# Patient Record
Sex: FEMALE | Race: BLACK OR AFRICAN AMERICAN
[De-identification: names, ages, dates, MRNs, and addresses within clinical notes are randomized per-mention and may not be internally consistent; named-entity substitution may affect disease eponyms.]

---

## 2017-02-27 ENCOUNTER — HOSPITAL ENCOUNTER (EMERGENCY)
Dept: HOSPITAL 17 - NEPA | Age: 62
LOS: 1 days | Discharge: HOME | End: 2017-02-28
Payer: MEDICARE

## 2017-02-27 VITALS — BODY MASS INDEX: 23.95 KG/M2 | WEIGHT: 140.3 LBS | HEIGHT: 64 IN

## 2017-02-27 VITALS
HEART RATE: 86 BPM | RESPIRATION RATE: 16 BRPM | SYSTOLIC BLOOD PRESSURE: 191 MMHG | OXYGEN SATURATION: 98 % | DIASTOLIC BLOOD PRESSURE: 90 MMHG | TEMPERATURE: 97.9 F

## 2017-02-27 DIAGNOSIS — Z79.82: ICD-10-CM

## 2017-02-27 DIAGNOSIS — I50.9: ICD-10-CM

## 2017-02-27 DIAGNOSIS — E11.9: ICD-10-CM

## 2017-02-27 DIAGNOSIS — Z95.1: ICD-10-CM

## 2017-02-27 DIAGNOSIS — I42.9: ICD-10-CM

## 2017-02-27 DIAGNOSIS — R06.09: Primary | ICD-10-CM

## 2017-02-27 DIAGNOSIS — E03.9: ICD-10-CM

## 2017-02-27 DIAGNOSIS — J44.9: ICD-10-CM

## 2017-02-27 DIAGNOSIS — M32.9: ICD-10-CM

## 2017-02-27 DIAGNOSIS — Z79.4: ICD-10-CM

## 2017-02-27 LAB
ANION GAP SERPL CALC-SCNC: 10 MEQ/L (ref 5–15)
BASOPHILS # BLD AUTO: 0 TH/MM3 (ref 0–0.2)
BASOPHILS NFR BLD: 0.2 % (ref 0–2)
BUN SERPL-MCNC: 11 MG/DL (ref 7–18)
CHLORIDE SERPL-SCNC: 104 MEQ/L (ref 98–107)
CK MB SERPL-MCNC: 2.3 NG/ML (ref 0.5–3.6)
CK SERPL-CCNC: 118 U/L (ref 26–192)
EOSINOPHIL # BLD: 0 TH/MM3 (ref 0–0.4)
EOSINOPHIL NFR BLD: 0.4 % (ref 0–4)
ERYTHROCYTE [DISTWIDTH] IN BLOOD BY AUTOMATED COUNT: 15.3 % (ref 11.6–17.2)
GFR SERPLBLD BASED ON 1.73 SQ M-ARVRAT: 83 ML/MIN (ref 89–?)
HCO3 BLD-SCNC: 30.2 MEQ/L (ref 21–32)
HCT VFR BLD CALC: 34.4 % (ref 35–46)
HEMO FLAGS: (no result)
LYMPHOCYTES # BLD AUTO: 0.9 TH/MM3 (ref 1–4.8)
LYMPHOCYTES NFR BLD AUTO: 14.8 % (ref 9–44)
MCH RBC QN AUTO: 26.5 PG (ref 27–34)
MCHC RBC AUTO-ENTMCNC: 33.1 % (ref 32–36)
MCV RBC AUTO: 80.1 FL (ref 80–100)
MONOCYTES NFR BLD: 9.9 % (ref 0–8)
NEUTROPHILS # BLD AUTO: 4.8 TH/MM3 (ref 1.8–7.7)
NEUTROPHILS NFR BLD AUTO: 74.7 % (ref 16–70)
PLATELET # BLD: 181 TH/MM3 (ref 150–450)
POTASSIUM SERPL-SCNC: 3.2 MEQ/L (ref 3.5–5.1)
RBC # BLD AUTO: 4.3 MIL/MM3 (ref 4–5.3)
SODIUM SERPL-SCNC: 144 MEQ/L (ref 136–145)
WBC # BLD AUTO: 6.4 TH/MM3 (ref 4–11)

## 2017-02-27 PROCEDURE — 71275 CT ANGIOGRAPHY CHEST: CPT

## 2017-02-27 PROCEDURE — 93005 ELECTROCARDIOGRAM TRACING: CPT

## 2017-02-27 PROCEDURE — 71010: CPT

## 2017-02-27 PROCEDURE — 82552 ASSAY OF CPK IN BLOOD: CPT

## 2017-02-27 PROCEDURE — 84484 ASSAY OF TROPONIN QUANT: CPT

## 2017-02-27 PROCEDURE — 99285 EMERGENCY DEPT VISIT HI MDM: CPT

## 2017-02-27 PROCEDURE — 82550 ASSAY OF CK (CPK): CPT

## 2017-02-27 PROCEDURE — 85025 COMPLETE CBC W/AUTO DIFF WBC: CPT

## 2017-02-27 PROCEDURE — 80048 BASIC METABOLIC PNL TOTAL CA: CPT

## 2017-02-27 NOTE — PD
HPI


.


Shortness of breath


Chief Complaint:  Edema


Time Seen by Provider:  22:34


Travel History


International Travel<30 days:  No


Contact w/Intl Traveler<30days:  No


Traveled to known affect area:  No





History of Present Illness


HPI


Patient presents with a 3 to four-week history of shortness of breath.  [facility]  She told her doctor about the 

shortness of breath and was instructed to come here for further evaluation.  

Patient reports increasing dyspnea with speaking.  She reports a past medical 

history of chronic bronchitis, CHF, cardiomyopathy, lupus.  She has several 

other underlying medical problems but these are the ones that could possibly 

affect her breathing.  She denies fever.  She denies productive cough.





PFSH


Past Medical History


Hx Anticoagulant Therapy:  Yes (asa)


Anemia:  Yes (PERNICIOUS)


Arthritis:  Yes


Asthma:  Yes (RESOLVED)


Autoimmune Disease:  Yes (LUPUS/ RA/ FIBROMYALGIA)


Blood Disorders:  No


Anxiety:  No


Depression:  No


Heart Rhythm Problems:  Yes (TACHYCARDIA)


Cancer:  No


Cardiac Catheterization:  Yes (in  for ablation)


Cardiomyopathy:  Yes


Cardiovascular Problems:  Yes (MI)


High Cholesterol:  No


Chemotherapy:  No


Chest Pain:  Yes


Congestive Heart Failure:  Yes


COPD:  Yes


Cerebrovascular Accident:  No


Coronary Artery Disease:  Yes


Diabetes:  Yes


Patient Takes Glucophage:  No


Diminished Hearing:  No


Endocrine:  Yes


Fibromyalgia:  Yes


Gastrointestinal Disorders:  Yes


GERD:  Yes


Glaucoma:  No


Genitourinary:  Yes


Headaches:  Yes


Hepatitis:  No


Hiatal Hernia:  Yes


Heparin Induced Thrombocytopen:  No


Herniated Disk:  Yes


Hypertension:  Yes


Immune Disorder:  Yes


Implanted Vascular Access Dvce:  Yes


Kidney Stones:  Yes


Medical other:  Yes (LUPUS, MIGRAINES, ANEMIA, FIBROMYALGIA, RA )


Musculoskeletal:  Yes (FIBROMYALGIA/LUPUS)


Neurologic:  Yes


Psychiatric:  Yes


Reproductive:  No


Immunizations Current:  Yes


Migraines:  Yes


Myocardial Infarction:  Yes ()


Radiation Therapy:  No


Renal Failure:  No


Seizures:  No


Sickle Cell Disease:  No


Sleep Apnea:  No


Thyroid Disease:  Yes (HYPO)


Ulcer:  Yes


Tetanus Vaccination:  > 5 Years


Influenza Vaccination:  Yes


Menopausal:  Yes


Dilation and Curettage (D&C):  Yes





Past Surgical History


Abdominal Surgery:  No


AICD:  Yes ([employer] RFMarq)


Arteriovenous Shunt:  No


Cardiac Surgery:  Yes (AICD [employer] CHANGE)


 Section:  Yes


Coronary Artery Bypass Graft:  Yes


Ear Surgery:  No


Endocrine Surgery:  No


Eye Surgery:  No


Genitourinary Surgery:  No


Gynecologic Surgery:  Yes ( LAP. MYOMECTOMY)


Hysterectomy:  Yes


Insulin Pump:  No


Joint Replacement:  No


Neurologic Surgery:  No


Oral Surgery:  No


Pacemaker:  Yes


Thoracic Surgery:  No


Other Surgery:  Yes





Family History


Family Myocardial Infarction:  No





Social History


Alcohol Use:  Yes (RARE)


Tobacco Use:  No


Substance Use:  No





Allergies-Medications


(Allergen,Severity, Reaction):  


Coded Allergies:  


     No Known Allergies (Verified , 17)


Reported Meds & Prescriptions





Reported Meds & Active Scripts


Active


Reported


Nitroglycerin SL (Nitroglycerin) 0.4 Mg Subl 0.4 Mg SL AS DIRECTED PRN


     ONE TABLET UNDER THE TONGUE AS NEEDED FOR CHEST PAIN, MAY REPEAT EVERY


     FIVE MINUTES FOR A TOTAL OF 3 DOSES OR CALL 911 IF NO RELIEF


Lyrica (Pregabalin) 25 Mg Cap 25 Mg PO DAILY


Percocet (Oxycodone-Acetaminophen)  mg Tab 1 Tab PO Q6H PRN


Metoprolol Tartrate 100 Mg Tab 100 Mg PO BID


Lisinopril 40 Mg Tab 40 Mg PO DAILY


Levothyroxine (Levothyroxine Sodium) 125 Mcg Tab 125 Mcg PO DAILY


Lantus Inj (Insulin Glargine) 1,000 Unit/10 Ml Vial 26 Units SQ HS


Cymbalta DR (Duloxetine HCl) 60 Mg Capdr 60 Mg PO DAILY


Cyanocobalamin Inj (Cyanocobalamin) 1,000 Mcg/Ml Inj 1,000 Mcg SQ ONCE


Carisoprodol 250 Mg Tab 250 Mg PO QID PRN


Aspirin 81 Mg Tabdr 81 Mg PO DAILY


Amlodipine-Benazepril 2.5-10 Mg Cap 1 Cap PO DAILY








Review of Systems


Except as stated in HPI:  all other systems reviewed are Neg


General / Constitutional:  No: Fever, Chills


Cardiovascular:  Positive: Chest Pain or Discomfort


Respiratory:  Positive: Shortness of Breath


Gastrointestinal:  No: Nausea, Vomiting, Diarrhea


Genitourinary:  No: Urgency, Frequency, Dysuria





Physical Exam


Narrative


GENERAL: Patient ambulates to the room and the apparent difficulty.


SKIN: Warm and dry.


HEAD: Atraumatic. Normocephalic. 


EYES: Pupils equal and round. 


ENT: No nasal bleeding or discharge.  Mucous membranes pink and moist.


NECK: Trachea midline.  Neck is supple.


CARDIOVASCULAR: Regular rate and rhythm.  Heart sounds are normal.


RESPIRATORY: No accessory muscle use.  Lungs sound clear with full air movement 

throughout.


GASTROINTESTINAL: Abdomen soft, non-tender, nondistended. 


MUSCULOSKELETAL: No obvious deformities.   No edema. 


NEUROLOGICAL: Awake and alert. No obvious cranial nerve deficits.  Motor 

grossly within normal limits. Normal speech.


PSYCHIATRIC: Appropriate mood and affect; insight and judgment normal.





Data


Data


Last Documented VS





Vital Signs








  Date Time  Temp Pulse Resp B/P Pulse Ox O2 Delivery O2 Flow Rate FiO2


 


17 22:50   24  99 Nasal Cannula 2 


 


17 21:11 97.9 86  191/90    








Orders





 Electrocardiogram (17 21:27)


Complete Blood Count With Diff (17 21:27)


Basic Metabolic Panel (Bmp) (17 21:27)


Ckmb (Isoenzyme) Profile (17 21:27)


Troponin I (17 21:27)


Chest, Single Ap (17 21:27)


CKMB (17 21:35)


CKMB% (17 21:35)


Ct Pulmonary Angiogram (17 )


Iohexol 350 Inj (Omnipaque 350 Inj) (17 01:07)





Labs





 Laboratory Tests








Test 17





 21:35


 


White Blood Count 6.4 TH/MM3


 


Red Blood Count 4.30 MIL/MM3


 


Hemoglobin 11.4 GM/DL


 


Hematocrit 34.4 %


 


Mean Corpuscular Volume 80.1 FL


 


Mean Corpuscular Hemoglobin 26.5 PG


 


Mean Corpuscular Hemoglobin 33.1 %





Concent 


 


Red Cell Distribution Width 15.3 %


 


Platelet Count 181 TH/MM3


 


Mean Platelet Volume 8.4 FL


 


Neutrophils (%) (Auto) 74.7 %


 


Lymphocytes (%) (Auto) 14.8 %


 


Monocytes (%) (Auto) 9.9 %


 


Eosinophils (%) (Auto) 0.4 %


 


Basophils (%) (Auto) 0.2 %


 


Neutrophils # (Auto) 4.8 TH/MM3


 


Lymphocytes # (Auto) 0.9 TH/MM3


 


Monocytes # (Auto) 0.6 TH/MM3


 


Eosinophils # (Auto) 0.0 TH/MM3


 


Basophils # (Auto) 0.0 TH/MM3


 


CBC Comment DIFF FINAL 


 


Differential Comment  


 


Sodium Level 144 MEQ/L


 


Potassium Level 3.2 MEQ/L


 


Chloride Level 104 MEQ/L


 


Carbon Dioxide Level 30.2 MEQ/L


 


Anion Gap 10 MEQ/L


 


Blood Urea Nitrogen 11 MG/DL


 


Creatinine 0.85 MG/DL


 


Estimat Glomerular Filtration 83 ML/MIN





Rate 


 


Random Glucose 255 MG/DL


 


Calcium Level 8.3 MG/DL


 


Total Creatine Kinase 118 U/L


 


Creatine Kinase MB 2.3 NG/ML


 


Troponin I 0.02 NG/ML











[facility]


Medical Decision Making


Medical Screen Exam Complete:  Yes


Emergency Medical Condition:  Yes


Interpretation(s)


EKG shows a sinus rhythm.  Poor wave progression.  Unchanged from previous.


Differential Diagnosis


Differential diagnosis of dyspnea includes but is not limited to congestive 

heart failure, pneumonia, wheezing, pneumothorax, pulmonary embolism


Narrative Course


Patient presents for dyspnea.





CBC & BMP Diagram


17 21:35








CK and troponin are negative.








Last Impressions








Chest X-Ray 17 Signed





Impressions: 





 Service Date/Time:  Monday, 2017 21:36 - CONCLUSION:  No acute 





 disease.    Mild cardiomegaly  AICD     Nghia Espino MD 








The chest x-ray was independently viewed by me.





I have ordered a CT for PE.  IV access is an issue.





Disposition has been delayed because of CT.  1st we couldn't get an IV.  Then 

CT was backed up.





CT:  Negative for pulmonary embolus. Enlarged central pulmonary arteries.





Diagnosis





 Primary Impression:  


 Dyspnea


 Qualified Code:  R06.09 - Dyspnea on exertion





***Additional Instructions:


Follow up with your doctor for ongoing evaluation.


Disposition:   DISCHARGE HOME


Condition:  Stable








Hailee Olivo MD 2017 23:09

## 2017-02-27 NOTE — RADRPT
EXAM DATE/TIME:  02/27/2017 21:36 

 

[facility] COMPARISON:     

CHEST SINGLE AP, July 07, 2016, 19:55.

 

                     

INDICATIONS :     

Chest pain.

                     

 

MEDICAL HISTORY :     

Hypertension.  Diabetes mellitus type II.  Chronic obstructive pulmonary disease.   Lupus.   

 

SURGICAL HISTORY :     

Pacemaker.   Ablation.

 

ENCOUNTER:     

Initial                                        

 

ACUITY:     

1 day      

 

PAIN SCORE:     

5/10

 

LOCATION:     

Bilateral chest 

 

FINDINGS:     

Heart is mildly enlarged.

 

AICD devices in stable position.

 

Lungs are clear.

 

Osseous structures are intact.

 

CONCLUSION:     

No acute disease.  

 

Mild cardiomegaly

 

AICD

 

 

 

 Nghia Espino MD on February 27, 2017 at 22:09           

Board Certified Radiologist.

 This report was verified electronically.

## 2017-02-28 VITALS
SYSTOLIC BLOOD PRESSURE: 168 MMHG | OXYGEN SATURATION: 99 % | RESPIRATION RATE: 22 BRPM | HEART RATE: 89 BPM | DIASTOLIC BLOOD PRESSURE: 72 MMHG

## 2017-02-28 NOTE — RADRPT
EXAM DATE/TIME:  02/28/2017 00:54 

 

[facility] COMPARISON:     

No previous studies available for comparison.

 

 

INDICATIONS :     

Chest pain with shortness of breath.

                    

 

IV CONTRAST:     

75 cc Omnipaque 350 (iohexol) IV 

 

 

RADIATION DOSE:     

23.35 CTDIvol (mGy) 

 

 

MEDICAL HISTORY :     

Cardiovascular disease. Hypertension. Chronic obstructive pulmonary disease.GERD  Diabetes

 

SURGICAL HISTORY :      

CABG Pacemaker.Hysterectomy.Cervical fusion

 

ENCOUNTER:      

Initial

 

ACUITY:      

1 day

 

PAIN SCALE:      

6/10

 

LOCATION:       

Bilateral chest 

 

TECHNIQUE:     

Volumetric scanning of the chest was performed using a pulmonary embolism protocol MIP images were re
constructed.  Using automated exposure control and adjustment of the mA and/or kV according to patien
t size, radiation dose was kept as low as reasonably achievable to obtain optimal diagnostic quality 
images. 

 

FINDINGS:     

No filling defects identified in the pulmonary arteries to suggest pulmonary embolic disease. Mild en
largement of the central pulmonary arteries present.

 

No significant lung consolidation. Dependent atelectasis in both lungs. Scattered parenchymal scarrin
g.

 

Heart size is enlarged. No pleural or pericardial effusion. No pneumothorax. Pacer lead tip in right 
ventricle.

 

No acute findings in the upper abdomen. Peripheral calcifications present in the spleen. 2.2 x 2.5 cm
 right adrenal nodule likely an adenoma. This was described on a prior CT from 2011.

 

CONCLUSION:     

1. Negative for pulmonary embolus. Enlarged central pulmonary arteries.

 

 

 

 Dileep Vu MD on February 28, 2017 at 1:29           

Board Certified Radiologist.

 This report was verified electronically.

## 2017-03-01 NOTE — EKG
Date Performed: 02/27/2017       Time Performed: 21:32:20

 

PTAGE:      61 years

 

EKG:      Sinus rhythm 

 

 NORMAL ECG

 

PREVIOUS TRACING       : 07/07/2016 20.11

 

DOCTOR:   Adam Abdi  Interpretating Date/Time  03/01/2017 11:47:08

## 2017-05-15 ENCOUNTER — HOSPITAL ENCOUNTER (EMERGENCY)
Dept: HOSPITAL 17 - NEPD | Age: 62
Discharge: HOME | End: 2017-05-15
Payer: MEDICARE

## 2017-05-15 VITALS
OXYGEN SATURATION: 96 % | SYSTOLIC BLOOD PRESSURE: 162 MMHG | DIASTOLIC BLOOD PRESSURE: 90 MMHG | RESPIRATION RATE: 12 BRPM | HEART RATE: 70 BPM

## 2017-05-15 VITALS
RESPIRATION RATE: 12 BRPM | HEART RATE: 66 BPM | OXYGEN SATURATION: 96 % | SYSTOLIC BLOOD PRESSURE: 167 MMHG | DIASTOLIC BLOOD PRESSURE: 101 MMHG

## 2017-05-15 VITALS — HEIGHT: 64 IN | BODY MASS INDEX: 24.46 KG/M2 | WEIGHT: 143.3 LBS

## 2017-05-15 VITALS
OXYGEN SATURATION: 100 % | RESPIRATION RATE: 18 BRPM | SYSTOLIC BLOOD PRESSURE: 159 MMHG | DIASTOLIC BLOOD PRESSURE: 86 MMHG | HEART RATE: 74 BPM

## 2017-05-15 VITALS — OXYGEN SATURATION: 95 %

## 2017-05-15 VITALS
SYSTOLIC BLOOD PRESSURE: 163 MMHG | RESPIRATION RATE: 20 BRPM | DIASTOLIC BLOOD PRESSURE: 84 MMHG | HEART RATE: 82 BPM | TEMPERATURE: 97.8 F | OXYGEN SATURATION: 98 %

## 2017-05-15 DIAGNOSIS — Z95.0: ICD-10-CM

## 2017-05-15 DIAGNOSIS — I50.9: ICD-10-CM

## 2017-05-15 DIAGNOSIS — Z95.1: ICD-10-CM

## 2017-05-15 DIAGNOSIS — I25.10: ICD-10-CM

## 2017-05-15 DIAGNOSIS — I10: ICD-10-CM

## 2017-05-15 DIAGNOSIS — Z95.810: ICD-10-CM

## 2017-05-15 DIAGNOSIS — I25.2: ICD-10-CM

## 2017-05-15 DIAGNOSIS — R51: Primary | ICD-10-CM

## 2017-05-15 DIAGNOSIS — Z79.4: ICD-10-CM

## 2017-05-15 DIAGNOSIS — M79.7: ICD-10-CM

## 2017-05-15 DIAGNOSIS — M32.9: ICD-10-CM

## 2017-05-15 DIAGNOSIS — E11.9: ICD-10-CM

## 2017-05-15 PROCEDURE — 96375 TX/PRO/DX INJ NEW DRUG ADDON: CPT

## 2017-05-15 PROCEDURE — 96374 THER/PROPH/DIAG INJ IV PUSH: CPT

## 2017-05-15 PROCEDURE — 96361 HYDRATE IV INFUSION ADD-ON: CPT

## 2017-05-15 PROCEDURE — 96372 THER/PROPH/DIAG INJ SC/IM: CPT

## 2017-05-15 PROCEDURE — 70450 CT HEAD/BRAIN W/O DYE: CPT

## 2017-05-15 PROCEDURE — 99283 EMERGENCY DEPT VISIT LOW MDM: CPT

## 2017-05-15 NOTE — PD
HPI


Chief Complaint:  Head Injury


Time Seen by Provider:  14:45


Travel History


International Travel<30 days:  No


Contact w/Intl Traveler<30days:  No


Traveled to known affect area:  No





History of Present Illness


HPI


61-year-old female with PMH of HTN, MI, CHF, COPD, DM, GERD, pernicious anemia, 

lupus, RA, fibromyalgia, CAD, MI, dysrhythmia s/p AICD implantation presents to 

the ED for evaluation of 9/10 constant, frontal headache.  Gradual onset 

approximately 8 hours ago.  Accompanied by nausea, vomiting, dizziness, 

intermittently blurred vision, photophobia.  The patient denies difficulties 

with ambulation.  She states that 3 days ago she was standing on a step stool 

changing a bulb in a ceiling fan when she lost her balance, fell and struck the 

posterior aspect of her head on the lever for a recliner.  She states that in 

an attempt to get up she lost her balance again, and struck her head on the 

hardwood floor.  She denies loss of consciousness.  She states that over the 

last few days she suffered from aches and pains but the headache only began 

today.  She states this is not like previous headaches.





PFSH


Past Medical History


Hx Anticoagulant Therapy:  Yes (ASA)


Anemia:  Yes (PERNICIOUS)


Arthritis:  Yes


Asthma:  Yes (RESOLVED)


Autoimmune Disease:  Yes (LUPUS/ RA/ FIBROMYALGIA)


Blood Disorders:  No


Anxiety:  No


Depression:  No


Heart Rhythm Problems:  Yes (TACHYCARDIA)


Cancer:  No


Cardiac Catheterization:  Yes (in  for ablation)


Cardiomyopathy:  Yes


Cardiovascular Problems:  Yes (HTN, MI)


High Cholesterol:  No


Chemotherapy:  No


Chest Pain:  Yes


Congestive Heart Failure:  Yes


COPD:  Yes


Cerebrovascular Accident:  No


Coronary Artery Disease:  Yes


Diabetes:  Yes


Diminished Hearing:  No


Endocrine:  Yes


Fibromyalgia:  Yes


Gastrointestinal Disorders:  Yes


GERD:  Yes


Glaucoma:  No


Genitourinary:  Yes


Headaches:  Yes


Hepatitis:  No


Hiatal Hernia:  Yes


Heparin Induced Thrombocytopen:  No


Herniated Disk:  Yes


Hypertension:  Yes


Immune Disorder:  Yes


Implanted Vascular Access Dvce:  Yes


Kidney Stones:  Yes


Musculoskeletal:  Yes (FIBROMYALGIA/LUPUS)


Neurologic:  Yes


Psychiatric:  Yes


Reproductive:  No


Respiratory:  Yes (ASTHMA, COPD)


Immunizations Current:  Yes


Migraines:  Yes


Myocardial Infarction:  Yes ()


Radiation Therapy:  No


Renal Failure:  No


Seizures:  No


Sickle Cell Disease:  No


Sleep Apnea:  No


Thyroid Disease:  Yes (HYPO)


Ulcer:  Yes


Menopausal:  Yes


Dilation and Curettage (D&C):  Yes





Past Surgical History


Abdominal Surgery:  No


AICD:  Yes ([employer])


Arteriovenous Shunt:  No


Cardiac Surgery:  Yes (AICD [employer] CHANGE)


 Section:  Yes


Coronary Artery Bypass Graft:  Yes


Ear Surgery:  No


Endocrine Surgery:  No


Eye Surgery:  No


Genitourinary Surgery:  No


Gynecologic Surgery:  Yes ( LAP. MYOMECTOMY)


Hysterectomy:  Yes


Insulin Pump:  No


Joint Replacement:  No


Neurologic Surgery:  No


Oral Surgery:  No


Pacemaker:  Yes


Thoracic Surgery:  No


Other Surgery:  Yes





Social History


Alcohol Use:  Yes (RARE)


Tobacco Use:  No


Substance Use:  No





Allergies-Medications


(Allergen,Severity, Reaction):  


Coded Allergies:  


     No Known Allergies (Verified , 5/15/17)


Reported Meds & Prescriptions





Reported Meds & Active Scripts


Active


Reported


Gabapentin 300 Mg Cap Unknown Dose PO TID


Sulfasalazine 500 Mg Tab 1,000 Mg PO BID


Integra Plus (Multi-Vit/Iron-Vit C-B12-Folic Acid) 191-210-0.01-1 mg Cap 1 Cap 

PO DAILY


Pantoprazole (Pantoprazole Sodium) 20 Mg Tab 20 Mg PO BID


Alendronate (Alendronate Sodium) 70 Mg Tab Unknown Dose PO Q7D


Novolin 70-30 Inj (Insulin Human Isoph/Insulin Regular) 1,000 Unit/10 Ml Vial 

12 Units SQ HS


Novolin 70-30 Inj (Insulin Human Isoph/Insulin Regular) 1,000 Unit/10 Ml Vial 

10 Units SQ DAILY


Novolin 70-30 Inj (Insulin Human Isoph/Insulin Regular) 1,000 Unit/10 Ml Vial 

10 Units SQ DAILY


Omeprazole 20 Mg Tab 20 Mg PO DAILY


Ibuprofen 800 Mg Tab 800 Mg PO HS


Morphine ER (Morphine Sulfate) 15 Mg Tab 15 Mg PO TID PRN


Nitroglycerin SL (Nitroglycerin) 0.4 Mg Subl 0.4 Mg SL AS DIRECTED PRN


     ONE TABLET UNDER THE TONGUE AS NEEDED FOR CHEST PAIN, MAY REPEAT EVERY


     FIVE MINUTES FOR A TOTAL OF 3 DOSES OR CALL 911 IF NO RELIEF


Lyrica (Pregabalin) 25 Mg Cap 25 Mg PO DAILY


Percocet (Oxycodone-Acetaminophen)  mg Tab 1 Tab PO Q6H PRN


Metoprolol Tartrate 100 Mg Tab 100 Mg PO BID


Lisinopril 40 Mg Tab 40 Mg PO DAILY


Levothyroxine (Levothyroxine Sodium) 125 Mcg Tab 125 Mcg PO DAILY


Lantus Inj (Insulin Glargine) 1,000 Unit/10 Ml Vial 26 Units SQ HS


Cymbalta DR (Duloxetine HCl) 60 Mg Capdr 60 Mg PO TID


Cyanocobalamin Inj (Cyanocobalamin) 1,000 Mcg/Ml Inj 1,000 Mcg SQ ONCE


Carisoprodol 250 Mg Tab 250 Mg PO QID PRN


Aspirin 81 Mg Tabdr 81 Mg PO DAILY


Amlodipine-Benazepril 2.5-10 Mg Cap 1 Cap PO DAILY








Review of Systems


Except as stated in HPI:  all other systems reviewed are Neg





Physical Exam


Narrative


GENERAL: Well-nourished, well-developed black female, lying on her side on the 

stretcher, holding her hands over the eyes, in no acute distress.


SKIN: Focused skin assessment warm/dry.


HEAD: Normocephalic. Atraumatic. No TTP or bony step offs.


EYES: No scleral icterus. No injection or drainage.  PERRLA.  EOMI


NECK: Supple, trachea midline. No JVD or lymphadenopathy.


CARDIOVASCULAR: Regular rate and rhythm without murmurs, gallops, or rubs. 


RESPIRATORY: Breath sounds clear and equal bilaterally. No accessory muscle use.


GASTROINTESTINAL: Abdomen soft, non-tender, nondistended.  Active bowel sounds


MUSCULOSKELETAL: No cyanosis, or edema.  Patient retains full, active MYRIAM of 

the bilateral upper and lower extremities.


NEUROLOGICAL: Awake and alert. Cranial nerves II through XII intact.  Motor and 

sensory grossly within normal  limits. 5/5 muscle strength in all muscle 

groups.  Normal speech.  No pronator drift.


BACK: Nontender without obvious deformity. No CVA tenderness.





Data


Data


Last Documented VS





Vital Signs








  Date Time  Temp Pulse Resp B/P Pulse Ox O2 Delivery O2 Flow Rate FiO2


 


5/15/17 17:24   16     


 


5/15/17 15:23  74  159/86 100 Room Air  


 


5/15/17 12:03 97.8       








Orders





 Ct Brain W/O Iv Contrast(Rout) (5/15/17 14:56)


Ecg Monitoring (5/15/17 14:56)


Iv Access Insert/Monitor (5/15/17 14:56)


Oximetry (5/15/17 14:56)


Sodium Chloride 0.9% Flush (Ns Flush) (5/15/17 15:00)


Ketorolac Inj (Toradol Inj) (5/15/17 15:00)


Prochlorperazine Inj (Compazine Inj) (5/15/17 15:00)


Diphenhydramine Inj (Benadryl Inj) (5/15/17 15:00)


Sodium Chlorid 0.9% 500 Ml Inj (Ns 500 M (5/15/17 15:00)








MDM


Medical Decision Making


Medical Screen Exam Complete:  Yes


Emergency Medical Condition:  Yes


Differential Diagnosis


Posttraumatic headache versus "head injury versus migraine headache versus ICH 

versus embolic stroke versus other


Narrative Course


61-year-old female with PMH of HTN, MI, CHF, COPD, DM, GERD, pernicious anemia, 

lupus, RA, fibromyalgia, CAD, MI, dysrhythmia s/p AICD implantation presents to 

the ED for evaluation of 9/10 constant, frontal headache.  Gradual onset 

approximately 8 hours ago.  Accompanied by nausea, vomiting, dizziness, 

intermittently blurred vision, photophobia.  The patient denies difficulties 

with ambulation.  She states that 3 days ago she fell from a step stool and 

struck the posterior aspect of her head on the lever for a recliner. Fell again 

while trying to rise and struck her head on the hardwood floor.  She denies 

LOC. Vitals reviewed. Physical exam reveals a nontoxic appearing black female 

in no acute distress. PERRLA, no focal neuro deficits. IV was established. 

Patient was administered IV Toradol, Compazine, Benadryl. CT unremarkable and 

stable compared to previous CT per radiology read. Patient is sleeping on 

recheck, reports improvement of her symptoms upon waking. This is headache, 

likely migraine.  Patient is instructed to rest, hydrate, avoid known stressors

, resume at home medicines, follow-up with the primary care provider.  She 

indicated understanding of instructions and is agreeable to a care plan.  She 

is stable and discharged home.





Diagnosis





 Primary Impression:  


 Cephalalgia


 Qualified Code:  R51 - Acute nonintractable headache, unspecified headache type


Referrals:  


Primary Care Physician


Patient Instructions:  General Instructions, Migraine Headache (ED)





***Additional Instructions:


Rest, hydrate. 


Avoid known stressors.


Resume at home medications.


Follow up with the primary care provider.


Return to the ED for any urgent or emergent medical condition.


Disposition:   DISCHARGE HOME


Condition:  Stable








Amy Cox May 15, 2017 14:47

## 2017-05-15 NOTE — RADRPT
EXAM DATE/TIME:  05/15/2017 16:52 

 

[facility] COMPARISON:     

CT BRAIN W/O CONTRAST, December 26, 2015, 10:07.

 

 

INDICATIONS :     

Patient fell,hitting right side of head 3 days ago.

                      

 

RADIATION DOSE:     

35.24 CTDIvol (mGy) 

 

 

 

MEDICAL HISTORY :     

Cardiovascular disease. Congestive heart failure. Hypertension.

 

SURGICAL HISTORY :      

None. 

 

ENCOUNTER:      

Initial

 

ACUITY:      

3 days

 

PAIN SCALE:      

9/10

 

LOCATION:       

Right cranial 

 

TECHNIQUE:     

Multiple contiguous axial images were obtained of the head.  Using automated exposure control and adj
ustment of the mA and/or kV according to patient size, radiation dose was kept as low as reasonably a
chievable to obtain optimal diagnostic quality images. 

 

FINDINGS:     

 

CEREBRUM:     

The ventricles are normal for age.  No evidence of midline shift, mass lesion, hemorrhage or acute in
farction.  No extra-axial fluid collections are seen.

 

POSTERIOR FOSSA:     

The cerebellum and brainstem are intact.  The 4th ventricle is midline.  The cerebellopontine angle i
s unremarkable.

 

EXTRACRANIAL:     

The visualized portion of the orbits is intact.

 

SKULL:     

The calvaria is intact.  No evidence of skull fracture.

 

CONCLUSION:     

Unremarkable and stable CT brain when compared to the prior examination.

 

 

 

 Sushil Moore MD on May 15, 2017 at 17:06           

Board Certified Radiologist.

 This report was verified electronically.

## 2017-05-15 NOTE — PD
Physical Exam


Date Seen by Provider:  May 15, 2017


Time Seen by Provider:  12:33


Narrative


60 y/o female with hx fall 3 days ago without LOC.  Pt. now c/o worsening HA. 

Pain 9/10.  + Nausea, Vomitting, and Dizzyness.  Hx of Migraine, but feels this 

is different.





NKDA





V/S Stable





Awaiting Bed Placement.





Data


Data


Last Documented VS





Vital Signs








  Date Time  Temp Pulse Resp B/P Pulse Ox O2 Delivery O2 Flow Rate FiO2


 


5/15/17 12:03 97.8 82 20 163/84 98 Room Air  











[facility]


Medical Record Reviewed:  Yes


Supervised Visit with LAURA:  Yes


Condition:  Stable








Abiel Cui May 15, 2017 12:35

## 2017-07-28 ENCOUNTER — HOSPITAL ENCOUNTER (EMERGENCY)
Dept: HOSPITAL 17 - NEPE | Age: 62
LOS: 1 days | Discharge: HOME | End: 2017-07-29
Payer: MEDICARE

## 2017-07-28 VITALS
TEMPERATURE: 98.8 F | HEART RATE: 97 BPM | OXYGEN SATURATION: 97 % | RESPIRATION RATE: 16 BRPM | SYSTOLIC BLOOD PRESSURE: 171 MMHG | DIASTOLIC BLOOD PRESSURE: 79 MMHG

## 2017-07-28 VITALS — HEIGHT: 64 IN | WEIGHT: 149.91 LBS | BODY MASS INDEX: 25.59 KG/M2

## 2017-07-28 VITALS — OXYGEN SATURATION: 97 % | HEART RATE: 86 BPM | RESPIRATION RATE: 16 BRPM

## 2017-07-28 DIAGNOSIS — Z79.4: ICD-10-CM

## 2017-07-28 DIAGNOSIS — E10.649: Primary | ICD-10-CM

## 2017-07-28 PROCEDURE — 85025 COMPLETE CBC W/AUTO DIFF WBC: CPT

## 2017-07-28 PROCEDURE — 99283 EMERGENCY DEPT VISIT LOW MDM: CPT

## 2017-07-28 PROCEDURE — 81001 URINALYSIS AUTO W/SCOPE: CPT

## 2017-07-28 PROCEDURE — 80053 COMPREHEN METABOLIC PANEL: CPT

## 2017-07-28 NOTE — PD
Physical Exam


Date Seen by Provider:  Jul 28, 2017


Time Seen by Provider:  20:15


Narrative


60 yo female here for evaluation of low blood sugars. Type 1 diabetic taking 

insulin and her sugars have been low in multiple readings. Not eating as much. 

Feeling weak. No other medical issues. 


Vitals are stable in triage. Awaiting bed placement.





Data


Data


Last Documented VS





Vital Signs








  Date Time  Temp Pulse Resp B/P Pulse Ox O2 Delivery O2 Flow Rate FiO2


 


7/28/17 19:32 98.8 97 16 171/79 97 Room Air  











[facility]


Medical Record Reviewed:  Yes


Supervised Visit with LAURA:  Adrian Can Jul 28, 2017 20:16

## 2017-07-28 NOTE — PD
HPI


Chief Complaint:  Diabetic


Time Seen by Provider:  22:55


Travel History


International Travel<30 days:  No


Contact w/Intl Traveler<30days:  No


Traveled to known affect area:  No





History of Present Illness


HPI


61-year-old female with history of diabetes, multiple medical issues, presents 

to the ER today because she states that she has been feeling weak and dizzy and 

checked her blood sugars this morning and it was low in the 50s.  She states 

that she had tried to eat lunch and is still having low blood sugars.  She had 

taken her last insulin dose of Humalog this morning.  Her last Lantus was last 

night.  She denies any nausea, vomiting, diarrhea, or any other issues.  She 

was symptomatic again in the waiting room and was given orange juice, repeat 

blood sugar after the juice was 140.





Modifying Factors: None


Associated Signs & Symptoms: Hypoglycemia


Risk Factors: Diabetic





PFSH


Past Medical History


Hx Anticoagulant Therapy:  Yes (low dose ASA daily. )


Anemia:  Yes (PERNICIOUS)


Arthritis:  Yes


Asthma:  Yes


Autoimmune Disease:  Yes (LUPUS/ RA/ FIBROMYALGIA)


Blood Disorders:  No


Anxiety:  No


Depression:  No


Heart Rhythm Problems:  Yes (TACHYCARDIA)


Cancer:  No


Cardiac Catheterization:  Yes (in  for ablation)


Cardiomyopathy:  Yes


Cardiovascular Problems:  Yes (AICD, SVT)


High Cholesterol:  No


Chemotherapy:  No


Chest Pain:  Yes


Congestive Heart Failure:  Yes


COPD:  Yes


Cerebrovascular Accident:  No


Coronary Artery Disease:  Yes


Diabetes:  Yes


Patient Takes Glucophage:  No


Diminished Hearing:  No


Endocrine:  Yes


Fibromyalgia:  Yes


Gastrointestinal Disorders:  Yes


GERD:  Yes


Glaucoma:  No


Genitourinary:  Yes


Headaches:  Yes


Hepatitis:  No


Hiatal Hernia:  Yes


Heparin Induced Thrombocytopen:  No


Herniated Disk:  Yes


Hypertension:  Yes


Immune Disorder:  Yes


Implanted Vascular Access Dvce:  Yes


Kidney Stones:  Yes


Medical other:  Yes (LUPUS, MIGRAINES, ANEMIA, FIBROMYALGIA, RA )


Musculoskeletal:  Yes (FIBROMYALGIA,LUPUS)


Neurologic:  Yes


Psychiatric:  Yes


Reproductive:  No


Respiratory:  Yes (COPD, Bronchitis)


Immunizations Current:  Yes


Migraines:  Yes


Myocardial Infarction:  Yes ()


Radiation Therapy:  No


Renal Failure:  No


Seizures:  No


Sickle Cell Disease:  No


Sleep Apnea:  No


Thyroid Disease:  Yes (HYPO)


Ulcer:  Yes


Menopausal:  Yes


:  2


Para:  1


Miscarriage:  1


Dilation and Curettage (D&C):  Yes





Past Surgical History


Abdominal Surgery:  No


AICD:  Yes ([employer])


Arteriovenous Shunt:  No


Cardiac Surgery:  Yes (AICD [employer])


 Section:  Yes (x1)


Coronary Artery Bypass Graft:  Yes


Ear Surgery:  No


Endocrine Surgery:  No


Eye Surgery:  No


Genitourinary Surgery:  No


Gynecologic Surgery:  Yes ( LAP. MYOMECTOMY)


Hysterectomy:  Yes


Insulin Pump:  No


Joint Replacement:  No


Neurologic Surgery:  No


Oral Surgery:  No


Pacemaker:  Yes


Thoracic Surgery:  No


Other Surgery:  Yes





Family History


Family Myocardial Infarction:  No





Social History


Alcohol Use:  No


Tobacco Use:  No


Substance Use:  No





Allergies-Medications


(Allergen,Severity, Reaction):  


Coded Allergies:  


     No Known Allergies (Verified , 5/15/17)


Reported Meds & Prescriptions





Reported Meds & Active Scripts


Active


Reported


Valsartan 40 Mg Tab 40 Mg PO DAILY


Aspirin 81 (Aspirin) 81 Mg Tabdr 81 Mg PO HS


Lyrica (Pregabalin) 225 Mg Cap 225 Mg PO HS


Gabapentin 300 Mg Cap Unknown Dose PO TID


Sulfasalazine 500 Mg Tab 1,000 Mg PO BID


Integra Plus (Multi-Vit/Iron-Vit C-B12-Folic Acid) 191-210-0.01-1 mg Cap 1 Cap 

PO DAILY


Alendronate (Alendronate Sodium) 70 Mg Tab Unknown Dose PO Q7D


Novolin 70-30 Inj (Insulin Human Isoph/Insulin Regular) 1,000 Unit/10 Ml Vial 

12 Units SQ HS


Novolin 70-30 Inj (Insulin Human Isoph/Insulin Regular) 1,000 Unit/10 Ml Vial 

12 Units SQ DAILY


Novolin 70-30 Inj (Insulin Human Isoph/Insulin Regular) 1,000 Unit/10 Ml Vial 

10 Units SQ DAILY


Omeprazole 20 Mg Tab 20 Mg PO DAILY


Morphine ER (Morphine Sulfate) 15 Mg Tab 60 Mg PO BID PRN


Nitroglycerin SL (Nitroglycerin) 0.4 Mg Subl 0.4 Mg SL AS DIRECTED PRN


     ONE TABLET UNDER THE TONGUE AS NEEDED FOR CHEST PAIN, MAY REPEAT EVERY


     FIVE MINUTES FOR A TOTAL OF 3 DOSES OR CALL 911 IF NO RELIEF


Percocet (Oxycodone-Acetaminophen)  mg Tab 1 Tab PO Q6H PRN


Metoprolol Tartrate 100 Mg Tab 100 Mg PO BID


Levothyroxine (Levothyroxine Sodium) 125 Mcg Tab 125 Mcg PO DAILY


Lantus Inj (Insulin Glargine) 1,000 Unit/10 Ml Vial 35 Units SQ HS


Cymbalta DR (Duloxetine HCl) 60 Mg Capdr 60 Mg PO TID


Cyanocobalamin Inj (Cyanocobalamin) 1,000 Mcg/Ml Inj 1,000 Mcg SQ 1 MONTH


Carisoprodol 250 Mg Tab 350 Mg PO TID PRN


Amlodipine-Benazepril 2.5-10 Mg Cap 1 Cap PO DAILY








Review of Systems


Except as stated in HPI:  all other systems reviewed are Neg





Physical Exam


Narrative


GENERAL: Well-developed elderly -American female patient currently none 

acute distress.  Awake and oriented 3.


SKIN: Focused skin assessment warm/dry.


HEAD: Atraumatic. Normocephalic. 


EYES: Pupils equal and round. No scleral icterus. No injection or drainage. 


ENT: No nasal bleeding or discharge.  Mucous membranes pink and moist.


NECK: Trachea midline. No JVD. 


CARDIOVASCULAR: Regular rate and rhythm.  No murmur appreciated.


RESPIRATORY: No accessory muscle use. Clear to auscultation. Breath sounds 

equal bilaterally. 


GASTROINTESTINAL: Abdomen soft, non-tender, nondistended. Hepatic and splenic 

margins not palpable. 


MUSCULOSKELETAL: No obvious deformities. No clubbing.  No cyanosis.  No edema. 


NEUROLOGICAL: Awake and alert. No obvious cranial nerve deficits.  Motor 

grossly within normal limits. Normal speech.


PSYCHIATRIC: Appropriate mood and affect; insight and judgment normal.





Data


Data


Last Documented VS





Vital Signs








  Date Time  Temp Pulse Resp B/P Pulse Ox O2 Delivery O2 Flow Rate FiO2


 


17 01:08  80 16 161/73 99 Room Air  


 


17 19:32 98.8       








Orders





 Complete Blood Count With Diff (17 22:49)


Comprehensive Metabolic Panel (17 22:49)


Urinalysis - C+S If Indicated (17 22:49)


Ecg Monitoring (17 22:49)


Iv Access Insert/Monitor (17 22:49)


Oximetry (17 22:49)


Sodium Chloride 0.9% Flush (Ns Flush) (17 23:00)





Labs








 Laboratory Tests








Test 17





 00:46 01:39


 


White Blood Count 9.9 TH/MM3 


 


Red Blood Count 4.37 MIL/MM3 


 


Hemoglobin 11.3 GM/DL 


 


Hematocrit 34.6 % 


 


Mean Corpuscular Volume 79.2 FL 


 


Mean Corpuscular Hemoglobin 25.8 PG 


 


Mean Corpuscular Hemoglobin 32.6 % 





Concent  


 


Red Cell Distribution Width 15.6 % 


 


Platelet Count 200 TH/MM3 


 


Mean Platelet Volume 8.3 FL 


 


Neutrophils (%) (Auto) 81.2 % 


 


Lymphocytes (%) (Auto) 9.9 % 


 


Monocytes (%) (Auto) 8.6 % 


 


Eosinophils (%) (Auto) 0.1 % 


 


Basophils (%) (Auto) 0.2 % 


 


Neutrophils # (Auto) 8.0 TH/MM3 


 


Lymphocytes # (Auto) 1.0 TH/MM3 


 


Monocytes # (Auto) 0.9 TH/MM3 


 


Eosinophils # (Auto) 0.0 TH/MM3 


 


Basophils # (Auto) 0.0 TH/MM3 


 


CBC Comment DIFF FINAL  


 


Differential Comment   


 


Sodium Level 139 MEQ/L 


 


Potassium Level 3.8 MEQ/L 


 


Chloride Level 103 MEQ/L 


 


Carbon Dioxide Level 29.7 MEQ/L 


 


Anion Gap 6 MEQ/L 


 


Blood Urea Nitrogen 18 MG/DL 


 


Creatinine 0.79 MG/DL 


 


Estimat Glomerular Filtration 90 ML/MIN 





Rate  


 


Random Glucose 84 MG/DL 


 


Calcium Level 9.1 MG/DL 


 


Total Bilirubin 0.3 MG/DL 


 


Aspartate Amino Transf 28 U/L 





(AST/SGOT)  


 


Alanine Aminotransferase 37 U/L 





(ALT/SGPT)  


 


Alkaline Phosphatase 152 U/L 


 


Total Protein 6.8 GM/DL 


 


Albumin 3.3 GM/DL 


 


Urine Color  YELLOW 


 


Urine Turbidity  CLEAR 


 


Urine pH  5.5 


 


Urine Specific Gravity  1.012 


 


Urine Protein  NEG mg/dL


 


Urine Glucose (UA)  NEG mg/dL


 


Urine Ketones  NEG mg/dL


 


Urine Occult Blood  NEG 


 


Urine Nitrite  NEG 


 


Urine Bilirubin  NEG 


 


Urine Urobilinogen  LESS THAN 2.0





  MG/DL


 


Urine Leukocyte Esterase  NEG 


 


Urine RBC  1 /hpf


 


Urine WBC  1 /hpf


 


Urine Squamous Epithelial  <1 /hpf





Cells  


 


Microscopic Urinalysis Comment  CULT NOT





  INDICATED














MDM


Medical Decision Making


Medical Screen Exam Complete:  Yes


Emergency Medical Condition:  Yes


Medical Record Reviewed:  Yes


Interpretation(s)





Laboratory Tests








Test 17





 00:46


 


Hemoglobin 11.3 GM/DL





 (11.6-15.3)


 


Hematocrit 34.6 %





 (35.0-46.0)


 


Mean Corpuscular Volume 79.2 FL





 (80.0-100.0)


 


Mean Corpuscular Hemoglobin 25.8 PG





 (27.0-34.0)


 


Neutrophils (%) (Auto) 81.2 %





 (16.0-70.0)


 


Monocytes (%) (Auto) 8.6 % (0.0-8.0)


 


Neutrophils # (Auto) 8.0 TH/MM3





 (1.8-7.7)


 


Alkaline Phosphatase 152 U/L





 ()


 


Albumin 3.3 GM/DL





 (3.4-5.0)








Differential Diagnosis


Dehydration versus renal failure versus other metabolic issues


Narrative Course


Patient's blood sugars have stabilized in the ER and she was observed several 

hours.  She ate dinner.  Last insulin dose was not since this morning.  Her long

-acting insulin was last night.  At this point, I suspect that the insulin is 

out of her system.  And she is eating well.  My plan would be to release her 

with close recheck of blood sugars tonight every few hours.  Return for any 

worsening in symptoms as needed.  The plan has been discussed with her and she 

states understanding.  She should talked her primary care physician regarding 

insulin dosing if hypoglycemia continues to be a problem.





Diagnosis





 Primary Impression:  


 Hypoglycemia


Disposition:  01 DISCHARGE HOME


Condition:  Stable








SoonAlirio shah MD 2017 22:56

## 2017-07-29 VITALS
RESPIRATION RATE: 16 BRPM | DIASTOLIC BLOOD PRESSURE: 73 MMHG | HEART RATE: 80 BPM | OXYGEN SATURATION: 99 % | SYSTOLIC BLOOD PRESSURE: 161 MMHG

## 2017-07-29 LAB
ALP SERPL-CCNC: 152 U/L (ref 45–117)
ALT SERPL-CCNC: 37 U/L (ref 10–53)
ANION GAP SERPL CALC-SCNC: 6 MEQ/L (ref 5–15)
AST SERPL-CCNC: 28 U/L (ref 15–37)
BASOPHILS # BLD AUTO: 0 TH/MM3 (ref 0–0.2)
BASOPHILS NFR BLD: 0.2 % (ref 0–2)
BILIRUB SERPL-MCNC: 0.3 MG/DL (ref 0.2–1)
BUN SERPL-MCNC: 18 MG/DL (ref 7–18)
CHLORIDE SERPL-SCNC: 103 MEQ/L (ref 98–107)
COLOR UR: YELLOW
COMMENT (UR): (no result)
CULTURE IF INDICATED: (no result)
EOSINOPHIL # BLD: 0 TH/MM3 (ref 0–0.4)
EOSINOPHIL NFR BLD: 0.1 % (ref 0–4)
ERYTHROCYTE [DISTWIDTH] IN BLOOD BY AUTOMATED COUNT: 15.6 % (ref 11.6–17.2)
GFR SERPLBLD BASED ON 1.73 SQ M-ARVRAT: 90 ML/MIN (ref 89–?)
GLUCOSE UR STRIP-MCNC: (no result) MG/DL
HCO3 BLD-SCNC: 29.7 MEQ/L (ref 21–32)
HCT VFR BLD CALC: 34.6 % (ref 35–46)
HEMO FLAGS: (no result)
HGB UR QL STRIP: (no result)
KETONES UR STRIP-MCNC: (no result) MG/DL
LYMPHOCYTES # BLD AUTO: 1 TH/MM3 (ref 1–4.8)
LYMPHOCYTES NFR BLD AUTO: 9.9 % (ref 9–44)
MCH RBC QN AUTO: 25.8 PG (ref 27–34)
MCHC RBC AUTO-ENTMCNC: 32.6 % (ref 32–36)
MCV RBC AUTO: 79.2 FL (ref 80–100)
MONOCYTES NFR BLD: 8.6 % (ref 0–8)
NEUTROPHILS # BLD AUTO: 8 TH/MM3 (ref 1.8–7.7)
NEUTROPHILS NFR BLD AUTO: 81.2 % (ref 16–70)
NITRITE UR QL STRIP: (no result)
PLATELET # BLD: 200 TH/MM3 (ref 150–450)
POTASSIUM SERPL-SCNC: 3.8 MEQ/L (ref 3.5–5.1)
RBC # BLD AUTO: 4.37 MIL/MM3 (ref 4–5.3)
SODIUM SERPL-SCNC: 139 MEQ/L (ref 136–145)
SP GR UR STRIP: 1.01 (ref 1–1.03)
SQUAMOUS #/AREA URNS HPF: <1 /HPF (ref 0–5)
WBC # BLD AUTO: 9.9 TH/MM3 (ref 4–11)

## 2017-10-03 ENCOUNTER — HOSPITAL ENCOUNTER (INPATIENT)
Dept: HOSPITAL 17 - NEPE | Age: 62
LOS: 3 days | Discharge: HOME | DRG: 872 | End: 2017-10-06
Attending: SPECIALIST | Admitting: SPECIALIST
Payer: MEDICARE

## 2017-10-03 VITALS
DIASTOLIC BLOOD PRESSURE: 64 MMHG | OXYGEN SATURATION: 97 % | TEMPERATURE: 97.9 F | RESPIRATION RATE: 16 BRPM | HEART RATE: 78 BPM | SYSTOLIC BLOOD PRESSURE: 108 MMHG

## 2017-10-03 VITALS — WEIGHT: 156.75 LBS | HEIGHT: 65.5 IN | BODY MASS INDEX: 25.8 KG/M2

## 2017-10-03 VITALS — OXYGEN SATURATION: 98 %

## 2017-10-03 VITALS
DIASTOLIC BLOOD PRESSURE: 62 MMHG | SYSTOLIC BLOOD PRESSURE: 125 MMHG | OXYGEN SATURATION: 98 % | HEART RATE: 89 BPM | RESPIRATION RATE: 20 BRPM

## 2017-10-03 DIAGNOSIS — Z95.1: ICD-10-CM

## 2017-10-03 DIAGNOSIS — Z87.891: ICD-10-CM

## 2017-10-03 DIAGNOSIS — K52.9: ICD-10-CM

## 2017-10-03 DIAGNOSIS — I42.9: ICD-10-CM

## 2017-10-03 DIAGNOSIS — N18.9: ICD-10-CM

## 2017-10-03 DIAGNOSIS — G89.4: ICD-10-CM

## 2017-10-03 DIAGNOSIS — D63.8: ICD-10-CM

## 2017-10-03 DIAGNOSIS — I25.10: ICD-10-CM

## 2017-10-03 DIAGNOSIS — E11.42: ICD-10-CM

## 2017-10-03 DIAGNOSIS — N17.9: ICD-10-CM

## 2017-10-03 DIAGNOSIS — E87.6: ICD-10-CM

## 2017-10-03 DIAGNOSIS — E11.22: ICD-10-CM

## 2017-10-03 DIAGNOSIS — I48.91: ICD-10-CM

## 2017-10-03 DIAGNOSIS — K44.9: ICD-10-CM

## 2017-10-03 DIAGNOSIS — E03.9: ICD-10-CM

## 2017-10-03 DIAGNOSIS — K21.0: ICD-10-CM

## 2017-10-03 DIAGNOSIS — K31.7: ICD-10-CM

## 2017-10-03 DIAGNOSIS — M32.9: ICD-10-CM

## 2017-10-03 DIAGNOSIS — E11.43: ICD-10-CM

## 2017-10-03 DIAGNOSIS — I95.9: ICD-10-CM

## 2017-10-03 DIAGNOSIS — E86.0: ICD-10-CM

## 2017-10-03 DIAGNOSIS — Z79.4: ICD-10-CM

## 2017-10-03 DIAGNOSIS — E88.09: ICD-10-CM

## 2017-10-03 DIAGNOSIS — T40.605A: ICD-10-CM

## 2017-10-03 DIAGNOSIS — Z95.810: ICD-10-CM

## 2017-10-03 DIAGNOSIS — I13.0: ICD-10-CM

## 2017-10-03 DIAGNOSIS — I25.2: ICD-10-CM

## 2017-10-03 DIAGNOSIS — E11.65: ICD-10-CM

## 2017-10-03 DIAGNOSIS — M06.9: ICD-10-CM

## 2017-10-03 DIAGNOSIS — K59.03: ICD-10-CM

## 2017-10-03 DIAGNOSIS — E87.2: ICD-10-CM

## 2017-10-03 DIAGNOSIS — J44.9: ICD-10-CM

## 2017-10-03 DIAGNOSIS — K64.9: ICD-10-CM

## 2017-10-03 DIAGNOSIS — M19.90: ICD-10-CM

## 2017-10-03 DIAGNOSIS — E83.42: ICD-10-CM

## 2017-10-03 DIAGNOSIS — E87.1: ICD-10-CM

## 2017-10-03 DIAGNOSIS — Z23: ICD-10-CM

## 2017-10-03 DIAGNOSIS — A41.9: Primary | ICD-10-CM

## 2017-10-03 DIAGNOSIS — R65.20: ICD-10-CM

## 2017-10-03 DIAGNOSIS — K29.70: ICD-10-CM

## 2017-10-03 DIAGNOSIS — M79.7: ICD-10-CM

## 2017-10-03 DIAGNOSIS — K31.84: ICD-10-CM

## 2017-10-03 DIAGNOSIS — K22.2: ICD-10-CM

## 2017-10-03 DIAGNOSIS — I50.9: ICD-10-CM

## 2017-10-03 DIAGNOSIS — K57.30: ICD-10-CM

## 2017-10-03 LAB
ALP SERPL-CCNC: 137 U/L (ref 45–117)
ALT SERPL-CCNC: 39 U/L (ref 10–53)
ANION GAP SERPL CALC-SCNC: 8 MEQ/L (ref 5–15)
APTT BLD: 27.6 SEC (ref 24.3–30.1)
AST SERPL-CCNC: 25 U/L (ref 15–37)
B-OH-BUTYR SERPL-SCNC: 0.11 MMOL/L (ref 0–0.39)
BASOPHILS # BLD AUTO: 0 TH/MM3 (ref 0–0.2)
BASOPHILS NFR BLD: 0.3 % (ref 0–2)
BILIRUB SERPL-MCNC: 0.4 MG/DL (ref 0.2–1)
BUN SERPL-MCNC: 28 MG/DL (ref 7–18)
CHLORIDE SERPL-SCNC: 97 MEQ/L (ref 98–107)
CK MB SERPL-MCNC: 4.7 NG/ML (ref 0.5–3.6)
CK SERPL-CCNC: 140 U/L (ref 26–192)
COLOR UR: YELLOW
COMMENT (UR): (no result)
CULTURE IF INDICATED: (no result)
EOSINOPHIL # BLD: 0 TH/MM3 (ref 0–0.4)
EOSINOPHIL NFR BLD: 0.1 % (ref 0–4)
ERYTHROCYTE [DISTWIDTH] IN BLOOD BY AUTOMATED COUNT: 17 % (ref 11.6–17.2)
GFR SERPLBLD BASED ON 1.73 SQ M-ARVRAT: 31 ML/MIN (ref 89–?)
GLUCOSE UR STRIP-MCNC: (no result) MG/DL
HCO3 BLD-SCNC: 28.7 MEQ/L (ref 21–32)
HCT VFR BLD CALC: 33.3 % (ref 35–46)
HEMO FLAGS: (no result)
HGB UR QL STRIP: (no result)
HYALINE CASTS #/AREA URNS LPF: 7 /LPF
INR PPP: 1 RATIO
KETONES UR STRIP-MCNC: (no result) MG/DL
LYMPHOCYTES # BLD AUTO: 0.9 TH/MM3 (ref 1–4.8)
LYMPHOCYTES NFR BLD AUTO: 4.7 % (ref 9–44)
MAGNESIUM SERPL-MCNC: 1.6 MG/DL (ref 1.5–2.5)
MCH RBC QN AUTO: 26.5 PG (ref 27–34)
MCHC RBC AUTO-ENTMCNC: 31.9 % (ref 32–36)
MCV RBC AUTO: 83.1 FL (ref 80–100)
MONOCYTES NFR BLD: 8.4 % (ref 0–8)
MUCOUS THREADS #/AREA URNS LPF: (no result) /LPF
NEUTROPHILS # BLD AUTO: 15.8 TH/MM3 (ref 1.8–7.7)
NEUTROPHILS NFR BLD AUTO: 86.5 % (ref 16–70)
NITRITE UR QL STRIP: (no result)
PLATELET # BLD: 198 TH/MM3 (ref 150–450)
POTASSIUM SERPL-SCNC: 3.5 MEQ/L (ref 3.5–5.1)
PROTHROMBIN TIME: 10.6 SEC (ref 9.8–11.6)
RBC # BLD AUTO: 4.01 MIL/MM3 (ref 4–5.3)
SODIUM SERPL-SCNC: 134 MEQ/L (ref 136–145)
SP GR UR STRIP: 1.02 (ref 1–1.03)
SQUAMOUS #/AREA URNS HPF: 1 /HPF (ref 0–5)
WBC # BLD AUTO: 18.2 TH/MM3 (ref 4–11)

## 2017-10-03 PROCEDURE — 90471 IMMUNIZATION ADMIN: CPT

## 2017-10-03 PROCEDURE — 96365 THER/PROPH/DIAG IV INF INIT: CPT

## 2017-10-03 PROCEDURE — 80053 COMPREHEN METABOLIC PANEL: CPT

## 2017-10-03 PROCEDURE — 85014 HEMATOCRIT: CPT

## 2017-10-03 PROCEDURE — 87329 GIARDIA AG IA: CPT

## 2017-10-03 PROCEDURE — 83690 ASSAY OF LIPASE: CPT

## 2017-10-03 PROCEDURE — 87205 SMEAR GRAM STAIN: CPT

## 2017-10-03 PROCEDURE — 88305 TISSUE EXAM BY PATHOLOGIST: CPT

## 2017-10-03 PROCEDURE — 87328 CRYPTOSPORIDIUM AG IA: CPT

## 2017-10-03 PROCEDURE — 90686 IIV4 VACC NO PRSV 0.5 ML IM: CPT

## 2017-10-03 PROCEDURE — 87506 IADNA-DNA/RNA PROBE TQ 6-11: CPT

## 2017-10-03 PROCEDURE — 81001 URINALYSIS AUTO W/SCOPE: CPT

## 2017-10-03 PROCEDURE — G0008 ADMIN INFLUENZA VIRUS VAC: HCPCS

## 2017-10-03 PROCEDURE — 96361 HYDRATE IV INFUSION ADD-ON: CPT

## 2017-10-03 PROCEDURE — 93005 ELECTROCARDIOGRAM TRACING: CPT

## 2017-10-03 PROCEDURE — 82010 KETONE BODYS QUAN: CPT

## 2017-10-03 PROCEDURE — 82948 REAGENT STRIP/BLOOD GLUCOSE: CPT

## 2017-10-03 PROCEDURE — 88312 SPECIAL STAINS GROUP 1: CPT

## 2017-10-03 PROCEDURE — 84145 PROCALCITONIN (PCT): CPT

## 2017-10-03 PROCEDURE — 82552 ASSAY OF CPK IN BLOOD: CPT

## 2017-10-03 PROCEDURE — 83735 ASSAY OF MAGNESIUM: CPT

## 2017-10-03 PROCEDURE — 82140 ASSAY OF AMMONIA: CPT

## 2017-10-03 PROCEDURE — 87493 C DIFF AMPLIFIED PROBE: CPT

## 2017-10-03 PROCEDURE — 85025 COMPLETE CBC W/AUTO DIFF WBC: CPT

## 2017-10-03 PROCEDURE — 83880 ASSAY OF NATRIURETIC PEPTIDE: CPT

## 2017-10-03 PROCEDURE — 85027 COMPLETE CBC AUTOMATED: CPT

## 2017-10-03 PROCEDURE — 80048 BASIC METABOLIC PNL TOTAL CA: CPT

## 2017-10-03 PROCEDURE — 82550 ASSAY OF CK (CPK): CPT

## 2017-10-03 PROCEDURE — 87040 BLOOD CULTURE FOR BACTERIA: CPT

## 2017-10-03 PROCEDURE — 84484 ASSAY OF TROPONIN QUANT: CPT

## 2017-10-03 PROCEDURE — 74176 CT ABD & PELVIS W/O CONTRAST: CPT

## 2017-10-03 PROCEDURE — 83605 ASSAY OF LACTIC ACID: CPT

## 2017-10-03 PROCEDURE — 85610 PROTHROMBIN TIME: CPT

## 2017-10-03 PROCEDURE — 74174 CTA ABD&PLVS W/CONTRAST: CPT

## 2017-10-03 PROCEDURE — 71010: CPT

## 2017-10-03 PROCEDURE — 85018 HEMOGLOBIN: CPT

## 2017-10-03 PROCEDURE — 76937 US GUIDE VASCULAR ACCESS: CPT

## 2017-10-03 PROCEDURE — 96375 TX/PRO/DX INJ NEW DRUG ADDON: CPT

## 2017-10-03 PROCEDURE — 85730 THROMBOPLASTIN TIME PARTIAL: CPT

## 2017-10-03 NOTE — PD
HPI


Chief Complaint:  Diabetic


Time Seen by Provider:  21:26


Travel History


International Travel<30 days:  No


Contact w/Intl Traveler<30days:  No


Traveled to known affect area:  No





History of Present Illness


HPI


The patient is a 62 year old female who presents to the [facility] 

emergency department with a history of reportedly being difficult to awaken 

this morning by her grandchild.  Her son called ambulance services and the 

patient's blood sugar was reportedly 24.  The patient was also hypotensive with 

a blood pressure of 58 systolic.  She reports that she had IV access obtained 

in her right external jugular vein.  She does not know what she was given for 

treatment, however she began to feel improved and decided to not be transported 

to the hospital.  She reports that today she continues to have generalized 

weakness.  She reports that she's had nausea with decreased appetite.  She 

reports that she has difficulty swallowing solids.  She reports that she is 

under the care of Dr. Bui.  She reports that she has a history of acid reflux 

and is on Protonix.  The patient reports that she is currently in the process 

of being worked up by her GI doctor for a 2 month history of right-sided 

abdominal pain in the right upper and lower quadrant of the abdomen that she 

describes as an aching sensation.  Ports of the pain is currently present and 

more generalized.  The patient reports that as her blood sugar was low this 

morning she has not taken any insulin throughout the day.  She normally takes 

10 units of her short acting insulin in the a.m. and 12 units at lunch and then 

12 units at dinner.  She also reports taking Lantus insulin 35 units in the 

evening.  The patient reports that she has been scheduled for CT scan of the 

abdomen and pelvis, however she had to have her laboratory studies done first.  

She reports that she was called by the GI doctor's office earlier today 

regarding some lab abnormalities that included an elevated white blood cell 

count and abnormalities of her BUN and creatinine.  She reports that she is in 

the process of being referred to a nephrologist.  Review of systems otherwise, 

the patient denies having any cough, congestion, neck pain,  vomiting, diarrhea

, urinary symptoms, or neurologic symptoms.  The patient reports that she has 

had intermittent chest pain and dyspnea on exertion.





[facility]


Past Medical History


*** Narrative Medical


The patient's past medical history is significant for pernicious anemia, acid 

reflux, systemic lupus erythematosus, rheumatoid arthritis, fibromyalgia, asthma

, history of a tachycardia arrhythmia status post ablation, history of AICD 

placement, history of cardiomyopathy, history of congestive heart failure, COPD

, history of myocardial infarction in , hypothyroid disorder


Hx Anticoagulant Therapy:  Yes (low dose ASA daily. )


Anemia:  Yes (PERNICIOUS)


Arthritis:  Yes


Asthma:  Yes


Autoimmune Disease:  Yes (LUPUS/ RA/ FIBROMYALGIA)


Blood Disorders:  No


Anxiety:  No


Depression:  No


Heart Rhythm Problems:  Yes (TACHYCARDIA)


Cancer:  No


Cardiac Catheterization:  Yes (in  for ablation)


Cardiomyopathy:  Yes


Cardiovascular Problems:  Yes (AICD, SVT)


High Cholesterol:  No


Chemotherapy:  No


Chest Pain:  Yes


Congestive Heart Failure:  Yes


COPD:  Yes


Cerebrovascular Accident:  No


Coronary Artery Disease:  Yes


Diabetes:  Yes


Patient Takes Glucophage:  No


Diminished Hearing:  No


Endocrine:  Yes


Fibromyalgia:  Yes


Gastrointestinal Disorders:  Yes


GERD:  Yes


Glaucoma:  No


Genitourinary:  Yes


Headaches:  Yes


Hepatitis:  No


Hiatal Hernia:  Yes


Heparin Induced Thrombocytopen:  No


Herniated Disk:  Yes


Hypertension:  Yes


Immune Disorder:  Yes


Implanted Vascular Access Dvce:  Yes


Kidney Stones:  Yes


Medical other:  Yes (LUPUS, MIGRAINES, ANEMIA, FIBROMYALGIA, RA )


Musculoskeletal:  Yes (FIBROMYALGIA,LUPUS)


Neurologic:  Yes


Psychiatric:  Yes


Reproductive:  No


Respiratory:  Yes (COPD, Bronchitis)


Immunizations Current:  Yes


Migraines:  Yes


Myocardial Infarction:  Yes ()


Radiation Therapy:  No


Renal Failure:  No


Seizures:  No


Sickle Cell Disease:  No


Sleep Apnea:  No


Thyroid Disease:  Yes (HYPO)


Ulcer:  Yes


Tetanus Vaccination:  < 5 Years


Influenza Vaccination:  No


Menopausal:  Yes


:  2


Para:  1


Miscarriage:  1


Dilation and Curettage (D&C):  Yes





Past Surgical History


*** Narrative Surgical


The patient's past surgical history is significant for an AICD placement, C-

section 1, coronary artery bypass grafting, laparoscopic myomectomy, 

hysterectomy.


Abdominal Surgery:  No


AICD:  Yes ([employer])


Arteriovenous Shunt:  No


Cardiac Surgery:  Yes (AICD [employer])


 Section:  Yes (x1)


Coronary Artery Bypass Graft:  Yes


Ear Surgery:  No


Endocrine Surgery:  No


Eye Surgery:  No


Genitourinary Surgery:  No


Gynecologic Surgery:  Yes ( LAP. MYOMECTOMY)


Hysterectomy:  Yes


Insulin Pump:  No


Joint Replacement:  No


Neurologic Surgery:  No


Oral Surgery:  No


Pacemaker:  Yes


Thoracic Surgery:  No


Other Surgery:  Yes





Social History


Alcohol Use:  No


Tobacco Use:  No


Substance Use:  No





Allergies-Medications


(Allergen,Severity, Reaction):  


Coded Allergies:  


     No Known Allergies (Verified , 10/3/17)


Reported Meds & Prescriptions





Reported Meds & Active Scripts


Active


Reported


Pantoprazole (Pantoprazole Sodium) 20 Mg Tab 20 Mg PO BID


Valsartan 40 Mg Tab 40 Mg PO DAILY


Aspirin 81 (Aspirin) 81 Mg Tabdr 81 Mg PO HS


Lyrica (Pregabalin) 225 Mg Cap 225 Mg PO HS


Sulfasalazine 500 Mg Tab 1,000 Mg PO BID


Integra Plus (Multi-Vit/Iron-Vit C-B12-Folic Acid) 191-210-0.01-1 mg Cap 1 Cap 

PO DAILY


Alendronate (Alendronate Sodium) 70 Mg Tab Unknown Dose PO Q7D


Novolin 70-30 Inj (Insulin Human Isoph/Insulin Regular) 1,000 Unit/10 Ml Vial 

12 Units SQ HS


Novolin 70-30 Inj (Insulin Human Isoph/Insulin Regular) 1,000 Unit/10 Ml Vial 

12 Units SQ DAILY


Novolin 70-30 Inj (Insulin Human Isoph/Insulin Regular) 1,000 Unit/10 Ml Vial 

10 Units SQ DAILY


Omeprazole 20 Mg Tab 20 Mg PO DAILY


Morphine ER (Morphine Sulfate) 15 Mg Tab 60 Mg PO BID PRN


Nitroglycerin SL (Nitroglycerin) 0.4 Mg Subl 0.4 Mg SL AS DIRECTED PRN


     ONE TABLET UNDER THE TONGUE AS NEEDED FOR CHEST PAIN, MAY REPEAT EVERY


     FIVE MINUTES FOR A TOTAL OF 3 DOSES OR CALL 911 IF NO RELIEF


Percocet (Oxycodone-Acetaminophen)  mg Tab 1 Tab PO Q6H PRN


Metoprolol Tartrate 100 Mg Tab 100 Mg PO BID


Levothyroxine (Levothyroxine Sodium) 125 Mcg Tab 125 Mcg PO DAILY


Lantus Inj (Insulin Glargine) 1,000 Unit/10 Ml Vial 35 Units SQ HS


Cymbalta DR (Duloxetine HCl) 60 Mg Capdr 60 Mg PO TID


Cyanocobalamin Inj (Cyanocobalamin) 1,000 Mcg/Ml Inj 1,000 Mcg SQ 1 MONTH


Carisoprodol 250 Mg Tab 350 Mg PO TID PRN


Amlodipine-Benazepril 2.5-10 Mg Cap 1 Cap PO DAILY








Review of Systems


Except as stated in HPI:  all other systems reviewed are Neg


General / Constitutional:  No: Fever


Eyes:  No: Visual changes


HENT:  No: Headaches


Cardiovascular:  No: Chest Pain or Discomfort


Respiratory:  No: Shortness of Breath


Gastrointestinal:  Positive: Abdominal Pain


Genitourinary:  No: Dysuria


Musculoskeletal:  No: Pain


Skin:  No Rash


Neurologic:  No: Weakness


Psychiatric:  No: Depression


Endocrine:  No: Polydipsia


Hematologic/Lymphatic:  No: Easy Bruising





Physical Exam


Narrative


General: 


The patient is a well-developed well-nourished female in no acute distress, 

slightly drowsy appearing on arrival. 





Head and Neck exam: 


Head is normocephalic atraumatic. 


Eyes: EOMI, pupils are equal round and reactive to light. 


Nose: Midline septum with pink mucous membranes 


Mouth: Dentition unremarkable. Moist mucus membranes. Posterior oropharynx is 

not erythematous. No tonsillar hypertrophy. Uvula midline. Airway patent. 


Neck: No palpable lymphadenopathy. No nuchal rigidity. No thyromegaly. 





Cardiovascular: 


Regular rate and rhythm without murmurs, gallops, or rubs. 





Lungs: 


Clear to auscultation bilaterally. No wheezes, rhonchi, or rales.


 


Abdomen:


Soft, with tenderness on palpation bilaterally worse on palpation in the left 

lower quadrant of the abdomen.  The patient has no point tenderness 

specifically over McBurney's point.  Negative Reaves sign.  Normal bowel sounds 

are audible.  No guarding, rebound, or rigidity. 





Extremities: 


No clubbing or cyanosis.  The patient has trace pedal edema bilateral lower 

extremities. 2+ pulses in all 4 extremities.  No calf tenderness on palpation.





Back: 


No spinous process tenderness to palpation. No costovertebral angle tenderness 

to palpation. 





Neurologic Exam: Grossly nonfocal.  





Skin Exam: No rash noted. Intact skin that is warm and dry.





Data


Data


Last Documented VS





Vital Signs








  Date Time  Temp Pulse Resp B/P (MAP) Pulse Ox O2 Delivery O2 Flow Rate FiO2


 


10/3/17 23:01  89 20 125/62 (83) 98 Room Air  


 


10/3/17 20:43 97.9       








Orders





 Orders


Electrocardiogram (10/3/17 21:40)


Complete Blood Count With Diff (10/3/17 21:40)


Comprehensive Metabolic Panel (10/3/17 21:40)


Creatine Kinase (Cpk) (10/3/17 21:40)


Ckmb (Isoenzyme) Profile (10/3/17 21:40)


Troponin I (10/3/17 21:40)


B-Type Natriuretic Peptide (10/3/17 21:40)


Prothrombin Time / Inr (Pt) (10/3/17 21:40)


Act Partial Throm Time (Ptt) (10/3/17 21:40)


Lipase (10/3/17 21:40)


Urinalysis - C+S If Indicated (10/3/17 21:40)


Magnesium (Mg) (10/3/17 21:40)


Beta Hydroxybutyrate (Acetone) (10/3/17 21:40)


Chest, Single Ap (10/3/17 21:40)


Iv Access Insert/Monitor (10/3/17 21:40)


Ecg Monitoring (10/3/17 21:40)


Oximetry (10/3/17 21:40)


Blood Glucose (10/3/17 21:40)


Sodium Chlorid 0.9% 500 Ml Inj (Ns 500 M (10/3/17 21:45)


Ammonia (10/3/17 21:42)


Piperacil-Tazo 3.375 Gm Premix (Zosyn 3. (10/3/17 22:45)


Ondansetron Inj (Zofran Inj) (10/3/17 22:45)


Blood Culture (10/3/17 22:32)


Lactic Acid Sepsis Protocol (10/3/17 22:32)


CKMB (10/3/17 21:40)


CKMB% (10/3/17 21:40)


Ct Abd/Pel W/O Iv Contrast (10/3/17 21:40)


Sodium Chlor 0.9% 1000 Ml Inj (Ns 1000 M (10/3/17 23:29)


Admit Order (Ed Use Only) (10/4/17 00:06)


Metronidazole 500 Mg Inj (Flagyl 500 Mg (10/4/17 00:15)


Insulin Human Regular Inj (Novolin R Inj (10/4/17 00:15)


Blood Glucose (10/4/17 00:06)





Labs





Laboratory Tests








Test


  10/3/17


21:40 10/3/17


22:55 10/3/17


23:00


 


White Blood Count 18.2 TH/MM3   


 


Red Blood Count 4.01 MIL/MM3   


 


Hemoglobin 10.6 GM/DL   


 


Hematocrit 33.3 %   


 


Mean Corpuscular Volume 83.1 FL   


 


Mean Corpuscular Hemoglobin 26.5 PG   


 


Mean Corpuscular Hemoglobin


Concent 31.9 % 


  


  


 


 


Red Cell Distribution Width 17.0 %   


 


Platelet Count 198 TH/MM3   


 


Mean Platelet Volume 9.2 FL   


 


Neutrophils (%) (Auto) 86.5 %   


 


Lymphocytes (%) (Auto) 4.7 %   


 


Monocytes (%) (Auto) 8.4 %   


 


Eosinophils (%) (Auto) 0.1 %   


 


Basophils (%) (Auto) 0.3 %   


 


Neutrophils # (Auto) 15.8 TH/MM3   


 


Lymphocytes # (Auto) 0.9 TH/MM3   


 


Monocytes # (Auto) 1.5 TH/MM3   


 


Eosinophils # (Auto) 0.0 TH/MM3   


 


Basophils # (Auto) 0.0 TH/MM3   


 


CBC Comment DIFF FINAL   


 


Differential Comment    


 


Prothrombin Time 10.6 SEC   


 


Prothromb Time International


Ratio 1.0 RATIO 


  


  


 


 


Activated Partial


Thromboplast Time 27.6 SEC 


  


  


 


 


Blood Urea Nitrogen 28 MG/DL   


 


Creatinine 1.96 MG/DL   


 


Random Glucose 315 MG/DL   


 


Total Protein 6.7 GM/DL   


 


Albumin 3.1 GM/DL   


 


Calcium Level 8.5 MG/DL   


 


Magnesium Level 1.6 MG/DL   


 


Alkaline Phosphatase 137 U/L   


 


Aspartate Amino Transf


(AST/SGOT) 25 U/L 


  


  


 


 


Alanine Aminotransferase


(ALT/SGPT) 39 U/L 


  


  


 


 


Total Bilirubin 0.4 MG/DL   


 


Sodium Level 134 MEQ/L   


 


Potassium Level 3.5 MEQ/L   


 


Chloride Level 97 MEQ/L   


 


Carbon Dioxide Level 28.7 MEQ/L   


 


Anion Gap 8 MEQ/L   


 


Estimat Glomerular Filtration


Rate 31 ML/MIN 


  


  


 


 


Ammonia 17 MCMOL/L   


 


Total Creatine Kinase 140 U/L   


 


Creatine Kinase MB 4.7 NG/ML   


 


Troponin I


  LESS THAN 0.02


NG/ML 


  


 


 


B-Type Natriuretic Peptide 63 PG/ML   


 


Lipase 34 U/L   


 


B-Hydroxybutyrate 0.11 MMOL/L   


 


Lactic Acid Level  2.6 mmol/L  


 


Urine Color   YELLOW 


 


Urine Turbidity   HAZY 


 


Urine pH   5.0 


 


Urine Specific Gravity   1.021 


 


Urine Protein   TRACE mg/dL 


 


Urine Glucose (UA)   TRACE mg/dL 


 


Urine Ketones   NEG mg/dL 


 


Urine Occult Blood   NEG 


 


Urine Nitrite   NEG 


 


Urine Bilirubin   NEG 


 


Urine Urobilinogen


  


  


  LESS THAN 2.0


MG/DL


 


Urine Leukocyte Esterase   SMALL 


 


Urine RBC


  


  


  LESS THAN 1


/hpf


 


Urine WBC


  


  


  LESS THAN 1


/hpf


 


Urine Squamous Epithelial


Cells 


  


  1 /hpf 


 


 


Urine Hyaline Casts   7 /lpf 


 


Urine Mucus   FEW /lpf 


 


Microscopic Urinalysis Comment


  


  


  CULT NOT


INDICATED











MDM


Medical Decision Making


Medical Screen Exam Complete:  Yes


Emergency Medical Condition:  Yes


Medical Record Reviewed:  Yes


Differential Diagnosis


Diverticulitis, versus colitis, versus pyelonephritis, versus kidney stone, 

versus acute pancreatitis, versus biliary colic, versus acute cholecystitis


Narrative Course


During the course of the patients emergency department visit, the patients 

history, examination, and differential diagnosis were reviewed with the 

patient. The patient had  IV access obtained and blood work sent for analysis.  

The patient was placed on a cardiac monitor with oximetry and blood pressure 

monitoring.  An ECG was done on arrival.  The patient's ECG reveals a sinus 

rhythm heart rate of 84, QRS duration is 94 ms, QTc is 469 ms.  No acute ST 

segment elevation or depression, T waves are inverted in V1, aVL.





The patient was initially provided normal saline a 500 mL bolus 1, Zofran 4 mg 

IV.





The patients laboratory studies were reviewed and remarkable for a white count 

of 18.2, hemoglobin 10.6, platelets 198 with 86.5 neutrophils, monocytes 4.7, 

monocytes 8.4.  Given the leukocytosis and the patient's abdominal pain the 

patient was initially treated with Zosyn 3.375 g IV, CT scan of the abdomen and 

pelvis is pending.  CMP is remarkable for sodium of 134, chloride 97, BUN 28, 

creatinine 1.96 which is increased compared to previously with normal values on 

her last evaluation earlier in the year at this facility, glucose 3:15, 

alkaline phosphatase 137, , troponin I less than 0.02, BNP is 63, lipase 

34, ammonia level is 17, lactic acid 2.6.  Given the patient's leukocytosis and 

elevated lactate, with a normal BNP and reported history of hypotension earlier 

in the day, the patient was given a 30 mL per KG IV fluid bolus.





Radiology studies were reviewed and remarkable for a chest x-ray that showed no 

acute abnormality.  CT scan of the abdomen and pelvis shows nonspecific gastric 

distention without perceptible etiology, is no small bowel distention, 

considerable stool throughout the colon, possible mild transverse and 

descending colitis, small nonobstructing stones in both kidneys, mild 

atelectasis of the lung bases, stable patchy benign appearing capsular 

calcification of the spleen, nonspecific subcutaneous edema of the upper thighs

, 7 mm soft tissue nodule seen in the retroareolar region of the left breast 

that is nonspecific, recommend outpatient mammogram.





Given the patient's symptoms, the most likely diagnosis appears to be colitis.  

Flagyl was added onto the patient's antibiotic regimen.  The patient will be 

admitted to the hospital for continued evaluation and treatment.





The patients results were discussed with the patient, including the plan of 

care. I explained that further testing and/ or monitoring is indicated based on 

the patients history, examination, and/ or laboratory findings. Therefore, I 

recommended admission for additional evaluation. The patient expressed 

understanding and was agreeable with this plan. The patient was admitted to the 

hospital in stable condition and [facility]





Sepsis Criteria


SIRS Criteria (2 or more):  WBC > 63854, < 4000 or > 10% bands


Severe Sepsis (+one):  Lactate >2





Physician Communication


Physician Communication


The patient's case was discussed with Nic Brice who did agree to admit the 

patient for further evaluation and treatment at this time.





Diagnosis





 Primary Impression:  


 Colitis


 Additional Impressions:  


 Hyperglycemia


 Acute renal insufficiency





Admitting Information


Admitting Physician Requests:  Admit











Leisa Keller MD Oct 3, 2017 21:47

## 2017-10-03 NOTE — RADRPT
EXAM DATE/TIME:  10/03/2017 21:47 

 

[facility] COMPARISON:     

CHEST SINGLE AP, February 27, 2017, 21:36.

 

                     

INDICATIONS :     

Pain in chest, diffculty breathing.

                     

 

MEDICAL HISTORY :     

None.          

 

SURGICAL HISTORY :     

None.   

 

ENCOUNTER:     

Initial                                        

 

ACUITY:     

1 day      

 

PAIN SCORE:     

0/10

 

LOCATION:     

Bilateral  chest

 

FINDINGS:     

There is a pacing/AICD device in place. The heart size is normal. The lungs are grossly clear. The st
udy is obtained with a poor inspiratory effort. There is anterior cervical fusion plate present.

 

CONCLUSION:     No acute disease.  

 

 

 

 Abdon Salcedo MD on October 03, 2017 at 23:15           

Board Certified Radiologist.

 This report was verified electronically.

## 2017-10-03 NOTE — RADRPT
EXAM DATE/TIME:  10/03/2017 23:32 

 

[facility] COMPARISON:     

CT PULMONARY ANGIOGRAM, February 28, 2017, 0:54.

 

 

INDICATIONS :     

Abdomen pain with weakness.

                  

 

ORAL CONTRAST:      

No oral contrast ingested.

                  

 

RADIATION DOSE:     

6.60 CTDIvol (mGy) 

 

 

MEDICAL HISTORY :     

Cardiovascular disease. Hypertension. Chronic obstructive pulmonary disease.GERD  Reanl stones

 

SURGICAL HISTORY :      

CABG Pacemaker.Hysterectomy.

 

ENCOUNTER:      

Initial

 

ACUITY:      

1 day

 

PAIN SCALE:      

10/10

 

LOCATION:       

Bilateral  abdomen

 

TECHNIQUE:     

Volumetric scanning of the abdomen and pelvis was performed.  Using automated exposure control and ad
justment of the mA and/or kV according to patient size, radiation dose was kept as low as reasonably 
achievable to obtain optimal diagnostic quality images.  DICOM format image data is available electro
nically for review and comparison.  

 

FINDINGS:     

 

LOWER LUNGS:     

Mild bibasilar atelectasis.

 

LIVER:     

Homogeneous density without lesion.  There is no dilation of the biliary tree.  No calcified gallston
es.

 

SPLEEN:     

Scattered capsular calcification again noted. No acute spine abnormality seen.

 

PANCREAS:     

Within normal limits. 

 

KIDNEYS:     

Nonobstructing stones are noted, 3 x 5 mm right upper pole and 3 mm of the left upper pole.

 

ADRENAL GLANDS:     

Within normal limits.

 

VASCULAR:     

There is no aortic aneurysm.

 

BOWEL/MESENTERY:     

Nonspecific gastric distention. No small bowel distention. Considerable stool throughout the colon. Q
uestionable mild wall thickening of the transverse and descending portions. No high-grade inflammator
y changes.

 

ABDOMINAL WALL:     

Within normal limits.

 

RETROPERITONEUM:     

There is no lymphadenopathy.

 

BLADDER:     

No wall thickening or mass.

 

REPRODUCTIVE:     

Within normal limits.

 

INGUINAL:     

There is no lymphadenopathy or hernia.

 

MUSCULOSKELETAL:     

No acute bony abnormality demonstrated. Mild subcutaneous edema overlying the bilateral greater troch
anters.

 

CONCLUSION:     

1. Nonspecific gastric distention without a perceptible etiology.

2. No small bowel distention.

3. Considerable stool throughout the colon. Possible mild transverse and descending colitis.

4. Small nonobstructing stones in both kidneys.

5. Mild atelectasis of the lung bases.

6. Stable patchy benign appearing capsular calcification of the spleen.

7. Nonspecific subcutaneous edema of the upper thighs.

8. 7 mm soft tissue nodule seen in the retroareolar region of the left breast, nonspecific. Clinical 
and outpatient mammographic correlation recommended. No comparison mammograms are seen in our system.


 

 

 

 Abdon Peters MD on October 03, 2017 at 23:43           

Board Certified Radiologist.

 This report was verified electronically.

## 2017-10-04 VITALS
DIASTOLIC BLOOD PRESSURE: 69 MMHG | RESPIRATION RATE: 19 BRPM | SYSTOLIC BLOOD PRESSURE: 134 MMHG | OXYGEN SATURATION: 95 % | HEART RATE: 79 BPM | TEMPERATURE: 96.1 F

## 2017-10-04 VITALS
SYSTOLIC BLOOD PRESSURE: 161 MMHG | DIASTOLIC BLOOD PRESSURE: 71 MMHG | TEMPERATURE: 97.4 F | RESPIRATION RATE: 18 BRPM | OXYGEN SATURATION: 96 % | HEART RATE: 70 BPM

## 2017-10-04 VITALS
SYSTOLIC BLOOD PRESSURE: 143 MMHG | TEMPERATURE: 96.9 F | OXYGEN SATURATION: 98 % | DIASTOLIC BLOOD PRESSURE: 76 MMHG | HEART RATE: 78 BPM | RESPIRATION RATE: 18 BRPM

## 2017-10-04 VITALS
OXYGEN SATURATION: 94 % | RESPIRATION RATE: 18 BRPM | HEART RATE: 74 BPM | SYSTOLIC BLOOD PRESSURE: 139 MMHG | DIASTOLIC BLOOD PRESSURE: 69 MMHG | TEMPERATURE: 97.8 F

## 2017-10-04 VITALS
OXYGEN SATURATION: 94 % | SYSTOLIC BLOOD PRESSURE: 167 MMHG | HEART RATE: 70 BPM | TEMPERATURE: 97.9 F | RESPIRATION RATE: 18 BRPM | DIASTOLIC BLOOD PRESSURE: 81 MMHG

## 2017-10-04 VITALS — OXYGEN SATURATION: 94 %

## 2017-10-04 VITALS
HEART RATE: 70 BPM | OXYGEN SATURATION: 94 % | RESPIRATION RATE: 18 BRPM | TEMPERATURE: 97.9 F | DIASTOLIC BLOOD PRESSURE: 81 MMHG | SYSTOLIC BLOOD PRESSURE: 167 MMHG

## 2017-10-04 VITALS — OXYGEN SATURATION: 98 %

## 2017-10-04 LAB
C. DIFF EPI 027: (no result)
LACTIC ACID GHOST: (no result)

## 2017-10-04 RX ADMIN — METOPROLOL TARTRATE SCH MG: 100 TABLET, FILM COATED ORAL at 10:09

## 2017-10-04 RX ADMIN — POLYETHYLENE GLYCOL 3350 SCH GM: 17 POWDER, FOR SOLUTION ORAL at 10:08

## 2017-10-04 RX ADMIN — Medication SCH ML: at 21:19

## 2017-10-04 RX ADMIN — PHENYTOIN SODIUM SCH MLS/HR: 50 INJECTION INTRAMUSCULAR; INTRAVENOUS at 22:44

## 2017-10-04 RX ADMIN — MAGNESIUM SULFATE IN DEXTROSE SCH MLS/HR: 10 INJECTION, SOLUTION INTRAVENOUS at 03:25

## 2017-10-04 RX ADMIN — PANTOPRAZOLE SCH MG: 40 TABLET, DELAYED RELEASE ORAL at 10:09

## 2017-10-04 RX ADMIN — TAZOBACTAM SODIUM AND PIPERACILLIN SODIUM SCH MLS/HR: 250; 2 INJECTION, SOLUTION INTRAVENOUS at 21:19

## 2017-10-04 RX ADMIN — PHENYTOIN SODIUM SCH MLS/HR: 50 INJECTION INTRAMUSCULAR; INTRAVENOUS at 03:44

## 2017-10-04 RX ADMIN — SODIUM CHLORIDE SCH MLS/HR: 900 INJECTION, SOLUTION INTRAVENOUS at 10:08

## 2017-10-04 RX ADMIN — INSULIN ASPART SCH: 100 INJECTION, SOLUTION INTRAVENOUS; SUBCUTANEOUS at 17:00

## 2017-10-04 RX ADMIN — INSULIN ASPART SCH: 100 INJECTION, SOLUTION INTRAVENOUS; SUBCUTANEOUS at 12:00

## 2017-10-04 RX ADMIN — Medication SCH ML: at 07:54

## 2017-10-04 RX ADMIN — TAZOBACTAM SODIUM AND PIPERACILLIN SODIUM SCH MLS/HR: 250; 2 INJECTION, SOLUTION INTRAVENOUS at 16:05

## 2017-10-04 RX ADMIN — SODIUM CHLORIDE SCH MLS/HR: 900 INJECTION, SOLUTION INTRAVENOUS at 17:22

## 2017-10-04 RX ADMIN — HEPARIN SODIUM SCH UNITS: 10000 INJECTION, SOLUTION INTRAVENOUS; SUBCUTANEOUS at 13:43

## 2017-10-04 RX ADMIN — ASPIRIN SCH MG: 81 TABLET ORAL at 10:09

## 2017-10-04 RX ADMIN — INSULIN ASPART SCH: 100 INJECTION, SOLUTION INTRAVENOUS; SUBCUTANEOUS at 21:18

## 2017-10-04 RX ADMIN — MAGNESIUM SULFATE IN DEXTROSE SCH MLS/HR: 10 INJECTION, SOLUTION INTRAVENOUS at 02:00

## 2017-10-04 RX ADMIN — MORPHINE SULFATE SCH MG: 15 TABLET, EXTENDED RELEASE ORAL at 10:09

## 2017-10-04 RX ADMIN — INSULIN ASPART SCH: 100 INJECTION, SOLUTION INTRAVENOUS; SUBCUTANEOUS at 07:54

## 2017-10-04 RX ADMIN — MORPHINE SULFATE SCH MG: 15 TABLET, EXTENDED RELEASE ORAL at 21:20

## 2017-10-04 RX ADMIN — PHENYTOIN SODIUM SCH MLS/HR: 50 INJECTION INTRAMUSCULAR; INTRAVENOUS at 13:47

## 2017-10-04 RX ADMIN — TAZOBACTAM SODIUM AND PIPERACILLIN SODIUM SCH MLS/HR: 250; 2 INJECTION, SOLUTION INTRAVENOUS at 10:08

## 2017-10-04 RX ADMIN — HEPARIN SODIUM SCH UNITS: 10000 INJECTION, SOLUTION INTRAVENOUS; SUBCUTANEOUS at 02:00

## 2017-10-04 RX ADMIN — METOPROLOL TARTRATE SCH MG: 100 TABLET, FILM COATED ORAL at 21:19

## 2017-10-04 NOTE — PD.CONS
HPI


History of Present Illness


This is a 62 year old female with a history of dysphagia and gastroparesis, who 

presented to the emergency room with generalized weakness, abdominal pain.  She 

reports that she has been having abdominal pain for the past 3 weeks.  This is 

an intermittent pain in her mid abdomen that radiates to her entire abdomen.  

She says the pain varies from a sharp stabbing pain to abdominal cramping to a 

dull ache/throbbing pain.  It seems to be aggravated by oral intake.  She does 

have occasional nausea and vomiting with this.  She states that she has loose 

stools although she denies any diarrhea.   She was recently seen by Dr. Bui 

on 17 and instructed to follow a gastroparesis diet and was given 

bethanechol.  She reports that she did have significant diarrhea with 

incontinence of stool while she was taking the bethanechol and therefore 

stopped this.  She does have gastroparesis at one point she was given 

bethanechol and she did have significant diarrhea with incontinence while she 

was taking this.  It was stopped and she states that her symptoms improved.  

She has GERD and takes pantoprazole for this.  She states that it is usually 

well controlled.  She denies any constipation, melena, or blood in the stool.  

She came to the ER for worsening of symptoms.  CT scan abdomen and pelvis (10/3/

17)---> Nonspecific gastric distention without a perceptible etiology.  No 

small bowel distention.  Considerate stool throughout the colon.  Possible mild 

transverse and descending colitis.  Small nonobstructing stones in both 

kidneys.  Mild atelectasis of the lung bases.  Stable patchy benign appearing 

capsular calcification of the spleen.  Nonspecific subcutaneous edema of the 

upper thighs.  7 mm soft tissue nodule seen in the retroareolar region of the 

left breast, nonspecific.  Clinical and outpatient mammographic correlation 

recommended.  No comparison mammograms are seen in our system.  She denies any 

recent travel, suspicious food, sick contacts, antibiotic use.  She 

occasionally takes Aleve.  She was evaluated with Capsule endoscopy for anemia (

16)---> capsule is in the stomach during the entire study suggesting 

gastroparesis, will need EGD with endoscopic placement of capsule  EGD (16)

---> there was a short stricture at the gastroesophageal junction, multiple 

biopsies were performed.  The stricture was dilated using a 17 mm savory 

dilator over guidewire, food residue in the gastric body, small sessile polyp 

was found in the gastric antrum, polypectomy was performed, retroflex views 

revealed no abnormalities.  Esophagus distal squamous mucosa with moderate, 

nonspecific chronic inflammation, negative for intestinal metaplasia, dysplasia

, or malignancy.  Stomach with exuberant reactive/chemical gastropathy in a 

background of moderate chronic, active gastritis with focal surgace mucosal 

erosion, negative for Helicobacter pylori.  Colonoscopy (11/2/15)---> the 

colonic mucosa appeared normal, retroflex views revealed no abnormalities, rule 

out no abnormalities of the rectum.  Repeat colonoscopy was recommended in 10 

years.





 (Ariella Awan)





PFSH


Past Medical History


DM


Gastroparesis


GERD/Gastritis/Esophagitis


CKD


Atrial fibrillation


Asthma


CAD


CMP


COPD


Lupus


RA


Anemia


HTN


Fibromyalgia


Shingles


GERD


Migraines


Hypothyroidism


Peripheral neuropathy


Chronic pain


Osteoarthritis


Past Surgical History


CABG


AICD placement


C-Spine fusion


Cardiac catheterization


Right carpal tunnel surgery





Myomectomy


PARAMJIT, BSO


ORIF right wrist.


Ankle surgery


Cardiac ablation


EGD/Colonoscopy/Capsule endoscopy


 (Ariella Awan)


Coded Allergies:  


     No Known Allergies (Verified , 10/3/17)


Medications





 Allergies








Coded Allergies Type Severity Reaction Last Updated Verified


 


  No Known Allergies    10/3/17 Yes








 Active Scripts








 Medications  Dose


 Route/Sig


 Max Daily Dose Days Date Category Dose


Instructions


 


 Pantoprazole


  (Pantoprazole


 Sodium) 20 Mg Tab  20 Mg


 PO BID


    10/3/17 Reported 


 


 Valsartan 40 Mg


 Tab  40 Mg


 PO DAILY


    17 Reported 


 


 Aspirin 81


  (Aspirin) 81 Mg


 Tabdr  81 Mg


 PO HS


    17 Reported 


 


 Lyrica


  (Pregabalin) 225


 Mg Cap  225 Mg


 PO HS


    17 Reported 


 


 Sulfasalazine 500


 Mg Tab  1,000 Mg


 PO BID


    5/15/17 Reported 


 


 Integra Plus


  (Multi-Vit/Iron-Vit


 C-B12-Folic Acid)


 191-210-0.01-1 mg


 Cap  1 Cap


 PO DAILY


    5/15/17 Reported 


 


 Alendronate


  (Alendronate


 Sodium) 70 Mg Tab  Unknown Dose


 PO Q7D


    5/15/17 Reported 


 


 Novolin 70-30 Inj


  (Insulin Human


 Isoph/Insulin


 Regular) 1,000


 Unit/10 Ml Vial  12 Units


 SQ HS


    5/15/17 Reported 


 


 Novolin 70-30 Inj


  (Insulin Human


 Isoph/Insulin


 Regular) 1,000


 Unit/10 Ml Vial  12 Units


 SQ DAILY


    5/15/17 Reported 


 


 Novolin 70-30 Inj


  (Insulin Human


 Isoph/Insulin


 Regular) 1,000


 Unit/10 Ml Vial  10 Units


 SQ DAILY


    5/15/17 Reported 


 


 Omeprazole 20 Mg


 Tab  20 Mg


 PO DAILY


    5/15/17 Reported 


 


 Morphine ER


  (Morphine


 Sulfate) 15 Mg Tab  60 Mg


 PO BID PRN


    5/15/17 Reported 


 


 Nitroglycerin SL


  (Nitroglycerin)


 0.4 Mg Subl  0.4 Mg


 SL AS DIRECTED PRN


    17 Reported  ONE TABLET UNDER THE TONGUE AS NEEDED FOR CHEST PAIN, MAY 

REPEAT EVERY


 FIVE MINUTES FOR A TOTAL OF 3 DOSES OR CALL 911 IF NO RELIEF


 


 Percocet


  (Oxycodone-Acetaminophen)


  mg Tab  1 Tab


 PO Q6H PRN


    17 Reported 


 


 Metoprolol


 Tartrate 100 Mg


 Tab  100 Mg


 PO BID


    17 Reported 


 


 Levothyroxine


  (Levothyroxine


 Sodium) 125 Mcg


 Tab  125 Mcg


 PO DAILY


    17 Reported 


 


 Lantus Inj


  (Insulin


 Glargine) 1,000


 Unit/10 Ml Vial  35 Units


 SQ HS


    17 Reported 


 


 Cymbalta DR


  (Duloxetine HCl)


 60 Mg Capdr  60 Mg


 PO TID


    17 Reported 


 


 Cyanocobalamin


 Inj


  (Cyanocobalamin)


 1,000 Mcg/Ml Inj  1,000 Mcg


 SQ 1 MONTH


    17 Reported 


 


 Carisoprodol 250


 Mg Tab  350 Mg


 PO TID PRN


    17 Reported 


 


 Amlodipine-Benazepril


 2.5-10 Mg Cap  1 Cap


 PO DAILY


    17 Reported 








Family History


Mother had leukemia, brain tumor


Father had gastric cancer


Social History


No use of tobacco, etoh, or illicit drug use.


 (Ariella Awan)





Review of Systems


Constitutional:  COMPLAINS OF: Fatigue, DENIES: Fever, Weight loss, Chills


Respiratory:  DENIES: Cough, Shortness of breath


Cardiovascular:  DENIES: Chest pain


Gastrointestinal:  COMPLAINS OF: Abdominal pain, Diarrhea, Nausea, Vomiting, 

Swelling of Abdomen, Heartburn, DENIES: Black stools, Bloody stools, 

Constipation


Musculoskeletal:  COMPLAINS OF: Joint pain, Muscle aches, Back pain


Neurologic:  COMPLAINS OF: Headache


Psychiatric:  DENIES: Confusion (Ariella Awan)





GI Exam


Vitals I&O





Vital Signs








  Date Time  Temp Pulse Resp B/P (MAP) Pulse Ox O2 Delivery O2 Flow Rate FiO2


 


10/4/17 02:52 96.1 79 19 134/69 (90) 95   


 


10/3/17 23:01  89 20 125/62 (83) 98 Room Air  


 


10/3/17 22:24     98 Room Air  


 


10/3/17 21:39  85 20  98   


 


10/3/17 20:43 97.9 78 16 108/64 (79) 97   














I/O      


 


 10/3/17 10/3/17 10/3/17 10/4/17 10/4/17 10/4/17





 07:00 15:00 23:00 07:00 15:00 23:00


 


Intake Total    2865 ml 45 ml 


 


Balance    2865 ml 45 ml 


 


      


 


Intake Oral    240 ml  


 


IV Total    2625 ml 45 ml 


 


# Voids    1  


 


# Bowel Movements    0  








Imaging





Last Impressions








Chest X-Ray 10/3/17 2140 Signed





Impressions: 





 Service Date/Time:  Tuesday, October 3, 2017 21:47 - CONCLUSION: No acute 





 disease.       Abdon Salcedo MD 


 


Abdomen/Pelvis CT 10/3/17 2140 Signed





Impressions: 





 Service Date/Time:  Tuesday, October 3, 2017 23:32 - CONCLUSION:  1. 

Nonspecific 





 gastric distention without a perceptible etiology. 2. No small bowel 

distention. 





 3. Considerable stool throughout the colon. Possible mild transverse and 





 descending colitis. 4. Small nonobstructing stones in both kidneys. 5. Mild 





 atelectasis of the lung bases. 6. Stable patchy benign appearing capsular 





 calcification of the spleen. 7. Nonspecific subcutaneous edema of the upper 





 thighs. 8. 7 mm soft tissue nodule seen in the retroareolar region of the left 





 breast, nonspecific. Clinical and outpatient mammographic correlation 





 recommended. No comparison mammograms are seen in our system.     Abdon Peters MD 








Laboratory











Test


  10/3/17


21:40 10/3/17


22:55 10/3/17


23:00 10/4/17


01:55


 


White Blood Count 18.2 TH/MM3    


 


Red Blood Count 4.01 MIL/MM3    


 


Hemoglobin 10.6 GM/DL    


 


Hematocrit 33.3 %    


 


Mean Corpuscular Volume 83.1 FL    


 


Mean Corpuscular Hemoglobin 26.5 PG    


 


Mean Corpuscular Hemoglobin


Concent 31.9 % 


  


  


  


 


 


Red Cell Distribution Width 17.0 %    


 


Platelet Count 198 TH/MM3    


 


Mean Platelet Volume 9.2 FL    


 


Neutrophils (%) (Auto) 86.5 %    


 


Lymphocytes (%) (Auto) 4.7 %    


 


Monocytes (%) (Auto) 8.4 %    


 


Eosinophils (%) (Auto) 0.1 %    


 


Basophils (%) (Auto) 0.3 %    


 


Neutrophils # (Auto) 15.8 TH/MM3    


 


Lymphocytes # (Auto) 0.9 TH/MM3    


 


Monocytes # (Auto) 1.5 TH/MM3    


 


Eosinophils # (Auto) 0.0 TH/MM3    


 


Basophils # (Auto) 0.0 TH/MM3    


 


CBC Comment DIFF FINAL    


 


Differential Comment     


 


Prothrombin Time 10.6 SEC    


 


Prothromb Time International


Ratio 1.0 RATIO 


  


  


  


 


 


Activated Partial


Thromboplast Time 27.6 SEC 


  


  


  


 


 


Blood Urea Nitrogen 28 MG/DL    


 


Creatinine 1.96 MG/DL    


 


Random Glucose 315 MG/DL    


 


Total Protein 6.7 GM/DL    


 


Albumin 3.1 GM/DL    


 


Calcium Level 8.5 MG/DL    


 


Magnesium Level 1.6 MG/DL    


 


Alkaline Phosphatase 137 U/L    


 


Aspartate Amino Transf


(AST/SGOT) 25 U/L 


  


  


  


 


 


Alanine Aminotransferase


(ALT/SGPT) 39 U/L 


  


  


  


 


 


Total Bilirubin 0.4 MG/DL    


 


Sodium Level 134 MEQ/L    


 


Potassium Level 3.5 MEQ/L    


 


Chloride Level 97 MEQ/L    


 


Carbon Dioxide Level 28.7 MEQ/L    


 


Anion Gap 8 MEQ/L    


 


Estimat Glomerular Filtration


Rate 31 ML/MIN 


  


  


  


 


 


Ammonia 17 MCMOL/L    


 


Total Creatine Kinase 140 U/L    


 


Creatine Kinase MB 4.7 NG/ML    


 


Troponin I


  LESS THAN 0.02


NG/ML 


  


  


 


 


B-Type Natriuretic Peptide 63 PG/ML    


 


Lipase 34 U/L    


 


B-Hydroxybutyrate 0.11 MMOL/L    


 


Lactic Acid Level  2.6 mmol/L   1.8 mmol/L 


 


Urine Color   YELLOW  


 


Urine Turbidity   HAZY  


 


Urine pH   5.0  


 


Urine Specific Gravity   1.021  


 


Urine Protein   TRACE mg/dL  


 


Urine Glucose (UA)   TRACE mg/dL  


 


Urine Ketones   NEG mg/dL  


 


Urine Occult Blood   NEG  


 


Urine Nitrite   NEG  


 


Urine Bilirubin   NEG  


 


Urine Urobilinogen


  


  


  LESS THAN 2.0


MG/DL 


 


 


Urine Leukocyte Esterase   SMALL  


 


Urine RBC


  


  


  LESS THAN 1


/hpf 


 


 


Urine WBC


  


  


  LESS THAN 1


/hpf 


 


 


Urine Squamous Epithelial


Cells 


  


  1 /hpf 


  


 


 


Urine Hyaline Casts   7 /lpf  


 


Urine Mucus   FEW /lpf  


 


Microscopic Urinalysis Comment


  


  


  CULT NOT


INDICATED 


 














 Date/Time


Source Procedure


Growth Status


 


 


 10/3/17 22:50


Blood Peripheral Aerobic Blood Culture


Pending Received


 


 10/3/17 22:50


Blood Peripheral Anaerobic Blood Culture


Pending Received








Physical Examination


HEENT: Normocephalic; atraumatic; no jaundice.  


CHEST:  CTA


CARDIAC:  RRR


ABDOMEN:  Soft, nondistended, mild diffuse tenderness, no hepatosplenomegaly; 

bowel sounds are present in all four quadrants.


EXTREMITIES: No clubbing, cyanosis, or edema.


SKIN:  Normal; no rash; no jaundice.


CNS:  No focal deficits; alert and oriented times three.


 (Ariella Awan)





Assessment and Plan


Plan


ASSESSMENT:


- Abdominal pain.  3 week hx of mid abdominal pain (sharp/throbbing/cramping at 

times), worse with po intake.  CT scan abdomen 


   and pelvis (10/3/17)---> Nonspecific gastric distention without a 

perceptible etiology.  No small bowel distention.  Considerate stool 


   throughout the colon.  Possible mild transverse and descending colitis.  

Small nonobstructing stones in both kidneys.  Mild atelectasis


   of the lung bases.  Stable patchy benign appearing capsular calcification of 

the spleen.  Nonspecific subcutaneous edema of the upper 


   thighs.  7 mm soft tissue nodule seen in the retroareolar region of the left 

breast, nonspecific.  Clinical and outpatient mammographic


   correlation recommended.  No comparison mammograms are seen in our system.  S

/P Capsule endoscopy for anemia (16)---> 


   capsule is in the stomach during the entire study suggesting gastroparesis, 

will need EGD with endoscopic placement of capsule


   EGD (16)---> there was a short stricture at the gastroesophageal 

junction, multiple biopsies were performed.  The stricture was 


   dilated using a 17 mm savory dilator over guidewire, food residue in the 

gastric body, small sessile polyp was found in the gastric antrum, 


   polypectomy was performed, retroflex views revealed no abnormalities.  

Esophagus distal squamous mucosa with moderate, nonspecific 


   chronic inflammation, negative for intestinal metaplasia, dysplasia, or 

malignancy.  Stomach with exuberant reactive/chemical gastropathy


   in a background of moderate chronic, active gastritis with focal surgace 

mucosal erosion, negative for Helicobacter pylori. 


   Colonoscopy (11/2/15)---> the colonic mucosa appeared normal, retroflex 

views revealed no abnormalities, rule out no abnormalities of the 


   rectum.  Repeat colonoscopy was recommended in 10 years.   She reports 

diarrhea with bethanechol in past.  She denies constipation


   and states she has loose stools, but she has significant amount of stool 

throughout her colon on CT.  ? overflow diarrhea.  ? pain r/t


   constipation, ? gastroparesis, ? colitis.  Stool studies. Flagyl, Zosyn. 


- Gastroparesis.  Pt reports diarrhea with bethanechol.


- Constipation, possibly overflow diarrhea.  Denies constipation, but 

significant stool throughout colon on CT.  


- GERD.  PPI.  


- Leukocytosis.  WBC 18.2.  No recent travel, suspicious food, abx use, sick 

contacts.  Get stool studies.  Zosyn, Flagyl


- Anemia, chronic.  No acute blood loss.  S/P Capsule endoscopy/EGD/Colonoscopy 

as above.


- Acute on chronic kidney disease.  Creat 1.96.  


- SLE, CAD, CMP, COPD, RA, HTN, Fibromyalgia, hypothyroidism per attending.





PLAN:


- JUAN RAMON- soft heart healthy


- Cont. Flagyl, Zosyn


- Miralax 17gram po daily


- Protonix 40mg po daily


- Stool studies


- Monitor labs


- ? EGD/Colonoscopy


- Supportive care


- Further recommendations to follow based on results of above


- Pt seen and examined by Dr. Bui and myself and this note is written on her 

behalf


 (Ariella Awan)


Physician Comments


seen, examined


agree with above 


egd/colon/botox/dilatation in am 


stool c diff negative 


 (Kim Bui MD)











Ariella Awan Oct 4, 2017 07:35


Kim Bui MD Oct 4, 2017 17:23

## 2017-10-04 NOTE — EKG
Date Performed: 10/03/2017       Time Performed: 22:48:32

 

PTAGE:      62 years

 

EKG:      Sinus rhythm 

 

 BORDERLINE LEFT AXIS DEVIATION BORDERLINE ECG 

 

NO PREVIOUS TRACING            

 

DOCTOR:   Adam Abdi  Interpretating Date/Time  10/04/2017 10:06:17

## 2017-10-04 NOTE — MH
cc:

MOHAMUD GOMES MD

****

 

DATE OF ADMISSION:  10/4/2017 @ 130

 

CHIEF COMPLAINT

Weakness.  Abdominal pain.

 

HISTORY OF PRESENT ILLNESS

This is a pleasant 62-year-old female with multiple medical problems.  She had

low blood sugar this morning.  A family member called EVAC.  She did not come

to the hospital.  She was given IV infusion of what sounds like dextrose, the

patient is unsure.  The patient has felt weak and tired all day.  She states

her blood pressure has been very low with the systolic blood pressure as low as

the 50s but it had remained under 100 during her time at home.  She did not

take any further insulin because of the hypoglycemia.  She has nausea but no

vomiting.  She could not eat anything.  She had no fever, no chills.  She had a

bowel movement which appeared to be darker.  She states she has diffuse

abdominal pain, worse in the upper abdomen than in the lower abdomen.  She has

chronic pain as well that has not changed.  The abdominal pain is newer.  She

has chronic constipation from chronic narcotic use as well.

 

MEDICATIONS ON ADMISSION

Please see the chart.

 

ALLERGIES

None.

 

PAST MEDICAL HISTORY

1. Diabetes.

2. Coronary artery disease.

3. Cardiomyopathy.

4. Hypertension.

5. Anemia.

6. COPD.

7. Lupus.

8. Rheumatoid arthritis.

9. Fibromyalgia.

10. Shingles.

11. GERD.

12. Hypothyroidism.

13. Paroxysmal supraventricular tachycardia.

14. Diabetic peripheral neuropathy.

15. Chronic pain syndrome.

PAST SURGICAL HISTORY

1. CABG.

2. AICD placement.

3. C-spine fusion.

4. Cardiac catheterization in  that showed mild disease; no intervention

   and normal EF.

5. Right carpal tunnel surgery.

6. .

7. Myomectomy.

8. PARAMJIT, BSO.

9. ORIF of the right wrist.

 

SOCIAL HISTORY

She is disables.  Tobacco - Quit in .  She only had about an 8 pack-year

history.  Alcohol - None recently.  Was never a heavy drinker.

 

FAMILY HISTORY

Non-contributory.

 

REVIEW OF SYSTEMS

The patient states her last mammogram was in November of last year and was

normal.  Her last colonoscopy was give years ago when she had an inflamed polyp

removed and is due for a colonoscopy now.  She has been following with Dr. Bui.  She has been having some chronic constipation and abdominal pain.  She

was due for a CAT scan of her abdomen and pelvis.  She was on Bethanechol but

this was giving her stool incontinence, thus she has quit taking it.  She did

have vomiting about three weeks ago but not since then.  She has not been on

any antibiotics in the last six months.  She has chronic pain mainly in her

right lower extremity, right shoulder, lumbar spine and hands.

 

REVIEW OF SYSTEMS

On 12-point, no other pertinent findings.

 

PHYSICAL EXAMINATION

VITAL SIGNS:  Last blood pressure was 152/62; she has been afebrile, heart rate

89, respirations 20, blood pressure on admission was 108/64, O2 sat is 98% on

room air.

GENERAL:  This is a 62-year-old female resting comfortably in bed.  She does

not appear to be in any distress.

HEENT:   Mildly dry mucous membranes, either without jaundice.

NECK:  Supple.

CARDIOVASCULAR SYSTEM:  Regular rate and rhythm.

RESPIRATORY SYSTEM:  A few expiratory wheezes.

GASTROINTESTINAL SYSTEM:  Bowel sounds are present.  There is mild diffuse

tenderness.  No guarding or rebound.

 SYSTEM:  No CVA tenderness.  No suprapubic tenderness.

MUSCULOSKELETAL SYSTEM:  No edema.  Miguel's is negative.

NEUROLOGICAL EXAMINATION:  Awake, alert, oriented.  Speech is clear and fluent.

Moving all extremities freely.  Gait was observed as she went to the bathroom

and appeared to be normal.

 

INVESTIGATIONS

White count is 18.2, hemoglobin is 10.6, platelet count 198; neutrophils are

86.5.

INR is 1.

Sodium 134, potassium 3.5, BUN 28, creatinine 1.96, GRF is 31, random glucose

was 315, lactic acid was 2.6, magnesium was 1.6, alkaline phosphatase 137, LFTs

otherwise normal.  Lipase was 34, albumin 3.1.

Troponin negative.

CKMB 4.7.

CPK normal.

BNP 63.

Beta-hydroxybutyric acid was 0.11.

Urinalysis showed a small amount of leukocyte esterase.  Culture is not

indicated.

 

CHEST X-RAY

Chest x-ray pacing/AICD device in place.  Lungs are grossly clear; no active

disease is seen.

 

CT OF THE ABDOMEN AND PELVIS

Nonspecific gastric distention without perceptible etiology; no small bowel

distention.  Considerable stool throughout the colon.  Possible mild transverse

and descending colitis.  Stable patchy, benign-appearing capsular calcification

of the spleen.  Mild atelectasis at the lung bases.  Nonobstructing renal

stone.  A 7-mm soft-tissue nodule seen in the retroareolar area of the left

breast, nonspecific.  Clinical and mammographic correlation is recommended.

 

IMPRESSION

1. Early sepsis on admission.

2. Colitis.

3. Anemia of chronic disease.

4. Hyponatremia.

5. Borderline hypokalemia and hypomagnesemia.

6. Acute kidney injury.

7. Diabetes mellitus.

8. Recent hypoglycemic episode.

9. Lactic acidosis.

10. Elevated alkaline phosphatase.

11. Hypoalbuminemia.

12. History of coronary artery disease, status post CABG.

13. History of cardiomyopathy, status post AICD placement.

14. Chronic pain syndrome.

15. Chronic constipation.

16. Hypothyroidism.

 

DISCUSSION

The patient is admitted to Dr. Gomes's service. The patient meets inpatient

criteria secondary to early sepsis and colitis and acute kidney injury on

admission without which hospitalization she would be at a high risk of

developing arrhythmias from electrolyte imbalance, septic shock, worsening

hypoglycemic episodes, etc.

 

PLAN

1. During her hospital stay, we will continue with hydration.  She has already

   had a bolus of normal saline at 30 mL/kg.

2. She has been started on Flagyl and Zosyn; these will be continued.

3. DVT and GI prophylaxis will be given.

4. We will resume some of her home medications as indicated.

5. We will hold the ACE inhibitor because of the acute kidney injury.

6. Laxatives will be given.

7. We will consult the gastroenterologist who she is known to, Dr. Bui.

8. We will replace her electrolytes as indicated.

9. We will monitor her and cover her blood sugar with a sliding scale of

   insulin.

10. We will cycle lactic acids and make further recommendations as her case

   progresses.

11. Blood cultures have been drawn and are pending.

 

ANTICIPATED LENGTH OF STAY

Three days.

 

ANTICIPATED DISCHARGE

Home.

 

Dictated by: John Brice PA-C

 

                               __________________________________

                           MD JUAN RAMON Mancia/ASTRID

D:  10/4/2017/1:53 AM

T:  10/4/2017/4:59 AM

Visit #:  [id number]

Job #:  [id number]

## 2017-10-05 VITALS — DIASTOLIC BLOOD PRESSURE: 70 MMHG | SYSTOLIC BLOOD PRESSURE: 169 MMHG

## 2017-10-05 VITALS
HEART RATE: 71 BPM | TEMPERATURE: 98.1 F | OXYGEN SATURATION: 94 % | SYSTOLIC BLOOD PRESSURE: 153 MMHG | RESPIRATION RATE: 16 BRPM | DIASTOLIC BLOOD PRESSURE: 75 MMHG

## 2017-10-05 VITALS
SYSTOLIC BLOOD PRESSURE: 180 MMHG | OXYGEN SATURATION: 99 % | DIASTOLIC BLOOD PRESSURE: 96 MMHG | HEART RATE: 104 BPM | RESPIRATION RATE: 17 BRPM | TEMPERATURE: 98.7 F

## 2017-10-05 VITALS
SYSTOLIC BLOOD PRESSURE: 153 MMHG | DIASTOLIC BLOOD PRESSURE: 71 MMHG | TEMPERATURE: 98.4 F | HEART RATE: 81 BPM | RESPIRATION RATE: 17 BRPM | OXYGEN SATURATION: 95 %

## 2017-10-05 VITALS
RESPIRATION RATE: 17 BRPM | SYSTOLIC BLOOD PRESSURE: 152 MMHG | TEMPERATURE: 98 F | DIASTOLIC BLOOD PRESSURE: 76 MMHG | HEART RATE: 74 BPM | OXYGEN SATURATION: 94 %

## 2017-10-05 VITALS
HEART RATE: 76 BPM | RESPIRATION RATE: 19 BRPM | DIASTOLIC BLOOD PRESSURE: 78 MMHG | TEMPERATURE: 97.1 F | SYSTOLIC BLOOD PRESSURE: 145 MMHG | OXYGEN SATURATION: 95 %

## 2017-10-05 VITALS
RESPIRATION RATE: 19 BRPM | TEMPERATURE: 95.5 F | DIASTOLIC BLOOD PRESSURE: 74 MMHG | SYSTOLIC BLOOD PRESSURE: 177 MMHG | OXYGEN SATURATION: 99 % | HEART RATE: 67 BPM

## 2017-10-05 VITALS — OXYGEN SATURATION: 99 %

## 2017-10-05 LAB
ANION GAP SERPL CALC-SCNC: 6 MEQ/L (ref 5–15)
BASOPHILS # BLD AUTO: 0 TH/MM3 (ref 0–0.2)
BASOPHILS NFR BLD: 0.3 % (ref 0–2)
BUN SERPL-MCNC: 9 MG/DL (ref 7–18)
CHLORIDE SERPL-SCNC: 110 MEQ/L (ref 98–107)
EOSINOPHIL # BLD: 0 TH/MM3 (ref 0–0.4)
EOSINOPHIL NFR BLD: 0.3 % (ref 0–4)
ERYTHROCYTE [DISTWIDTH] IN BLOOD BY AUTOMATED COUNT: 17.1 % (ref 11.6–17.2)
GFR SERPLBLD BASED ON 1.73 SQ M-ARVRAT: 125 ML/MIN (ref 89–?)
HCO3 BLD-SCNC: 30.1 MEQ/L (ref 21–32)
HCT VFR BLD CALC: 30 % (ref 35–46)
HEMO FLAGS: (no result)
LYMPHOCYTES # BLD AUTO: 1.1 TH/MM3 (ref 1–4.8)
LYMPHOCYTES NFR BLD AUTO: 11.4 % (ref 9–44)
MAGNESIUM SERPL-MCNC: 1.9 MG/DL (ref 1.5–2.5)
MCH RBC QN AUTO: 27.2 PG (ref 27–34)
MCHC RBC AUTO-ENTMCNC: 33 % (ref 32–36)
MCV RBC AUTO: 82.5 FL (ref 80–100)
MONOCYTES NFR BLD: 8.4 % (ref 0–8)
NEUTROPHILS # BLD AUTO: 7.5 TH/MM3 (ref 1.8–7.7)
NEUTROPHILS NFR BLD AUTO: 79.6 % (ref 16–70)
PLATELET # BLD: 166 TH/MM3 (ref 150–450)
POTASSIUM SERPL-SCNC: 3.2 MEQ/L (ref 3.5–5.1)
RBC # BLD AUTO: 3.64 MIL/MM3 (ref 4–5.3)
SODIUM SERPL-SCNC: 146 MEQ/L (ref 136–145)
WBC # BLD AUTO: 9.4 TH/MM3 (ref 4–11)

## 2017-10-05 PROCEDURE — 0DBM8ZX EXCISION OF DESCENDING COLON, VIA NATURAL OR ARTIFICIAL OPENING ENDOSCOPIC, DIAGNOSTIC: ICD-10-PCS | Performed by: INTERNAL MEDICINE

## 2017-10-05 PROCEDURE — 0DB78ZZ EXCISION OF STOMACH, PYLORUS, VIA NATURAL OR ARTIFICIAL OPENING ENDOSCOPIC: ICD-10-PCS | Performed by: INTERNAL MEDICINE

## 2017-10-05 PROCEDURE — 0DB78ZX EXCISION OF STOMACH, PYLORUS, VIA NATURAL OR ARTIFICIAL OPENING ENDOSCOPIC, DIAGNOSTIC: ICD-10-PCS | Performed by: INTERNAL MEDICINE

## 2017-10-05 PROCEDURE — 0DB98ZX EXCISION OF DUODENUM, VIA NATURAL OR ARTIFICIAL OPENING ENDOSCOPIC, DIAGNOSTIC: ICD-10-PCS | Performed by: INTERNAL MEDICINE

## 2017-10-05 PROCEDURE — 3E0G8GC INTRODUCTION OF OTHER THERAPEUTIC SUBSTANCE INTO UPPER GI, VIA NATURAL OR ARTIFICIAL OPENING ENDOSCOPIC: ICD-10-PCS | Performed by: INTERNAL MEDICINE

## 2017-10-05 PROCEDURE — 0D748ZZ DILATION OF ESOPHAGOGASTRIC JUNCTION, VIA NATURAL OR ARTIFICIAL OPENING ENDOSCOPIC: ICD-10-PCS | Performed by: INTERNAL MEDICINE

## 2017-10-05 RX ADMIN — HEPARIN SODIUM SCH UNITS: 10000 INJECTION, SOLUTION INTRAVENOUS; SUBCUTANEOUS at 02:00

## 2017-10-05 RX ADMIN — PANTOPRAZOLE SCH MG: 40 TABLET, DELAYED RELEASE ORAL at 08:07

## 2017-10-05 RX ADMIN — PHENYTOIN SODIUM SCH MLS/HR: 50 INJECTION INTRAMUSCULAR; INTRAVENOUS at 08:10

## 2017-10-05 RX ADMIN — MORPHINE SULFATE SCH MG: 15 TABLET, EXTENDED RELEASE ORAL at 08:07

## 2017-10-05 RX ADMIN — MORPHINE SULFATE SCH MG: 15 TABLET, EXTENDED RELEASE ORAL at 21:10

## 2017-10-05 RX ADMIN — TAZOBACTAM SODIUM AND PIPERACILLIN SODIUM SCH MLS/HR: 250; 2 INJECTION, SOLUTION INTRAVENOUS at 17:08

## 2017-10-05 RX ADMIN — DULOXETINE SCH MG: 60 CAPSULE, DELAYED RELEASE ORAL at 16:47

## 2017-10-05 RX ADMIN — INSULIN ASPART SCH: 100 INJECTION, SOLUTION INTRAVENOUS; SUBCUTANEOUS at 21:38

## 2017-10-05 RX ADMIN — SODIUM CHLORIDE SCH MLS/HR: 900 INJECTION, SOLUTION INTRAVENOUS at 02:03

## 2017-10-05 RX ADMIN — METOPROLOL TARTRATE SCH MG: 100 TABLET, FILM COATED ORAL at 21:11

## 2017-10-05 RX ADMIN — Medication SCH ML: at 08:10

## 2017-10-05 RX ADMIN — METOPROLOL TARTRATE SCH MG: 100 TABLET, FILM COATED ORAL at 21:00

## 2017-10-05 RX ADMIN — INSULIN ASPART SCH: 100 INJECTION, SOLUTION INTRAVENOUS; SUBCUTANEOUS at 12:00

## 2017-10-05 RX ADMIN — SODIUM CHLORIDE SCH MLS/HR: 900 INJECTION, SOLUTION INTRAVENOUS at 08:10

## 2017-10-05 RX ADMIN — DULOXETINE SCH MG: 60 CAPSULE, DELAYED RELEASE ORAL at 18:00

## 2017-10-05 RX ADMIN — TAZOBACTAM SODIUM AND PIPERACILLIN SODIUM SCH MLS/HR: 250; 2 INJECTION, SOLUTION INTRAVENOUS at 21:10

## 2017-10-05 RX ADMIN — METOPROLOL TARTRATE SCH MG: 100 TABLET, FILM COATED ORAL at 08:07

## 2017-10-05 RX ADMIN — INSULIN ASPART SCH: 100 INJECTION, SOLUTION INTRAVENOUS; SUBCUTANEOUS at 17:00

## 2017-10-05 RX ADMIN — SODIUM CHLORIDE SCH MLS/HR: 900 INJECTION, SOLUTION INTRAVENOUS at 17:08

## 2017-10-05 RX ADMIN — Medication SCH ML: at 21:11

## 2017-10-05 RX ADMIN — HEPARIN SODIUM SCH UNITS: 10000 INJECTION, SOLUTION INTRAVENOUS; SUBCUTANEOUS at 16:47

## 2017-10-05 RX ADMIN — TAZOBACTAM SODIUM AND PIPERACILLIN SODIUM SCH MLS/HR: 250; 2 INJECTION, SOLUTION INTRAVENOUS at 02:04

## 2017-10-05 RX ADMIN — INSULIN ASPART SCH: 100 INJECTION, SOLUTION INTRAVENOUS; SUBCUTANEOUS at 08:06

## 2017-10-05 RX ADMIN — TAZOBACTAM SODIUM AND PIPERACILLIN SODIUM SCH MLS/HR: 250; 2 INJECTION, SOLUTION INTRAVENOUS at 08:09

## 2017-10-05 RX ADMIN — ASPIRIN SCH MG: 81 TABLET ORAL at 08:07

## 2017-10-05 RX ADMIN — PHENYTOIN SODIUM SCH MLS/HR: 50 INJECTION INTRAMUSCULAR; INTRAVENOUS at 17:08

## 2017-10-05 RX ADMIN — POLYETHYLENE GLYCOL 3350 SCH GM: 17 POWDER, FOR SOLUTION ORAL at 08:07

## 2017-10-05 NOTE — GIPROC
[employer]

[address] [facility]. [facility], [address]

 

 

COLONOSCOPY PROCEDURE REPORT     EXAM DATE: 10/05/2017

 

PATIENT NAME:      Maite Damon           MR #:      [id number]

YOB: 1955      VISIT #:     [id number]

ENDOSCOPIST:     Kim Bui MD     ORDER #:     [id number]

ASSISTANT:      Charleen Shaffer RN     STATUS:     inpatient

 

INDICATIONS:  The patient is a 62 yr old female here for a colonoscopy due to

abnormal ct, constipation, abdominal pain

PROCEDURE PERFORMED:     Colonoscopy with biopsy

MEDICATIONS:     None and Per Anesthesia.

PREP QUALITY:     poor PREP TYPE:Other:

ESTIMATED BLOOD LOSS:     None

 

CONSENT: The patient understands the risks and benefits of the procedure and

understands that these risks include, but are not limited to: sedation,

allergic reaction, infection, perforation and/or bleeding. Alternative means of

evaluation and treatment include, among others: physical exam, x-rays, and/or

surgical intervention. The patient elects to proceed with this endoscopic

procedure.

 



medical equipment was checked for proper function. Hand hygiene and appropriate

measures for infection prevention was taken. After the risks, benefits and

alternatives of the procedure were thoroughly explained, Informed consent was

verified, confirmed and timeout was successfully executed by the treatment

team. A digital exam revealed external hemorrhoids The Pentax EC-[id number]

endoscope was introduced through the anus and advanced to the cecum, which was

identified by both the appendix and ileocecal valve. The instrument was then

slowly withdrawn as the colon was fully examined.

 

 

COLON FINDINGS: Diverticulosis sigmoid,descending

random biopsy from descending colon to r/o microscopic colitis

agressive washing done. Retroflexed views revealed internal hemorrhoids and

Retroflexed views revealed small internal hemorrhoids The scope was then

completely withdrawn from the patient and the procedure terminated.

PROCEDURE WITHDRAWAL TIME:6minutes

 

 

 

ADVERSE EVENTS:      There were no complications.

IMPRESSIONS:     1.  Diverticulosis sigmoid,descending

random biopsy from descending colon to r/o microscopic colitis

agressive washing done

2.  Retroflexed views revealed internal hemorrhoids

3.  Retroflexed views revealed small internal hemorrhoids

4.  Revealed external hemorrhoids

 

RECOMMENDATIONS:     1.  Await biopsy results.  Biopsy results will not be ready

for 7-10 days.  If you don't hear from us in two weeks, call our office for

results.

2.  Probiotics from any [facility] or health food store

3.  Yearly rectal exams

RECALL:     Return 3 years Colonoscopy

 

_____________________________

Kim Bui MD

eSigned:  Kim Bui MD 10/05/2017 1:59 PM

 

 

cc:

## 2017-10-05 NOTE — RADRPT
EXAM DATE/TIME:  10/05/2017 19:02 

 

[facility] COMPARISON:     

CT ABDOMEN & PELVIS W/O CONTRAST, October 03, 2017, 23:32.

 

 

INDICATIONS :     

Mesenteric ischemia

                   

 

IV CONTRAST:     

99 cc Omnipaque 350 (iohexol) IV 

 

 

ORAL CONTRAST:      

No oral contrast ingested.

                   

 

RADIATION DOSE:     

6.24 CTDIvol (mGy) 

 

 

MEDICAL HISTORY :     

Cardiovascular disease. Hypertension. Chronic obstructive pulmonary disease.Hiatal hernia,ulcer,lupus
 diabetes

 

SURGICAL HISTORY :      

Pacemaker. Hysterectomy.

 

ENCOUNTER:      

Initial

 

ACUITY:      

1 day

 

PAIN SCALE:      

2/10

 

LOCATION:         

CTA ABDOMEN

 

TECHNIQUE:     

Volumetric scanning was performed using a multi-row detector CT scanner.  The data was post processed
 with a variety of visualization algorithms including full volume maximum intensity projection, multi
-planar sliding thin slab reformation, curved planar reformation, and surface rendering techniques.  
Using automated exposure control and adjustment of the mA and/or kV according to patient size, radiat
ion dose was kept as low as reasonably achievable to obtain optimal diagnostic quality images.  DICOM
 format image data is available electronically for review and comparison.  

 

FINDINGS:     

 

ABDOMINAL AORTA:     

Atherosclerotic calcifications are seen throughout the arterial system. No aneurysm is seen. A signif
icant area of stenosis at the aorta is not seen. The proximal celiac and superior mesenteric arteries
 are patent and normal in diameter. There is mild plaque seen at the mid and more distal SMA but a si
gnificant stenosis is not seen. The KAM is patent. There is an accessory renal artery seen on the lef
t. The renal arteries are free of focal stenosis. There are calcifications seen in the central aspect
 of the kidneys bilaterally which are thought to likely be related to arterial calcifications. Adjace
nt nonobstructing urinary stones cannot be excluded but both these areas of calcification are immedia
tely adjacent to arterial structures. 

 

BIFURCATION:     

Obstruction stations are seen. Significant stenosis is not seen.

 

RIGHT PELVIS:     

Calcified and soft plaque is seen. There does appear to be a significant stenosis with a linear by at
 least 60% at the right external iliac artery. This is amenable to percutaneous treatment if needed.

 

LEFT PELVIS:     

Calcified and soft plaque is seen. A significant stenosis is not seen. 

 

CONCLUSION:     

1. Plaques seen throughout the arterial system. There does appear to be a significant stenosis of the
 right external iliac artery.

2. The SMA, KAM and celiac arteries are patent. There is mild plaque at the mid and distal SMA withou
t significant stenosis.

3. Calcifications in the central aspects of the kidneys which appear most closely related to arterial
 calcifications as opposed to nonobstructing stones.

 

 

 

 Abdon Salcedo MD on October 05, 2017 at 20:21           

Board Certified Radiologist.

 This report was verified electronically.

## 2017-10-05 NOTE — GIPROC
[facility]

[address] [facility]. [facility], [address]

 

 

EGD WITH DILATION PROCEDURE REPORT     EXAM DATE: 10/05/2017

 

PATIENT NAME:          Maite Damon          MR#:       [id number]

YOB: 1955     VISIT #:     [id number]

ATTENDING:     Kim Bui MD     ORDER #:     [id number]

ASSISTANT:      Dominique Crowell RN      STATUS:     inpatient

 

INDICATIONS:  The patient is a 62 yr old female here for an EGD with dilation

due to gastroparesis

dysphagia

abnormal ct

PROCEDURE PERFORMED:     EGD w/ biopsy

EGD w/ dilation of esophagus via guidewire

EGD w/ directed submucosal injection(s), any substance

MEDICATIONS:     None and Per Anesthesia.

TOPICAL ANESTHETIC:      none

 

CONSENT: The patient understands the risks and benefits of the procedure and

understands that these risks include, but are not limited to: sedation,

allergic reaction, infection, perforation and/or bleeding. Alternative means of

evaluation and treatment include, among others: physical exam, x-rays, and/or

surgical intervention. The patient elects to proceed with this endoscopic

procedure.

 



medical equipment was checked for proper function. Hand hygiene and appropriate

measures for infection prevention was taken. After the risks, benefits and

alternatives of the procedure were thoroughly explained, Informed consent was

verified, confirmed and timeout was successfully executed by the treatment

team. The patient was anesthetized with topical anesthesia and the EC-[id number]

(Pedi C) endoscope was introduced through the mouth and advanced to the second

portion of the duodenum.  The instrument was slowly withdrawn as the mucosa was

fully examined.

Nodular gastrtis antrum-biopsy

polyp 9 mm antrum-hot snare polypectomy with complete removal

esophagitis distal esophagus

duodenum normal-biopsy to r/o celiac disease.   Botox 100 units injected in

pyloric channel-25 units in each quadrant.

Dilation was performed at gastroesophageal junction.

 

DILATOR:     SIZE(S):     RESISTANCE:     HEME:     APPEARANCE:

Dilator: Savary over guidewire     Size(s): 17

COMMENT:

Retroflexed views revealed a hiatal hernia

 

 

 

ADVERSE EVENTS:     There were no complications.

IMPRESSIONS:     1.  Nodular gastrtis antrum-biopsy

polyp 9 mm antrum-hot snare polypectomy with complete removal

esophagitis distal esophagus

duodenum normal-biopsy to r/o celiac disease

2.  Retroflexed views revealed a hiatal hernia

 

RECOMMENDATIONS:     1.  Await biopsy results.  Biopsy results will not be ready

for 7-10 days.  If you don't hear from us in two weeks, call our office for

biopsy results.

2.  Anti-reflux regimen

3.  Continue PPI

4.  Start PPI

5.  Dilatations PRN

6.  Avoid NSAIDS

REPEAT EXAM:     EGD in 3 month

egd/botox/dil as needed

 

___________________________________

Kim Bui MD

eSigned:  Kim Bui MD 10/05/2017 2:05 PM

 

 

cc:

 

 

 

 

PATIENT NAME:  Maite Damon

MR#: [id number]

## 2017-10-05 NOTE — HHI.PR
Subjective


Subjective Remarks


Complaining of low abdominal pain, nausea


NPO going for panendoscopy


No chest pain


No shortness of breath


No fever


No bloody stools


No diarrhea





Review of Systems


Constitutional


Constitutional Remarks


12 point review of systems completed, negative except as noted above





Vitals/Results


Vital Signs





Vital Signs








  Date Time  Temp Pulse Resp B/P (MAP) Pulse Ox O2 Delivery O2 Flow Rate FiO2


 


10/5/17 07:47 97.1 76 19 145/78 (100) 95   


 


10/5/17 03:20 98.1 71 16 153/75 (101) 94   


 


10/5/17 00:00 98.0 74 17 152/76 (101) 94   


 


10/4/17 21:34     94   


 


10/4/17 20:15 97.9 70 18 167/81 (109) 94   


 


10/4/17 16:00 97.8 74 18 139/69 (92) 94   


 


10/4/17 12:00 97.4 70 18 161/71 (101) 96   








CBC/BMP:  


10/5/17 0651                                                                   

             10/5/17 0651





Lab Results





Laboratory Tests








Test


  10/4/17


12:30 10/5/17


06:51


 


Stool C. difficile Toxin (PCR) NEGATIVE  


 


Stl C. difficile Toxin


Epiderm 027 PRESUMPTIVE


NEGATIVE 


 


 


White Blood Count  9.4 TH/MM3 


 


Red Blood Count  3.64 MIL/MM3 


 


Hemoglobin  9.9 GM/DL 


 


Hematocrit  30.0 % 


 


Mean Corpuscular Volume  82.5 FL 


 


Mean Corpuscular Hemoglobin  27.2 PG 


 


Mean Corpuscular Hemoglobin


Concent 


  33.0 % 


 


 


Red Cell Distribution Width  17.1 % 


 


Platelet Count  166 TH/MM3 


 


Mean Platelet Volume  8.9 FL 


 


Neutrophils (%) (Auto)  79.6 % 


 


Lymphocytes (%) (Auto)  11.4 % 


 


Monocytes (%) (Auto)  8.4 % 


 


Eosinophils (%) (Auto)  0.3 % 


 


Basophils (%) (Auto)  0.3 % 


 


Neutrophils # (Auto)  7.5 TH/MM3 


 


Lymphocytes # (Auto)  1.1 TH/MM3 


 


Monocytes # (Auto)  0.8 TH/MM3 


 


Eosinophils # (Auto)  0.0 TH/MM3 


 


Basophils # (Auto)  0.0 TH/MM3 


 


CBC Comment  DIFF FINAL 


 


Differential Comment   


 


Blood Urea Nitrogen  9 MG/DL 


 


Creatinine  0.59 MG/DL 


 


Random Glucose  77 MG/DL 


 


Calcium Level  8.0 MG/DL 


 


Magnesium Level  1.9 MG/DL 


 


Sodium Level  146 MEQ/L 


 


Potassium Level  3.2 MEQ/L 


 


Chloride Level  110 MEQ/L 


 


Carbon Dioxide Level  30.1 MEQ/L 


 


Anion Gap  6 MEQ/L 


 


Estimat Glomerular Filtration


Rate 


  125 ML/MIN 


 








Microbiology





 Microbiology


10/4/17 Cryptosporidium Exam, Resulted


          Pending


10/4/17 Stool Pus (NURY) - Final, Resulted


          NO WBC'S  SEEN


10/4/17 Giardia Antigen (NURY), Resulted


          Pending


10/4/17 - Final, Complete





Physical Exam


General


General Appearance:  Well Developed, Well Nourished, No Acute Distress, 

Comfortable, Sleeping





Eyes


Eye Exam:  Pupils Equal, Pupils Reactive





Ears & Nose


Ears & Nose Exam:  Nasal Mucosa Pink





Throat


Throat Exam:  Oral Mucosa Pink & Moist





Neck


Neck Exam:  Neck Supple, Trachea Midline





Pulmonary


Resp Exam:  Clear Bilaterally, No Distress





Cardiology


CV Exam:  Regular, Good Perfusion





Gastrointestinal/Abdomen


GI Exam:  Soft, Bowel Sounds Present, Non-Distended, Bowel Sounds Hypoactive


GI Remarks


Tender to lower abdomen





Musculoskeletal


MS Exam:  Joints Intact





Integumentary


Skin Exam:  Warm, Dry





Extremeties


Extremities Exam:  No Edema, Pedal Pulses Palpable





Neurologic


Neuro Exam:  Awake, Oriented, Speech Clear, Moving All Extremities, No Focal 

Deficits





Psychiatric


Psych Exam:  Appropriate Responses





VTE Prophylaxis


VTE Prophylaxis Device:  SCDs


VTE Prophylaxis Meds:  Heparin





PUD Prophylasis


PUD Prophylaxis:  Protonix





Assessment/Plan


Assessment/Plan


IMPRESSION


1. Early sepsis on admission.


2. Colitis.


3. Anemia of chronic disease.


4. Hyponatremia.


5. Borderline hypokalemia and hypomagnesemia.


6. Acute kidney injury.


7. Diabetes mellitus.


8. Recent hypoglycemic episode.


9. Lactic acidosis.


10. Elevated alkaline phosphatase.


11. Hypoalbuminemia.


12. History of coronary artery disease, status post CABG.


13. History of cardiomyopathy, status post AICD placement.


14. Chronic pain syndrome.


15. Chronic constipation.


16. Hypothyroidism.


 


PLAN


Presented with sepsis, elevated lactic acid, hypotensive


Continue with IV fluids, antibiotics-Flagyl and Zosyn


Lactic acid okay


Appreciate GI input, recommend panendoscopy


Nothing by mouth, going for procedure today


Stools for C. difficile negative





Replace potassium


Sodium 146, continue to monitor


Monitor renal function-has improved


Continue IV fluids


Monitor electrolytes and replace as needed


resume ACE inhibitor's tomorrow





Blood pressure stable, we'll resume antihypertensive agents 





Continue with Accu-Cheks and insulin therapy as needed





SCDs and heparin for DVT prophylaxis


PPI for GI prophylaxis





Repeat labs in the morning





Discussed with RN


Discussed with patient


Discussed with attending


This patient was seen by myself and Dr. Gomes, this note is written on his 

behalf











Maura Antoine Oct 5, 2017 11:26

## 2017-10-06 VITALS
DIASTOLIC BLOOD PRESSURE: 83 MMHG | HEART RATE: 76 BPM | SYSTOLIC BLOOD PRESSURE: 157 MMHG | RESPIRATION RATE: 18 BRPM | OXYGEN SATURATION: 93 % | TEMPERATURE: 98.4 F

## 2017-10-06 VITALS — OXYGEN SATURATION: 93 %

## 2017-10-06 VITALS
RESPIRATION RATE: 18 BRPM | DIASTOLIC BLOOD PRESSURE: 72 MMHG | SYSTOLIC BLOOD PRESSURE: 157 MMHG | TEMPERATURE: 98.4 F | HEART RATE: 69 BPM | OXYGEN SATURATION: 96 %

## 2017-10-06 LAB
ANION GAP SERPL CALC-SCNC: 7 MEQ/L (ref 5–15)
BUN SERPL-MCNC: 5 MG/DL (ref 7–18)
CHLORIDE SERPL-SCNC: 108 MEQ/L (ref 98–107)
ERYTHROCYTE [DISTWIDTH] IN BLOOD BY AUTOMATED COUNT: 17.5 % (ref 11.6–17.2)
GFR SERPLBLD BASED ON 1.73 SQ M-ARVRAT: 118 ML/MIN (ref 89–?)
HCO3 BLD-SCNC: 27 MEQ/L (ref 21–32)
HCT VFR BLD CALC: 29.4 % (ref 35–46)
HCT VFR BLD CALC: 32.9 % (ref 35–46)
MCH RBC QN AUTO: 27.3 PG (ref 27–34)
MCHC RBC AUTO-ENTMCNC: 33.2 % (ref 32–36)
MCV RBC AUTO: 82 FL (ref 80–100)
PLATELET # BLD: 160 TH/MM3 (ref 150–450)
POTASSIUM SERPL-SCNC: 3.2 MEQ/L (ref 3.5–5.1)
RBC # BLD AUTO: 3.58 MIL/MM3 (ref 4–5.3)
REVIEW FLAG: (no result)
REVIEW FLAG: (no result)
SODIUM SERPL-SCNC: 142 MEQ/L (ref 136–145)
WBC # BLD AUTO: 8.6 TH/MM3 (ref 4–11)

## 2017-10-06 RX ADMIN — TAZOBACTAM SODIUM AND PIPERACILLIN SODIUM SCH MLS/HR: 250; 2 INJECTION, SOLUTION INTRAVENOUS at 08:22

## 2017-10-06 RX ADMIN — INSULIN ASPART SCH: 100 INJECTION, SOLUTION INTRAVENOUS; SUBCUTANEOUS at 12:49

## 2017-10-06 RX ADMIN — HEPARIN SODIUM SCH UNITS: 10000 INJECTION, SOLUTION INTRAVENOUS; SUBCUTANEOUS at 01:16

## 2017-10-06 RX ADMIN — PHENYTOIN SODIUM SCH MLS/HR: 50 INJECTION INTRAMUSCULAR; INTRAVENOUS at 03:42

## 2017-10-06 RX ADMIN — DULOXETINE SCH MG: 60 CAPSULE, DELAYED RELEASE ORAL at 12:48

## 2017-10-06 RX ADMIN — METOPROLOL TARTRATE SCH MG: 100 TABLET, FILM COATED ORAL at 08:25

## 2017-10-06 RX ADMIN — TAZOBACTAM SODIUM AND PIPERACILLIN SODIUM SCH MLS/HR: 250; 2 INJECTION, SOLUTION INTRAVENOUS at 12:54

## 2017-10-06 RX ADMIN — DULOXETINE SCH MG: 60 CAPSULE, DELAYED RELEASE ORAL at 08:23

## 2017-10-06 RX ADMIN — TAZOBACTAM SODIUM AND PIPERACILLIN SODIUM SCH MLS/HR: 250; 2 INJECTION, SOLUTION INTRAVENOUS at 02:49

## 2017-10-06 RX ADMIN — SODIUM CHLORIDE SCH MLS/HR: 900 INJECTION, SOLUTION INTRAVENOUS at 08:22

## 2017-10-06 RX ADMIN — INSULIN ASPART SCH: 100 INJECTION, SOLUTION INTRAVENOUS; SUBCUTANEOUS at 08:22

## 2017-10-06 RX ADMIN — METOPROLOL TARTRATE SCH MG: 100 TABLET, FILM COATED ORAL at 08:24

## 2017-10-06 RX ADMIN — PANTOPRAZOLE SCH MG: 40 TABLET, DELAYED RELEASE ORAL at 08:23

## 2017-10-06 RX ADMIN — ASPIRIN SCH MG: 81 TABLET ORAL at 08:24

## 2017-10-06 RX ADMIN — MORPHINE SULFATE SCH MG: 15 TABLET, EXTENDED RELEASE ORAL at 08:25

## 2017-10-06 RX ADMIN — POLYETHYLENE GLYCOL 3350 SCH GM: 17 POWDER, FOR SOLUTION ORAL at 08:25

## 2017-10-06 RX ADMIN — HEPARIN SODIUM SCH UNITS: 10000 INJECTION, SOLUTION INTRAVENOUS; SUBCUTANEOUS at 12:48

## 2017-10-06 RX ADMIN — SODIUM CHLORIDE SCH MLS/HR: 900 INJECTION, SOLUTION INTRAVENOUS at 01:15

## 2017-10-06 RX ADMIN — Medication SCH ML: at 08:25

## 2017-10-06 RX ADMIN — PHENYTOIN SODIUM SCH MLS/HR: 50 INJECTION INTRAMUSCULAR; INTRAVENOUS at 12:55

## 2017-10-06 NOTE — HHI.GIFU
GI Follow-up Note


Consult Follow-up





Subjective:  Patient laying in bed comfortably, feeling better.No nausea, 

vomiting, abdominal pain.Tolerated diet well.Awaiting to go home .Had egd with 

Botox injection yesterday , feeling better.CTA discussed with her -no 

indication of mesenteric ischemia . There is some stenosis -right iliac -will 

need to see vascular surgery op 


Objective:


PHYSICAL EXAMINATION:


Vitals signs stable


No fever





HEENT: Pupils round and reactive to light; normocephalic; atraumatic; no 

jaundice.  Throat is clear.


NECK: Neck is supple, no JVD, no lymphadenopathy.


CHEST:  Chest is clear to auscultation and percussion.


CARDIAC:  Regular rate and rhythm with no murmur gallop or rubs.


ABDOMEN:  Soft, nondistended, nontender; no hepatosplenomegaly; bowel sounds 

are present in all four quadrants.


EXTREMITIES: No clubbing, cyanosis, or edema.


SKIN:  Normal; no rash; no jaundice.


CNS:  No focal deficits; alert and oriented times three.


Available Data (labs, X- Rays, Procedues) : 











 Laboratory Tests








Test


  10/5/17


06:51 10/6/17


06:34 10/6/17


15:15


 


White Blood Count 9.4 TH/MM3  8.6 TH/MM3  


 


Red Blood Count 3.64 MIL/MM3  3.58 MIL/MM3  


 


Hemoglobin 9.9 GM/DL  9.8 GM/DL  10.7 GM/DL 


 


Hematocrit 30.0 %  29.4 %  32.9 % 


 


Mean Corpuscular Volume 82.5 FL  82.0 FL  


 


Mean Corpuscular Hemoglobin 27.2 PG  27.3 PG  


 


Mean Corpuscular Hemoglobin


Concent 33.0 % 


  33.2 % 


  


 


 


Red Cell Distribution Width 17.1 %  17.5 %  


 


Platelet Count 166 TH/MM3  160 TH/MM3  


 


Mean Platelet Volume 8.9 FL  8.8 FL  


 


Neutrophils (%) (Auto) 79.6 %   


 


Lymphocytes (%) (Auto) 11.4 %   


 


Monocytes (%) (Auto) 8.4 %   


 


Eosinophils (%) (Auto) 0.3 %   


 


Basophils (%) (Auto) 0.3 %   


 


Neutrophils # (Auto) 7.5 TH/MM3   


 


Lymphocytes # (Auto) 1.1 TH/MM3   


 


Monocytes # (Auto) 0.8 TH/MM3   


 


Eosinophils # (Auto) 0.0 TH/MM3   


 


Basophils # (Auto) 0.0 TH/MM3   


 


CBC Comment DIFF FINAL   


 


Differential Comment    


 


Blood Urea Nitrogen 9 MG/DL  5 MG/DL  


 


Creatinine 0.59 MG/DL  0.62 MG/DL  


 


Random Glucose 77 MG/DL  172 MG/DL  


 


Calcium Level 8.0 MG/DL  7.8 MG/DL  


 


Magnesium Level 1.9 MG/DL   


 


Sodium Level 146 MEQ/L  142 MEQ/L  


 


Potassium Level 3.2 MEQ/L  3.2 MEQ/L  


 


Chloride Level 110 MEQ/L  108 MEQ/L  


 


Carbon Dioxide Level 30.1 MEQ/L  27.0 MEQ/L  


 


Anion Gap 6 MEQ/L  7 MEQ/L  


 


Estimat Glomerular Filtration


Rate 125 ML/MIN 


  118 ML/MIN 


  


 


 


Procalcitonin 0.05 ng/mL   








Vital Signs








  Date Time  Temp Pulse Resp B/P (MAP) Pulse Ox O2 Delivery O2 Flow Rate FiO2


 


10/6/17 12:00 98.4 69 18 157/72 (100) 96   


 


10/6/17 10:20     93   











ASSESSMENT/PLAN:


gastroparesis -s/p egd/botox-doing well today


constipation-resolved


abnormal cta-no indication of mesenteric ischemia , will need to see vascular 

surgery op





Recommendations :


[place]


fu gi in 2-4 weeks 


egd/botox prn if good results 


miralax daily


vascular surgery eval op 








It was a pleasure seeing Maite Damon. Thank you for this consult.


Entered by: Kim Christensen MD Oct 6, 2017 16:51

## 2017-10-06 NOTE — HHI.DS
Discharge Summary


Admission Date


Oct 4, 2017 at 00:09


Discharge Date:  Oct 6, 2017


Admitting Diagnosis





Colitis, Dehydration, poorly controlled bs





(1) Colitis


ICD Codes:  K52.9 - Noninfective gastroenteritis and colitis, unspecified


Status:  Acute


(2) Hypokalemia


ICD Codes:  E87.6 - Hypokalemia


(3) Dehydration


ICD Codes:  E86.0 - Dehydration


(4) Hyperglycemia


ICD Codes:  R73.9 - Hyperglycemia, unspecified


Status:  Acute


(5) Acute renal insufficiency


ICD Codes:  N28.9 - Disorder of kidney and ureter, unspecified


Status:  Acute


Procedures


S/P colonoscopy 10/5 -Diverticulosis sigmoid,descending/internal hemorrhoids/

external hemorrhoids


S/P EGD 10/5 - Nodular gastrtis antrum-biopsy polyp 9 mm antrum-hot snare 

polypectomy with complete removal/


esophagitis distal esophagus/Retroflexed views revealed a hiatal hernia


Recommends PPI, no NSAIDs, EGD with 3 mos botox/dil as needed


CBC/BMP:  


10/6/17 1515                                                                   

             10/6/17 0634





Significant Findings





Laboratory Tests








Test


  10/3/17


21:40 10/3/17


22:55 10/3/17


23:00 10/4/17


01:55


 


White Blood Count


  18.2 TH/MM3


(4.0-11.0) 


  


  


 


 


Hemoglobin


  10.6 GM/DL


(11.6-15.3) 


  


  


 


 


Hematocrit


  33.3 %


(35.0-46.0) 


  


  


 


 


Mean Corpuscular Hemoglobin


  26.5 PG


(27.0-34.0) 


  


  


 


 


Mean Corpuscular Hemoglobin


Concent 31.9 %


(32.0-36.0) 


  


  


 


 


Neutrophils (%) (Auto)


  86.5 %


(16.0-70.0) 


  


  


 


 


Lymphocytes (%) (Auto)


  4.7 %


(9.0-44.0) 


  


  


 


 


Monocytes (%) (Auto)


  8.4 %


(0.0-8.0) 


  


  


 


 


Neutrophils # (Auto)


  15.8 TH/MM3


(1.8-7.7) 


  


  


 


 


Lymphocytes # (Auto)


  0.9 TH/MM3


(1.0-4.8) 


  


  


 


 


Monocytes # (Auto)


  1.5 TH/MM3


(0-0.9) 


  


  


 


 


Blood Urea Nitrogen


  28 MG/DL


(7-18) 


  


  


 


 


Creatinine


  1.96 MG/DL


(0.50-1.00) 


  


  


 


 


Random Glucose


  315 MG/DL


() 


  


  


 


 


Albumin


  3.1 GM/DL


(3.4-5.0) 


  


  


 


 


Alkaline Phosphatase


  137 U/L


() 


  


  


 


 


Sodium Level


  134 MEQ/L


(136-145) 


  


  


 


 


Chloride Level


  97 MEQ/L


() 


  


  


 


 


Estimat Glomerular Filtration


Rate 31 ML/MIN


(>89) 


  


  


 


 


Creatine Kinase MB


  4.7 NG/ML


(0.5-3.6) 


  


  


 


 


Troponin I


  LESS THAN 0.02


NG/ML 


  


  


 


 


Lipase


  34 U/L


() 


  


  


 


 


Lactic Acid Level


  


  2.6 mmol/L


(0.4-2.0) 


  


 


 


Urine Turbidity   HAZY (CLEAR)  


 


Urine Leukocyte Esterase   SMALL (NEG)  


 


Urine Mucus   FEW /lpf (OCC)  


 


Test


  10/4/17


12:30 10/5/17


06:51 10/6/17


06:34 10/6/17


15:15


 


Red Blood Count


  


  3.64 MIL/MM3


(4.00-5.30) 3.58 MIL/MM3


(4.00-5.30) 


 


 


Hemoglobin


  


  9.9 GM/DL


(11.6-15.3) 9.8 GM/DL


(11.6-15.3) 10.7 GM/DL


(11.6-15.3)


 


Hematocrit


  


  30.0 %


(35.0-46.0) 29.4 %


(35.0-46.0) 32.9 %


(35.0-46.0)


 


Neutrophils (%) (Auto)


  


  79.6 %


(16.0-70.0) 


  


 


 


Monocytes (%) (Auto)


  


  8.4 %


(0.0-8.0) 


  


 


 


Calcium Level


  


  8.0 MG/DL


(8.5-10.1) 7.8 MG/DL


(8.5-10.1) 


 


 


Sodium Level


  


  146 MEQ/L


(136-145) 


  


 


 


Potassium Level


  


  3.2 MEQ/L


(3.5-5.1) 3.2 MEQ/L


(3.5-5.1) 


 


 


Chloride Level


  


  110 MEQ/L


() 108 MEQ/L


() 


 


 


Red Cell Distribution Width


  


  


  17.5 %


(11.6-17.2) 


 


 


Blood Urea Nitrogen   5 MG/DL (7-18)  


 


Random Glucose


  


  


  172 MG/DL


() 


 








Imaging





Last Impressions








Abdomen/Pelvis CT 10/5/17 0000 Signed





Impressions: 





 Service Date/Time:  Thursday, October 5, 2017 19:02 - CONCLUSION:  1. Plaques 





 seen throughout the arterial system. There does appear to be a significant 





 stenosis of the right external iliac artery. 2. The SMA, KAM and celiac 

arteries 





 are patent. There is mild plaque at the mid and distal SMA without significant 





 stenosis. 3. Calcifications in the central aspects of the kidneys which appear 





 most closely related to arterial calcifications as opposed to nonobstructing 





 stones.     Abdon Salcedo MD 


 


Chest X-Ray 10/3/17 2140 Signed





Impressions: 





 Service Date/Time:  Tuesday, October 3, 2017 21:47 - CONCLUSION: No acute 





 disease.       Abdon Salcedo MD 








Hospital Course


This is a pleasant 62-year-old female with multiple medical problems.  She had


low blood sugar this morning.  A family member called [facility].  She did not come


to the hospital.  She was given IV infusion of what sounds like dextrose, the


patient was unsure.  The patient had felt weak and tired all day.  She stated


her blood pressure has been very low with the systolic blood pressure as low as


the 50s but it had remained under 100 during her time at home.  She did not


take any further insulin because of the hypoglycemia.  She had nausea but no


vomiting.  She could not eat anything.  She had no fever, no chills.  She had a


bowel movement which appeared to be darker.  She states she has diffuse


abdominal pain, worse in the upper abdomen than in the lower abdomen.  She has


chronic pain as well that has not changed.  The abdominal pain is newer.  She


has chronic constipation from chronic narcotic use as well.


Was evaluated in the ED:





White count is 18.2, hemoglobin is 10.6, platelet count 198; neutrophils are


86.5.


INR is 1.


Sodium 134, potassium 3.5, BUN 28, creatinine 1.96, GRF is 31, random glucose


was 315, lactic acid was 2.6, magnesium was 1.6, alkaline phosphatase 137, LFTs


otherwise normal.  Lipase was 34, albumin 3.1.


Troponin negative.


CKMB 4.7.


CPK normal.


BNP 63.


Beta-hydroxybutyric acid was 0.11.


Urinalysis showed a small amount of leukocyte esterase.  Culture is not


indicated.


 


CHEST X-RAY


Chest x-ray pacing/AICD device in place.  Lungs are grossly clear; no active


disease is seen.


 


CT OF THE ABDOMEN AND PELVIS


Nonspecific gastric distention without perceptible etiology; no small bowel


distention.  Considerable stool throughout the colon.  Possible mild transverse


and descending colitis.  Stable patchy, benign-appearing capsular calcification


of the spleen.  Mild atelectasis at the lung bases.  Nonobstructing renal


stone.  A 7-mm soft-tissue nodule seen in the retroareolar area of the left


breast, nonspecific.  Clinical and mammographic correlation is recommended.


 


Pt admitted for:





1. Early sepsis on admission.


2. Colitis.


3. Anemia of chronic disease.


4. Hyponatremia.


5. Borderline hypokalemia and hypomagnesemia.


6. Acute kidney injury.


7. Diabetes mellitus.


8. Recent hypoglycemic episode.


9. Lactic acidosis.


10. Elevated alkaline phosphatase.


11. Hypoalbuminemia.


12. History of coronary artery disease, status post CABG.


13. History of cardiomyopathy, status post AICD placement.


14. Chronic pain syndrome.


15. Chronic constipation.


16. Hypothyroidism.


 


During the hospitalization, the following took place:


Patient was given hydration, she had a bolus in the emergency room.  She was 

started on Flagyl and Zosyn.


She was put on appropriate DVT and GI prophylaxis per


Home medications were resumed as indicated


ACE inhibitor was held because of acute kidney injury


Gastroenterology was consulted, she was known to Dr. Bui.


Electrolytes were replaced as necessary


She was put on Accu-Cheks before meals and at bedtime with sliding scale


Lactic acids were monitored


Blood cultures were drawn lactic acid trended down


Appreciate GI input, recommended panendoscopy


S/P colonoscopy 10/5 -Diverticulosis sigmoid,descending/internal hemorrhoids/

external hemorrhoids


S/P EGD 10/5 - Nodular gastrtis antrum-biopsy polyp 9 mm antrum-hot snare 

polypectomy with complete removal/


esophagitis distal esophagus/Retroflexed views revealed a hiatal hernia


Recommends PPI, no NSAIDs, EGD with 3 mos botox/dil as needed 


CT Abd/pelvis-stenosis right ext iliac artery, SMA, KAM and celiac are patent. 

Mild plaque at mid and distal SMA, no sign stenosis. 


Diet was advanced, tolerated well.  She had no further abdominal pain.


She had loose stools, thinks she may have noted some blood.  H&H was followed, 

he remains stable.  No further bleeding.


Renal function improve.  Electrolytes stabilize.


Continue with Accu-Cheks and insulin therapy as needed-blood glucose remained 

stable


Patient's symptoms improve, was clear for discharge by GI


Discharged home in stable condition


Instructed to continue on by mouth antibiotics


Diet heart healthy


Activity as tolerated


Follow-up with gastroenterology in 2 weeks


Pt Condition on Discharge:  Stable


Discharge Disposition:  Discharge Home


Discharge Instructions


DIET: Follow Instructions for:  Heart Healthy Diet


Activities you can perform:  Weight Bearing as Migel


Follow up Referrals:  


Gastroenterology - 2 Weeks with Kim Bui MD


PCP Follow-up - 1 Week





New Medications:  


Amoxicillin-Clavulanate (Augmentin) 875-125 Mg Tab


1 TAB PO BID for Infection for 10 Days, #20 TAB 0 Refills





 


Continued Medications:  


Alendronate (Alendronate) 70 Mg Tab


Unknown Dose PO Q7D for Osteporosis Treatment, #4 TAB 0 Refills





Amlodipine-Benazepril (Amlodipine-Benazepril) 2.5-10 Mg Cap


1 CAP PO DAILY for Blood Pressure Management, #30 CAP 0 Refills





Carisoprodol (Carisoprodol) 250 Mg Tab


350 MG PO TID PRN for PAIN, TAB 0 Refills





Cyanocobalamin Inj (Cyanocobalamin Inj) 1,000 Mcg/Ml Inj


1000 MCG SQ 1 month, #1 VIAL 0 Refills





Duloxetine DR (Cymbalta DR) 60 Mg Capdr


60 MG PO TID, #30 CAP 0 Refills





Insulin Glargine Inj (Lantus Inj) 1,000 Unit/10 Ml Vial


35 UNITS SQ HS for Blood Sugar Management, VIAL 0 Refills





Insulin Human Isophane-Regular 70-30 Inj (Novolin 70-30 Inj) 1,000 Unit/10 Ml 

Vial


10 UNITS SQ DAILY for Blood Sugar Management, ML 0 Refills





Insulin Human Isophane-Regular 70-30 Inj (Novolin 70-30 Inj) 1,000 Unit/10 Ml 

Vial


12 UNITS SQ DAILY for Blood Sugar Management, ML 0 Refills





Insulin Human Isophane-Regular 70-30 Inj (Novolin 70-30 Inj) 1,000 Unit/10 Ml 

Vial


12 UNITS SQ HS for Blood Sugar Management, ML 0 Refills





Levothyroxine (Levothyroxine) 125 Mcg Tab


125 MCG PO DAILY for Thyroid, #30 TAB 0 Refills





Metoprolol Tartrate (Metoprolol Tartrate) 100 Mg Tab


100 MG PO BID, #60 TAB 0 Refills





Morphine ER (Morphine ER) 15 Mg Tab


60 MG PO BID PRN for Pain Management, TAB 0 Refills





Multi-Vit/Iron-Vit C-B12-Folic Acid (Integra Plus) 191-210-0.01-1 mg Cap


1 CAP PO DAILY





Nitroglycerin SL (Nitroglycerin SL) 0.4 Mg Subl


0.4 MG SL AS DIRECTED PRN for CHEST PAIN, #100 TAB.SL 0 Refills


ONE TABLET UNDER THE TONGUE AS NEEDED FOR CHEST PAIN, MAY REPEAT EVERY


 FIVE MINUTES FOR A TOTAL OF 3 DOSES OR CALL 911 IF NO RELIEF


Omeprazole (Omeprazole) 20 Mg Tab


20 MG PO DAILY, #30 TAB 0 Refills





Oxycodone-Acetaminophen (Percocet)  mg Tab


1 TAB PO Q6H PRN for PAIN, TAB 0 Refills





Pantoprazole (Pantoprazole) 20 Mg Tab


20 MG PO BID for Reflux, #30 TAB 0 Refills





Pregabalin (Lyrica) 225 Mg Cap


225 MG PO HS, #60 CAP 0 Refills





Sulfasalazine (Sulfasalazine) 500 Mg Tab


1000 MG PO BID, #120 TAB 0 Refills





Valsartan (Valsartan) 40 Mg Tab


40 MG PO DAILY, #60 TAB 0 Refills





 


Discontinued Medications:  


Aspirin DR (Aspirin 81) 81 Mg Tabdr


81 MG PO HS, TAB 0 Refills

















Maura Antoine Oct 6, 2017 16:02

## 2017-10-06 NOTE — HHI.PR
Subjective


Subjective Remarks


abd pain improved, tolerating liquids well. At times painful after eating


loose stools with blood mixed in


no fever


no cp


no sob


no n/v


feels much better anxious to go home





Review of Systems


Constitutional


Constitutional Remarks


12 point review of systems completed, negative except as noted above





Vitals/Results


Vital Signs





Vital Signs








  Date Time  Temp Pulse Resp B/P (MAP) Pulse Ox O2 Delivery O2 Flow Rate FiO2


 


10/6/17 08:00 98.4 76 18 157/83 (107) 93   


 


10/5/17 23:15 98.4 81 17 153/71 (98) 95   


 


10/5/17 22:00    169/70 (103)    


 


10/5/17 21:00 98.7 104 17 180/96 (124) 99   


 


10/5/17 17:53     99   21


 


10/5/17 15:51 95.5 67 19 177/74 (108) 99   


 


10/5/17 14:15 97.4 76 16 157/83 (107) 93 Room Air  








CBC/BMP:  


10/6/17 0634                                                                   

             10/6/17 0634





Lab Results





Laboratory Tests








Test


  10/6/17


06:34


 


White Blood Count 8.6 TH/MM3 


 


Red Blood Count 3.58 MIL/MM3 


 


Hemoglobin 9.8 GM/DL 


 


Hematocrit 29.4 % 


 


Mean Corpuscular Volume 82.0 FL 


 


Mean Corpuscular Hemoglobin 27.3 PG 


 


Mean Corpuscular Hemoglobin


Concent 33.2 % 


 


 


Red Cell Distribution Width 17.5 % 


 


Platelet Count 160 TH/MM3 


 


Mean Platelet Volume 8.8 FL 


 


Blood Urea Nitrogen 5 MG/DL 


 


Creatinine 0.62 MG/DL 


 


Random Glucose 172 MG/DL 


 


Calcium Level 7.8 MG/DL 


 


Sodium Level 142 MEQ/L 


 


Potassium Level 3.2 MEQ/L 


 


Chloride Level 108 MEQ/L 


 


Carbon Dioxide Level 27.0 MEQ/L 


 


Anion Gap 7 MEQ/L 


 


Estimat Glomerular Filtration


Rate 118 ML/MIN 


 











Physical Exam


General


General Appearance:  Well Developed, Well Nourished, No Acute Distress, 

Comfortable, Sleeping





Eyes


Eye Exam:  Pupils Equal, Pupils Reactive





Ears & Nose


Ears & Nose Exam:  Nasal Mucosa Pink





Throat


Throat Exam:  Oral Mucosa Pink & Moist





Neck


Neck Exam:  Neck Supple, Trachea Midline





Pulmonary


Resp Exam:  Clear Bilaterally, No Distress





Cardiology


CV Exam:  Regular, Good Perfusion





Gastrointestinal/Abdomen


GI Exam:  Soft, Bowel Sounds Present, Non-Distended, Bowel Sounds Hypoactive


GI Remarks


Tender to lower abdomen





Musculoskeletal


MS Exam:  Joints Intact





Integumentary


Skin Exam:  Warm, Dry





Extremeties


Extremities Exam:  No Edema, Pedal Pulses Palpable





Neurologic


Neuro Exam:  Awake, Oriented, Speech Clear, Moving All Extremities, No Focal 

Deficits





Psychiatric


Psych Exam:  Appropriate Responses





VTE Prophylaxis


VTE Prophylaxis Device:  SCDs


VTE Prophylaxis Meds:  Heparin





PUD Prophylasis


PUD Prophylaxis:  Protonix





Assessment/Plan


Assessment/Plan


IMPRESSION


1. Early sepsis on admission.


2. Colitis.


3. Anemia of chronic disease.


4. Hyponatremia.


5. Borderline hypokalemia and hypomagnesemia.


6. Acute kidney injury.


7. Diabetes mellitus.


8. Recent hypoglycemic episode.


9. Lactic acidosis.


10. Elevated alkaline phosphatase.


11. Hypoalbuminemia.


12. History of coronary artery disease, status post CABG.


13. History of cardiomyopathy, status post AICD placement.


14. Chronic pain syndrome.


15. Chronic constipation.


16. Hypothyroidism.


 


PLAN


Presented with sepsis, elevated lactic acid, hypotensive


Continue with IV fluids, antibiotics-Flagyl and Zosyn


Lactic acid okay


Appreciate GI input, recommend panendoscopy


S/P colonoscopy 10/5 -Diverticulosis sigmoid,descending/internal hemorrhoids/

external hemorrhoids


S/P EGD 10/5 - Nodular gastrtis antrum-biopsy polyp 9 mm antrum-hot snare 

polypectomy with complete removal/


esophagitis distal esophagus/Retroflexed views revealed a hiatal hernia


Recommends PPI, no NSAIDs, EGD with 3 mos botox/dil as needed 


CT Abd/pelvis-stenosis right ext iliac artery, SMA, KAM and celiac are patent. 

Mild plaque at mid and distal SMA, no sign stenosis. 


Will need f/u vascular surgery 





c/o loose stools with blood


HH stable, continue to follow 





Diet advanced per GI will eval how she tolerates. 





Replace potassium


Sodium back to normal.


Monitor renal function-has improved


Continue IV fluids


Monitor electrolytes and replace as needed





Blood pressure stable, continue with home meds 


Continue with Accu-Cheks and insulin therapy as needed





SCDs and heparin for DVT prophylaxis


PPI for GI prophylaxis





Labs reviewed stable


overall improved, will wait for GI input


CBC in am 











Discussed with RN


Discussed with patient


Discussed with attending


This patient was seen by myself and Dr. Gomes, this note is written on his 

behalf











Maura Antoine Oct 6, 2017 10:25

## 2017-10-06 NOTE — HHI.DCPOC
Discharge Care Plan


Diagnosis:  


(1) Colitis








Your Health Problems Are: Irregular Bowel Function








Goals to Promote Your Health


* To prevent worsening of your condition and complications


* To maintain your health at the optimal level


Directions to Meet Your Goals


*** Take your medications as prescribed


*** Follow your dietary instruction


*** Follow activity as directed








*** Keep your appointments as scheduled


*** Take your immunizations and boosters as scheduled


*** If your symptoms worsen call your PCP, if no PCP go to Urgent Care Center 

or Emergency Room***


*** Smoking is Dangerous to Your Health. Avoid second hand smoke***


***Call the 24-hour hour crisis hotline for domestic abuse at 1-683.568.4027***











Maura Antoine. [facility] Oct 6, 2017 13:10

## 2017-10-14 ENCOUNTER — HOSPITAL ENCOUNTER (INPATIENT)
Dept: HOSPITAL 17 - NEPE | Age: 62
LOS: 7 days | Discharge: HOME | DRG: 637 | End: 2017-10-21
Attending: SPECIALIST | Admitting: SPECIALIST
Payer: MEDICARE

## 2017-10-14 VITALS
OXYGEN SATURATION: 97 % | HEART RATE: 117 BPM | DIASTOLIC BLOOD PRESSURE: 62 MMHG | SYSTOLIC BLOOD PRESSURE: 117 MMHG | RESPIRATION RATE: 16 BRPM

## 2017-10-14 VITALS
DIASTOLIC BLOOD PRESSURE: 52 MMHG | SYSTOLIC BLOOD PRESSURE: 102 MMHG | HEART RATE: 116 BPM | RESPIRATION RATE: 16 BRPM | OXYGEN SATURATION: 98 %

## 2017-10-14 VITALS
SYSTOLIC BLOOD PRESSURE: 119 MMHG | OXYGEN SATURATION: 98 % | DIASTOLIC BLOOD PRESSURE: 59 MMHG | HEART RATE: 120 BPM | RESPIRATION RATE: 16 BRPM

## 2017-10-14 VITALS — HEIGHT: 64 IN | WEIGHT: 154.54 LBS | BODY MASS INDEX: 26.38 KG/M2

## 2017-10-14 VITALS
TEMPERATURE: 98.9 F | HEART RATE: 119 BPM | SYSTOLIC BLOOD PRESSURE: 102 MMHG | RESPIRATION RATE: 14 BRPM | OXYGEN SATURATION: 93 % | DIASTOLIC BLOOD PRESSURE: 52 MMHG

## 2017-10-14 VITALS — OXYGEN SATURATION: 98 %

## 2017-10-14 VITALS
HEART RATE: 18 BPM | RESPIRATION RATE: 18 BRPM | DIASTOLIC BLOOD PRESSURE: 59 MMHG | SYSTOLIC BLOOD PRESSURE: 124 MMHG | OXYGEN SATURATION: 99 %

## 2017-10-14 VITALS
HEART RATE: 124 BPM | OXYGEN SATURATION: 93 % | TEMPERATURE: 99 F | RESPIRATION RATE: 16 BRPM | DIASTOLIC BLOOD PRESSURE: 67 MMHG | SYSTOLIC BLOOD PRESSURE: 137 MMHG

## 2017-10-14 VITALS
DIASTOLIC BLOOD PRESSURE: 71 MMHG | SYSTOLIC BLOOD PRESSURE: 128 MMHG | RESPIRATION RATE: 12 BRPM | HEART RATE: 124 BPM | OXYGEN SATURATION: 92 %

## 2017-10-14 VITALS
OXYGEN SATURATION: 99 % | HEART RATE: 112 BPM | RESPIRATION RATE: 16 BRPM | DIASTOLIC BLOOD PRESSURE: 60 MMHG | SYSTOLIC BLOOD PRESSURE: 108 MMHG

## 2017-10-14 VITALS — HEART RATE: 120 BPM

## 2017-10-14 DIAGNOSIS — E11.649: ICD-10-CM

## 2017-10-14 DIAGNOSIS — Z87.891: ICD-10-CM

## 2017-10-14 DIAGNOSIS — M32.9: ICD-10-CM

## 2017-10-14 DIAGNOSIS — E11.42: ICD-10-CM

## 2017-10-14 DIAGNOSIS — E86.9: ICD-10-CM

## 2017-10-14 DIAGNOSIS — I25.10: ICD-10-CM

## 2017-10-14 DIAGNOSIS — B37.81: ICD-10-CM

## 2017-10-14 DIAGNOSIS — K64.8: ICD-10-CM

## 2017-10-14 DIAGNOSIS — Z79.899: ICD-10-CM

## 2017-10-14 DIAGNOSIS — E03.9: ICD-10-CM

## 2017-10-14 DIAGNOSIS — G93.41: ICD-10-CM

## 2017-10-14 DIAGNOSIS — G89.29: ICD-10-CM

## 2017-10-14 DIAGNOSIS — N17.9: ICD-10-CM

## 2017-10-14 DIAGNOSIS — J44.9: ICD-10-CM

## 2017-10-14 DIAGNOSIS — E44.1: ICD-10-CM

## 2017-10-14 DIAGNOSIS — K64.4: ICD-10-CM

## 2017-10-14 DIAGNOSIS — Z95.0: ICD-10-CM

## 2017-10-14 DIAGNOSIS — M06.9: ICD-10-CM

## 2017-10-14 DIAGNOSIS — K31.84: ICD-10-CM

## 2017-10-14 DIAGNOSIS — Z79.4: ICD-10-CM

## 2017-10-14 DIAGNOSIS — E66.3: ICD-10-CM

## 2017-10-14 DIAGNOSIS — M62.82: ICD-10-CM

## 2017-10-14 DIAGNOSIS — E87.6: ICD-10-CM

## 2017-10-14 DIAGNOSIS — D64.9: ICD-10-CM

## 2017-10-14 DIAGNOSIS — E11.10: Primary | ICD-10-CM

## 2017-10-14 DIAGNOSIS — J69.0: ICD-10-CM

## 2017-10-14 DIAGNOSIS — E11.43: ICD-10-CM

## 2017-10-14 DIAGNOSIS — I10: ICD-10-CM

## 2017-10-14 DIAGNOSIS — I42.9: ICD-10-CM

## 2017-10-14 DIAGNOSIS — Z95.810: ICD-10-CM

## 2017-10-14 LAB
ALP SERPL-CCNC: 115 U/L (ref 45–117)
ALT SERPL-CCNC: 57 U/L (ref 10–53)
ANION GAP SERPL CALC-SCNC: 11 MEQ/L (ref 5–15)
ANION GAP SERPL CALC-SCNC: 15 MEQ/L (ref 5–15)
AST SERPL-CCNC: 134 U/L (ref 15–37)
B-OH-BUTYR SERPL-SCNC: 1.52 MMOL/L (ref 0–0.39)
BASE EXCESS BLD CALC-SCNC: -6.7 MMOL/L (ref -2–2)
BASOPHILS # BLD AUTO: 0.1 TH/MM3 (ref 0–0.2)
BASOPHILS NFR BLD: 0.2 % (ref 0–2)
BENZODIAZEPINES PNL UR: 89 % (ref 90–100)
BILIRUB SERPL-MCNC: 0.6 MG/DL (ref 0.2–1)
BLOOD GAS CARBOXYHEMOGLOBIN: 1 % (ref 0–4)
BLOOD GAS HCO3: 19 MMOL/L (ref 22–26)
BLOOD GAS OXYGEN CONTENT: 13.3 VOL % (ref 12–20)
BLOOD GAS PCO2: 41 MMHG (ref 38–42)
BUN SERPL-MCNC: 52 MG/DL (ref 7–18)
BUN SERPL-MCNC: 61 MG/DL (ref 7–18)
CALCIUM TP COR SERPL-MCNC: 7.6 MG/DL (ref 8.5–10.1)
CHLORIDE SERPL-SCNC: 107 MEQ/L (ref 98–107)
CHLORIDE SERPL-SCNC: 95 MEQ/L (ref 98–107)
COLOR UR: (no result)
COMMENT (UR): (no result)
CRITICAL VALUE: YES
CULTURE IF INDICATED: (no result)
DRAW SITE: (no result)
EOSINOPHIL # BLD: 0 TH/MM3 (ref 0–0.4)
EOSINOPHIL NFR BLD: 0.1 % (ref 0–4)
ERYTHROCYTE [DISTWIDTH] IN BLOOD BY AUTOMATED COUNT: 17.9 % (ref 11.6–17.2)
GFR SERPLBLD BASED ON 1.73 SQ M-ARVRAT: 11 ML/MIN (ref 89–?)
GFR SERPLBLD BASED ON 1.73 SQ M-ARVRAT: 8 ML/MIN (ref 89–?)
GLUCOSE UR STRIP-MCNC: 150 MG/DL
HCO3 BLD-SCNC: 20.3 MEQ/L (ref 21–32)
HCO3 BLD-SCNC: 21.3 MEQ/L (ref 21–32)
HCT VFR BLD CALC: 35.3 % (ref 35–46)
HDLC SERPL-MCNC: 42.1 MG/DL (ref 40–60)
HEMO FLAGS: (no result)
HGB UR QL STRIP: (no result)
KETONES UR STRIP-MCNC: (no result) MG/DL
LDLC SERPL-MCNC: 12 MG/DL (ref 0–99)
LITER FLOW: 2 L/M
LYMPHOCYTES # BLD AUTO: 0.5 TH/MM3 (ref 1–4.8)
LYMPHOCYTES NFR BLD AUTO: 2.1 % (ref 9–44)
MAGNESIUM SERPL-MCNC: 1.9 MG/DL (ref 1.5–2.5)
MCH RBC QN AUTO: 27 PG (ref 27–34)
MCHC RBC AUTO-ENTMCNC: 31.3 % (ref 32–36)
MCV RBC AUTO: 86.1 FL (ref 80–100)
METHGB MFR BLDA: 1.7 % (ref 0–2)
MONOCYTES NFR BLD: 7.6 % (ref 0–8)
NEUTROPHILS # BLD AUTO: 22.6 TH/MM3 (ref 1.8–7.7)
NEUTROPHILS NFR BLD AUTO: 90 % (ref 16–70)
NITRITE UR QL STRIP: (no result)
NUMBER OF ARTERIAL PUNCTURES: 1
OXYGEN DEVICE: (no result)
PLATELET # BLD: 258 TH/MM3 (ref 150–450)
PO2 BLD: 73 MMHG (ref 61–120)
POTASSIUM SERPL-SCNC: 3.6 MEQ/L (ref 3.5–5.1)
POTASSIUM SERPL-SCNC: 4.3 MEQ/L (ref 3.5–5.1)
RBC # BLD AUTO: 4.1 MIL/MM3 (ref 4–5.3)
SALICYLATES SERPL-MCNC: 10.5 G/DL (ref 12–16)
SODIUM SERPL-SCNC: 130 MEQ/L (ref 136–145)
SODIUM SERPL-SCNC: 139 MEQ/L (ref 136–145)
SP GR UR STRIP: 1.03 (ref 1–1.03)
SQUAMOUS #/AREA URNS HPF: 1 /HPF (ref 0–5)
STAT: YES
TEMP CORR TO: 98.6
ULNAR PULSE: PRESENT
WBC # BLD AUTO: 25.1 TH/MM3 (ref 4–11)

## 2017-10-14 PROCEDURE — 82570 ASSAY OF URINE CREATININE: CPT

## 2017-10-14 PROCEDURE — 80048 BASIC METABOLIC PNL TOTAL CA: CPT

## 2017-10-14 PROCEDURE — 96361 HYDRATE IV INFUSION ADD-ON: CPT

## 2017-10-14 PROCEDURE — 82552 ASSAY OF CPK IN BLOOD: CPT

## 2017-10-14 PROCEDURE — 82947 ASSAY GLUCOSE BLOOD QUANT: CPT

## 2017-10-14 PROCEDURE — C9113 INJ PANTOPRAZOLE SODIUM, VIA: HCPCS

## 2017-10-14 PROCEDURE — 71250 CT THORAX DX C-: CPT

## 2017-10-14 PROCEDURE — 80053 COMPREHEN METABOLIC PANEL: CPT

## 2017-10-14 PROCEDURE — 51702 INSERT TEMP BLADDER CATH: CPT

## 2017-10-14 PROCEDURE — 84300 ASSAY OF URINE SODIUM: CPT

## 2017-10-14 PROCEDURE — 94640 AIRWAY INHALATION TREATMENT: CPT

## 2017-10-14 PROCEDURE — 73030 X-RAY EXAM OF SHOULDER: CPT

## 2017-10-14 PROCEDURE — 83735 ASSAY OF MAGNESIUM: CPT

## 2017-10-14 PROCEDURE — 80076 HEPATIC FUNCTION PANEL: CPT

## 2017-10-14 PROCEDURE — 96376 TX/PRO/DX INJ SAME DRUG ADON: CPT

## 2017-10-14 PROCEDURE — 87641 MR-STAPH DNA AMP PROBE: CPT

## 2017-10-14 PROCEDURE — 70450 CT HEAD/BRAIN W/O DYE: CPT

## 2017-10-14 PROCEDURE — 84156 ASSAY OF PROTEIN URINE: CPT

## 2017-10-14 PROCEDURE — 96360 HYDRATION IV INFUSION INIT: CPT

## 2017-10-14 PROCEDURE — 82805 BLOOD GASES W/O2 SATURATION: CPT

## 2017-10-14 PROCEDURE — 84443 ASSAY THYROID STIM HORMONE: CPT

## 2017-10-14 PROCEDURE — 36600 WITHDRAWAL OF ARTERIAL BLOOD: CPT

## 2017-10-14 PROCEDURE — 80307 DRUG TEST PRSMV CHEM ANLYZR: CPT

## 2017-10-14 PROCEDURE — 93005 ELECTROCARDIOGRAM TRACING: CPT

## 2017-10-14 PROCEDURE — 83605 ASSAY OF LACTIC ACID: CPT

## 2017-10-14 PROCEDURE — 84155 ASSAY OF PROTEIN SERUM: CPT

## 2017-10-14 PROCEDURE — 96374 THER/PROPH/DIAG INJ IV PUSH: CPT

## 2017-10-14 PROCEDURE — 83880 ASSAY OF NATRIURETIC PEPTIDE: CPT

## 2017-10-14 PROCEDURE — 82948 REAGENT STRIP/BLOOD GLUCOSE: CPT

## 2017-10-14 PROCEDURE — 87040 BLOOD CULTURE FOR BACTERIA: CPT

## 2017-10-14 PROCEDURE — 82550 ASSAY OF CK (CPK): CPT

## 2017-10-14 PROCEDURE — 84100 ASSAY OF PHOSPHORUS: CPT

## 2017-10-14 PROCEDURE — 81001 URINALYSIS AUTO W/SCOPE: CPT

## 2017-10-14 PROCEDURE — 93880 EXTRACRANIAL BILAT STUDY: CPT

## 2017-10-14 PROCEDURE — 94664 DEMO&/EVAL PT USE INHALER: CPT

## 2017-10-14 PROCEDURE — 93306 TTE W/DOPPLER COMPLETE: CPT

## 2017-10-14 PROCEDURE — 80061 LIPID PANEL: CPT

## 2017-10-14 PROCEDURE — 86038 ANTINUCLEAR ANTIBODIES: CPT

## 2017-10-14 PROCEDURE — 76700 US EXAM ABDOM COMPLETE: CPT

## 2017-10-14 PROCEDURE — 71010: CPT

## 2017-10-14 PROCEDURE — 84484 ASSAY OF TROPONIN QUANT: CPT

## 2017-10-14 PROCEDURE — 85025 COMPLETE CBC W/AUTO DIFF WBC: CPT

## 2017-10-14 PROCEDURE — 82010 KETONE BODYS QUAN: CPT

## 2017-10-14 PROCEDURE — 82140 ASSAY OF AMMONIA: CPT

## 2017-10-14 PROCEDURE — 85027 COMPLETE CBC AUTOMATED: CPT

## 2017-10-14 RX ADMIN — SODIUM BICARBONATE SCH MLS/HR: 84 INJECTION, SOLUTION INTRAVENOUS at 22:50

## 2017-10-14 RX ADMIN — TAZOBACTAM SODIUM AND PIPERACILLIN SODIUM SCH MLS/HR: 250; 2 INJECTION, SOLUTION INTRAVENOUS at 22:00

## 2017-10-14 RX ADMIN — IPRATROPIUM BROMIDE AND ALBUTEROL SULFATE SCH AMPULE: .5; 3 SOLUTION RESPIRATORY (INHALATION) at 20:44

## 2017-10-14 RX ADMIN — Medication SCH ML: at 21:00

## 2017-10-14 RX ADMIN — PHENYTOIN SODIUM SCH MLS/HR: 50 INJECTION INTRAMUSCULAR; INTRAVENOUS at 21:50

## 2017-10-14 RX ADMIN — SODIUM BICARBONATE SCH MLS/HR: 84 INJECTION, SOLUTION INTRAVENOUS at 01:30

## 2017-10-14 RX ADMIN — STANDARDIZED SENNA CONCENTRATE AND DOCUSATE SODIUM SCH TAB: 8.6; 5 TABLET, FILM COATED ORAL at 21:00

## 2017-10-14 RX ADMIN — HEPARIN SODIUM SCH UNITS: 10000 INJECTION, SOLUTION INTRAVENOUS; SUBCUTANEOUS at 21:00

## 2017-10-14 RX ADMIN — PHENYTOIN SODIUM SCH MLS/HR: 50 INJECTION INTRAMUSCULAR; INTRAVENOUS at 18:49

## 2017-10-14 NOTE — RADRPT
EXAM DATE/TIME:  10/14/2017 20:40 

 

[facility] COMPARISON:     

No previous studies available for comparison.

        

 

 

INDICATIONS :     

Transient ischemic attack. 

                     

 

MEDICAL HISTORY :     

Hypothyroidism. Myocardial infarction. Congestive heart failure. Neck pain. Glasses. Dentures. Migrai
ne. Numbness. Cardiomyopathy. Coronary artery disease. Anticoagulant therapy. Hypertension. COPD. Ast
hma. Ulcer. Hiatal hernia. Gastroesophageal reflux disease. Renal calculi. Arthritis. Osteoporosis. D
iabetes. Anemia. Lupus. Herniated disk.  

 

SURGICAL HISTORY :     

CABG. Pacemaker. Hysterectomy. Cardiac catheterization. Dilation and curretage. 

 

ENCOUNTER:     

Initial

 

ACUITY:     

1 day

 

PAIN SCORE:     

0/10

 

LOCATION:     

Bilateral neck 

                     

PEAK SYSTOLIC VELOCITIES (cm/sec):

 

ICA/CCA RATIO:                    

Right: 2.3     Left: 1.7

 

ICA:                          

Right: 155.1     Left: 124.1

 

CCA:                          

Right: 67.7     Left: 71.1

 

ECA:                           

Right: 47.0     Left: 75.1

 

VERTEBRAL:           

Right: 145.9 antegrade     Left: 132.0 antegrade

             

Elevated flow velocities and ICA/CCA ratios have been found to correlate with increased degrees of

vessel stenosis, calculated as percentage of diameter relative to a normal segment of distal ICA/CCA

 

FINDINGS:

 

Minimal plaque is identified bilaterally.

     

RIGHT CAROTID:     

No significant stenosis is visualized.  Mild peak systolic velocity elevation due to tortuosity. The 
waveforms are within normal limits.

 

LEFT CAROTID:     

No significant stenosis is visualized.  Mild peak systolic velocity elevation due to tortuosity. The 
waveforms are within normal limits.

 

VERTEBRAL ARTERIES:  

Antegrade flow is seen in both vertebral arteries.

MISCELLANEOUS:     None.

 

CONCLUSION:     Minimal carotid plaque bilaterally.

 

No evidence of hemodynamically significant stenosis. 

 

 

 Nghia Espino MD on October 14, 2017 at 21:41           

Board Certified Radiologist.

 This report was verified electronically.

## 2017-10-14 NOTE — RADRPT
EXAM DATE/TIME:  10/14/2017 21:03 

 

[facility] COMPARISON:     

CT THORAX W/O CONTRAST, October 14, 2017, 21:39.

        

 

 

INDICATIONS :     

Elevated liver function tests. 

                     

 

MEDICAL HISTORY :     

Hypothyroidism. Myocardial infarction. Congestive heart failure. Neck pain. Glasses. Dentures. Migrai
ne. Numbness. Cardiomyopathy. Coronary artery disease. Anticoagulant therapy. Hypertension. COPD. Ast
hma. Ulcer. Hiatal hernia. Gastroesophageal reflux disease. Renal calculi. Arthritis. Osteoporosis. D
iabetes. Anemia. Lupus. Herniated disk.  

 

SURGICAL HISTORY :     

CABG. Pacemaker. Hysterectomy. Cardiac catheterization. Dilation and curretage. 

 

ENCOUNTER:     

Initial

 

ACUITY:     

1 day

 

PAIN SCORE:     

0/10

 

LOCATION:         

Abdomen. 

                     

MEASUREMENTS:     

 

LIVER:     

13.6  cm length 

 

COMMON DUCT:     

5 mm

 

RIGHT KIDNEY:     

10.1 x 5.0 x 4.9 cm

 

LEFT KIDNEY:     

12.5 x 4.4 x 5.8  cm

 

SPLEEN:     

11.9 cm length

 

AORTA:     

1.9cm maximal      

 

FINDINGS:     

 

LIVER:     

Normal echotexture without focal lesion or ductal dilatation.  

 

COMMON DUCT:     

No intraluminal mass or stone visualized.  

 

GALLBLADDER:     

Contains no stones, demonstrates no wall thickening or pericholecystic fluid.  

 

PANCREAS:     

The visualized portions are within normal limits.  

 

RIGHT KIDNEY:     

No hydronephrosis, stone or mass.  3 cm hypoechoic nodule is identified in the right suprarenal regio
n.

 

LEFT KIDNEY:     

No hydronephrosis, stone or mass.  

 

SPLEEN:     

No focal lesion.  

 

AORTA:     

Non aneurysmal.  

 

IVC:     

Within normal limits.  

 

CONCLUSION:     

3 cm hypoechoic nodule in the right suprarenal region consistent with a adrenal adenoma.

 

Otherwise unremarkable exam without evidence of acute process.

 

No evidence of cholelithiasis, biliary structure disease or hydronephrosis.

 

 

 

 Nghia Espino MD on October 14, 2017 at 22:24           

Board Certified Radiologist.

 This report was verified electronically.

## 2017-10-14 NOTE — RADRPT
EXAM DATE/TIME:  10/14/2017 16:09 

 

[facility] COMPARISON:     

CT BRAIN W/O CONTRAST, May 15, 2017, 16:52.

 

 

INDICATIONS :     

Altered mental status.

                      

 

RADIATION DOSE:     

48.57 CTDIvol (mGy) 

 

 

 

MEDICAL HISTORY :     

Cardiovascular disease. Hypertension. Lupus.

 

SURGICAL HISTORY :      

CABG Defibrillator.

 

ENCOUNTER:      

Initial

 

ACUITY:      

1 day

 

PAIN SCALE:      

Non-responsive

 

LOCATION:        

cranial 

 

TECHNIQUE:     

Multiple contiguous axial images were obtained of the head.  Using automated exposure control and adj
ustment of the mA and/or kV according to patient size, radiation dose was kept as low as reasonably a
chievable to obtain optimal diagnostic quality images.   DICOM format image data is available electro
nically for review and comparison.  

 

FINDINGS:     

 

CEREBRUM:     

The ventricles are normal for age.  No evidence of midline shift, mass lesion, hemorrhage or acute in
farction.  No extra-axial fluid collections are seen.

 

POSTERIOR FOSSA:     

The cerebellum and brainstem are intact.  The 4th ventricle is midline.  The cerebellopontine angle i
s unremarkable.

 

EXTRACRANIAL:     

The visualized portion of the orbits is intact.

 

SKULL:     

The calvaria is intact.  No evidence of skull fracture.

 

CONCLUSION:     

1. No evidence of acute intracranial pathology. No masses are identified.

 

 

 

 Alejandro Phillips MD on October 14, 2017 at 16:23           

Board Certified Radiologist.

 This report was verified electronically.

## 2017-10-14 NOTE — RADRPT
EXAM DATE/TIME:  10/14/2017 20:24 

 

[facility] COMPARISON:     

No previous studies available for comparison.

 

                     

INDICATIONS :     

Shoulder pain with no memory of what happened.

                     

 

MEDICAL HISTORY :     

None.          

 

SURGICAL HISTORY :     

None.   

 

ENCOUNTER:     

Initial                                        

 

ACUITY:     

1 day      

 

PAIN SCORE:     

10/10

 

LOCATION:     

Right  Shoulder.

 

FINDINGS:     

Small calcific density is identified adjacent to the greater tuberosity.

 

There is mild arthropathy of the glenohumeral joint with minimal spurring.

 

The glenohumeral joint is intact without evidence of fracture or dislocation.

 

Acromioclavicular joint is unremarkable.

 

CONCLUSION:     

1. Periarticular calcific deposit characteristic of ossific tendinitis or bursitis.

2. Mild arthropathy.

3. No evidence of acute process.

4. Incidentally noted is significant interstitial lung disease

 

 

 

 Nghia Espino MD on October 14, 2017 at 20:33           

Board Certified Radiologist.

 This report was verified electronically.

## 2017-10-14 NOTE — PD
HPI


Chief Complaint:  Diabetic


Time Seen by Provider:  15:39


Travel History


International Travel<30 days:  No


Contact w/Intl Traveler<30days:  No


Traveled to known affect area:  No





History of Present Illness


HPI


This patient presents to the ER critically ill.  An insulin-dependent diabetic 

who presents obtunded.  Accu-Chek is critically high.  She is unable to provide 

any useful history or review of systems.  She is tachycardic and hypotensive.  

Symptoms are severe.  Family called paramedics when they found her lying in the 

bed covered in stool.  This patient has extreme polypharmacy and brings a box 

with approximately 20 different pill bottles in it.  She says she manages her 

own medications but doesn't seem to know what any of them do or what they are 

for.  She denies intentional overdose.  She is on chronic daily morphine and 

Soma and Lortab.  She was found hypotensive and is on several different 

antihypertensives.





PFSH


Past Medical History


Hx Anticoagulant Therapy:  Yes (low dose ASA daily. )


Anemia:  Yes (PERNICIOUS)


Arthritis:  Yes


Asthma:  Yes


Autoimmune Disease:  Yes (LUPUS/ RA/ FIBROMYALGIA)


Blood Disorders:  No


Anxiety:  No


Depression:  No


Heart Rhythm Problems:  Yes (TACHYCARDIA)


Cancer:  No


Cardiac Catheterization:  Yes (in  for ablation)


Cardiomyopathy:  Yes


Cardiovascular Problems:  Yes (AICD, SVT)


High Cholesterol:  No


Chemotherapy:  No


Chest Pain:  Yes


Congestive Heart Failure:  Yes


COPD:  Yes


Cerebrovascular Accident:  No


Coronary Artery Disease:  Yes


Diabetes:  Yes


Patient Takes Glucophage:  No


Diminished Hearing:  No


Endocrine:  Yes


Fibromyalgia:  Yes


Gastrointestinal Disorders:  Yes


GERD:  Yes


Glaucoma:  No


Genitourinary:  Yes


Headaches:  Yes


Hepatitis:  No


Hiatal Hernia:  Yes


Heparin Induced Thrombocytopen:  No


Herniated Disk:  Yes


Hypertension:  Yes


Immune Disorder:  Yes


Implanted Vascular Access Dvce:  Yes


Kidney Stones:  Yes


Medical other:  Yes (LUPUS, MIGRAINES, ANEMIA, FIBROMYALGIA, RA )


Musculoskeletal:  Yes (FIBROMYALGIA,LUPUS)


Neurologic:  Yes


Psychiatric:  Yes


Reproductive:  No


Respiratory:  Yes (COPD, Bronchitis)


Immunizations Current:  Yes


Migraines:  Yes


Myocardial Infarction:  Yes ()


Radiation Therapy:  No


Renal Failure:  No


Seizures:  No


Sickle Cell Disease:  No


Sleep Apnea:  No


Thyroid Disease:  Yes (HYPO)


Ulcer:  Yes


Menopausal:  Yes


:  2


Para:  1


Miscarriage:  1


Dilation and Curettage (D&C):  Yes





Past Surgical History


Abdominal Surgery:  No


AICD:  Yes ([employer])


Arteriovenous Shunt:  No


Cardiac Surgery:  Yes (AICD [employer]ANGE)


 Section:  Yes (x1)


Coronary Artery Bypass Graft:  Yes


Ear Surgery:  No


Endocrine Surgery:  No


Eye Surgery:  No


Genitourinary Surgery:  No


Gynecologic Surgery:  Yes ( LAP. MYOMECTOMY)


Hysterectomy:  Yes


Insulin Pump:  No


Joint Replacement:  No


Neurologic Surgery:  No


Oral Surgery:  No


Pacemaker:  Yes


Thoracic Surgery:  No


Other Surgery:  Yes





Social History


Alcohol Use:  No


Tobacco Use:  No


Substance Use:  No





Allergies-Medications


(Allergen,Severity, Reaction):  


Coded Allergies:  


     No Known Allergies (Verified , 10/3/17)


Reported Meds & Prescriptions





Reported Meds & Active Scripts


Active


Augmentin (Amoxicillin-Clavulanate) 875-125 Mg Tab 1 Tab PO BID 10 Days


Reported


Pantoprazole (Pantoprazole Sodium) 20 Mg Tab 20 Mg PO BID


Valsartan 40 Mg Tab 40 Mg PO DAILY


Lyrica (Pregabalin) 225 Mg Cap 225 Mg PO HS


Sulfasalazine 500 Mg Tab 1,000 Mg PO BID


Integra Plus (Multi-Vit/Iron-Vit C-B12-Folic Acid) 191-210-0.01-1 mg Cap 1 Cap 

PO DAILY


Alendronate (Alendronate Sodium) 70 Mg Tab Unknown Dose PO Q7D


Novolin 70-30 Inj (Insulin Human Isoph/Insulin Regular) 1,000 Unit/10 Ml Vial 

12 Units SQ HS


Novolin 70-30 Inj (Insulin Human Isoph/Insulin Regular) 1,000 Unit/10 Ml Vial 

12 Units SQ DAILY


Novolin 70-30 Inj (Insulin Human Isoph/Insulin Regular) 1,000 Unit/10 Ml Vial 

10 Units SQ DAILY


Omeprazole 20 Mg Tab 20 Mg PO DAILY


Morphine ER (Morphine Sulfate) 15 Mg Tab 60 Mg PO BID PRN


Nitroglycerin SL (Nitroglycerin) 0.4 Mg Subl 0.4 Mg SL AS DIRECTED PRN


     ONE TABLET UNDER THE TONGUE AS NEEDED FOR CHEST PAIN, MAY REPEAT EVERY


     FIVE MINUTES FOR A TOTAL OF 3 DOSES OR CALL 911 IF NO RELIEF


Percocet (Oxycodone-Acetaminophen)  mg Tab 1 Tab PO Q6H PRN


Metoprolol Tartrate 100 Mg Tab 100 Mg PO BID


Levothyroxine (Levothyroxine Sodium) 125 Mcg Tab 125 Mcg PO DAILY


Lantus Inj (Insulin Glargine) 1,000 Unit/10 Ml Vial 35 Units SQ HS


Cymbalta DR (Duloxetine HCl) 60 Mg Capdr 60 Mg PO TID


Cyanocobalamin Inj (Cyanocobalamin) 1,000 Mcg/Ml Inj 1,000 Mcg SQ 1 MONTH


Carisoprodol 250 Mg Tab 350 Mg PO TID PRN


Amlodipine-Benazepril 2.5-10 Mg Cap 1 Cap PO DAILY








Review of Systems


ROS Limitations:  Clinical Condition, Altered Mental Status, Poor Historian





Physical Exam


Narrative


GENERAL: Disheveled, Overweight  well-developed patient who is very sedated-

appearing and covered in stool


SKIN: Focused skin assessment reveals no rash and nodules. Skin is Warm and dry.


HEAD: Atraumatic. Normocephalic. 


EYES: Pupils equal and round. No scleral icterus. No injection or drainage. 


ENT: No nasal bleeding or discharge.  Mucous membranes pink and moist.


NECK: Trachea midline. No JVD. 


CARDIOVASCULAR: Regular rate and rhythm.  No murmur appreciated.


RESPIRATORY: No accessory muscle use. Clear to auscultation. Breath sounds 

equal bilaterally. 


GASTROINTESTINAL: Abdomen soft, non-tender, nondistended. Hepatic and splenic 

margins not palpable. 


MUSCULOSKELETAL: No obvious deformities. No clubbing.  No cyanosis.  No edema. 


NEUROLOGICAL: Arouses to voice and will answer questions but very drowsy and 

nods off. No obvious cranial nerve deficits.  Motor and sensory exams 

impossible to do given limited participation.  Slurred speech.


PSYCHIATRIC: Drowsy and lethargic mood and affect; insight and judgment poor.





Data


Data


Last Documented VS





Vital Signs








  Date Time  Temp Pulse Resp B/P (MAP) Pulse Ox O2 Delivery O2 Flow Rate FiO2


 


10/14/17 15:59  116 16 102/52 (69) 98 Nasal Cannula 2.00 


 


10/14/17 15:31 98.9       








Orders





 Orders


Electrocardiogram (10/14/17 15:40)


Complete Blood Count With Diff (10/14/17 15:40)


Comprehensive Metabolic Panel (10/14/17 15:40)


Beta Hydroxybutyrate (Acetone) (10/14/17 15:40)


Urinalysis - C+S If Indicated (10/14/17 15:40)


Arterial Blood Gas (Abg) (10/14/17 15:40)


Ecg Monitoring (10/14/17 15:40)


Iv Access Insert/Monitor (10/14/17 15:40)


Oximetry (10/14/17 15:40)


Oxygen Administration (10/14/17 15:40)


NPO (10/14/17 15:40)


Sodium Chlor 0.9% 1000 Ml Inj (Ns 1000 M (10/14/17 15:40)


Sodium Chlor 0.9% 1000 Ml Inj (Ns 1000 M (10/14/17 16:10)


Sodium Chloride 0.9% Flush (Ns Flush) (10/14/17 15:45)


Cath For Specimen (10/14/17 15:40)


Ct Brain W/O Iv Contrast(Rout) (10/14/17 )


Insulin Human Regular Inj (Novolin R Inj (10/14/17 15:45)


Insulin Human Regular Inj (Novolin R Inj (10/14/17 16:45)


Chest, Single Ap (10/14/17 )


Urinary Catheter Insert/Apply (10/14/17 17:39)


Admit Order (Ed Use Only) (10/14/17 17:48)


Sodium Chlor 0.9% 1000 Ml Inj (Ns 1000 M (10/14/17 17:50)


Dext 5%-Nacl 0.9% 1000 Ml Inj (D5w-Ns 10 (10/14/17 17:50)


Insulin Regular (Iv Infusion) (Novolin R (10/14/17 18:00)


Potassium Chlor 40 Meq Premix (Kcl 40 Me (10/14/17 18:00)


Potassium Chlor 40 Meq Premix (Kcl 40 Me (10/14/17 18:00)


Potassium Chlor 20 Meq Premix (Kcl 20 Me (10/14/17 18:00)


Sodium Bicarbonate 8.4% Inj (Sodium Bica (10/14/17 18:00)


Sodium Bicarbonate 8.4% Inj (Sodium Bica (10/14/17 18:00)


Sodium Phosphate Inj (Sodium Phosphate I (10/14/17 18:00)


Basic Metabolic Panel (Bmp) (10/14/17 22:50)


Basic Metabolic Panel (Bmp) (10/15/17 04:50)


Basic Metabolic Panel (Bmp) (10/15/17 10:50)


Basic Metabolic Panel (Bmp) (10/15/17 16:50)


Magnesium (Mg) (10/14/17 22:50)


Magnesium (Mg) (10/15/17 04:50)


Magnesium (Mg) (10/15/17 10:50)


Magnesium (Mg) (10/15/17 16:50)


Phosphorus (Po4) (10/14/17 22:50)


Phosphorus (Po4) (10/15/17 04:50)


Phosphorus (Po4) (10/15/17 10:50)


Phosphorus (Po4) (10/15/17 16:50)


Beta Hydroxybutyrate (Acetone) (10/15/17 04:50)


Beta Hydroxybutyrate (Acetone) (10/15/17 16:50)





Labs





Laboratory Tests








Test


  10/14/17


15:45 10/14/17


15:48 10/14/17


15:49


 


White Blood Count 25.1 TH/MM3   


 


Red Blood Count 4.10 MIL/MM3   


 


Hemoglobin 11.1 GM/DL   


 


Hematocrit 35.3 %   


 


Mean Corpuscular Volume 86.1 FL   


 


Mean Corpuscular Hemoglobin 27.0 PG   


 


Mean Corpuscular Hemoglobin


Concent 31.3 % 


  


  


 


 


Red Cell Distribution Width 17.9 %   


 


Platelet Count 258 TH/MM3   


 


Mean Platelet Volume 9.7 FL   


 


Neutrophils (%) (Auto) 90.0 %   


 


Lymphocytes (%) (Auto) 2.1 %   


 


Monocytes (%) (Auto) 7.6 %   


 


Eosinophils (%) (Auto) 0.1 %   


 


Basophils (%) (Auto) 0.2 %   


 


Neutrophils # (Auto) 22.6 TH/MM3   


 


Lymphocytes # (Auto) 0.5 TH/MM3   


 


Monocytes # (Auto) 1.9 TH/MM3   


 


Eosinophils # (Auto) 0.0 TH/MM3   


 


Basophils # (Auto) 0.1 TH/MM3   


 


CBC Comment DIFF FINAL   


 


Differential Comment    


 


Blood Urea Nitrogen 61 MG/DL   


 


Creatinine 6.24 MG/DL   


 


Random Glucose 689 MG/DL   


 


Total Protein 6.5 GM/DL   


 


Albumin 2.5 GM/DL   


 


Calcium Level 7.3 MG/DL   


 


Alkaline Phosphatase 115 U/L   


 


Aspartate Amino Transf


(AST/SGOT) 134 U/L 


  


  


 


 


Alanine Aminotransferase


(ALT/SGPT) 57 U/L 


  


  


 


 


Total Bilirubin 0.6 MG/DL   


 


Sodium Level 130 MEQ/L   


 


Potassium Level 4.3 MEQ/L   


 


Chloride Level 95 MEQ/L   


 


Carbon Dioxide Level 20.3 MEQ/L   


 


Anion Gap 15 MEQ/L   


 


Estimat Glomerular Filtration


Rate 8 ML/MIN 


  


  


 


 


Protein Corrected Calcium 7.6 MG/DL   


 


B-Hydroxybutyrate 1.52 MMOL/L   


 


Blood Gas Puncture Site  RT RADIAL  


 


Blood Gas Patient Temperature  98.6  


 


Blood Gas HCO3  19 mmol/L  


 


Blood Gas Base Excess  -6.7 mmol/L  


 


Blood Gas Oxygen Saturation  89 %  


 


Arterial Blood pH  7.29  


 


Arterial Blood Partial


Pressure CO2 


  41 mmHg 


  


 


 


Arterial Blood Partial


Pressure O2 


  73 mmHG 


  


 


 


Arterial Blood Oxygen Content  13.3 Vol %  


 


Arterial Blood


Carboxyhemoglobin 


  1.0 % 


  


 


 


Arterial Blood Methemoglobin  1.7 %  


 


Blood Gas Hemoglobin  10.5 G/DL  


 


Oxygen Delivery Device  NASAL CANNULA  


 


Blood Gas Liter Flow  2 L/M  


 


Urine Color   DARK-BROWN 


 


Urine Turbidity   HAZY 


 


Urine pH   5.0 


 


Urine Specific Gravity   1.027 


 


Urine Protein   30 mg/dL 


 


Urine Glucose (UA)   150 mg/dL 


 


Urine Ketones   TRACE mg/dL 


 


Urine Occult Blood   MOD 


 


Urine Nitrite   NEG 


 


Urine Bilirubin   NEG 


 


Urine Urobilinogen   4.0 MG/DL 


 


Urine Leukocyte Esterase   MOD 


 


Urine RBC   3 /hpf 


 


Urine WBC   1 /hpf 


 


Urine Squamous Epithelial


Cells 


  


  1 /hpf 


 


 


Urine Amorphous Sediment   RARE 


 


Microscopic Urinalysis Comment


  


  


  CULT NOT


INDICATED











MDM


Medical Decision Making


Medical Screen Exam Complete:  Yes


Emergency Medical Condition:  Yes


Medical Record Reviewed:  Yes


Differential Diagnosis


DKA, hyperglycemia, noncompliance, polypharmacy, overmedication, dehydration, 

hyperosmolar coma


Narrative Course


I have reviewed the patient's electronic medical record.  Patient was just 

hospitalized 1 week ago and discharge summary and admission history and 

physical reviewed





2 IVs placed


I gave her 2 liters normal saline IV bolus and 12 units IV regular insulin


CBC shows leukocytosis of 25,000


Metabolic profile shows new-onset renal failure with creatinine over 6, normal 

potassium and almost normal bicarbonate


LFTs I reviewed


Urinalysis does not meet culture standards


Beta hydroxybutyrate is elevated


ABG shows pH of 7.29


Brain CT is negative


Chest x-ray shows only atelectasis





After is noted the patient had new-onset renal failure Romano catheter was 

placed to evaluate for urinary retention.  She has 800 cc of urine in the 

bladder





After 2 L of saline her blood pressures improved to 110 systolic and she is 

tachycardic at 1:15


Her mental status is improved but she is still drowsy/lethargic


Controlling her airway fine


Accu-Chek 412 at this time





Case reviewed with intensivist.  He requests DKA protocol initiated which I 

have done


Patient critically ill and admitted to intensive care


Critical Care Narrative


Aggregate critical care time was 45 minutes. Time to perform other separately 

billable procedures was not included in the critical care time. My time did not 

include minutes spent treating any other patients simultaneously or on 

activities that did not directly contribute to the patient's treatment.  





The services I provided to this patient were to treat and/or prevent clinically 

significant deterioration that could result in:  Cardiopulmonary arrest, loss 

of airway, aspiration, hypoxemic brain injury





I provided critical care services requiring my management, as noted below:


Chart data review, documentation time, medication orders and management, vital 

sign assessments/reviewing monitor data, ordering and reviewing lab tests, 

ordering and interpreting/reviewing x-rays and diagnostic studies, care of the 

patient and discussion of the patient with the admitting physicians.





Diagnosis





 Primary Impression:  


 DKA (diabetic ketoacidoses)


 Qualified Codes:  E13.10 - Other specified diabetes mellitus with ketoacidosis 

without coma


 Additional Impressions:  


 Renal failure


 Qualified Codes:  N17.9 - Acute kidney failure, unspecified


 Altered mental status, unspecified


 Qualified Codes:  R40.0 - Somnolence


 Polypharmacy





Admitting Information


Admitting Physician Requests:  it











Wisam Baltazar MD Oct 14, 2017 15:53

## 2017-10-14 NOTE — RADRPT
EXAM DATE/TIME:  10/14/2017 21:39 

 

This report includes an Addendum and supersedes previous reports for this exam.

 

 

 

 

[facility] COMPARISON:     

No previous studies available for comparison.

 

 

INDICATIONS :     

Shortness of breath.

                      

 

RADIATION DOSE:     

5.54 CTDIvol (mGy) 

 

 

MEDICAL HISTORY :     

Cardiovascular disease. Congestive heart failure. Hypertension. 

 

SURGICAL HISTORY :      

CABG Defibrillator.Hysterectomy. 

 

ENCOUNTER:      

Initial

 

ACUITY:      

1 day

 

PAIN SCALE:      

3/10

 

LOCATION:        

chest 

 

TECHNIQUE:      

Volumetric scanning of the chest was performed.  Using automated exposure control and adjustment of t
he mA and/or kV according to patient size, radiation dose was kept as low as reasonably achievable to
 obtain optimal diagnostic quality images.  DICOM format image data is available electronically for r
eview and comparison.  

 

Follow-up recommendations for detected pulmonary nodules are based at a minimum on nodule size and pa
tient risk factors according to Fleischner Society Guidelines.

 

FINDINGS:     

 

LUNGS:     

Peribronchial subsegmental airspace disease is identified in the right upper and right lower lobes

 

PLEURAE:     

There is no pleural thickening or pleural effusion.

 

MEDIASTINUM:     

Heart is mildly enlarged. AICD device is in place.

     The heart and great vessels demonstrate no acute abnormality.  There is no mediastinal or hilar 
lymphadenopathy.

 

AXILLAE:     

Within normal limits.  No lymphadenopathy.

 

MUSCULOSKELETAL:     

Within normal limits for patient age.

 

MISCELLANEOUS:     

The visualized upper abdominal organs demonstrate no acute abnormality.

 

CONCLUSION:     

1. Subsegmental airspace disease in the right upper and lower lobes.

2. Moderate cardiomegaly.

3. No other significant abnormality.

 

 

 

 

 Nghia Espino MD on October 14, 2017 at 22:16           

Board Certified Radiologist.

 This report was verified electronically. 

 

ADDENDUM: 

 

3 cm hypodense nodule is identified in the right adrenal gland characteristic of an adenoma.

 

 Nghia Espino MD on October 14, 2017 at 22:27           

Board Certified Radiologist.

 This report was verified electronically.

## 2017-10-14 NOTE — RADRPT
EXAM DATE/TIME:  10/14/2017 16:54 

 

[facility] COMPARISON:     

CHEST SINGLE AP, October 03, 2017, 21:47.

 

                     

INDICATIONS :     

Shortness of breath.

                     

 

MEDICAL HISTORY :            

Cardiovascular disease. Hypertension. Chronic obstructive pulmonary disease. Hiatal hernia. Ulcer. Indira
pus. Diabetes   

 

SURGICAL HISTORY :     

Pacemaker. Hysterectomy.  

 

ENCOUNTER:     

Initial                                        

 

ACUITY:     

1 day      

 

PAIN SCORE:     

0/10

 

LOCATION:     

Bilateral chest 

 

FINDINGS:     

Mild airspace disease characteristic of atelectasis is seen in the right base. Lungs are otherwise cl
ear.

 

Heart is mildly enlarged. AICD device is stable.

 

CONCLUSION:     

Mild right basilar atelectasis otherwise no evidence of acute process.

 

 

 

 Nghia Espino MD on October 14, 2017 at 17:19           

Board Certified Radiologist.

 This report was verified electronically.

## 2017-10-14 NOTE — HHI.HP
HPI


Service


Critical Care Medicine


Primary Care Physician


Harini Teixeira MD


Admission Diagnosis





DKA,new onset renal failure, AMS,polypharmacy


Diagnosis:  


Travel History


International Travel<30 Days:  No


Contact w/Intl Traveler <30 Da:  No


Traveled to Known Affected Are:  No


History of Present Illness


Patient is lethargic and has a difficult historian.





62-year-old R hand dominant  female with past medical history 

of insulin-dependent diabetes mellitus, hypertension, COPD, ablation of atrial 

tachycardia,  pacemaker,  gastroparesis s/p botox therapy pylorus 10/5/17, 

esophageal stricture s/p multiple dilations,  peripheral neuropathy, peripheral 

arterial disease, SLE/RA not on any disease modifying therapy, chronic pain  

who presented to [facility] via [facility].  According to [facility] she was 

hypotensive and tachycardic on arrival with critically high glucose.  Fluids 

were initiated per [facility] and she has been tachycardic but normotensive in the 

ED with blood pressure 102/52 to 119/59.  Mean arterial pressure 69-80.  She is 

severely hyperglycemic with glucose of 689.  Beta hydroxybutyrate is elevated 

and albumin-corrected anion gap is 18. She does not have appropriate degree of  

respiratory compensation as noted by ABG with pH 7.29/PaCO2 41/PA O2 of 73/

bicarbonate of 19. She is in acute kidney injury with BUN 61, creatinine of 

6.24 (baseline Cr 0.62 on 10/6/17).  She has significant polypharmacy and on 

multiple sedative meds (Morphine 60 ER/percocet 10/lyrica 225, Soma) that could 

contribute to her concomitant mild respiratory acidosis, particularly in the 

setting of acute renal failure. She reports compliance with insulin regimen but 

has not been checking her glucose. Her ROS is positive in multiple areas and 

she is a difficult historian. She states that she was not feeling well and was 

in bed all day today. She says she woke up at some point today and was not able 

to move her right arm or leg well. She did not notice any changes in speech but 

says she feels "disoriented". She has right shoulder pain but denies leg pain. 

She needed to have a BM and called her son to help her get out of bed because 

she couldn't walk. Before she could make it to the bathroom she had a large BM 

in the bed. No melena or BRBPR. She states she also had one loose stool 

yesterday, stating "I have irritable bowel". She has had some nausea but no 

vomiting, no abdominal pain. She has had a nonproductive cough today. No fever, 

dysuria, SOB, or headache. She has right shoulder pain during exam and states 

she "fell over onto her shoulder while sweeping the floor". She has been 

started on insulin drip and IVF per DKA protocol.  She has received 2 L NS 

bolus in the ED.





Review of Systems


ROS Limitations:  Clinical Condition, Poor Historian


Respiratory:  COMPLAINS OF: Cough


Musculoskeletal:  COMPLAINS OF: Joint pain (r shoulder)





Past Family Social History


Allergies:  


Coded Allergies:  


     No Known Allergies (Verified , 10/3/17)


Past Medical History


Diabetes


Hypertension


CAD 


Anemia


COPD


SLE reportedly diagnosed 20 years ago. 


Rheumatoid arthritis


GERD


Hypothyroidism


Peripheral neuropathy


Gastroparesis


Chronic pain


Dysphagia


Esophageal stricture s/p dilation 16, 


Diverticulosis


Internal and external hemorrhoids


Past Surgical History


Colonoscopy - 10/5/17 (Dr. Bui)sigmoid diverticulosis.  Biopsy positive for 

mild colitis. And internal and external hemorrhoids present 


EGD, Esophageal dilation, botox therapy at pylorus 10/5/17


Esophageal dilation 16


Cardiac catheterization 12/7/15 2015 with mild coronary artery disease, EF 65%


Atrial tachycardia ablation  (Dr. Marquez)


Pacemaker


Right carpal tunnel


 section


Myomectomy


Total abdominal hysterectomy and BSO


ORIF right wrist


Reported Medications


Sulfasalazine 1000 g by mouth twice a day


Soma 350 g by mouth 3 times a day


Multivitamin/B-12/folate acid one by mouth daily


Nitroglycerin 0.4 mg sublingual as needed for chest pain


Metoprolol 100 mg by mouth twice a day


Norvasc 2.5/benazepril 10 mg one by mouth daily


Valsartan 40 mEq by mouth daily


Morphine ER 60 g by mouth twice a day


Oxycodone 10 by mouth every 6 hours as needed for pain


Lyrica to 25 mg by mouth daily at bedtime


Cymbalta 60 g by mouth 3 times a day


Omeprazole 20 mg by mouth daily


Pantoprazole 20 mill grams by mouth twice a day


Lantus 35 units subcutaneous daily at bedtime


Novolin 7030


Levothyroxin 125 g by mouth daily


Cyanocobalamin 1000 g subcutaneous monthly


Alendronate 70 mg weekly


Family History


She was not able to provide family medical history


Social History


She is a former smoker.  Apparently quit in .  8-pack-year history of 

smoking


Denies use of alcohol or illicit drugs





Physical Exam


Vital Signs





Vital Signs








  Date Time  Temp Pulse Resp B/P (MAP) Pulse Ox O2 Delivery O2 Flow Rate FiO2


 


10/14/17 18:30  117 16 117/62 (80) 97 Nasal Cannula 2.00 


 


10/14/17 17:00  112 16 108/60 (76) 99 Nasal Cannula 2.00 


 


10/14/17 15:59  116 16 102/52 (69) 98 Nasal Cannula 2.00 


 


10/14/17 15:58     98 Nasal Cannula 2.00 


 


10/14/17 15:55     94 Nasal Cannula 2.00 


 


10/14/17 15:36     94   


 


10/14/17 15:31 98.9 119 14 102/52 (69) 93   








Physical Exam


GENERAL: Overweight  female who is laying in ED stretcher, 

lethargic but arouses and answers some questions. 


SKIN: Warm and dry, adequately perfused. 


HEAD: Atraumatic. Normocephalic. 


EYES: Pupils equal and round, 4mm and reactive to 2 mm bilaterally. No scleral 

icterus. No injection or drainage. 


ENT: No nasal bleeding or discharge.  Mucous membranes are dry with significant 

thrush throughout her bucca mucosa. Upper dentures in place. 


NECK: Trachea midline. No JVD. 


CARDIOVASCULAR: Regular, tachycardic, sinus tach on monitor with rate 110s. No 

murmurs rubs or gallops. 


RESPIRATORY: No accessory muscle use. Clear to auscultation. Breath sounds 

equal bilaterally. On 2 L NC


GASTROINTESTINAL: Abdomen protruberant but  soft, non-tender, nondistended. 

Bowel sounds present. No rebound or guarding.


MUSCULOSKELETAL: Extremities without clubbing, cyanosis, or edema. Pain with 

movement right shoulder. No tenderness of humeral head or clavicle. 


NEUROLOGICAL: Lethargic. Slow to answer questions but Oriented to person, place

, year.  No obvious cranial nerve deficits, no facial droop. Speech is slow but 

no aphasia. Did not cooperate with EOM or tongue protrusion.  She reports 

decreased sensation of right arm and right leg to touch. She has pain in her 

right shoulder and is unable to lift right arm up but it is unclear if pain is 

the sole factor. Unable to assess pronator drift. She would not cooperate well 

with hand intrinsics. .   Strength is 5/5 L upper and lower extremities but did 

not cooperate with dorsiflexion of the ankle despite multiple attempts.  Right 

quadriceps and hamstring strength is 2/5. R  Ankle plantar flexion 4/5. Does 

not cooperate with ankle dorsiflexion. No response to Babinski bilaterally. No 

clonus. Not appropriate for gait testing currently


VASC: palpable femoral and DP pulses bilaterally. Feet are warm and appear 

adequately perfused.


Laboratory





Laboratory Tests








Test


  10/14/17


15:45 10/14/17


15:48 10/14/17


15:49


 


White Blood Count 25.1   


 


Red Blood Count 4.10   


 


Hemoglobin 11.1   


 


Hematocrit 35.3   


 


Mean Corpuscular Volume 86.1   


 


Mean Corpuscular Hemoglobin 27.0   


 


Mean Corpuscular Hemoglobin


Concent 31.3 


  


  


 


 


Red Cell Distribution Width 17.9   


 


Platelet Count 258   


 


Mean Platelet Volume 9.7   


 


Neutrophils (%) (Auto) 90.0   


 


Lymphocytes (%) (Auto) 2.1   


 


Monocytes (%) (Auto) 7.6   


 


Eosinophils (%) (Auto) 0.1   


 


Basophils (%) (Auto) 0.2   


 


Neutrophils # (Auto) 22.6   


 


Lymphocytes # (Auto) 0.5   


 


Monocytes # (Auto) 1.9   


 


Eosinophils # (Auto) 0.0   


 


Basophils # (Auto) 0.1   


 


CBC Comment DIFF FINAL   


 


Differential Comment    


 


Blood Urea Nitrogen 61   


 


Creatinine 6.24   


 


Random Glucose 689   


 


Total Protein 6.5   


 


Albumin 2.5   


 


Calcium Level 7.3   


 


Alkaline Phosphatase 115   


 


Aspartate Amino Transf


(AST/SGOT) 134 


  


  


 


 


Alanine Aminotransferase


(ALT/SGPT) 57 


  


  


 


 


Total Bilirubin 0.6   


 


Sodium Level 130   


 


Potassium Level 4.3   


 


Chloride Level 95   


 


Carbon Dioxide Level 20.3   


 


Anion Gap 15   


 


Estimat Glomerular Filtration


Rate 8 


  


  


 


 


Protein Corrected Calcium 7.6   


 


B-Hydroxybutyrate 1.52   


 


Blood Gas Puncture Site  RT RADIAL  


 


Blood Gas Patient Temperature  98.6  


 


Blood Gas HCO3  19  


 


Blood Gas Base Excess  -6.7  


 


Blood Gas Oxygen Saturation  89  


 


Arterial Blood pH  7.29  


 


Arterial Blood Partial


Pressure CO2 


  41 


  


 


 


Arterial Blood Partial


Pressure O2 


  73 


  


 


 


Arterial Blood Oxygen Content  13.3  


 


Arterial Blood


Carboxyhemoglobin 


  1.0 


  


 


 


Arterial Blood Methemoglobin  1.7  


 


Blood Gas Hemoglobin  10.5  


 


Oxygen Delivery Device  NASAL CANNULA  


 


Blood Gas Liter Flow  2  


 


Urine Color   DARK-BROWN 


 


Urine Turbidity   HAZY 


 


Urine pH   5.0 


 


Urine Specific Gravity   1.027 


 


Urine Protein   30 


 


Urine Glucose (UA)   150 


 


Urine Ketones   TRACE 


 


Urine Occult Blood   MOD 


 


Urine Nitrite   NEG 


 


Urine Bilirubin   NEG 


 


Urine Urobilinogen   4.0 


 


Urine Leukocyte Esterase   MOD 


 


Urine RBC   3 


 


Urine WBC   1 


 


Urine Squamous Epithelial


Cells 


  


  1 


 


 


Urine Amorphous Sediment   RARE 


 


Microscopic Urinalysis Comment


  


  


  CULT NOT


INDICATED








Result Diagram:  


10/14/17 1545                                                                  

              10/14/17 1545








Caprin VTE Risk Assessment


Caprini VTE Risk Assessment:  Mod/High Risk (score >= 2)


Caprini Risk Assessment Model











 Point Value = 1          Point Value = 2  Point Value = 3  Point Value = 5


 


Age 41-60


Minor surgery


BMI > 25 kg/m2


Swollen legs


Varicose veins


Pregnancy or postpartum


History of unexplained or recurrent


   spontaneous 


Oral contraceptives or hormone


   replacement


Sepsis (< 1 month)


Serious lung disease, including


   pneumonia (< 1 month)


Abnormal pulmonary function


Acute myocardial infarction


Congestive heart failure (< 1 month)


History of inflammatory bowel disease


Medical patient at bed rest Age 61-74


Arthroscopic surgery


Major open surgery (> 45 min)


Laparoscopic surgery (> 45 min)


Malignancy


Confined to bed (> 72 hours)


Immobilizing plaster cast


Central venous access Age >= 75


History of VTE


Family history of VTE


Factor V Leiden


Prothrombin [id number]


Lupus anticoagulant


Anticardiolipin antibodies


Elevated serum homocysteine


Heparin-induced thrombocytopenia


Other congenital or acquired


   thrombophilia Stroke (< 1 month)


Elective arthroplasty


Hip, pelvis, or leg fracture


Acute spinal cord injury (< 1 month)








Prophylaxis Regimen











   Total Risk


Factor Score Risk Level Prophylaxis Regimen


 


0-1      Low Early ambulation


 


2 Moderate Order ONE of the following:


*Sequential Compression Device (SCD)


*Heparin 5000 units SQ BID


 


3-4 Higher Order ONE of the following medications:


*Heparin 5000 units SQ TID


*Enoxaparin/Lovenox 40 mg SQ daily (WT < 150 kg, CrCl > 30 mL/min)


*Enoxaparin/Lovenox 30 mg SQ daily (WT < 150 kg, CrCl > 10-29 mL/min)


*Enoxaparin/Lovenox 30 mg SQ BID (WT < 150 kg, CrCl > 30 mL/min)


AND/OR


*Sequential Compression Device (SCD)


 


5 or more Highest Order ONE of the following medications:


*Heparin 5000 units SQ TID (Preferred with Epidurals)


*Enoxaparin/Lovenox 40 mg SQ daily (WT < 150 kg, CrCl > 30 mL/min)


*Enoxaparin/Lovenox 30 mg SQ daily (WT < 150 kg, CrCl > 10-29 mL/min)


*Enoxaparin/Lovenox 30 mg SQ BID (WT < 150 kg, CrCl > 30 mL/min)


AND


*Sequential Compression Device (SCD)











Assessment and Plan


Problem List:  


(1) Polypharmacy


ICD Code:  Z79.899 - Other long term (current) drug therapy


Status:  Acute


(2) Renal failure


ICD Code:  N19 - Unspecified kidney failure


Status:  Acute


(3) CAD (coronary artery disease)


ICD Code:  I25.10 - Atherosclerotic heart disease of native coronary artery 

without angina pectoris


(4) HTN (hypertension)


ICD Code:  I10 - Essential (primary) hypertension


(5) Diabetes mellitus


ICD Code:  E11.9 - Type 2 diabetes mellitus without complications


(6) Aspiration pneumonia


ICD Code:  J69.0 - Pneumonitis due to inhalation of food and vomit


(7) GEOVANNY (acute kidney injury)


ICD Code:  N17.9 - Acute kidney failure, unspecified


(8) Esophageal stricture


ICD Code:  K22.2 - Esophageal obstruction


(9) Candida esophagitis


ICD Code:  B37.81 - Candidal esophagitis


(10) Thrush of mouth and esophagus


ICD Code:  B37.81 - Candidal esophagitis; B37.0 - Candidal stomatitis


(11) DKA (diabetic ketoacidoses)


ICD Code:  E13.10 - Other specified diabetes mellitus with ketoacidosis without 

coma


Status:  Acute


(12) Rhabdomyolysis


ICD Code:  M62.82 - Rhabdomyolysis


Status:  Acute


Assessment and Plan


NEURO:


R hemiparesis


Chronic pain


Polypharmacy


Peripheral neuropathy


Exam is difficult because she is lethargic, is only intermittently cooperative, 

and it is unclear if right arm movement is limited by pain versus true 

weakness. Right leg definitely appears weak.  She may have had an ischemic 

stroke, but onset is unknown and she would not be a candidate for TPA


CT brain negative.  Unable to obtain MRI due to pacemaker


Obtain carotid u/s, Echo, lipid studies. 


Tox screen pending. Initiate ASA if salicylate negative. 


Neurology consult. 


Hold sedative medications which are contributing - Soma 350 tid, morphine ER 60 

twice a day, Percocet 10 every 6 hours, Lyrica to 25 daily at bedtime, Cymbalta 

60 3 times a day





RESP:


COPD


Prior history of tobacco abuse


DuoNeb every 6 hours.  Albuterol every 2 hours as needed.  On nasal cannula.


She currently appears to be protecting her airway.





CV:


Mild coronary artery disease on cardiac catheterization , medically 

managed. Dr. Treadwell.


Hypertension


Atrial tachycardia ablation  Dr. Marquez


Pacemaker


PAD - 60% R external iliac stenosis - referred to vascular surgery outpatient. 

Currently does not appear symptomatic. 


Is reportedly hypotensive per [facility] but blood pressure has normalized.  


Hold nitroglycerin sublingual, metoprolol 100 twice a day, Norvasc 2.5 daily


Hold benazepril 10 mg daily and valsartan 40 mg by mouth daily due to acute 

kidney injury


Following Serial EKG and troponin. 


F/u Echo





GI:


Mild Transaminitis


Gastroparesis s/p botox therapy to pylorus 10/5/17 Dr. Bui


Esophageal stricture s/p dilation at GE junction 10/5/17 Dr. Bui. 


GERD


Dysphagia


Diverticulosis


Internal and external hemorrhoids


Mild chronic protein energy malnutrition


NPO. Will need swallow eval before diet advancement. 


Abdominal exam is benign. 


Follow LFTs. Followup abd u/s. 


Recent admission 10/4 due to colitis but patients exam is currently benign. . C 

diff was negative 10/4. She has been on an augmentin as outpatient, will repeat 

C diff given presence of diarrhea. 





FEN/RENAL:


Acute kidney injury


Pseudohyponatremia


Clinically appears volume depleted and prerenal.  May have ATN due to 

hypotension/volume depletion, but will have to see how she responds to fluids.


She reportedly has a history of lupus, workup not performed at [facility]. No 

history of lupus nephritis in records. Sent CPK and urine protein:urine Cr, 

FeNa. 


Received 2 L NS bolus in ED and some additional prehospital fluids. Romano in 

place, monitor intake and output. D/c DKA protocol  potassium replacement for 

now due to GEOVANNY. 


Monitor serial BMP, mag, phos per DKA protocol. Nephrology consult if renal 

function not improving with fluids.  





ID:


Thrush


Candida esophagitis 


Leukocytosis


Aspiration pneumonia


Abdomen appears benign, u/a negative.  Initial chest x-ray without infiltrate 

but worsening appearance of R chest when R shoulder xray was performed. Cough 

nonproductive. CT chest with RUL and RLL peribronchial opacities. Empiric Zosyn 

for now for possible aspiration pneumonia. 


Followup blood cultures. 


Esophageal biopsy from 10/5/17 - with few fungal organisms consistent with 

candida.  Start on fluconazole, renal adjusted. Monitor LFTs. Monitor renal 

function and adjust dose if creatinine clearance increases above 50.  Ordered 

removal of dentures with brushing q24 hours, soak overnight chlorhexidine. 





HEME:


Chronic anemia


No acute hematologic issues.





RHEUM/MSK:


SLE


Rheumatoid arthritis


R shoulder pain following reported trauma 


Xray R shoulder - chronic arthropathy, no acute process. 


Not on disease modifying therapy.  Rheumatologic workup apparently occurred as 

an outpatient.  She previously saw Dr. Ayala but is not followed by him 

anymore. 





ENDO:


Hypothyroidism


Diabetes mellitus


DKA


Check TSH. Continue synthroid 125 mcg daily.


DKA protocol - on insulin drip. Will need transition to subcut insulin when 

albumin corrected anion gap is closed and when able to take po. Swallow eval 

prior to diet advancement. 





PROPH: Protonix 40 mg  IV daily for stress ulcer prophylaxis and history of 

GERD. Heparin 5000 subcutaneous every 12 hours for DVT prophylaxis





ACCESS: Peripheral IV





Full code





Level III H&P





Problem Qualifiers





(1) Renal failure:  


Qualified Codes:  N17.9 - Acute kidney failure, unspecified


(2) DKA (diabetic ketoacidoses):  


Qualified Codes:  E13.10 - Other specified diabetes mellitus with ketoacidosis 

without coma








Светлана Urrutia MD Oct 14, 2017 19:28

## 2017-10-15 VITALS
HEART RATE: 128 BPM | DIASTOLIC BLOOD PRESSURE: 63 MMHG | OXYGEN SATURATION: 94 % | SYSTOLIC BLOOD PRESSURE: 137 MMHG | RESPIRATION RATE: 20 BRPM

## 2017-10-15 VITALS
RESPIRATION RATE: 10 BRPM | OXYGEN SATURATION: 94 % | DIASTOLIC BLOOD PRESSURE: 61 MMHG | HEART RATE: 121 BPM | SYSTOLIC BLOOD PRESSURE: 128 MMHG

## 2017-10-15 VITALS
SYSTOLIC BLOOD PRESSURE: 132 MMHG | RESPIRATION RATE: 11 BRPM | DIASTOLIC BLOOD PRESSURE: 60 MMHG | HEART RATE: 125 BPM | TEMPERATURE: 100.7 F | OXYGEN SATURATION: 96 %

## 2017-10-15 VITALS — OXYGEN SATURATION: 96 %

## 2017-10-15 VITALS
SYSTOLIC BLOOD PRESSURE: 144 MMHG | DIASTOLIC BLOOD PRESSURE: 68 MMHG | OXYGEN SATURATION: 96 % | HEART RATE: 122 BPM | RESPIRATION RATE: 19 BRPM

## 2017-10-15 VITALS
RESPIRATION RATE: 14 BRPM | HEART RATE: 118 BPM | DIASTOLIC BLOOD PRESSURE: 62 MMHG | OXYGEN SATURATION: 96 % | SYSTOLIC BLOOD PRESSURE: 133 MMHG

## 2017-10-15 VITALS
SYSTOLIC BLOOD PRESSURE: 128 MMHG | DIASTOLIC BLOOD PRESSURE: 60 MMHG | OXYGEN SATURATION: 95 % | RESPIRATION RATE: 13 BRPM | HEART RATE: 122 BPM

## 2017-10-15 VITALS
HEART RATE: 113 BPM | OXYGEN SATURATION: 98 % | RESPIRATION RATE: 19 BRPM | SYSTOLIC BLOOD PRESSURE: 150 MMHG | DIASTOLIC BLOOD PRESSURE: 71 MMHG

## 2017-10-15 VITALS
OXYGEN SATURATION: 96 % | RESPIRATION RATE: 16 BRPM | SYSTOLIC BLOOD PRESSURE: 144 MMHG | HEART RATE: 124 BPM | TEMPERATURE: 99.1 F | DIASTOLIC BLOOD PRESSURE: 70 MMHG

## 2017-10-15 VITALS
HEART RATE: 88 BPM | DIASTOLIC BLOOD PRESSURE: 81 MMHG | SYSTOLIC BLOOD PRESSURE: 170 MMHG | RESPIRATION RATE: 17 BRPM | OXYGEN SATURATION: 99 % | TEMPERATURE: 100.8 F

## 2017-10-15 VITALS
SYSTOLIC BLOOD PRESSURE: 116 MMHG | RESPIRATION RATE: 16 BRPM | OXYGEN SATURATION: 96 % | DIASTOLIC BLOOD PRESSURE: 56 MMHG | HEART RATE: 123 BPM

## 2017-10-15 VITALS
TEMPERATURE: 98.8 F | SYSTOLIC BLOOD PRESSURE: 118 MMHG | OXYGEN SATURATION: 96 % | DIASTOLIC BLOOD PRESSURE: 57 MMHG | RESPIRATION RATE: 10 BRPM | HEART RATE: 122 BPM

## 2017-10-15 VITALS
OXYGEN SATURATION: 95 % | RESPIRATION RATE: 11 BRPM | DIASTOLIC BLOOD PRESSURE: 60 MMHG | TEMPERATURE: 98 F | SYSTOLIC BLOOD PRESSURE: 128 MMHG | HEART RATE: 129 BPM

## 2017-10-15 VITALS — HEART RATE: 126 BPM

## 2017-10-15 VITALS
DIASTOLIC BLOOD PRESSURE: 65 MMHG | SYSTOLIC BLOOD PRESSURE: 145 MMHG | OXYGEN SATURATION: 94 % | RESPIRATION RATE: 15 BRPM | HEART RATE: 122 BPM

## 2017-10-15 VITALS
DIASTOLIC BLOOD PRESSURE: 63 MMHG | SYSTOLIC BLOOD PRESSURE: 138 MMHG | RESPIRATION RATE: 12 BRPM | HEART RATE: 115 BPM | OXYGEN SATURATION: 95 %

## 2017-10-15 VITALS — HEART RATE: 127 BPM

## 2017-10-15 VITALS — HEART RATE: 124 BPM

## 2017-10-15 VITALS — OXYGEN SATURATION: 100 %

## 2017-10-15 VITALS — HEART RATE: 128 BPM

## 2017-10-15 LAB
ALP SERPL-CCNC: 84 U/L (ref 45–117)
ALT SERPL-CCNC: 58 U/L (ref 10–53)
ANION GAP SERPL CALC-SCNC: 10 MEQ/L (ref 5–15)
ANION GAP SERPL CALC-SCNC: 10 MEQ/L (ref 5–15)
ANION GAP SERPL CALC-SCNC: 16 MEQ/L (ref 5–15)
APAP SERPL-MCNC: 2.6 MCG/ML (ref 10–30)
AST SERPL-CCNC: 131 U/L (ref 15–37)
B-OH-BUTYR SERPL-SCNC: 0.06 MMOL/L (ref 0–0.39)
B-OH-BUTYR SERPL-SCNC: 1.65 MMOL/L (ref 0–0.39)
BASE EXCESS BLD CALC-SCNC: -7.3 MMOL/L (ref -2–2)
BASOPHILS # BLD AUTO: 0 TH/MM3 (ref 0–0.2)
BASOPHILS NFR BLD: 0.2 % (ref 0–2)
BENZODIAZEPINES PNL UR: 91 % (ref 90–100)
BILIRUB SERPL-MCNC: 0.4 MG/DL (ref 0.2–1)
BLOOD GAS CARBOXYHEMOGLOBIN: 1.4 % (ref 0–4)
BLOOD GAS HCO3: 18 MMOL/L (ref 22–26)
BLOOD GAS OXYGEN CONTENT: 11.5 VOL % (ref 12–20)
BLOOD GAS PCO2: 34 MMHG (ref 38–42)
BUN SERPL-MCNC: 52 MG/DL (ref 7–18)
BUN SERPL-MCNC: 54 MG/DL (ref 7–18)
BUN SERPL-MCNC: 54 MG/DL (ref 7–18)
CALCIUM TP COR SERPL-MCNC: 7.8 MG/DL (ref 8.5–10.1)
CALCIUM TP COR SERPL-MCNC: 7.9 MG/DL (ref 8.5–10.1)
CHLORIDE SERPL-SCNC: 109 MEQ/L (ref 98–107)
CHLORIDE SERPL-SCNC: 115 MEQ/L (ref 98–107)
CHLORIDE SERPL-SCNC: 117 MEQ/L (ref 98–107)
CK MB SERPL-MCNC: 36.2 NG/ML (ref 0.5–3.6)
CRITICAL VALUE: NO
DRAW SITE: (no result)
EOSINOPHIL # BLD: 0 TH/MM3 (ref 0–0.4)
EOSINOPHIL NFR BLD: 0.1 % (ref 0–4)
ERYTHROCYTE [DISTWIDTH] IN BLOOD BY AUTOMATED COUNT: 17.6 % (ref 11.6–17.2)
GFR SERPLBLD BASED ON 1.73 SQ M-ARVRAT: 13 ML/MIN (ref 89–?)
HCO3 BLD-SCNC: 17.8 MEQ/L (ref 21–32)
HCO3 BLD-SCNC: 19 MEQ/L (ref 21–32)
HCO3 BLD-SCNC: 21 MEQ/L (ref 21–32)
HCT VFR BLD CALC: 29.8 % (ref 35–46)
HEMO FLAGS: (no result)
LITER FLOW: 1 L/M
LYMPHOCYTES # BLD AUTO: 0.5 TH/MM3 (ref 1–4.8)
LYMPHOCYTES NFR BLD AUTO: 2.9 % (ref 9–44)
MAGNESIUM SERPL-MCNC: 1.7 MG/DL (ref 1.5–2.5)
MAGNESIUM SERPL-MCNC: 1.8 MG/DL (ref 1.5–2.5)
MAGNESIUM SERPL-MCNC: 1.8 MG/DL (ref 1.5–2.5)
MCH RBC QN AUTO: 26.8 PG (ref 27–34)
MCHC RBC AUTO-ENTMCNC: 31.8 % (ref 32–36)
MCV RBC AUTO: 84.5 FL (ref 80–100)
METHGB MFR BLDA: 1.9 % (ref 0–2)
MONOCYTES NFR BLD: 9.8 % (ref 0–8)
NEUTROPHILS # BLD AUTO: 14.4 TH/MM3 (ref 1.8–7.7)
NEUTROPHILS NFR BLD AUTO: 87 % (ref 16–70)
NUMBER OF ARTERIAL PUNCTURES: 1
O2/TOTAL GAS SETTING VFR VENT: 24 %
OXYGEN DEVICE: (no result)
PLATELET # BLD: 194 TH/MM3 (ref 150–450)
PO2 BLD: 76 MMHG (ref 61–120)
POTASSIUM SERPL-SCNC: 3.3 MEQ/L (ref 3.5–5.1)
POTASSIUM SERPL-SCNC: 3.7 MEQ/L (ref 3.5–5.1)
POTASSIUM SERPL-SCNC: 3.8 MEQ/L (ref 3.5–5.1)
RBC # BLD AUTO: 3.53 MIL/MM3 (ref 4–5.3)
SALICYLATES SERPL-MCNC: 8.9 G/DL (ref 12–16)
SODIUM SERPL-SCNC: 143 MEQ/L (ref 136–145)
SODIUM SERPL-SCNC: 146 MEQ/L (ref 136–145)
SODIUM SERPL-SCNC: 146 MEQ/L (ref 136–145)
STAT: NO
TEMP CORR TO: 98.6
ULNAR PULSE: PRESENT
WBC # BLD AUTO: 16.5 TH/MM3 (ref 4–11)

## 2017-10-15 RX ADMIN — ACYCLOVIR SCH UNITS: 800 TABLET ORAL at 18:37

## 2017-10-15 RX ADMIN — SODIUM BICARBONATE SCH MLS/HR: 84 INJECTION, SOLUTION INTRAVENOUS at 20:28

## 2017-10-15 RX ADMIN — PANTOPRAZOLE SODIUM SCH MG: 40 INJECTION, POWDER, FOR SOLUTION INTRAVENOUS at 08:51

## 2017-10-15 RX ADMIN — SODIUM BICARBONATE SCH MLS/HR: 84 INJECTION, SOLUTION INTRAVENOUS at 18:50

## 2017-10-15 RX ADMIN — TAZOBACTAM SODIUM AND PIPERACILLIN SODIUM SCH MLS/HR: 250; 2 INJECTION, SOLUTION INTRAVENOUS at 15:47

## 2017-10-15 RX ADMIN — SODIUM BICARBONATE SCH MLS/HR: 84 INJECTION, SOLUTION INTRAVENOUS at 20:09

## 2017-10-15 RX ADMIN — POTASSIUM CHLORIDE PRN MLS/HR: 200 INJECTION, SOLUTION INTRAVENOUS at 13:37

## 2017-10-15 RX ADMIN — POTASSIUM CHLORIDE PRN MLS/HR: 200 INJECTION, SOLUTION INTRAVENOUS at 15:45

## 2017-10-15 RX ADMIN — TAZOBACTAM SODIUM AND PIPERACILLIN SODIUM SCH MLS/HR: 250; 2 INJECTION, SOLUTION INTRAVENOUS at 04:00

## 2017-10-15 RX ADMIN — METOPROLOL TARTRATE PRN MG: 1 INJECTION, SOLUTION INTRAVENOUS at 23:08

## 2017-10-15 RX ADMIN — METOPROLOL TARTRATE SCH MG: 25 TABLET, FILM COATED ORAL at 17:53

## 2017-10-15 RX ADMIN — PHENYTOIN SODIUM SCH MLS/HR: 50 INJECTION INTRAMUSCULAR; INTRAVENOUS at 20:09

## 2017-10-15 RX ADMIN — INSULIN ASPART SCH: 100 INJECTION, SOLUTION INTRAVENOUS; SUBCUTANEOUS at 20:09

## 2017-10-15 RX ADMIN — PHENYTOIN SODIUM SCH MLS/HR: 50 INJECTION INTRAMUSCULAR; INTRAVENOUS at 20:27

## 2017-10-15 RX ADMIN — POTASSIUM CHLORIDE PRN MLS/HR: 200 INJECTION, SOLUTION INTRAVENOUS at 19:55

## 2017-10-15 RX ADMIN — METOPROLOL TARTRATE PRN MG: 1 INJECTION, SOLUTION INTRAVENOUS at 17:39

## 2017-10-15 RX ADMIN — PHENYTOIN SODIUM SCH MLS/HR: 50 INJECTION INTRAMUSCULAR; INTRAVENOUS at 13:50

## 2017-10-15 RX ADMIN — PHENYTOIN SODIUM SCH MLS/HR: 50 INJECTION INTRAMUSCULAR; INTRAVENOUS at 01:50

## 2017-10-15 RX ADMIN — Medication SCH ML: at 20:09

## 2017-10-15 RX ADMIN — IPRATROPIUM BROMIDE AND ALBUTEROL SULFATE SCH AMPULE: .5; 3 SOLUTION RESPIRATORY (INHALATION) at 07:36

## 2017-10-15 RX ADMIN — TAZOBACTAM SODIUM AND PIPERACILLIN SODIUM SCH MLS/HR: 250; 2 INJECTION, SOLUTION INTRAVENOUS at 21:20

## 2017-10-15 RX ADMIN — PHENYTOIN SODIUM SCH MLS/HR: 50 INJECTION INTRAMUSCULAR; INTRAVENOUS at 20:28

## 2017-10-15 RX ADMIN — CHLORHEXIDINE GLUCONATE SCH PACK: 500 CLOTH TOPICAL at 04:00

## 2017-10-15 RX ADMIN — PHENYTOIN SODIUM SCH MLS/HR: 50 INJECTION INTRAMUSCULAR; INTRAVENOUS at 17:50

## 2017-10-15 RX ADMIN — HEPARIN SODIUM SCH UNITS: 10000 INJECTION, SOLUTION INTRAVENOUS; SUBCUTANEOUS at 20:08

## 2017-10-15 RX ADMIN — PHENYTOIN SODIUM SCH MLS/HR: 50 INJECTION INTRAMUSCULAR; INTRAVENOUS at 05:04

## 2017-10-15 RX ADMIN — CHLORHEXIDINE GLUCONATE SCH ML: 1.2 RINSE ORAL at 09:00

## 2017-10-15 RX ADMIN — TAZOBACTAM SODIUM AND PIPERACILLIN SODIUM SCH MLS/HR: 250; 2 INJECTION, SOLUTION INTRAVENOUS at 10:58

## 2017-10-15 RX ADMIN — STANDARDIZED SENNA CONCENTRATE AND DOCUSATE SODIUM SCH TAB: 8.6; 5 TABLET, FILM COATED ORAL at 08:51

## 2017-10-15 RX ADMIN — HEPARIN SODIUM SCH UNITS: 10000 INJECTION, SOLUTION INTRAVENOUS; SUBCUTANEOUS at 08:51

## 2017-10-15 RX ADMIN — LEVOTHYROXINE SODIUM SCH MCG: 125 TABLET ORAL at 05:03

## 2017-10-15 RX ADMIN — PHENYTOIN SODIUM SCH MLS/HR: 50 INJECTION INTRAMUSCULAR; INTRAVENOUS at 17:46

## 2017-10-15 RX ADMIN — PHENYTOIN SODIUM SCH MLS/HR: 50 INJECTION INTRAMUSCULAR; INTRAVENOUS at 09:50

## 2017-10-15 RX ADMIN — SODIUM BICARBONATE SCH MLS/HR: 84 INJECTION, SOLUTION INTRAVENOUS at 03:50

## 2017-10-15 RX ADMIN — POTASSIUM CHLORIDE PRN MLS/HR: 200 INJECTION, SOLUTION INTRAVENOUS at 17:50

## 2017-10-15 RX ADMIN — IPRATROPIUM BROMIDE AND ALBUTEROL SULFATE SCH AMPULE: .5; 3 SOLUTION RESPIRATORY (INHALATION) at 04:00

## 2017-10-15 RX ADMIN — STANDARDIZED SENNA CONCENTRATE AND DOCUSATE SODIUM SCH TAB: 8.6; 5 TABLET, FILM COATED ORAL at 20:08

## 2017-10-15 RX ADMIN — METOPROLOL TARTRATE SCH MG: 25 TABLET, FILM COATED ORAL at 20:08

## 2017-10-15 RX ADMIN — ACETAMINOPHEN PRN MG: 325 TABLET ORAL at 20:08

## 2017-10-15 RX ADMIN — IPRATROPIUM BROMIDE AND ALBUTEROL SULFATE SCH AMPULE: .5; 3 SOLUTION RESPIRATORY (INHALATION) at 20:25

## 2017-10-15 RX ADMIN — IPRATROPIUM BROMIDE AND ALBUTEROL SULFATE SCH AMPULE: .5; 3 SOLUTION RESPIRATORY (INHALATION) at 13:56

## 2017-10-15 RX ADMIN — Medication SCH ML: at 09:00

## 2017-10-15 RX ADMIN — SODIUM BICARBONATE SCH MLS/HR: 84 INJECTION, SOLUTION INTRAVENOUS at 12:49

## 2017-10-15 NOTE — EKG
Date Performed: 10/14/2017       Time Performed: 15:40:27

 

PTAGE:      62 years

 

EKG:      Sinus tachycardia BORDERLINE LEFT AXIS DEVIATION NONSPECIFIC ST & T-WAVE ABNORMALITY ABNORM
AL RHYTHM ECG Compared to prior electrocardiogram, rate has increased and nonspecific ST T-wave rodrigez
es are slightly more marked. 

 

 PREVIOUS TRACING            : 10/03/2017 22.48

 

DOCTOR:   Florencio Monk  Interpretating Date/Time  10/15/2017 07:44:14

## 2017-10-15 NOTE — MB
cc:

STEVEN ARSHAD MD

****

 

 

DATE OF CONSULTATION:  10/15/2017.

 

REASON FOR CONSULTATION:

Medical management and taking over service.

 

ADMITTING DOCTOR:

Dr. Urrutia.

 

CONSULTING DOCTOR:

Dr. Steven Arshad.

 

HISTORY OF PRESENT ILLNESS:

The patient is a 62-year-old -American female with significant past

medical history of diabetes, hypertension, COPD, rheumatoid arthritis, who was

brought by [facility] to the emergency room with hypotension and tachycardia and a

very high sugar. She was found at home.  In the emergency room, she was found

to have diabetic ketoacidosis. The patient was seen by the intensivist and

started on appropriate medications including IV insulin, antibiotics. In

addition, the patient had sepsis.  _______________  with a high heart rate and

likely with aspiration pneumonia. The patient was also found to have acute

kidney injury. The patient was started on empiric medications as described

above including IV hydration.

 

The patient was seen this morning between 11:00 and 12:00 with the RN at

bedside.  The patient was sleepy and woke up lethargic, answered appropriately.

She was oriented to time, place and person. She was weak and tired. She denied

any pain. There is no headache or dizziness. She denies any nausea or vomiting.

She has some sore throat. As per the patient, she __________________ and she on

medication for that.  She does not how long she has thrush.  She denies any

shortness of breath.  There is no abdominal pain. She does not know about

diarrhea.  She has no other pain.

 

PAST MEDICAL HISTORY:

1. Hypertension.

2. Coronary artery disease.

3. Diabetes.

4. COPD.

5. ___________ / rheumatoid arthritis.

6. Gastroesophageal reflux disease (GERD).

7. Hypothyroidism.

8. Peripheral neuropathy.

9. Gastroparesis.

10. ________________________

11.Chronic pain.

12 Dysphagia.

13.Esophageal stricture with dilatation in August of 2016.

14. Diverticulosis.

15. Hemorrhoids.

 

PAST SURGICAL HISTORY:

1. History of EGD and colonoscopy.

2. History of esophageal dilatation.

3. Cardiac catheterization.

4. Ablation because of atrial tachycardia.

5. Pacemaker.

6. Right carpal tunnel.

7. _____________________.

8. Myomectomy.

9. Abdominal hysterectomy and bilateral salpingo-oophorectomy.

10. Open reduction internal fixation with ___________.

 

MEDICATIONS:

Reviewed. Please see the MAR.

 

ALLERGIES:

NO KNOWN DRUG ALLERGIES.

 

REVIEW OF SYSTEMS:

The review of systems is as described above in the history of present illness

and is negative for ten systems.

 

PHYSICAL EXAMINATION:

GENERAL: The patient is kind of lethargic, oriented, not in any apparent

respiratory distress.

VITAL SIGNS:  Heart rate is high. The patient is afebrile. Pulse 120s.

Respiratory rate 16 to 18. Blood pressure 144/70, Pulse oximetry of 99.1%. The

patient _____________ as discussed with the RN at 100.9.

HEAD, EYES, EARS, NOSE, THROAT:  Head is normocephalic and atraumatic. Eyes -

negative conjunctival icterus. Mouth has thrush.

NECK: The neck is supple. No increased jugular venous distention. Central

trachea.

RESPIRATORY SYSTEM: Decreased air entry bibasilarly. Questionable rales versus

rhonchi. No other sounds noted.

CARDIOVASCULAR: S1-S2 audible. Sinus tachycardia. Unable to appreciate any

murmur.

GASTROINTESTINAL: Abdomen soft and nontender. No organomegaly. Positive bowel

sounds.

MUSCULOSKELETAL: Extremities have no cyanosis or pedal edema appreciated.

CENTRAL NERVOUS SYSTEM: Lethargic, oriented, normal facial features and moving

all her extremities.

SKIN: Warm and dry.

PSYCHIATRIC: Appropriate mood and affect but lethargic.

 

INVESTIGATIONS:

White blood cell count yesterday was 25.1 and today 16.5. Hemoglobin 9.5,

hematocrit 29.8, platelet count 194,000.

 

Chemistries showed sodium 146, potassium 3.3, chloride 115, BUN 54, creatinine

4.23. Yesterday creatinine was 6.24. Calcium 7.9. Lactic acid was 0.4. .

ALT 58.

 

Troponin 0.06, 0.0 and 0.09.

 

Total protein 5.1. Albumin 2.1.

 

Cholesterol 82.

 

TSH 8.9.

 

Yesterday glucose was 689.

 

Positive beta-hydroxybutyrate acid at 1.65.

 

Urine opiate screening was positive.

 

Urinalysis showed nitrates negative, RBCs 3, WBCs 1, leukocyte esterase

moderate. Urine glucose was 160.

 

Nasal MRSA screen negative.

 

Blood cultures x2 negative. No growth x1 day.

 

IMAGING STUDIES:

Shoulder x-rays were done which show periarticular calcium deposits

characteristic of tendinitis and bursitis. Mild arthropathy. No evidence of

acute process. Incidental note is significant interstitial lung disease.

 

Head CT shows no evidence of acute intracranial pathology. No mass identified.

 

 

Chest x-ray shows mild bibasilar atelectasis otherwise no evidence of acute

process.

 

Chest CT was done which shows subsegmental airspace disease in the right upper

and lower lobes. Moderate cardiomegaly. No other significant abnormalities. 3

cm hypodense nodule in the right adrenal ____________________________ adenoma.

 

 

Carotid ultrasound shows minimal carotid plaque bilaterally. No evidence of

hemodynamically significant stenosis.

 

Abdominal ultrasound was done which shows a 3 cm hyperechoic nodule in the

right suprarenal region consistent with ______ adenoma. Otherwise unremarkable

exam without evidence of acute process.  No evidence of cholelithiasis or

biliary obstruction or hydronephrosis.

 

Chest x-ray today shows cardiomegaly and findings of vascular congestion

without overt failure. This is new when compared to the prior exam.

 

EKGS:

EKG shows sinus tachycardia without any acute S-T or T wave changes.

 

Echocardiogram shows ejection fraction of 65% to 70%, pacemaker wire within the

right atrial cavity, _________________ pulmonary hypertension. Moderate

tricuspid regurgitation.

 

ASSESSMENT

1. Acute renal failure.

2. Diabetic ketoacidosis.

3. Aspiration pneumonia.

4. Candida esophagitis.

5. Rhabdomyolysis.

6. Coronary artery disease with increased troponin.

7. Sinus tachycardia with history of ______________ status post ablation with

   pacemaker placement.

8. Anemia.

9. History of systemic lupus erythematosus / rheumatoid arthritis.

10. Hypothyroidism.

11. Peripheral neuropathy.

12. Gastroparesis.

13. History of buttocks injection in the past.

14. ___________________________________________ August of 2016.

15. History of internal and external hemorrhoids.

16. History of chronic pain.

17. Leukocytosis.

 

PLAN:

1. IV insulin per protocol.

2. IV hydration.

3. IV antibiotics.

4. Appreciate intensivist's help. Discussed with the intensivist and they will

   follow this patient.

5. Breathing treatments.

6. Increased troponin likely secondary to acute kidney injury and a history of

   tachycardia but the patient has a history of coronary artery disease. Plan

   for cardiology consult.

7. Monitor increased liver function tests.

8. _______________ on a PRN basis.

9. Monitor CPK for rhabdomyolysis.

10. Diflucan for candida _______________.

11. Monitor labs.

12. See orders.

 

CONDITION:

Critical.

 

PROGNOSIS:

Guarded.

 

Discussed with the RN in detail.

 

Further recommendations to follow as the patient progresses.

 

 

 

                              _________________________________

                              Steven Arshad MD JP/RICARDO

D:  10/15/2017/4:58 PM

T:  10/15/2017/5:33 PM

Visit #:  [id number]

Job #:  [id number]

## 2017-10-15 NOTE — ECHRPT
Indication:   cva/tia

 

 CONCLUSIONS

 The left ventricular systolic function is hyperdynamic with an estimated ejection fraction in the ra
nge of 65-

 70%. 

 Moderate concentric left ventricular hypertrophy. 

 Normal left ventricular size. 

 No regional wall motion abnormalities are present. 

 A pacemaker wire is noted. 

 

 There is a pacemaker wire present in the right atrial cavity. 

  

 Trace mitral valve regurgitation. 

 Focal thickening or sclerosis or mass tip of anterior leaflet.

 Aortic valve sclerosis is present. 

 There is estimated moderate-to-severe pulmonary hypertension present (range 60-70 mmHg). 

 

 There is moderate tricuspid regurgitation. 

 The estimated pulmonary arterial pressure is 64.5 mmHg. 

 The pulmonary valve is not well visualized. 

  

 

 BP:  128   / 60      HR: 82                       Rhythm:           Sinus

 

 MEASUREMENTS  (Male / Female) Normal Values       Technical Quality:Adequate

 2D ECHO

 LV Diastolic Diameter PLAX        3.3 cm                4.2 - 5.9 / 3.9 - 5.3 cm

 LV Systolic Diameter PLAX         2.0 cm                

 IVS Diastolic Thickness           1.6 cm                0.6 - 1.0 / 0.6 - 0.9 cm

 LVPW Diastolic Thickness          1.7 cm                0.6 - 1.0 / 0.6 - 0.9 cm

 LV Relative Wall Thickness        1.0                   

 RV Internal Dim ED PLAX           2.6 cm                

 LVOT Diameter                     2.0 cm                

 LA Systolic Diameter LX           3.2 cm                3.0 - 4.0 / 2.7 - 3.8 cm

 LV Ejection Fraction MOD 4C       71.4 %                

 LV Cardiac Index MOD 4C           1830.8 cm/minm     

 LV Ejection Fraction 4C AL        76.1 %                

 LV Cardiac Index 4C AL            2107.1 cm/minm     

 

 M-MODE

 Aortic Root Diameter MM           2.9 cm                

 LA Systolic Diameter MM           3.5 cm                

 LA Ao Ratio MM                    1.2                   

 AV Cusp Separation MM             1.9 cm                

 

 DOPPLER

 AV Peak Velocity                  191.0 cm/s            

 AV Peak Gradient                  14.6 mmHg             

 LVOT Peak Velocity                140.0 cm/s            

 LVOT Peak Gradient                7.8 mmHg              

 AV Area Cont Eq pk                2.3 cm               

 MV Area PHT                       8.1 cm               

 Mitral E Point Velocity           140.0 cm/s            

 Mitral A Point Velocity           79.6 cm/s             

 Mitral E to A Ratio               1.8                   

 LV E' Lateral Velocity            5.1 cm/s              

 Mitral E to LV E' Lateral Ratio   27.6                  

 LV E' Septal Velocity             5.7 cm/s              

 Mitral E to LV E' Septal Ratio    24.8                  

 TR Peak Velocity                  369.0 cm/s            

 TR Peak Gradient                  54.5 mmHg             

 Right Atrial Pressure             10.0 mmHg             

 Pulmonary Artery Systolic Pressu  64.5 mmHg             

 Right Ventricular Systolic Press  64.5 mmHg             

 PV Peak Velocity                  121.0 cm/s            

 PV Peak Gradient                  5.9 mmHg              

 

 

 FINDINGS

 

 LEFT VENTRICLE

 The left ventricular systolic function is hyperdynamic with an estimated ejection fraction in the ra
nge of 65-

 70%. 

 Moderate concentric left ventricular hypertrophy. 

 Normal left ventricular size. 

 No regional wall motion abnormalities are present. 

 

 RIGHT VENTRICLE

 A pacemaker wire is noted. 

 

 LEFT ATRIUM

 The left atrial size is normal.  

 

 RIGHT ATRIUM

 

 There is a pacemaker wire present in the right atrial cavity. 

 The right atrial size is normal.  

 

 ATRIAL SEPTUM

 Normal atrial septal thickness without atrial level shunting by limited color doppler interrogation.
  

 

 AORTA

 The aortic root and proximal ascending aorta are normal in size on limited imaging.  

 

 MITRAL VALVE

 Trace mitral valve regurgitation. 

 Focal thickening or sclerosis or mass tip of anterior leaflet.

 

 AORTIC VALVE

 Trileaflet aortic valve. No aortic valve stenosis or regurgitation. 

 Aortic valve sclerosis is present. 

 

 TRICUSPID VALVE

 There is estimated moderate-to-severe pulmonary hypertension present (range 60-70 mmHg). 

 Structurally normal tricuspid valve. 

 There is moderate tricuspid regurgitation. 

 The estimated pulmonary arterial pressure is 64.5 mmHg. 

 

 PULMONARY VALVE

 The pulmonary valve is not well visualized. 

 No pulmonary valve regurgitation or stenosis.  

 

 VESSELS

 The inferior vena cava is normal in size.  

 

 PERICARDIUM

 No pericardial effusion.  

 

 

 

 

  Florencio Monk MD

  (Electronically Signed)

  Final Date:15 October 2017 10:21

## 2017-10-15 NOTE — MB
cc:

JESSICA FISHER M.D.

****

 

 

DATE OF CONSULTATION

10/15/2017

 

REASON FOR CONSULTATION

She is a 62-year-old seen in neurological consultation.  She was admitted

yesterday when she came in with confusion and there was some apparent

right-sided weakness.

 

The patient has been overall quite ill and she is unable to provide

information.  She has a history of multiple medical problems including

diabetes, hypertension, gastroparesis, obesity, pacemaker.  Ablation history.

 

She had generalized weakness, could not walk and she has been lethargic,

obtunded in the hospital.

 

MEDICATIONS

At home include:

1. Insulin.

2. Omeprazole.

3. Pantoprazole.

4. Cymbalta 60 mg three times a day.

5. Lyrica 25 mg a day.

6. Oxycodone 10 milligrams q.6h as needed.

7. Valsartan.

8. Morphine ER 60 twice a day.

9. Norvasc.

10. Metoprolol.

11. Nitroglycerin.

12. Soma.

13. Sulfasalazine.

 

NEUROLOGICAL EXAMINATION

Showed the patient to be obtunded but with stimulation she woke up and

responded to simple questions.  Seems to have some partial orientation.  She

knew her age and seems to know the place.  She followed simple commands, has

moderate to severe generalized weakness but she is stable to start raising both

arms and even starts flexing and maintaining the lower extremities slightly

elevated.  Plantar responses were equivocal and muscle stretch reflexes were

trace versus absent throughout.  Pupils were about same size, reactive.  She

was able to count fingers bilaterally.

 

IMAGING

CT brain and carotid ultrasound unremarkable.

 

LABORATORY DATA

Laboratory data reviewed.  WBC 25.1, hemoglobin 11.1, platelets 258.

 

Today sodium 146, potassium 3.3.  Random blood sugar initially 289.  Calcium

low, 6.9 today.  Corrected calcium 7.9.  BUN 54, creatinine 4.23.

 

CPK is 6191.

 

ASSESSMENT

1. Metabolic encephalopathy, possible sepsis.

2. Rhabdomyolysis.

3. Kidney failure.

4. Hyperglycemia.

 

Neurologically wise would maintain a low level of central nervous system

medications as any of these might be a significant factor in her

encephalopathic state.  Observed that her meds have been held.  Aggressive

general medical care.

 

I will follow the neurological status.  Thank you for asking us to assist in

her care.

 

 

 

                              _________________________________

                              MD LEONARD Hussein/KK

D:  10/15/2017/4:00 PM

T:  10/15/2017/4:14 PM

Visit #:  [id number]

Job #:  [id number]

## 2017-10-15 NOTE — HHI.CCPN
Subjective


Remarks/Hospital Course


62-year-old R hand dominant  female with past medical history 

of insulin-dependent diabetes mellitus, hypertension, COPD, ablation of atrial 

tachycardia,  pacemaker,  gastroparesis s/p botox therapy pylorus 10/5/17, 

esophageal stricture s/p multiple dilations,  peripheral neuropathy, peripheral 

arterial disease, SLE/RA not on any disease modifying therapy, chronic pain  

who presented to [facility] via [facility].  According to [facility] she was 

hypotensive and tachycardic on arrival with critically high glucose.  Fluids 

were initiated per E [facility] and she has been tachycardic but normotensive in the 

ED with blood pressure 102/52 to 119/59.  Mean arterial pressure 69-80.  She is 

severely hyperglycemic with glucose of 689.  Beta hydroxybutyrate is elevated 

and albumin-corrected anion gap is 18. She does not have appropriate degree of  

respiratory compensation as noted by ABG with pH 7.29/PaCO2 41/PA O2 of 73/

bicarbonate of 19. She is in acute kidney injury with BUN 61, creatinine of 

6.24 (baseline Cr 0.62 on 10/6/17).  She has significant polypharmacy and on 

multiple sedative meds (Morphine 60 ER/percocet 10/lyrica 225, Soma) that could 

contribute to her concomitant mild respiratory acidosis, particularly in the 

setting of acute renal failure. She reports compliance with insulin regimen but 

has not been checking her glucose. Her ROS is positive in multiple areas and 

she is a difficult historian. She states that she was not feeling well and was 

in bed all day today. She says she woke up at some point today and was not able 

to move her right arm or leg well. She did not notice any changes in speech but 

says she feels "disoriented". She has right shoulder pain but denies leg pain. 

She needed to have a BM and called her son to help her get out of bed because 

she couldn't walk. Before she could make it to the bathroom she had a large BM 

in the bed. No melena or BRBPR. She states she also had one loose stool 

yesterday, stating "I have irritable bowel". She has had some nausea but no 

vomiting, no abdominal pain. She has had a nonproductive cough today. No fever, 

dysuria, SOB, or headache. She has right shoulder pain during exam and states 

she "fell over onto her shoulder while sweeping the floor". She has been 

started on insulin drip and IVF per DKA protocol.  She has received 2 L NS 

bolus in the ED.





SUBJ 10/16/17: Patient remains intermittently confused lethargic.  But on my 

exam patient was awake, following commands.  He has resolved I'll transition to 

subcutaneous insulin.  Tachycardic most likely from not getting her beta 

blockers.  2.5 mg IV 1 and started on metoprolol 25 every 8 hours.  Gradually 

increase to home dose of Toradol 20 mg daily.  Cultures remain negative





Objective





Vital Signs








  Date Time  Temp Pulse Resp B/P (MAP) Pulse Ox O2 Delivery O2 Flow Rate FiO2


 


10/15/17 17:00  118 14 133/62 (85) 96   


 


10/15/17 16:00 100.7       


 


10/15/17 07:36      Nasal Cannula 1.00 














Intake and Output   


 


 10/15/17 10/15/17 10/16/17





 08:00 16:00 00:00


 


Intake Total 3347 ml 200 ml 


 


Balance 3347 ml 200 ml 








Result Diagram:  


10/15/17 0348                                                                  

              10/15/17 1115





Other Results





Laboratory Tests








Test


  10/15/17


16:55


 


Blood Gas Puncture Site RT RADIAL 


 


Blood Gas Patient Temperature 98.6 


 


Blood Gas HCO3


  18 mmol/L


(22-26)


 


Blood Gas Base Excess


  -7.3 mmol/L


(-2-2)


 


Blood Gas Oxygen Saturation 91 % () 


 


Arterial Blood pH


  7.33


(7.380-7.420)


 


Arterial Blood Partial


Pressure CO2 34 mmHg


(38-42)


 


Arterial Blood Partial


Pressure O2 76 mmHg


()


 


Arterial Blood Oxygen Content


  11.5 Vol %


(12.0-20.0)


 


Arterial Blood


Carboxyhemoglobin 1.4 % (0-4) 


 


 


Arterial Blood Methemoglobin 1.9 % (0-2) 


 


Blood Gas Hemoglobin


  8.9 G/DL


(12.0-16.0)


 


Oxygen Delivery Device NASAL CANNULA 


 


Blood Gas Liter Flow 1 L/M 


 


Blood Gas Inspired Oxygen 24 % 








Objective Remarks


GENERAL: Overweight  female who is laying in ICU bed, lethargic 

but arouses and answers some questions. 


SKIN: Warm and dry, adequately perfused. 


HEAD: Atraumatic. Normocephalic. 


EYES: Pupils equal and round, 4mm and reactive to 2 mm bilaterally. No 

injection or drainage. 


ENT: No nasal bleeding or discharge.  Mucous membranes are dry with significant 

thrush throughout her bucca mucosa. Upper dentures in place. 


NECK: Trachea midline. No JVD. 


CARDIOVASCULAR: Regular, tachycardic, sinus tach on monitor with rate 110s. No 

murmurs rubs or gallops. 


RESPIRATORY: No accessory muscle use. Clear to auscultation. Breath sounds 

equal bilaterally. On 2 L NC


GASTROINTESTINAL: Abdomen protruberant but  soft, non-tender, nondistended. 

Bowel sounds present. 


MUSCULOSKELETAL: Extremities without clubbing, cyanosis, or edema. Pain with 

movement right shoulder. No tenderness of humeral head or clavicle. 


NEUROLOGICAL: Awake alert, cooperative.  Follows commands 4.  Mild right 

hemiparesis





A/P


Problem List:  


(1) Polypharmacy


ICD Code:  Z79.899 - Other long term (current) drug therapy


Status:  Acute


(2) Renal failure


ICD Code:  N19 - Unspecified kidney failure


Status:  Acute


(3) CAD (coronary artery disease)


ICD Code:  I25.10 - Atherosclerotic heart disease of native coronary artery 

without angina pectoris


(4) HTN (hypertension)


ICD Code:  I10 - Essential (primary) hypertension


(5) Diabetes mellitus


ICD Code:  E11.9 - Type 2 diabetes mellitus without complications


(6) Aspiration pneumonia


ICD Code:  J69.0 - Pneumonitis due to inhalation of food and vomit


(7) GEOVANNY (acute kidney injury)


ICD Code:  N17.9 - Acute kidney failure, unspecified


(8) Esophageal stricture


ICD Code:  K22.2 - Esophageal obstruction


(9) Candida esophagitis


ICD Code:  B37.81 - Candidal esophagitis


(10) Thrush of mouth and esophagus


ICD Code:  B37.81 - Candidal esophagitis; B37.0 - Candidal stomatitis


(11) DKA (diabetic ketoacidoses)


ICD Code:  E13.10 - Other specified diabetes mellitus with ketoacidosis without 

coma


Status:  Acute


(12) Rhabdomyolysis


ICD Code:  M62.82 - Rhabdomyolysis


Status:  Acute


Assessment and Plan


NEURO:


R hemiparesis


Chronic pain


Polypharmacy


Peripheral neuropathy


Right-sided weakness may be due to encephalopathy She may have had an ischemic 

stroke, but onset is unknown 


CT brain negative.  Unable to obtain MRI due to pacemaker. Neurology Dr. Salgado


Obtain carotid u/s, Echo, lipid studies. 


Tox screen neg. Initiate ASA if salicylate negative. 


Hold sedative medications which are contributing - Soma 350 tid, morphine ER 60 

twice a day, Percocet 10 every 6 hours, Lyrica to 25 daily at bedtime, Cymbalta 

60 3 times a day





RESP:


COPD


Prior history of tobacco abuse


DuoNeb every 6 hours.  Albuterol every 2 hours as needed.  On nasal cannula.





CV:


Mild coronary artery disease on cardiac catheterization 2015, medically 

managed. Dr. Treadwell.


Hypertension


Atrial tachycardia ablation 2005 Dr. Marquez


Pacemaker


PAD - 60% R external iliac stenosis - referred to vascular surgery outpatient. 

Currently does not appear symptomatic. 


Is reportedly hypotensive per E V[facility] but blood pressure has normalized.  


Holding nitroglycerin sublingual, metoprolol 100 twice a day, Norvasc 2.5 

daily.  I will restart metoprolol 25 mg by mouth every 8 hours


Hold benazepril 10 mg daily and valsartan 40 mg by mouth daily due to acute 

kidney injury


Following Serial EKG and troponin. 


F/u Echo





GI:


Mild Transaminitis


Gastroparesis s/p botox therapy to pylorus 10/5/17 Dr. Bui


Esophageal stricture s/p dilation at GE junction 10/5/17 Dr. Bui. 


GERD


Dysphagia


Diverticulosis


Internal and external hemorrhoids


Mild chronic protein energy malnutrition


Passed bedside swallow. Start ADA diet


Follow LFTs. Followup abd u/s. 


Recent admission 10/4 due to colitis but patients exam is currently benign. C 

diff was negative 10/4. 


She has been on an augmentin as outpatient, will repeat C diff given presence 

of diarrhea. 





FEN/RENAL:


Acute kidney injury


Pseudohyponatremia


Clinically appears volume depleted and prerenal. ATN due to hypotension/volume 

depletion, creat improving with fluid resuscitation


She reportedly has a history of lupus, workup not performed at [place]. No 

history of lupus nephritis in records. F/u CPK and urine protein:urine Cr, 

FeNa. 


Received 2 L NS bolus in ED and some additional prehospital fluids. Romano in 

place, monitor intake and output. D/c DKA protocol  potassium replacement for 

now due to GEOVANNY. 


Monitor serial BMP, mag, phos per DKA protocol. Nephrology consult if renal 

function not improving with fluids.  





ID:


Thrush


Candida esophagitis 


Leukocytosis


Aspiration pneumonia


Initial chest x-ray without infiltrate but worsening appearance of R chest when 

R shoulder xray was performed. Cough nonproductive. CT chest with RUL and RLL 

peribronchial opacities. Empiric Zosyn for now for possible aspiration 

pneumonia. 


Followup blood cultures. 


Esophageal biopsy from 10/5/17 - with few fungal organisms consistent with 

candida.  Started on fluconazole, renal adjusted. Monitor LFTs. Monitor renal 

function and adjust dose if creatinine clearance increases above 50.  Ordered 

removal of dentures with brushing q24 hours, soak overnight chlorhexidine. 





HEME:


Chronic anemia


No acute hematologic issues.





RHEUM/MSK:


SLE


Rheumatoid arthritis


R shoulder pain following reported trauma 


Xray R shoulder - chronic arthropathy, no acute process. 


Not on disease modifying therapy.  Rheumatologic workup apparently occurred as 

an outpatient.  She previously saw Dr. Ayala but is not followed by him 

anymore. 





ENDO:


Hypothyroidism


Diabetes mellitus


DKA


f/u TSH. Continue synthroid 125 mcg daily.


DKA protocol - on insulin drip. Transition to subcut insulin anion gap is 

closed and able to take po.





PROPH: Protonix 40 mg  IV daily for stress ulcer prophylaxis and history of 

GERD. Heparin 5000 subcutaneous every 12 hours for DVT prophylaxis





ACCESS: Peripheral IV





Full code





Level III





Problem Qualifiers





(1) Renal failure:  


Qualified Codes:  N17.9 - Acute kidney failure, unspecified


(2) DKA (diabetic ketoacidoses):  


Qualified Codes:  E13.10 - Other specified diabetes mellitus with ketoacidosis 

without coma








Sussy Coppola MD Oct 15, 2017 17:21

## 2017-10-15 NOTE — RADRPT
EXAM DATE/TIME:  10/15/2017 14:46 

 

[facility] COMPARISON:     

CHEST SINGLE AP, October 14, 2017, 16:54.

 

                     

INDICATIONS :     

Shortness of breath.

                     

 

MEDICAL HISTORY :     

Cardiovascular disease.  Congestive heart failure.  Hypertension.      

 

SURGICAL HISTORY :     

CABG.   Defibrillator, hysterectomy

 

ENCOUNTER:     

Subsequent                                        

 

ACUITY:     

2 days      

 

PAIN SCORE:     

0/10

 

LOCATION:     

Bilateral chest 

 

FINDINGS:     

The cardiac silhouette is enlarged in transverse diameter. A defibrillator device is in place via a l
eft sided approach. There is prominence of the central pulmonary vasculature with indistinct vascular
 margins compatible with vascular congestion but no evidence of overt failure. This is new when geraldo
red with the prior exam.

 

CONCLUSION:     

1. Cardiomegaly and findings of vascular congestion without overt failure. This is new when compared 
with the prior exam.

 

 

 

 Alejandro Phillips MD on October 15, 2017 at 15:42           

Board Certified Radiologist.

 This report was verified electronically.

## 2017-10-16 VITALS
OXYGEN SATURATION: 99 % | DIASTOLIC BLOOD PRESSURE: 81 MMHG | HEART RATE: 93 BPM | SYSTOLIC BLOOD PRESSURE: 174 MMHG | RESPIRATION RATE: 17 BRPM

## 2017-10-16 VITALS
RESPIRATION RATE: 17 BRPM | SYSTOLIC BLOOD PRESSURE: 163 MMHG | TEMPERATURE: 98.6 F | DIASTOLIC BLOOD PRESSURE: 74 MMHG | HEART RATE: 91 BPM | OXYGEN SATURATION: 99 %

## 2017-10-16 VITALS
RESPIRATION RATE: 14 BRPM | OXYGEN SATURATION: 98 % | SYSTOLIC BLOOD PRESSURE: 187 MMHG | HEART RATE: 84 BPM | DIASTOLIC BLOOD PRESSURE: 88 MMHG

## 2017-10-16 VITALS
SYSTOLIC BLOOD PRESSURE: 106 MMHG | HEART RATE: 95 BPM | TEMPERATURE: 97.7 F | DIASTOLIC BLOOD PRESSURE: 57 MMHG | OXYGEN SATURATION: 98 % | RESPIRATION RATE: 20 BRPM

## 2017-10-16 VITALS
DIASTOLIC BLOOD PRESSURE: 87 MMHG | SYSTOLIC BLOOD PRESSURE: 182 MMHG | RESPIRATION RATE: 14 BRPM | HEART RATE: 89 BPM | OXYGEN SATURATION: 98 %

## 2017-10-16 VITALS
DIASTOLIC BLOOD PRESSURE: 91 MMHG | HEART RATE: 85 BPM | RESPIRATION RATE: 15 BRPM | OXYGEN SATURATION: 99 % | SYSTOLIC BLOOD PRESSURE: 194 MMHG

## 2017-10-16 VITALS
OXYGEN SATURATION: 99 % | HEART RATE: 92 BPM | RESPIRATION RATE: 21 BRPM | DIASTOLIC BLOOD PRESSURE: 94 MMHG | SYSTOLIC BLOOD PRESSURE: 191 MMHG

## 2017-10-16 VITALS
TEMPERATURE: 98.8 F | SYSTOLIC BLOOD PRESSURE: 174 MMHG | RESPIRATION RATE: 19 BRPM | HEART RATE: 103 BPM | DIASTOLIC BLOOD PRESSURE: 84 MMHG | OXYGEN SATURATION: 96 %

## 2017-10-16 VITALS
SYSTOLIC BLOOD PRESSURE: 183 MMHG | DIASTOLIC BLOOD PRESSURE: 88 MMHG | HEART RATE: 86 BPM | OXYGEN SATURATION: 98 % | RESPIRATION RATE: 16 BRPM

## 2017-10-16 VITALS
HEART RATE: 89 BPM | OXYGEN SATURATION: 99 % | DIASTOLIC BLOOD PRESSURE: 84 MMHG | RESPIRATION RATE: 15 BRPM | SYSTOLIC BLOOD PRESSURE: 176 MMHG

## 2017-10-16 VITALS
RESPIRATION RATE: 15 BRPM | SYSTOLIC BLOOD PRESSURE: 160 MMHG | OXYGEN SATURATION: 99 % | HEART RATE: 93 BPM | DIASTOLIC BLOOD PRESSURE: 70 MMHG

## 2017-10-16 VITALS
OXYGEN SATURATION: 96 % | TEMPERATURE: 98.8 F | HEART RATE: 81 BPM | SYSTOLIC BLOOD PRESSURE: 171 MMHG | DIASTOLIC BLOOD PRESSURE: 81 MMHG | RESPIRATION RATE: 14 BRPM

## 2017-10-16 VITALS — RESPIRATION RATE: 16 BRPM | HEART RATE: 86 BPM | OXYGEN SATURATION: 99 %

## 2017-10-16 VITALS — HEART RATE: 88 BPM

## 2017-10-16 VITALS
SYSTOLIC BLOOD PRESSURE: 181 MMHG | HEART RATE: 91 BPM | DIASTOLIC BLOOD PRESSURE: 117 MMHG | RESPIRATION RATE: 23 BRPM | OXYGEN SATURATION: 100 %

## 2017-10-16 VITALS
OXYGEN SATURATION: 97 % | SYSTOLIC BLOOD PRESSURE: 207 MMHG | RESPIRATION RATE: 21 BRPM | DIASTOLIC BLOOD PRESSURE: 101 MMHG | HEART RATE: 92 BPM

## 2017-10-16 VITALS
DIASTOLIC BLOOD PRESSURE: 84 MMHG | SYSTOLIC BLOOD PRESSURE: 186 MMHG | OXYGEN SATURATION: 98 % | HEART RATE: 84 BPM | RESPIRATION RATE: 18 BRPM

## 2017-10-16 VITALS
OXYGEN SATURATION: 98 % | HEART RATE: 89 BPM | DIASTOLIC BLOOD PRESSURE: 155 MMHG | RESPIRATION RATE: 18 BRPM | SYSTOLIC BLOOD PRESSURE: 178 MMHG

## 2017-10-16 VITALS
TEMPERATURE: 97.7 F | RESPIRATION RATE: 18 BRPM | SYSTOLIC BLOOD PRESSURE: 127 MMHG | HEART RATE: 95 BPM | OXYGEN SATURATION: 96 % | DIASTOLIC BLOOD PRESSURE: 60 MMHG

## 2017-10-16 VITALS
SYSTOLIC BLOOD PRESSURE: 183 MMHG | OXYGEN SATURATION: 99 % | DIASTOLIC BLOOD PRESSURE: 89 MMHG | HEART RATE: 93 BPM | RESPIRATION RATE: 24 BRPM

## 2017-10-16 VITALS
HEART RATE: 80 BPM | DIASTOLIC BLOOD PRESSURE: 77 MMHG | TEMPERATURE: 98.1 F | SYSTOLIC BLOOD PRESSURE: 164 MMHG | RESPIRATION RATE: 20 BRPM | OXYGEN SATURATION: 100 %

## 2017-10-16 VITALS
RESPIRATION RATE: 27 BRPM | SYSTOLIC BLOOD PRESSURE: 190 MMHG | DIASTOLIC BLOOD PRESSURE: 99 MMHG | OXYGEN SATURATION: 97 % | HEART RATE: 97 BPM

## 2017-10-16 VITALS
RESPIRATION RATE: 27 BRPM | OXYGEN SATURATION: 100 % | SYSTOLIC BLOOD PRESSURE: 178 MMHG | HEART RATE: 84 BPM | DIASTOLIC BLOOD PRESSURE: 85 MMHG

## 2017-10-16 VITALS
SYSTOLIC BLOOD PRESSURE: 157 MMHG | TEMPERATURE: 99.1 F | DIASTOLIC BLOOD PRESSURE: 70 MMHG | RESPIRATION RATE: 15 BRPM | HEART RATE: 92 BPM | OXYGEN SATURATION: 98 %

## 2017-10-16 VITALS — OXYGEN SATURATION: 99 %

## 2017-10-16 VITALS — OXYGEN SATURATION: 98 %

## 2017-10-16 VITALS — HEART RATE: 95 BPM

## 2017-10-16 VITALS — HEART RATE: 97 BPM

## 2017-10-16 LAB
ALP SERPL-CCNC: 88 U/L (ref 45–117)
ALT SERPL-CCNC: 62 U/L (ref 10–53)
ANION GAP SERPL CALC-SCNC: 11 MEQ/L (ref 5–15)
AST SERPL-CCNC: 115 U/L (ref 15–37)
BILIRUB INDIRECT SERPL-MCNC: 0.3 MG/DL (ref 0–0.8)
BILIRUB SERPL-MCNC: 0.4 MG/DL (ref 0.2–1)
BUN SERPL-MCNC: 46 MG/DL (ref 7–18)
CHLORIDE SERPL-SCNC: 116 MEQ/L (ref 98–107)
CK MB SERPL-MCNC: 8.5 NG/ML (ref 0.5–3.6)
CK SERPL-CCNC: 3140 U/L (ref 26–192)
ERYTHROCYTE [DISTWIDTH] IN BLOOD BY AUTOMATED COUNT: 17.9 % (ref 11.6–17.2)
GFR SERPLBLD BASED ON 1.73 SQ M-ARVRAT: 14 ML/MIN (ref 89–?)
HCO3 BLD-SCNC: 17.5 MEQ/L (ref 21–32)
HCT VFR BLD CALC: 28.5 % (ref 35–46)
MAGNESIUM SERPL-MCNC: 1.7 MG/DL (ref 1.5–2.5)
MCH RBC QN AUTO: 27.5 PG (ref 27–34)
MCHC RBC AUTO-ENTMCNC: 32.8 % (ref 32–36)
MCV RBC AUTO: 84.1 FL (ref 80–100)
PLATELET # BLD: 173 TH/MM3 (ref 150–450)
POTASSIUM SERPL-SCNC: 4.7 MEQ/L (ref 3.5–5.1)
RBC # BLD AUTO: 3.39 MIL/MM3 (ref 4–5.3)
REVIEW FLAG: (no result)
SODIUM SERPL-SCNC: 144 MEQ/L (ref 136–145)
WBC # BLD AUTO: 14.1 TH/MM3 (ref 4–11)

## 2017-10-16 RX ADMIN — PHENYTOIN SODIUM SCH MLS/HR: 50 INJECTION INTRAMUSCULAR; INTRAVENOUS at 08:04

## 2017-10-16 RX ADMIN — TAZOBACTAM SODIUM AND PIPERACILLIN SODIUM SCH MLS/HR: 250; 2 INJECTION, SOLUTION INTRAVENOUS at 11:08

## 2017-10-16 RX ADMIN — METOPROLOL TARTRATE PRN MG: 1 INJECTION, SOLUTION INTRAVENOUS at 06:35

## 2017-10-16 RX ADMIN — INSULIN ASPART SCH UNITS: 100 INJECTION, SOLUTION INTRAVENOUS; SUBCUTANEOUS at 12:00

## 2017-10-16 RX ADMIN — TAZOBACTAM SODIUM AND PIPERACILLIN SODIUM SCH MLS/HR: 250; 2 INJECTION, SOLUTION INTRAVENOUS at 05:27

## 2017-10-16 RX ADMIN — CHLORHEXIDINE GLUCONATE SCH PACK: 500 CLOTH TOPICAL at 04:00

## 2017-10-16 RX ADMIN — TAZOBACTAM SODIUM AND PIPERACILLIN SODIUM SCH MLS/HR: 250; 2 INJECTION, SOLUTION INTRAVENOUS at 21:23

## 2017-10-16 RX ADMIN — ACYCLOVIR SCH UNITS: 800 TABLET ORAL at 21:28

## 2017-10-16 RX ADMIN — HEPARIN SODIUM SCH UNITS: 10000 INJECTION, SOLUTION INTRAVENOUS; SUBCUTANEOUS at 21:27

## 2017-10-16 RX ADMIN — FLUCONAZOLE SCH MLS/HR: 2 INJECTION, SOLUTION INTRAVENOUS at 08:01

## 2017-10-16 RX ADMIN — ACETAMINOPHEN PRN MG: 325 TABLET ORAL at 06:41

## 2017-10-16 RX ADMIN — LEVOTHYROXINE SODIUM SCH MCG: 125 TABLET ORAL at 05:28

## 2017-10-16 RX ADMIN — PANTOPRAZOLE SODIUM SCH MG: 40 INJECTION, POWDER, FOR SOLUTION INTRAVENOUS at 08:02

## 2017-10-16 RX ADMIN — STANDARDIZED SENNA CONCENTRATE AND DOCUSATE SODIUM SCH TAB: 8.6; 5 TABLET, FILM COATED ORAL at 08:02

## 2017-10-16 RX ADMIN — INSULIN ASPART SCH UNITS: 100 INJECTION, SOLUTION INTRAVENOUS; SUBCUTANEOUS at 16:54

## 2017-10-16 RX ADMIN — CHLORHEXIDINE GLUCONATE SCH ML: 1.2 RINSE ORAL at 08:00

## 2017-10-16 RX ADMIN — METOPROLOL TARTRATE SCH MG: 25 TABLET, FILM COATED ORAL at 13:15

## 2017-10-16 RX ADMIN — INSULIN ASPART SCH: 100 INJECTION, SOLUTION INTRAVENOUS; SUBCUTANEOUS at 16:54

## 2017-10-16 RX ADMIN — Medication SCH ML: at 21:29

## 2017-10-16 RX ADMIN — ACYCLOVIR SCH UNITS: 800 TABLET ORAL at 05:28

## 2017-10-16 RX ADMIN — LABETALOL HCL SCH MG: 200 TABLET, FILM COATED ORAL at 16:49

## 2017-10-16 RX ADMIN — STANDARDIZED SENNA CONCENTRATE AND DOCUSATE SODIUM SCH TAB: 8.6; 5 TABLET, FILM COATED ORAL at 21:00

## 2017-10-16 RX ADMIN — METOPROLOL TARTRATE SCH MG: 25 TABLET, FILM COATED ORAL at 21:25

## 2017-10-16 RX ADMIN — OXYCODONE HYDROCHLORIDE AND ACETAMINOPHEN PRN TAB: 5; 325 TABLET ORAL at 23:45

## 2017-10-16 RX ADMIN — IPRATROPIUM BROMIDE AND ALBUTEROL SULFATE SCH AMPULE: .5; 3 SOLUTION RESPIRATORY (INHALATION) at 10:15

## 2017-10-16 RX ADMIN — IPRATROPIUM BROMIDE AND ALBUTEROL SULFATE SCH AMPULE: .5; 3 SOLUTION RESPIRATORY (INHALATION) at 19:17

## 2017-10-16 RX ADMIN — INSULIN ASPART SCH UNITS: 100 INJECTION, SOLUTION INTRAVENOUS; SUBCUTANEOUS at 08:00

## 2017-10-16 RX ADMIN — INSULIN ASPART SCH: 100 INJECTION, SOLUTION INTRAVENOUS; SUBCUTANEOUS at 21:00

## 2017-10-16 RX ADMIN — HEPARIN SODIUM SCH UNITS: 10000 INJECTION, SOLUTION INTRAVENOUS; SUBCUTANEOUS at 08:03

## 2017-10-16 RX ADMIN — Medication SCH ML: at 08:01

## 2017-10-16 RX ADMIN — TAZOBACTAM SODIUM AND PIPERACILLIN SODIUM SCH MLS/HR: 250; 2 INJECTION, SOLUTION INTRAVENOUS at 16:45

## 2017-10-16 RX ADMIN — ACYCLOVIR SCH UNITS: 800 TABLET ORAL at 08:03

## 2017-10-16 RX ADMIN — PHENYTOIN SODIUM SCH MLS/HR: 50 INJECTION INTRAMUSCULAR; INTRAVENOUS at 05:25

## 2017-10-16 RX ADMIN — OXYCODONE HYDROCHLORIDE AND ACETAMINOPHEN PRN TAB: 5; 325 TABLET ORAL at 16:45

## 2017-10-16 RX ADMIN — INSULIN ASPART SCH: 100 INJECTION, SOLUTION INTRAVENOUS; SUBCUTANEOUS at 12:00

## 2017-10-16 RX ADMIN — METOPROLOL TARTRATE SCH MG: 25 TABLET, FILM COATED ORAL at 05:28

## 2017-10-16 RX ADMIN — INSULIN ASPART SCH: 100 INJECTION, SOLUTION INTRAVENOUS; SUBCUTANEOUS at 07:59

## 2017-10-16 RX ADMIN — PHENYTOIN SODIUM SCH MLS/HR: 50 INJECTION INTRAMUSCULAR; INTRAVENOUS at 16:46

## 2017-10-16 RX ADMIN — IPRATROPIUM BROMIDE AND ALBUTEROL SULFATE SCH AMPULE: .5; 3 SOLUTION RESPIRATORY (INHALATION) at 16:44

## 2017-10-16 RX ADMIN — LABETALOL HCL SCH MG: 200 TABLET, FILM COATED ORAL at 14:00

## 2017-10-16 RX ADMIN — IPRATROPIUM BROMIDE AND ALBUTEROL SULFATE SCH AMPULE: .5; 3 SOLUTION RESPIRATORY (INHALATION) at 02:55

## 2017-10-16 NOTE — HHI.CCPN
Subjective


Remarks/Hospital Course


62-year-old R hand dominant  female with past medical history 

of insulin-dependent diabetes mellitus, hypertension, COPD, ablation of atrial 

tachycardia,  pacemaker,  gastroparesis s/p botox therapy pylorus 10/5/17, 

esophageal stricture s/p multiple dilations,  peripheral neuropathy, peripheral 

arterial disease, SLE/RA not on any disease modifying therapy, chronic pain  

who presented to [facility] via E [facility].  According to [facility] she was 

hypotensive and tachycardic on arrival with critically high glucose.  Fluids 

were initiated per E VAC and she has been tachycardic but normotensive in the 

ED with blood pressure 102/52 to 119/59.  Mean arterial pressure 69-80.  She is 

severely hyperglycemic with glucose of 689.  Beta hydroxybutyrate is elevated 

and albumin-corrected anion gap is 18. She does not have appropriate degree of  

respiratory compensation as noted by ABG with pH 7.29/PaCO2 41/PA O2 of 73/

bicarbonate of 19. She is in acute kidney injury with BUN 61, creatinine of 

6.24 (baseline Cr 0.62 on 10/6/17).  She has significant polypharmacy and on 

multiple sedative meds (Morphine 60 ER/percocet 10/lyrica 225, Soma) that could 

contribute to her concomitant mild respiratory acidosis, particularly in the 

setting of acute renal failure. She reports compliance with insulin regimen but 

has not been checking her glucose. Her ROS is positive in multiple areas and 

she is a difficult historian. She states that she was not feeling well and was 

in bed all day today. She says she woke up at some point today and was not able 

to move her right arm or leg well. She did not notice any changes in speech but 

says she feels "disoriented". She has right shoulder pain but denies leg pain. 

She needed to have a BM and called her son to help her get out of bed because 

she couldn't walk. Before she could make it to the bathroom she had a large BM 

in the bed. No melena or BRBPR. She states she also had one loose stool 

yesterday, stating "I have irritable bowel". She has had some nausea but no 

vomiting, no abdominal pain. She has had a nonproductive cough today. No fever, 

dysuria, SOB, or headache. She has right shoulder pain during exam and states 

she "fell over onto her shoulder while sweeping the floor". She has been 

started on insulin drip and IVF per DKA protocol.  She has received 2 L NS 

bolus in the ED.





SUBJ 10/15/17: Patient remains intermittently confused lethargic.  But on my 

exam patient was awake, following commands.  He has resolved I'll transition to 

subcutaneous insulin.  Tachycardic most likely from not getting her beta 

blockers.  2.5 mg IV 1 and started on metoprolol 25 every 8 hours.  Gradually 

increase to home dose of total 200 mg daily.  Cultures remain negative





10/16/17: Lying on bed comfortably.  Transition to subcutaneous insulin 

yesterday tolerated well.  Heart rate well controlled after restarting home 

metoprolol.  Will increase to 50 every 8 hours, and place patient on Norvasc 5 

mg daily





Objective





Vital Signs








  Date Time  Temp Pulse Resp B/P (MAP) Pulse Ox O2 Delivery O2 Flow Rate FiO2


 


10/16/17 08:04   14     


 


10/16/17 04:00 98.8 103  174/84 (114) 96   


 


10/15/17 20:27      Nasal Cannula 0.50 














Intake and Output   


 


 10/16/17 10/16/17 10/17/17





 08:00 16:00 00:00


 


Intake Total 530 ml  


 


Output Total 1300 ml  


 


Balance -770 ml  








Result Diagram:  


10/16/17 0421                                                                  

              10/16/17 0421





Other Results





Laboratory Tests








Test


  10/15/17


16:55


 


Blood Gas Puncture Site RT RADIAL 


 


Blood Gas Patient Temperature 98.6 


 


Blood Gas HCO3


  18 mmol/L


(22-26)


 


Blood Gas Base Excess


  -7.3 mmol/L


(-2-2)


 


Blood Gas Oxygen Saturation 91 % () 


 


Arterial Blood pH


  7.33


(7.380-7.420)


 


Arterial Blood Partial


Pressure CO2 34 mmHg


(38-42)


 


Arterial Blood Partial


Pressure O2 76 mmHg


()


 


Arterial Blood Oxygen Content


  11.5 Vol %


(12.0-20.0)


 


Arterial Blood


Carboxyhemoglobin 1.4 % (0-4) 


 


 


Arterial Blood Methemoglobin 1.9 % (0-2) 


 


Blood Gas Hemoglobin


  8.9 G/DL


(12.0-16.0)


 


Oxygen Delivery Device NASAL CANNULA 


 


Blood Gas Liter Flow 1 L/M 


 


Blood Gas Inspired Oxygen 24 % 








Objective Remarks


GENERAL: Overweight  female who is laying in ICU bed, 

comfortable


SKIN: Warm and dry, adequately perfused. 


HEAD: Atraumatic. Normocephalic. 


EYES: Pupils equal and round, 4mm and reactive to 2 mm bilaterally. No 

injection or drainage. 


ENT: No nasal bleeding or discharge.  Mucous membranes are dry with significant 

thrush throughout her bucca mucosa. Upper dentures in place. 


NECK: Trachea midline. No JVD. 


CARDIOVASCULAR: Regular. No murmurs rubs or gallops. 


RESPIRATORY: No accessory muscle use. Clear to auscultation. Breath sounds 

equal bilaterally. On 2 L NC


GASTROINTESTINAL: Abdomen protuberant but  soft, non-tender, nondistended. 

Bowel sounds present. 


MUSCULOSKELETAL: Extremities without clubbing, cyanosis, or edema. Pain with 

movement right shoulder. No tenderness of humeral head or clavicle. 


NEUROLOGICAL: Awake alert, cooperative.  Follows commands 4.





A/P


Problem List:  


(1) Polypharmacy


ICD Code:  Z79.899 - Other long term (current) drug therapy


Status:  Acute


(2) Renal failure


ICD Code:  N19 - Unspecified kidney failure


Status:  Acute


(3) CAD (coronary artery disease)


ICD Code:  I25.10 - Atherosclerotic heart disease of native coronary artery 

without angina pectoris


(4) HTN (hypertension)


ICD Code:  I10 - Essential (primary) hypertension


(5) Diabetes mellitus


ICD Code:  E11.9 - Type 2 diabetes mellitus without complications


(6) Aspiration pneumonia


ICD Code:  J69.0 - Pneumonitis due to inhalation of food and vomit


(7) GEOVANNY (acute kidney injury)


ICD Code:  N17.9 - Acute kidney failure, unspecified


(8) Esophageal stricture


ICD Code:  K22.2 - Esophageal obstruction


(9) Candida esophagitis


ICD Code:  B37.81 - Candidal esophagitis


(10) Thrush of mouth and esophagus


ICD Code:  B37.81 - Candidal esophagitis; B37.0 - Candidal stomatitis


(11) DKA (diabetic ketoacidoses)


ICD Code:  E13.10 - Other specified diabetes mellitus with ketoacidosis without 

coma


Status:  Acute


(12) Rhabdomyolysis


ICD Code:  M62.82 - Rhabdomyolysis


Status:  Acute


Assessment and Plan


NEURO:


Mild R hemiparesis


Chronic pain


Polypharmacy


Peripheral neuropathy


Right-sided weakness may be due to encephalopathy. Ischemic stroke is less 

likely


CT brain negative.  Unable to obtain MRI due to pacemaker. Neurology Dr. Salgado


Obtain carotid u/s, Echo, lipid studies. 


Tox screen neg. Initiate ASA if salicylate negative. 


Hold sedative medications which are contributing - Soma 350 tid, morphine ER 60 

twice a day, Percocet 10 every 6 hours, Lyrica to 25 daily at bedtime, Cymbalta 

60 3 times a day





RESP:


COPD


Prior history of tobacco abuse


DuoNeb every 6 hours.  Albuterol every 2 hours as needed.  On nasal cannula.





CV:


Mild coronary artery disease on cardiac catheterization 2015, medically 

managed. Dr. Treadwell.


Hypertension


Atrial tachycardia ablation 2005 Dr. Marquez


Pacemaker


PAD - 60% R external iliac stenosis - referred to vascular surgery outpatient. 

Currently does not appear symptomatic. 


Holding nitroglycerin sublingual. Metoprolol 25 mg by mouth every 8 hours 

restated 10/15/17, increase to 50 mg by mouth every 8 hours.  Restart Norvasc 5 

mg daily


Hold benazepril 10 mg daily and valsartan 40 mg by mouth daily due to acute 

kidney injury


Following Serial EKG and troponin. 





GI:


Mild Transaminitis


Gastroparesis s/p botox therapy to pylorus 10/5/17 Dr. Bui


Esophageal stricture s/p dilation at GE junction 10/5/17 Dr. Bui. 


GERD


Dysphagia


Diverticulosis


Internal and external hemorrhoids


Mild chronic protein energy malnutrition


2000 ADA diet


Follow LFTs. Followup abd u/s. 


Recent admission 10/4 due to colitis but patients exam is currently benign. C 

diff was negative 10/4. 


She has been on an augmentin as outpatient, will repeat C diff given presence 

of diarrhea. 





FEN/RENAL:


Acute kidney injury


Pseudohyponatremia


Clinically appears volume depleted and prerenal. ATN due to hypotension/volume 

depletion, creat improving with fluid resuscitation


She reportedly has a history of lupus, workup not performed at [facility]. No 

history of lupus nephritis in records. F/u CPK and urine protein:urine Cr, 

FeNa. 


Received 2 L NS bolus in ED and some additional prehospital fluids. Romano in 

place, monitor intake and output. D/c DKA protocol  potassium replacement for 

now due to GEOVANNY. 





ID:


Thrush


Candida esophagitis 


Leukocytosis


Aspiration pneumonia


Initial chest x-ray without infiltrate but worsening appearance of R chest when 

R shoulder xray was performed. Cough nonproductive. CT chest with RUL and RLL 

peribronchial opacities. Empiric Zosyn for now for possible aspiration 

pneumonia. 


Followup blood cultures. 


Esophageal biopsy from 10/5/17 - with few fungal organisms consistent with 

candida.  Started on fluconazole, renal adjusted. Monitor LFTs. Monitor renal 

function and adjust dose if creatinine clearance increases above 50.  Ordered 

removal of dentures with brushing q24 hours, soak overnight chlorhexidine. 





HEME:


Chronic anemia


No acute hematologic issues.





RHEUM/MSK:


SLE


Rheumatoid arthritis


R shoulder pain following reported trauma 


Xray R shoulder - chronic arthropathy, no acute process. 


Not on disease modifying therapy.  Rheumatologic workup apparently occurred as 

an outpatient.  She previously saw Dr. Ayala but is not followed by him 

anymore. 





ENDO:


Hypothyroidism


Diabetes mellitus


DKA


f/u TSH. Continue synthroid 125 mcg daily.


DKA protocol - on insulin drip. Transitioned to subcut insulin per protocol 10/

15





PROPH: Protonix 40 mg  IV daily for stress ulcer prophylaxis and history of 

GERD. Heparin 5000 subcutaneous every 12 hours for DVT prophylaxis





ACCESS: Peripheral IV





Full code





Level II





Transfer to Med Surg, [facility] will sign off, reconsult as needed





Problem Qualifiers





(1) Renal failure:  


Qualified Codes:  N17.9 - Acute kidney failure, unspecified


(2) DKA (diabetic ketoacidoses):  


Qualified Codes:  E13.10 - Other specified diabetes mellitus with ketoacidosis 

without coma








Sussy Coppola MD Oct 16, 2017 09:30

## 2017-10-16 NOTE — HHI.PR
Subjective


Remarks


pt sitting on chair feeling lot better


alert and active looking 


no pain 


ROS for 10 point system is unremarkable





Objective


Objective Results


-





Vital Signs








  Date Time  Temp Pulse Resp B/P (MAP) Pulse Ox O2 Delivery O2 Flow Rate FiO2


 


10/16/17 09:42  93 17 174/81 (112) 99   


 


10/16/17 09:37  89 15 176/84 (114) 99   


 


10/16/17 09:10  97 27 190/99 (129) 97   


 


10/16/17 09:07  92 21 191/94 (126) 99   


 


10/16/17 09:02  89 14 182/87 (118) 98   


 


10/16/17 09:01  91 23 181/117 (138) 100   


 


10/16/17 09:00  86 16  99   


 


10/16/17 08:59  84 27 178/85 (116) 100   


 


10/16/17 08:56  89 18 178/155 (163) 98   


 


10/16/17 08:37  92 21 207/101 (136) 97   


 


10/16/17 08:35  93 24 183/89 (120) 99   


 


10/16/17 08:28  84 18 186/84 (118) 98   


 


10/16/17 08:24  85 15 194/91 (125) 99   


 


10/16/17 08:20  86 16 183/88 (119) 98   


 


10/16/17 08:17  84 14 187/88 (121) 98   


 


10/16/17 08:04   14     


 


10/16/17 08:00 98.8       


 


10/16/17 08:00  82 14 171/81 (111) 96   


 


10/16/17 08:00  81      


 


10/16/17 04:00 98.8 103 19 174/84 (114) 96   


 


10/16/17 00:00 99.1 92 15 157/70 (99) 98   


 


10/15/17 20:27     100 Nasal Cannula 0.50 


 


10/15/17 20:00 100.8 88 17 170/81 (110) 99   


 


10/15/17 18:00  113      


 


10/15/17 18:00  113 19 150/71 (97) 98   


 


10/15/17 17:00  118 14 133/62 (85) 96   


 


10/15/17 16:00  125      


 


10/15/17 16:00 100.7 125 11 132/60 (84) 96   


 


10/15/17 15:00  128 20 137/63 (87) 94   


 


10/15/17 14:00  122      


 


10/15/17 14:00  122 19 144/68 (93) 96   


 


10/15/17 13:00  115 12 138/63 (88) 95   


 


10/15/17 12:00  124      


 


10/15/17 12:00 99.1 124 16 144/70 (94) 96   


 


10/15/17 11:00  122 13 128/60 (82) 95   














I/O      


 


 10/15/17 10/15/17 10/15/17 10/16/17 10/16/17 10/16/17





 07:00 15:00 23:00 07:00 15:00 23:00


 


Intake Total  3447 ml 2880 ml 530 ml  


 


Output Total   600 ml 1300 ml  


 


Balance  3447 ml 2280 ml -770 ml  


 


      


 


Intake Oral   236 ml 480 ml  


 


IV Total  3447 ml 2644 ml 50 ml  


 


Output Urine Total   600 ml 1300 ml  


 


# Bowel Movements   1 2  








Result Diagram:  


10/16/17 0421                                                                  

              10/16/17 0421





Other Results





Laboratory Tests








Test


  10/15/17


11:15 10/15/17


16:29 10/15/17


16:55 10/16/17


04:21


 


Blood Urea Nitrogen 54  52   46 


 


Creatinine 4.23  4.24   4.05 


 


Random Glucose 156  182   126 


 


Total Protein 5.1  5.1   5.2 


 


Calcium Level 6.9  6.9   7.6 


 


Phosphorus Level 2.9  2.9   3.0 


 


Magnesium Level 1.8  1.7   1.7 


 


Sodium Level 146  146   144 


 


Potassium Level 3.3  3.7   4.7 


 


Chloride Level 115  117   116 


 


Carbon Dioxide Level 21.0  19.0   17.5 


 


Anion Gap 10  10   11 


 


Estimat Glomerular Filtration


Rate 13 


  13 


  


  14 


 


 


Protein Corrected Calcium 7.9  7.9   


 


Troponin I 0.09    0.08 


 


Acetaminophen Level 2.6    


 


B-Hydroxybutyrate  0.06   


 


Blood Gas Puncture Site   RT RADIAL  


 


Blood Gas Patient Temperature   98.6  


 


Blood Gas HCO3   18  


 


Blood Gas Base Excess   -7.3  


 


Blood Gas Oxygen Saturation   91  


 


Arterial Blood pH   7.33  


 


Arterial Blood Partial


Pressure CO2 


  


  34 


  


 


 


Arterial Blood Partial


Pressure O2 


  


  76 


  


 


 


Arterial Blood Oxygen Content   11.5  


 


Arterial Blood


Carboxyhemoglobin 


  


  1.4 


  


 


 


Arterial Blood Methemoglobin   1.9  


 


Blood Gas Hemoglobin   8.9  


 


Oxygen Delivery Device   NASAL CANNULA  


 


Blood Gas Liter Flow   1  


 


Blood Gas Inspired Oxygen   24  


 


White Blood Count    14.1 


 


Red Blood Count    3.39 


 


Hemoglobin    9.3 


 


Hematocrit    28.5 


 


Mean Corpuscular Volume    84.1 


 


Mean Corpuscular Hemoglobin    27.5 


 


Mean Corpuscular Hemoglobin


Concent 


  


  


  32.8 


 


 


Red Cell Distribution Width    17.9 


 


Platelet Count    173 


 


Mean Platelet Volume    8.5 


 


Albumin    1.8 


 


Alkaline Phosphatase    88 


 


Aspartate Amino Transf


(AST/SGOT) 


  


  


  115 


 


 


Alanine Aminotransferase


(ALT/SGPT) 


  


  


  62 


 


 


Total Bilirubin    0.4 


 


Direct Bilirubin    0.1 


 


Indirect Bilirubin    0.3 


 


Ammonia    41 


 


Total Creatine Kinase    3140 


 


Creatine Kinase MB    8.5 


 


Creatine Kinase MB %    0.3 














 Date/Time


Source Procedure


Growth Status


 


 


 10/15/17 18:56


Blood Peripheral Aerobic Blood Culture


Pending Resulted


 


 10/15/17 18:56


Blood Peripheral Anaerobic Blood Culture - Final


QNS - SEE AEROBE REPORT Resulted











Physical Exam


Physical Exam


GENERAL: The patient oriented, not in any apparent respiratory distress.


VITAL SIGNS: reviewed


HEAD, EYES, EARS, NOSE, THROAT:  Head is normocephalic and atraumatic. Eyes -


negative conjunctival icterus. Mouth has significant improvement in thrush.


NECK: The neck is supple. No increased jugular venous distention. Central


trachea.


RESPIRATORY SYSTEM: Decreased air entry bibasilarly. Questionable rales versus


rhonchi. No other sounds noted.


CARDIOVASCULAR: S1-S2 audible. Sinus tachycardia. Unable to appreciate any


murmur.


GASTROINTESTINAL: Abdomen soft and nontender. No organomegaly. Positive bowel


sounds.


MUSCULOSKELETAL: Extremities have no cyanosis or pedal edema appreciated.


CENTRAL NERVOUS SYSTEM: alert oriented, normal facial features and moving


all her extremities.


SKIN: Warm and dry.


PSYCHIATRIC: Appropriate mood and affect but lethargic.





A/P


Assessment and Plan


1. Acute renal failure.


2. Diabetic ketoacidosis.


3. Aspiration pneumonia.


4. Candida esophagitis.


5. Rhabdomyolysis.


6. Coronary artery disease with increased troponin.


7. Sinus tachycardia with history of  status post ablation with


   pacemaker placement.


8. Anemia.


9. History of systemic lupus erythematosus / rheumatoid arthritis.


10. Hypothyroidism.


11. Peripheral neuropathy.


12. Gastroparesis. History of botox  injection in the past.


14. History of internal and external hemorrhoids


15. History of chronic pain


16. Leukocytosis.


 


PLAN:


1. on  insulin  for high sugar. reolved DKA 


2. IV hydration.


3. IV antibiotics.


4. Appreciate intensivist's help. 


5. Breathing treatments.


6. Increased troponin likely secondary to acute kidney injury and a history of


   tachycardia but the patient has a history of coronary artery disease. [facility] 

ardiology consult.


7. Monitor increased liver function tests.


8. labs reviewed.


9. Monitor CPK for rhabdomyolysis. improving 


10. Diflucan for candida wsophagitis and oral thrush 


11. Monitor labs.


12. dw pt 


landry rn.


 


CONDITION:


guarded


 


PROGNOSIS:


Guarded.


 


Further recommendations to follow as the patient progresses.











Mathieu Arshad MD Oct 16, 2017 10:15

## 2017-10-16 NOTE — EKG
Date Performed: 10/15/2017       Time Performed: 13:45:44

 

PTAGE:      62 years

 

EKG:      SINUS TACHYCARDIA POSSIBLE LEFT ATRIAL ENLARGEMENT MARKED LEFT AXIS DEVIATION LOW QRS VOLTA
GE IN EXTREMITY LEADS ABNORMAL ECG

 

PREVIOUS TRACING       : 10/14/2017 15.40 Compared to prior tracing no significant change

 

DOCTOR:   Tom Shaw  Interpretating Date/Time  10/16/2017 22:59:05

## 2017-10-16 NOTE — MB
cc:

JOSE C MONTAGUE MD,ANNABEL BRAUN,BÁRBARA ARSHAD,STEVEN

****

 

 

DATE OF CONSULTATION:  10/16/2017

 

YOB: 1955

 

REASON FOR CONSULTATION

Management of troponin elevation and tachycardia in the setting of DKA.

 

HISTORY OF PRESENT ILLNESS

Mrs. Damon is a 62-year-old lady well-known to me.  She does have diabetes,

hypertension, COPD, rheumatoid arthritis, esophageal stricture and status post

ablation by me years ago after reviewing my detailed ablation report in the

past.  She does have cardiomyopathy with a [employer] ICD.  A few days

prior to admission she was noted to have hypoglycemia along with syncope.  She

presented to the [facility] with change in mental status and confusion and was

found to have DKA.  I am not sure of the particular trigger, it could be

sepsis.  The patient was admitted and put on an insulin drip and antibiotics.

The patient was also noted to have very labile hypertension with BP up to 200

systolic.  She was also found to have acute renal failure with creatinine 4.23,

now trending down slightly to 4.05. Initially WBC was 25.1, now is 14.

Potassium initially was 3.3, now is 4.7.  She was also found to have troponin

slightly elevated at 0.8, now trending down to 0.07.  I reviewed the EKGs. So

far she is sinus without ST changes.  She has been feeling better off the

insulin drip.  BP is high and labile.

 

PAST MEDICAL HISTORY

As above.  Also has:

1. Gastroparesis.

2. Diverticulosis.

3. Hemorrhoid surgery.

4. EGD and colonoscopy.

5. ICD implantation.

 

MEDICATIONS

1. Metoprolol 50 mg q.8h.

2. Amlodipine 5 mg daily.

3. Insulin.

4. Protonix.

5. Levothyroxine.

6. Sulfasalazine.

7. ________.

 

REVIEW OF SYSTEMS

HEENT:  Normal.

GI:  No nausea.

:  No dysuria.

MSK:  Fatigue.

CVS:  As above.  She has some dyspnea.

ENDOCRINE:  Normal.

SKIN:  Normal.

CNS:  Dizziness has improved.

 

PHYSICAL EXAMINATION

VITAL SIGNS:  Blood pressure 200/100 with pulse in the 80s.  O2 sat is 99%.

Afebrile.

HEENT:  Normal oral exam.  PERRLA.

NECK:  No thyroid enlargement.  There is some lymphadenopathy.

LUNGS:  Decreased breath sounds bilaterally but no crackles.

HEART:  Regular, decreased S1, S2.  No loud murmurs.

ABDOMEN:  Active bowel sounds in all four quadrants.

:  Deferred.

EXTREMITIES:  Range of motion _______.

SKIN:  There is ecchymosis.

PSYCHIATRIC:  Good mood and good judgment.

NEUROLOGIC:  There is no focal neurologic deficit.

 

LABORATORY

Troponin 0.08, now 0.07.

Creatinine is coming down, 0.05.

WBC came down to 25.1.

Potassium 3.3, now 4.7.

 

ASSESSMENT

1. Diabetic ketoacidosis, which has improved.

2. Troponin elevation, could be a combination of diabetic ketoacidosis,

   cardiomyopathy and acute renal failure.

3. Acute renal failure, which has slightly improved.

4. Labile hypertension.

5. History of cardiomyopathy with ICD and ablation in the past.

 

PLAN

Aiming for supportive care at this moment.  She has been feeling better.  No

specific medication needed for troponin elevation.  Again borderline troponin

elevation could be a combination of multiple factors including cardiomyopathy,

diabetic ketoacidosis and possible sepsis.  I will hold off ACE inhibitors due

to acute renal failure.  I would switch from metoprolol to labetalol 200-300 mg

t.i.d.  Amlodipine will increase to 10 mg daily.  I will continue Zosyn for

possible infection.  Continue supportive care for acute renal failure.  Again,

no cardiac intervention is needed given acute renal failure.

 

I would like to thank Dr. Montague and Dr. Arshad for letting me participate in the

care of Mrs. Damon.

 

 

 

 

                              _________________________________

                              MD SLADE Saxena/CASPER

D:  10/16/2017/8:52 AM

T:  10/16/2017/9:44 AM

Visit #:  [id number]

Job #:  [id number]

KENNA

## 2017-10-17 VITALS
SYSTOLIC BLOOD PRESSURE: 156 MMHG | TEMPERATURE: 98.2 F | OXYGEN SATURATION: 95 % | HEART RATE: 82 BPM | DIASTOLIC BLOOD PRESSURE: 74 MMHG | RESPIRATION RATE: 16 BRPM

## 2017-10-17 VITALS
DIASTOLIC BLOOD PRESSURE: 60 MMHG | HEART RATE: 85 BPM | TEMPERATURE: 98.4 F | SYSTOLIC BLOOD PRESSURE: 131 MMHG | OXYGEN SATURATION: 96 % | RESPIRATION RATE: 16 BRPM

## 2017-10-17 VITALS
HEART RATE: 83 BPM | RESPIRATION RATE: 18 BRPM | TEMPERATURE: 98.2 F | DIASTOLIC BLOOD PRESSURE: 77 MMHG | OXYGEN SATURATION: 98 % | SYSTOLIC BLOOD PRESSURE: 170 MMHG

## 2017-10-17 VITALS
SYSTOLIC BLOOD PRESSURE: 100 MMHG | DIASTOLIC BLOOD PRESSURE: 67 MMHG | TEMPERATURE: 98.2 F | OXYGEN SATURATION: 99 % | HEART RATE: 85 BPM | RESPIRATION RATE: 17 BRPM

## 2017-10-17 VITALS
HEART RATE: 85 BPM | TEMPERATURE: 97.8 F | SYSTOLIC BLOOD PRESSURE: 156 MMHG | RESPIRATION RATE: 18 BRPM | DIASTOLIC BLOOD PRESSURE: 79 MMHG | OXYGEN SATURATION: 95 %

## 2017-10-17 VITALS
SYSTOLIC BLOOD PRESSURE: 138 MMHG | TEMPERATURE: 97.9 F | HEART RATE: 94 BPM | OXYGEN SATURATION: 95 % | RESPIRATION RATE: 18 BRPM | DIASTOLIC BLOOD PRESSURE: 60 MMHG

## 2017-10-17 VITALS — HEART RATE: 82 BPM

## 2017-10-17 VITALS
DIASTOLIC BLOOD PRESSURE: 67 MMHG | OXYGEN SATURATION: 97 % | TEMPERATURE: 97 F | HEART RATE: 80 BPM | SYSTOLIC BLOOD PRESSURE: 148 MMHG | RESPIRATION RATE: 20 BRPM

## 2017-10-17 VITALS — OXYGEN SATURATION: 99 %

## 2017-10-17 LAB
ALP SERPL-CCNC: 78 U/L (ref 45–117)
ALT SERPL-CCNC: 72 U/L (ref 10–53)
ANION GAP SERPL CALC-SCNC: 10 MEQ/L (ref 5–15)
AST SERPL-CCNC: 93 U/L (ref 15–37)
BILIRUB INDIRECT SERPL-MCNC: 0.3 MG/DL (ref 0–0.8)
BILIRUB SERPL-MCNC: 0.4 MG/DL (ref 0.2–1)
BUN SERPL-MCNC: 35 MG/DL (ref 7–18)
CHLORIDE SERPL-SCNC: 112 MEQ/L (ref 98–107)
CK MB SERPL-MCNC: 5.6 NG/ML (ref 0.5–3.6)
CK SERPL-CCNC: 1453 U/L (ref 26–192)
ERYTHROCYTE [DISTWIDTH] IN BLOOD BY AUTOMATED COUNT: 18.2 % (ref 11.6–17.2)
GFR SERPLBLD BASED ON 1.73 SQ M-ARVRAT: 17 ML/MIN (ref 89–?)
HCO3 BLD-SCNC: 18.6 MEQ/L (ref 21–32)
HCT VFR BLD CALC: 28.7 % (ref 35–46)
MCH RBC QN AUTO: 27.5 PG (ref 27–34)
MCHC RBC AUTO-ENTMCNC: 33 % (ref 32–36)
MCV RBC AUTO: 83.4 FL (ref 80–100)
PLATELET # BLD: 166 TH/MM3 (ref 150–450)
POTASSIUM SERPL-SCNC: 3.9 MEQ/L (ref 3.5–5.1)
RBC # BLD AUTO: 3.44 MIL/MM3 (ref 4–5.3)
REVIEW FLAG: (no result)
SODIUM SERPL-SCNC: 141 MEQ/L (ref 136–145)
WBC # BLD AUTO: 8.8 TH/MM3 (ref 4–11)

## 2017-10-17 RX ADMIN — IPRATROPIUM BROMIDE AND ALBUTEROL SULFATE SCH AMPULE: .5; 3 SOLUTION RESPIRATORY (INHALATION) at 03:39

## 2017-10-17 RX ADMIN — METOPROLOL TARTRATE SCH MG: 25 TABLET, FILM COATED ORAL at 05:52

## 2017-10-17 RX ADMIN — CHLORHEXIDINE GLUCONATE SCH ML: 1.2 RINSE ORAL at 09:00

## 2017-10-17 RX ADMIN — CHLORHEXIDINE GLUCONATE SCH PACK: 500 CLOTH TOPICAL at 04:00

## 2017-10-17 RX ADMIN — INSULIN ASPART SCH: 100 INJECTION, SOLUTION INTRAVENOUS; SUBCUTANEOUS at 21:00

## 2017-10-17 RX ADMIN — INSULIN ASPART SCH UNITS: 100 INJECTION, SOLUTION INTRAVENOUS; SUBCUTANEOUS at 12:00

## 2017-10-17 RX ADMIN — PANTOPRAZOLE SODIUM SCH MG: 40 INJECTION, POWDER, FOR SOLUTION INTRAVENOUS at 09:44

## 2017-10-17 RX ADMIN — LABETALOL HCL SCH MG: 200 TABLET, FILM COATED ORAL at 17:02

## 2017-10-17 RX ADMIN — PHENYTOIN SODIUM SCH MLS/HR: 50 INJECTION INTRAMUSCULAR; INTRAVENOUS at 04:09

## 2017-10-17 RX ADMIN — METOPROLOL TARTRATE SCH MG: 25 TABLET, FILM COATED ORAL at 21:18

## 2017-10-17 RX ADMIN — TAZOBACTAM SODIUM AND PIPERACILLIN SODIUM SCH MLS/HR: 250; 2 INJECTION, SOLUTION INTRAVENOUS at 10:11

## 2017-10-17 RX ADMIN — TAZOBACTAM SODIUM AND PIPERACILLIN SODIUM SCH MLS/HR: 250; 2 INJECTION, SOLUTION INTRAVENOUS at 04:09

## 2017-10-17 RX ADMIN — HEPARIN SODIUM SCH UNITS: 10000 INJECTION, SOLUTION INTRAVENOUS; SUBCUTANEOUS at 21:19

## 2017-10-17 RX ADMIN — Medication SCH ML: at 21:00

## 2017-10-17 RX ADMIN — OXYCODONE HYDROCHLORIDE AND ACETAMINOPHEN PRN TAB: 10; 325 TABLET ORAL at 13:09

## 2017-10-17 RX ADMIN — Medication SCH ML: at 10:12

## 2017-10-17 RX ADMIN — IPRATROPIUM BROMIDE AND ALBUTEROL SULFATE SCH AMPULE: .5; 3 SOLUTION RESPIRATORY (INHALATION) at 21:57

## 2017-10-17 RX ADMIN — INSULIN ASPART SCH: 100 INJECTION, SOLUTION INTRAVENOUS; SUBCUTANEOUS at 17:00

## 2017-10-17 RX ADMIN — ACYCLOVIR SCH UNITS: 800 TABLET ORAL at 21:17

## 2017-10-17 RX ADMIN — INSULIN ASPART SCH UNITS: 100 INJECTION, SOLUTION INTRAVENOUS; SUBCUTANEOUS at 17:01

## 2017-10-17 RX ADMIN — METOPROLOL TARTRATE SCH MG: 25 TABLET, FILM COATED ORAL at 13:08

## 2017-10-17 RX ADMIN — TAZOBACTAM SODIUM AND PIPERACILLIN SODIUM SCH MLS/HR: 250; 2 INJECTION, SOLUTION INTRAVENOUS at 16:59

## 2017-10-17 RX ADMIN — LABETALOL HCL SCH MG: 200 TABLET, FILM COATED ORAL at 09:43

## 2017-10-17 RX ADMIN — INSULIN ASPART SCH UNITS: 100 INJECTION, SOLUTION INTRAVENOUS; SUBCUTANEOUS at 09:47

## 2017-10-17 RX ADMIN — STANDARDIZED SENNA CONCENTRATE AND DOCUSATE SODIUM SCH TAB: 8.6; 5 TABLET, FILM COATED ORAL at 09:44

## 2017-10-17 RX ADMIN — HEPARIN SODIUM SCH UNITS: 10000 INJECTION, SOLUTION INTRAVENOUS; SUBCUTANEOUS at 09:43

## 2017-10-17 RX ADMIN — OXYCODONE HYDROCHLORIDE AND ACETAMINOPHEN PRN TAB: 5; 325 TABLET ORAL at 05:52

## 2017-10-17 RX ADMIN — LABETALOL HCL SCH MG: 200 TABLET, FILM COATED ORAL at 13:08

## 2017-10-17 RX ADMIN — TAZOBACTAM SODIUM AND PIPERACILLIN SODIUM SCH MLS/HR: 250; 2 INJECTION, SOLUTION INTRAVENOUS at 21:18

## 2017-10-17 RX ADMIN — LEVOTHYROXINE SODIUM SCH MCG: 125 TABLET ORAL at 05:52

## 2017-10-17 RX ADMIN — IPRATROPIUM BROMIDE AND ALBUTEROL SULFATE SCH AMPULE: .5; 3 SOLUTION RESPIRATORY (INHALATION) at 16:41

## 2017-10-17 RX ADMIN — ACYCLOVIR SCH UNITS: 800 TABLET ORAL at 09:46

## 2017-10-17 RX ADMIN — MORPHINE SULFATE SCH MG: 15 TABLET, EXTENDED RELEASE ORAL at 21:16

## 2017-10-17 RX ADMIN — MORPHINE SULFATE SCH MG: 15 TABLET, EXTENDED RELEASE ORAL at 16:31

## 2017-10-17 RX ADMIN — FLUCONAZOLE SCH MLS/HR: 2 INJECTION, SOLUTION INTRAVENOUS at 10:12

## 2017-10-17 RX ADMIN — STANDARDIZED SENNA CONCENTRATE AND DOCUSATE SODIUM SCH TAB: 8.6; 5 TABLET, FILM COATED ORAL at 21:00

## 2017-10-17 RX ADMIN — PHENYTOIN SODIUM SCH MLS/HR: 50 INJECTION INTRAMUSCULAR; INTRAVENOUS at 16:31

## 2017-10-17 RX ADMIN — IPRATROPIUM BROMIDE AND ALBUTEROL SULFATE SCH AMPULE: .5; 3 SOLUTION RESPIRATORY (INHALATION) at 12:01

## 2017-10-17 NOTE — HHI.PR
Review/Management


Daily Summary


10/17


neuro wise much improved


alert and oriented now


moves 4 limbs well


encephalopathy appears mostly resolved


will see prn





Subjective


Subjective Comments


denies headaches and new neuro sx


Active Medications





Current Medications








 Medications


  (Trade)  Dose


 Ordered  Sig/Adilene


 Route  Start Time


 Stop Time Status Last Admin


 


  (NS Flush)  2 ml  UNSCH  PRN


 IVF  10/14/17 15:45


     


 


 


  (Synthroid)  125 mcg  DAILY@0600


 PO  10/15/17 06:00


    10/17/17 05:52


 


 


  (Azulfidine)  1,000 mg  BID


 PO  10/14/17 21:00


    10/16/17 21:24


 


 


 Patient Own


 Medication  PT OWN MED:


 Multi-Vit/


 Iron-...  DAILY


 PO  10/15/17 09:00


   Future Hold  


 


 


  (NS Flush)  2 ml  UNSCH  PRN


 IV FLUSH  10/14/17 20:15


     


 


 


  (NS Flush)  2 ml  BID


 IV FLUSH  10/14/17 21:00


    10/16/17 21:29


 


 


  (Protonix Inj)  40 mg  DAILY


 IV PUSH  10/15/17 09:00


    10/16/17 08:02


 


 


  (Zofran Inj)  4 mg  Q6H  PRN


 IV PUSH  10/14/17 20:15


     


 


 


  (Duoneb Neb)  1 ampule  Q6HR  NEB


 INH  10/14/17 22:00


    10/16/17 19:17


 


 


  (Albuterol Neb)  2.5 mg  Q2HR NEB  PRN


 INH  10/14/17 20:15


     


 


 


  (Heparin Inj)  5,000 units  Q12H


 SQ  10/14/17 21:00


    10/16/17 21:27


 


 


 Miscellaneous


 Information  1  Q361D


 XX  10/14/17 20:15


    10/14/17 20:15


 


 


  (Chlorhexidine


 2% Cloth)  3 pack


 Taper  DAILY@04


 TOP  10/15/17 04:00


 10/11/18 03:59  10/16/17 04:00


 


 


  (Chlorhexidine


 2% Cloth)  3 pack  UNSCH  PRN


 TOP  10/14/17 20:15


     


 


 


  (Tammi-Colace)  1 tab  BID


 PO  10/14/17 21:00


    10/16/17 08:02


 


 


  (Milk Of


 Magnesia Liq)  30 ml  Q12H  PRN


 PO  10/14/17 20:15


     


 


 


  (Senokot)  17.2 mg  Q12H  PRN


 PO  10/14/17 20:15


     


 


 


  (Dulcolax Supp)  10 mg  DAILY  PRN


 RECTAL  10/14/17 20:15


     


 


 


  (Lactulose Liq)  30 ml  DAILY  PRN


 PO  10/14/17 20:15


     


 


 


 Piperacillin Sod/


 Tazobactam Sod  50 ml @ 


 100 mls/hr  Q6H


 IV  10/14/17 22:00


    10/17/17 04:09


 


 


 Fluconazole/


 Sodium Chloride  50 ml @ 50


 mls/hr  Q24H


 IV  10/16/17 09:00


    10/16/17 08:01


 


 


  (Peridex 0.12%


 Liq)  60 ml  DAILY


 .XX  10/15/17 09:00


    10/16/17 08:00


 


 


  (D50w (Syr) Inj)  50 ml  UNSCH  PRN


 IV PUSH  10/15/17 17:30


     


 


 


  (Glucagon Inj)  1 mg  UNSCH  PRN


 OTHER  10/15/17 17:30


     


 


 


  (NovoLOG INJ)  3 units  TIDAC


 SQ  10/16/17 08:00


    10/16/17 16:54


 


 


  (NovoLOG


 SUPPLEMENTAL


 SCALE)  1  ACHS SLIDING  SCALE


 SQ  10/15/17 21:00


    10/16/17 12:00


 


 


 Sodium Chloride  1,000 ml @ 


 84 mls/hr  [id number]


 IV  10/15/17 17:30


    10/17/17 04:09


 


 


  (Tylenol)  650 mg  Q4H  PRN


 PO  10/15/17 17:45


    10/16/17 06:41


 


 


  (Levemir Inj)  10 units  Q12HR


 SQ  10/15/17 17:30


    10/16/17 21:28


 


 


  (Lopressor)  50 mg  Q8HR


 PO  10/16/17 14:00


    10/17/17 05:52


 


 


  (Trandate)  200 mg  TID


 PO  10/16/17 14:00


    10/16/17 16:49


 


 


  (Percocet  5-325


 Mg)  1 tab  Q6H  PRN


 PO  10/16/17 16:00


    10/17/17 05:52


 


 


  (Norvasc)  10 mg  DAILY


 PO  10/17/17 09:00


     


 








Allergies





Allergies


Coded Allergies


  No Known Allergies ([id number]/3/17)





Exam


I&O / VS





Vital Signs








  Date Time  Temp Pulse Resp B/P (MAP) Pulse Ox O2 Delivery O2 Flow Rate FiO2


 


10/17/17 04:00 97.8 85 18 156/79 (104) 95   


 


10/17/17 00:00 98.2 82 16 156/74 (101) 95   


 


10/16/17 20:00  95      


 


10/16/17 20:00 97.7 92 18 127/60 (82) 96   


 


10/16/17 19:17     98   21


 


10/16/17 16:00 97.7 95 20 106/57 (73) 98   


 


10/16/17 14:00 98.1 80 20 164/77 (106) 100   


 


10/16/17 12:11  93      


 


10/16/17 12:11  93 15 160/70 (100) 99   


 


10/16/17 12:00 98.6       


 


10/16/17 12:00  91 17 163/74 (103) 99   


 


10/16/17 12:00  91      


 


10/16/17 11:08   18     


 


10/16/17 11:00  97      


 


10/16/17 10:48  95      


 


10/16/17 10:16     99 Nasal Cannula 1.50 


 


10/16/17 10:00  88      


 


10/16/17 09:42  93 17 174/81 (112) 99   


 


10/16/17 09:37  89 15 176/84 (114) 99   


 


10/16/17 09:10  97 27 190/99 (129) 97   


 


10/16/17 09:07  92 21 191/94 (126) 99   


 


10/16/17 09:02  89 14 182/87 (118) 98   


 


10/16/17 09:01  91 23 181/117 (138) 100   


 


10/16/17 09:00  86 16  99   


 


10/16/17 08:59  84 27 178/85 (116) 100   


 


10/16/17 08:56  89 18 178/155 (163) 98   


 


10/16/17 08:37  92 21 207/101 (136) 97   


 


10/16/17 08:35  93 24 183/89 (120) 99   


 


10/16/17 08:28  84 18 186/84 (118) 98   


 


10/16/17 08:24  85 15 194/91 (125) 99   


 


10/16/17 08:20  86 16 183/88 (119) 98   


 


10/16/17 08:17  84 14 187/88 (121) 98   











Objective


Micro and Labs











 Date/Time


Source Procedure


Growth Status


 


 


 10/15/17 18:56


Blood Peripheral Aerobic Blood Culture - Preliminary


NO GROWTH IN 1 DAY Resulted


 


 10/15/17 18:56


Blood Peripheral Anaerobic Blood Culture - Final


QNS - SEE AEROBE REPORT Resulted

















Brittany Salgado MD Oct 17, 2017 08:11

## 2017-10-17 NOTE — HHI.PR
Subjective


Remarks


pt sitting on chair feeling lot better


alert and active looking 


Has a good appetite


Oriented


no pain 


ROS for 10 point system is unremarkable





Objective


Objective Results


-





Vital Signs








  Date Time  Temp Pulse Resp B/P (MAP) Pulse Ox O2 Delivery O2 Flow Rate FiO2


 


10/17/17 08:00 98.2 82 18 170/77 (108) 98   


 


10/17/17 04:00 97.8 85 18 156/79 (104) 95   


 


10/17/17 00:00 98.2 82 16 156/74 (101) 95   


 


10/16/17 20:00  95      


 


10/16/17 20:00 97.7 92 18 127/60 (82) 96   


 


10/16/17 19:17     98   21


 


10/16/17 16:00 97.7 95 20 106/57 (73) 98   


 


10/16/17 14:00 98.1 80 20 164/77 (106) 100   


 


10/16/17 12:11  93      


 


10/16/17 12:11  93 15 160/70 (100) 99   


 


10/16/17 12:00 98.6       


 


10/16/17 12:00  91 17 163/74 (103) 99   


 


10/16/17 12:00  91      


 


10/16/17 11:08   18     


 


10/16/17 11:00  97      


 


10/16/17 10:48  95      














I/O      


 


 10/16/17 10/16/17 10/16/17 10/17/17 10/17/17 10/17/17





 07:00 15:00 23:00 07:00 15:00 23:00


 


Intake Total 530 ml 150 ml 1558 ml 1220 ml  


 


Output Total 1300 ml   800 ml  


 


Balance -770 ml 150 ml 1558 ml 420 ml  


 


      


 


Intake Oral 480 ml   480 ml  


 


IV Total 50 ml 150 ml 1558 ml 740 ml  


 


Output Urine Total 1300 ml   800 ml  


 


# Voids   3 1  


 


# Bowel Movements 2  1 1  








Result Diagram:  


10/17/17 0735                                                                  

              10/17/17 0735





Other Results





Laboratory Tests








Test


  10/17/17


07:35


 


White Blood Count 8.8 


 


Red Blood Count 3.44 


 


Hemoglobin 9.5 


 


Hematocrit 28.7 


 


Mean Corpuscular Volume 83.4 


 


Mean Corpuscular Hemoglobin 27.5 


 


Mean Corpuscular Hemoglobin


Concent 33.0 


 


 


Red Cell Distribution Width 18.2 


 


Platelet Count 166 


 


Mean Platelet Volume 8.6 


 


Blood Urea Nitrogen 35 


 


Creatinine 3.29 


 


Random Glucose 78 


 


Total Protein 5.5 


 


Albumin 2.0 


 


Calcium Level 8.5 


 


Alkaline Phosphatase 78 


 


Aspartate Amino Transf


(AST/SGOT) 93 


 


 


Alanine Aminotransferase


(ALT/SGPT) 72 


 


 


Total Bilirubin 0.4 


 


Direct Bilirubin 0.1 


 


Sodium Level 141 


 


Potassium Level 3.9 


 


Chloride Level 112 


 


Carbon Dioxide Level 18.6 


 


Anion Gap 10 


 


Estimat Glomerular Filtration


Rate 17 


 


 


Indirect Bilirubin 0.3 


 


Total Creatine Kinase 1453 


 


Creatine Kinase MB 5.6 


 


Creatine Kinase MB % 0.4 














 Date/Time


Source Procedure


Growth Status


 


 


 10/15/17 18:56


Blood Peripheral Aerobic Blood Culture - Preliminary


NO GROWTH IN 1 DAY Resulted


 


 10/15/17 18:56


Blood Peripheral Anaerobic Blood Culture - Final


QNS - SEE AEROBE REPORT Resulted











Physical Exam


Physical Exam


GENERAL: The patient oriented, not in any apparent respiratory distress.


VITAL SIGNS: reviewed


HEAD, EYES, EARS, NOSE, THROAT:  Head is normocephalic and atraumatic. Eyes -


negative conjunctival icterus. Mouth has significant improvement in thrush.


NECK: The neck is supple. No increased jugular venous distention. Central


trachea.


RESPIRATORY SYSTEM: Decreased air entry bibasilarly. Questionable rales versus


rhonchi. No other sounds noted.


CARDIOVASCULAR: S1-S2 audible. Sinus tachycardia. Unable to appreciate any


murmur.


GASTROINTESTINAL: Abdomen soft and nontender. No organomegaly. Positive bowel


sounds.


MUSCULOSKELETAL: Extremities have no cyanosis or pedal edema appreciated.


CENTRAL NERVOUS SYSTEM: alert oriented, normal facial features and moving


all her extremities.


SKIN: Warm and dry.


PSYCHIATRIC: Appropriate mood and affect but lethargic.





A/P


Assessment and Plan


1. Acute renal failure.


2. Diabetic ketoacidosis.


3. Aspiration pneumonia.


4. Candida esophagitis.


5. Rhabdomyolysis.


6. Coronary artery disease with increased troponin.


7. Sinus tachycardia with history of  status post ablation with


   pacemaker placement.


8. Anemia.


9. History of systemic lupus erythematosus / rheumatoid arthritis.


10. Hypothyroidism.


11. Peripheral neuropathy.


12. Gastroparesis. History of botox  injection in the past.


14. History of internal and external hemorrhoids


15. History of chronic pain


16. Leukocytosis.


 


PLAN:


1. on  insulin subcutaneous for her diabetes, improving control. reolved DKA 


2. IV hydration.


3. IV antibiotics.


4. Appreciate neurologist help. 


5. Breathing treatments.


6. Increased troponin likely secondary to acute kidney injury and a history of


   tachycardia but the patient has a history of coronary artery disease. [facility] 

ardiology consult.  Tachycardia improved


7. Monitor increased liver function tests.  Improving


8. labs reviewed.  Improve what blood cell count, stable H&H.  Improving kidney 

function.  Calcium within normal limits today.


9. Monitor CPK for rhabdomyolysis. improving 


10. Diflucan for candida wsophagitis and oral thrush 


11. Monitor labs.


12. dw pt 


landry rn.


 


CONDITION:


guarded


 


PROGNOSIS:


Guarded.  An improving


 


Further recommendations to follow as the patient progresses.











Mathieu Arshad MD Oct 17, 2017 10:47

## 2017-10-18 VITALS
RESPIRATION RATE: 18 BRPM | SYSTOLIC BLOOD PRESSURE: 127 MMHG | TEMPERATURE: 98.5 F | DIASTOLIC BLOOD PRESSURE: 73 MMHG | HEART RATE: 84 BPM | OXYGEN SATURATION: 96 %

## 2017-10-18 VITALS
SYSTOLIC BLOOD PRESSURE: 148 MMHG | DIASTOLIC BLOOD PRESSURE: 74 MMHG | RESPIRATION RATE: 16 BRPM | OXYGEN SATURATION: 94 % | HEART RATE: 84 BPM | TEMPERATURE: 98 F

## 2017-10-18 VITALS
SYSTOLIC BLOOD PRESSURE: 126 MMHG | HEART RATE: 82 BPM | TEMPERATURE: 98 F | OXYGEN SATURATION: 97 % | DIASTOLIC BLOOD PRESSURE: 82 MMHG | RESPIRATION RATE: 18 BRPM

## 2017-10-18 VITALS
TEMPERATURE: 97.2 F | RESPIRATION RATE: 20 BRPM | HEART RATE: 77 BPM | DIASTOLIC BLOOD PRESSURE: 74 MMHG | OXYGEN SATURATION: 98 % | SYSTOLIC BLOOD PRESSURE: 173 MMHG

## 2017-10-18 VITALS
HEART RATE: 66 BPM | OXYGEN SATURATION: 97 % | TEMPERATURE: 97.9 F | SYSTOLIC BLOOD PRESSURE: 161 MMHG | RESPIRATION RATE: 18 BRPM | DIASTOLIC BLOOD PRESSURE: 73 MMHG

## 2017-10-18 VITALS — OXYGEN SATURATION: 97 %

## 2017-10-18 VITALS
OXYGEN SATURATION: 97 % | SYSTOLIC BLOOD PRESSURE: 146 MMHG | HEART RATE: 80 BPM | DIASTOLIC BLOOD PRESSURE: 77 MMHG | RESPIRATION RATE: 18 BRPM | TEMPERATURE: 97.9 F

## 2017-10-18 VITALS — HEART RATE: 85 BPM

## 2017-10-18 LAB
ANION GAP SERPL CALC-SCNC: 9 MEQ/L (ref 5–15)
BUN SERPL-MCNC: 24 MG/DL (ref 7–18)
CHLORIDE SERPL-SCNC: 111 MEQ/L (ref 98–107)
GFR SERPLBLD BASED ON 1.73 SQ M-ARVRAT: 22 ML/MIN (ref 89–?)
HCO3 BLD-SCNC: 22.5 MEQ/L (ref 21–32)
MAGNESIUM SERPL-MCNC: 1.3 MG/DL (ref 1.5–2.5)
POTASSIUM SERPL-SCNC: 3.3 MEQ/L (ref 3.5–5.1)
SODIUM SERPL-SCNC: 142 MEQ/L (ref 136–145)

## 2017-10-18 RX ADMIN — ACYCLOVIR SCH UNITS: 800 TABLET ORAL at 21:30

## 2017-10-18 RX ADMIN — METOPROLOL TARTRATE SCH MG: 25 TABLET, FILM COATED ORAL at 14:00

## 2017-10-18 RX ADMIN — STANDARDIZED SENNA CONCENTRATE AND DOCUSATE SODIUM SCH TAB: 8.6; 5 TABLET, FILM COATED ORAL at 10:27

## 2017-10-18 RX ADMIN — PHENYTOIN SODIUM SCH MLS/HR: 50 INJECTION INTRAMUSCULAR; INTRAVENOUS at 04:17

## 2017-10-18 RX ADMIN — MORPHINE SULFATE SCH MG: 15 TABLET, EXTENDED RELEASE ORAL at 10:27

## 2017-10-18 RX ADMIN — PANTOPRAZOLE SODIUM SCH MG: 40 INJECTION, POWDER, FOR SOLUTION INTRAVENOUS at 10:28

## 2017-10-18 RX ADMIN — INSULIN ASPART SCH: 100 INJECTION, SOLUTION INTRAVENOUS; SUBCUTANEOUS at 21:29

## 2017-10-18 RX ADMIN — STANDARDIZED SENNA CONCENTRATE AND DOCUSATE SODIUM SCH TAB: 8.6; 5 TABLET, FILM COATED ORAL at 21:00

## 2017-10-18 RX ADMIN — Medication SCH ML: at 21:36

## 2017-10-18 RX ADMIN — FLUCONAZOLE SCH MLS/HR: 2 INJECTION, SOLUTION INTRAVENOUS at 10:28

## 2017-10-18 RX ADMIN — HEPARIN SODIUM SCH UNITS: 10000 INJECTION, SOLUTION INTRAVENOUS; SUBCUTANEOUS at 21:36

## 2017-10-18 RX ADMIN — TAZOBACTAM SODIUM AND PIPERACILLIN SODIUM SCH MLS/HR: 250; 2 INJECTION, SOLUTION INTRAVENOUS at 21:31

## 2017-10-18 RX ADMIN — INSULIN ASPART SCH: 100 INJECTION, SOLUTION INTRAVENOUS; SUBCUTANEOUS at 08:00

## 2017-10-18 RX ADMIN — INSULIN ASPART SCH UNITS: 100 INJECTION, SOLUTION INTRAVENOUS; SUBCUTANEOUS at 08:00

## 2017-10-18 RX ADMIN — IPRATROPIUM BROMIDE AND ALBUTEROL SULFATE SCH AMPULE: .5; 3 SOLUTION RESPIRATORY (INHALATION) at 16:10

## 2017-10-18 RX ADMIN — CHLORHEXIDINE GLUCONATE SCH PACK: 500 CLOTH TOPICAL at 04:00

## 2017-10-18 RX ADMIN — ACYCLOVIR SCH UNITS: 800 TABLET ORAL at 09:00

## 2017-10-18 RX ADMIN — IPRATROPIUM BROMIDE AND ALBUTEROL SULFATE SCH AMPULE: .5; 3 SOLUTION RESPIRATORY (INHALATION) at 04:08

## 2017-10-18 RX ADMIN — LABETALOL HCL SCH MG: 200 TABLET, FILM COATED ORAL at 18:46

## 2017-10-18 RX ADMIN — TAZOBACTAM SODIUM AND PIPERACILLIN SODIUM SCH MLS/HR: 250; 2 INJECTION, SOLUTION INTRAVENOUS at 04:16

## 2017-10-18 RX ADMIN — MORPHINE SULFATE SCH MG: 15 TABLET, EXTENDED RELEASE ORAL at 21:35

## 2017-10-18 RX ADMIN — CHLORHEXIDINE GLUCONATE SCH ML: 1.2 RINSE ORAL at 09:00

## 2017-10-18 RX ADMIN — INSULIN ASPART SCH: 100 INJECTION, SOLUTION INTRAVENOUS; SUBCUTANEOUS at 13:00

## 2017-10-18 RX ADMIN — TAZOBACTAM SODIUM AND PIPERACILLIN SODIUM SCH MLS/HR: 250; 2 INJECTION, SOLUTION INTRAVENOUS at 16:00

## 2017-10-18 RX ADMIN — Medication SCH ML: at 09:00

## 2017-10-18 RX ADMIN — IPRATROPIUM BROMIDE AND ALBUTEROL SULFATE SCH AMPULE: .5; 3 SOLUTION RESPIRATORY (INHALATION) at 10:00

## 2017-10-18 RX ADMIN — METOPROLOL TARTRATE SCH MG: 25 TABLET, FILM COATED ORAL at 04:22

## 2017-10-18 RX ADMIN — LEVOTHYROXINE SODIUM SCH MCG: 125 TABLET ORAL at 04:21

## 2017-10-18 RX ADMIN — PHENYTOIN SODIUM SCH MLS/HR: 50 INJECTION INTRAMUSCULAR; INTRAVENOUS at 17:00

## 2017-10-18 RX ADMIN — HEPARIN SODIUM SCH UNITS: 10000 INJECTION, SOLUTION INTRAVENOUS; SUBCUTANEOUS at 10:29

## 2017-10-18 RX ADMIN — IPRATROPIUM BROMIDE AND ALBUTEROL SULFATE SCH AMPULE: .5; 3 SOLUTION RESPIRATORY (INHALATION) at 21:09

## 2017-10-18 RX ADMIN — METOPROLOL TARTRATE SCH MG: 25 TABLET, FILM COATED ORAL at 21:34

## 2017-10-18 RX ADMIN — TAZOBACTAM SODIUM AND PIPERACILLIN SODIUM SCH MLS/HR: 250; 2 INJECTION, SOLUTION INTRAVENOUS at 10:28

## 2017-10-18 RX ADMIN — OXYCODONE HYDROCHLORIDE AND ACETAMINOPHEN PRN TAB: 10; 325 TABLET ORAL at 04:16

## 2017-10-18 RX ADMIN — INSULIN ASPART SCH: 100 INJECTION, SOLUTION INTRAVENOUS; SUBCUTANEOUS at 17:00

## 2017-10-18 RX ADMIN — LABETALOL HCL SCH MG: 200 TABLET, FILM COATED ORAL at 13:00

## 2017-10-18 RX ADMIN — LABETALOL HCL SCH MG: 200 TABLET, FILM COATED ORAL at 10:27

## 2017-10-18 NOTE — HHI.PR
Subjective


Remarks


pt sitting on chair feeling lot better


Had low sugar this morning.  As per patient she ate good last night and had 

some snacks as well.


alert and active looking 


Has a good appetite


Oriented


no pain 


ROS for 10 point system is unremarkable





Objective


Objective Results


-





Vital Signs








  Date Time  Temp Pulse Resp B/P (MAP) Pulse Ox O2 Delivery O2 Flow Rate FiO2


 


10/18/17 08:00 97.2 77 20 173/74 (107) 98   


 


10/18/17 04:14 98.0 84 16 148/74 (98) 94   


 


10/17/17 23:50 98.4 85 16 131/60 (83) 96   


 


10/17/17 20:30 97.9 94 18 138/60 (86) 95   


 


10/17/17 20:29  82      


 


10/17/17 16:00 97.0 80 20 148/67 (94) 97   


 


10/17/17 12:08     99   


 


10/17/17 12:00 98.2 85 17 100/67 (78) 99   














I/O      


 


 10/17/17 10/17/17 10/17/17 10/18/17 10/18/17 10/18/17





 07:00 15:00 23:00 07:00 15:00 23:00


 


Intake Total 1220 ml 100 ml 2184 ml 1752 ml  


 


Output Total 800 ml   2200 ml  


 


Balance 420 ml 100 ml 2184 ml -448 ml  


 


      


 


Intake Oral 480 ml  960 ml 480 ml  


 


IV Total 740 ml 100 ml 1224 ml 1272 ml  


 


Output Urine Total 800 ml   2200 ml  


 


# Voids 1  4   


 


# Bowel Movements 1  2 0  








Result Diagram:  


10/17/17 0735                                                                  

              10/18/17 0755





Other Results





Laboratory Tests








Test


  10/18/17


07:55


 


Blood Urea Nitrogen 24 


 


Creatinine 2.65 


 


Random Glucose 49 


 


Calcium Level 8.3 


 


Magnesium Level 1.3 


 


Sodium Level 142 


 


Potassium Level 3.3 


 


Chloride Level 111 


 


Carbon Dioxide Level 22.5 


 


Anion Gap 9 


 


Estimat Glomerular Filtration


Rate 22 


 














 Date/Time


Source Procedure


Growth Status


 


 


 10/15/17 18:56


Blood Peripheral Aerobic Blood Culture - Preliminary


NO GROWTH IN 3 DAYS Resulted


 


 10/15/17 18:56


Blood Peripheral Anaerobic Blood Culture - Final


QNS - SEE AEROBE REPORT Resulted











Physical Exam


Physical Exam


GENERAL: The patient oriented, not in any apparent respiratory distress.


VITAL SIGNS: reviewed


HEAD, EYES, EARS, NOSE, THROAT:  Head is normocephalic and atraumatic. Eyes -


negative conjunctival icterus. Mouth has significant improvement in thrush.


NECK: The neck is supple. No increased jugular venous distention. Central


trachea.


RESPIRATORY SYSTEM: Decreased air entry bibasilarly. Questionable rales versus


rhonchi. No other sounds noted.


CARDIOVASCULAR: S1-S2 audible. Sinus tachycardia. Unable to appreciate any


murmur.


GASTROINTESTINAL: Abdomen soft and nontender. No organomegaly. Positive bowel


sounds.


MUSCULOSKELETAL: Extremities have no cyanosis or pedal edema appreciated.


CENTRAL NERVOUS SYSTEM: alert oriented, normal facial features and moving


all her extremities.


SKIN: Warm and dry.


PSYCHIATRIC: Appropriate mood and affect but lethargic.





A/P


Assessment and Plan


1. Acute renal failure.


2. Diabetic ketoacidosis.


3. Aspiration pneumonia.


4. Candida esophagitis.


5. Rhabdomyolysis.


6. Coronary artery disease with increased troponin.


7. Sinus tachycardia with history of  status post ablation with


   pacemaker placement.


8. Anemia.


9. History of systemic lupus erythematosus / rheumatoid arthritis.


10. Hypothyroidism.


11. Peripheral neuropathy.


12. Gastroparesis. History of botox  injection in the past.


14. History of internal and external hemorrhoids


15. History of chronic pain


16. Leukocytosis.


 


PLAN:


1. on  insulin subcutaneous for her diabetes, improving control. reolved DKA .  

Hypoglycemia will monitor.  Likely will decrease insulin long-acting dosage.


2. IV hydration.


3. IV antibiotics.


4. Appreciate neurologist help. 


5. Breathing treatments.


6. Increased troponin likely secondary to acute kidney injury and a history of


   tachycardia but the patient has a history of coronary artery disease. [facility] 

ardiology consult.  Tachycardia improved


7. Monitor increased liver function tests.  Improving


8. labs reviewed.  Improve what blood cell count, stable H&H.  Improving kidney 

function.  Low potassium will replace.  Low magnesium will replace


9.  Improving CPK


10. Diflucan for candida esophagitis and oral thrush 


11. Monitor labs.


12. dw pt 


landry rn.


 


CONDITION:


guarded


 


Continue physical therapy


Further recommendations to follow as the patient progresses.











Mathieu Arshad MD Oct 18, 2017 11:36

## 2017-10-19 VITALS
DIASTOLIC BLOOD PRESSURE: 60 MMHG | OXYGEN SATURATION: 97 % | TEMPERATURE: 97.5 F | RESPIRATION RATE: 16 BRPM | HEART RATE: 81 BPM | SYSTOLIC BLOOD PRESSURE: 118 MMHG

## 2017-10-19 VITALS
RESPIRATION RATE: 20 BRPM | OXYGEN SATURATION: 97 % | SYSTOLIC BLOOD PRESSURE: 145 MMHG | HEART RATE: 97 BPM | DIASTOLIC BLOOD PRESSURE: 72 MMHG | TEMPERATURE: 98.7 F

## 2017-10-19 VITALS
OXYGEN SATURATION: 98 % | HEART RATE: 77 BPM | TEMPERATURE: 97.9 F | DIASTOLIC BLOOD PRESSURE: 77 MMHG | SYSTOLIC BLOOD PRESSURE: 164 MMHG | RESPIRATION RATE: 20 BRPM

## 2017-10-19 VITALS
RESPIRATION RATE: 16 BRPM | HEART RATE: 82 BPM | DIASTOLIC BLOOD PRESSURE: 61 MMHG | TEMPERATURE: 97.3 F | SYSTOLIC BLOOD PRESSURE: 126 MMHG | OXYGEN SATURATION: 97 %

## 2017-10-19 VITALS
TEMPERATURE: 97.5 F | RESPIRATION RATE: 20 BRPM | OXYGEN SATURATION: 98 % | HEART RATE: 80 BPM | DIASTOLIC BLOOD PRESSURE: 68 MMHG | SYSTOLIC BLOOD PRESSURE: 133 MMHG

## 2017-10-19 VITALS
OXYGEN SATURATION: 96 % | DIASTOLIC BLOOD PRESSURE: 68 MMHG | RESPIRATION RATE: 18 BRPM | SYSTOLIC BLOOD PRESSURE: 149 MMHG | HEART RATE: 85 BPM | TEMPERATURE: 98.5 F

## 2017-10-19 VITALS — HEART RATE: 67 BPM

## 2017-10-19 LAB
ANION GAP SERPL CALC-SCNC: 6 MEQ/L (ref 5–15)
BUN SERPL-MCNC: 16 MG/DL (ref 7–18)
CHLORIDE SERPL-SCNC: 110 MEQ/L (ref 98–107)
GFR SERPLBLD BASED ON 1.73 SQ M-ARVRAT: 30 ML/MIN (ref 89–?)
HCO3 BLD-SCNC: 24.2 MEQ/L (ref 21–32)
MAGNESIUM SERPL-MCNC: 1.6 MG/DL (ref 1.5–2.5)
POTASSIUM SERPL-SCNC: 3.6 MEQ/L (ref 3.5–5.1)
SODIUM SERPL-SCNC: 140 MEQ/L (ref 136–145)

## 2017-10-19 RX ADMIN — MORPHINE SULFATE SCH MG: 15 TABLET, EXTENDED RELEASE ORAL at 21:13

## 2017-10-19 RX ADMIN — HEPARIN SODIUM SCH UNITS: 10000 INJECTION, SOLUTION INTRAVENOUS; SUBCUTANEOUS at 09:04

## 2017-10-19 RX ADMIN — STANDARDIZED SENNA CONCENTRATE AND DOCUSATE SODIUM SCH TAB: 8.6; 5 TABLET, FILM COATED ORAL at 09:00

## 2017-10-19 RX ADMIN — LEVOTHYROXINE SODIUM SCH MCG: 125 TABLET ORAL at 05:06

## 2017-10-19 RX ADMIN — TAZOBACTAM SODIUM AND PIPERACILLIN SODIUM SCH MLS/HR: 250; 2 INJECTION, SOLUTION INTRAVENOUS at 10:17

## 2017-10-19 RX ADMIN — METOPROLOL TARTRATE SCH MG: 25 TABLET, FILM COATED ORAL at 05:06

## 2017-10-19 RX ADMIN — LABETALOL HCL SCH MG: 200 TABLET, FILM COATED ORAL at 09:03

## 2017-10-19 RX ADMIN — MORPHINE SULFATE SCH MG: 15 TABLET, EXTENDED RELEASE ORAL at 09:03

## 2017-10-19 RX ADMIN — TAZOBACTAM SODIUM AND PIPERACILLIN SODIUM SCH MLS/HR: 250; 2 INJECTION, SOLUTION INTRAVENOUS at 21:15

## 2017-10-19 RX ADMIN — STANDARDIZED SENNA CONCENTRATE AND DOCUSATE SODIUM SCH TAB: 8.6; 5 TABLET, FILM COATED ORAL at 21:00

## 2017-10-19 RX ADMIN — PHENYTOIN SODIUM SCH MLS/HR: 50 INJECTION INTRAMUSCULAR; INTRAVENOUS at 14:43

## 2017-10-19 RX ADMIN — PANTOPRAZOLE SODIUM SCH MG: 40 INJECTION, POWDER, FOR SOLUTION INTRAVENOUS at 09:05

## 2017-10-19 RX ADMIN — OXYCODONE HYDROCHLORIDE AND ACETAMINOPHEN PRN TAB: 10; 325 TABLET ORAL at 01:26

## 2017-10-19 RX ADMIN — Medication SCH ML: at 21:00

## 2017-10-19 RX ADMIN — INSULIN ASPART SCH: 100 INJECTION, SOLUTION INTRAVENOUS; SUBCUTANEOUS at 21:14

## 2017-10-19 RX ADMIN — ACYCLOVIR SCH UNITS: 800 TABLET ORAL at 21:14

## 2017-10-19 RX ADMIN — INSULIN ASPART SCH: 100 INJECTION, SOLUTION INTRAVENOUS; SUBCUTANEOUS at 08:00

## 2017-10-19 RX ADMIN — HEPARIN SODIUM SCH UNITS: 10000 INJECTION, SOLUTION INTRAVENOUS; SUBCUTANEOUS at 21:13

## 2017-10-19 RX ADMIN — CHLORHEXIDINE GLUCONATE SCH PACK: 500 CLOTH TOPICAL at 02:57

## 2017-10-19 RX ADMIN — METOPROLOL TARTRATE SCH MG: 25 TABLET, FILM COATED ORAL at 12:21

## 2017-10-19 RX ADMIN — PHENYTOIN SODIUM SCH MLS/HR: 50 INJECTION INTRAMUSCULAR; INTRAVENOUS at 02:03

## 2017-10-19 RX ADMIN — TAZOBACTAM SODIUM AND PIPERACILLIN SODIUM SCH MLS/HR: 250; 2 INJECTION, SOLUTION INTRAVENOUS at 16:00

## 2017-10-19 RX ADMIN — LABETALOL HCL SCH MG: 200 TABLET, FILM COATED ORAL at 17:05

## 2017-10-19 RX ADMIN — TAZOBACTAM SODIUM AND PIPERACILLIN SODIUM SCH MLS/HR: 250; 2 INJECTION, SOLUTION INTRAVENOUS at 05:05

## 2017-10-19 RX ADMIN — METOPROLOL TARTRATE SCH MG: 25 TABLET, FILM COATED ORAL at 21:12

## 2017-10-19 RX ADMIN — Medication SCH ML: at 09:00

## 2017-10-19 RX ADMIN — OXYCODONE HYDROCHLORIDE AND ACETAMINOPHEN PRN TAB: 10; 325 TABLET ORAL at 14:43

## 2017-10-19 RX ADMIN — LABETALOL HCL SCH MG: 200 TABLET, FILM COATED ORAL at 12:21

## 2017-10-19 RX ADMIN — FLUCONAZOLE SCH MLS/HR: 2 INJECTION, SOLUTION INTRAVENOUS at 08:59

## 2017-10-19 RX ADMIN — ACYCLOVIR SCH UNITS: 800 TABLET ORAL at 09:05

## 2017-10-19 RX ADMIN — INSULIN ASPART SCH: 100 INJECTION, SOLUTION INTRAVENOUS; SUBCUTANEOUS at 12:21

## 2017-10-19 RX ADMIN — INSULIN ASPART SCH: 100 INJECTION, SOLUTION INTRAVENOUS; SUBCUTANEOUS at 16:29

## 2017-10-19 NOTE — HHI.FF
Face to Face Verification


Diagnosis:  


(1) DKA (diabetic ketoacidoses)


(2) Renal failure


(3) Altered mental status, unspecified


(4) Diabetes mellitus


(5) Candida esophagitis


(6) GEOVANNY (acute kidney injury)


Physical Therapy


Order:  Evaluate and Treat





Home Health Nursing








Order: Medical education





 Diabetic education





 Wound care and dressing changes





 Nursing assessment with vital signs

















I have seen patient Maite Damon on 10/19/17. My clinical findings 

support the need for the requested home health care services because:








 Deconditioned w/ increased weakness














I certify that my clinical findings support that this patient is homebound 

because:








 Unable to use public transportation

















Mathieu Arshad MD Oct 19, 2017 12:58

## 2017-10-19 NOTE — HHI.PR
Subjective


Remarks


pt sitting on chair feeling lot better


 As per patient she ate good last night and had some snacks as well.


alert and active looking 


Has a good appetite


Oriented


no pain 


ROS for 10 point system is unremarkable





Objective


Objective Results


-





Vital Signs








  Date Time  Temp Pulse Resp B/P (MAP) Pulse Ox O2 Delivery O2 Flow Rate FiO2


 


10/19/17 11:59 97.9 77 20 164/77 (106) 98   


 


10/19/17 08:00 98.7 97 20 145/72 (96) 97   


 


10/19/17 03:35 98.5 85 18 149/68 (95) 96   


 


10/18/17 23:40 98.5 84 18 127/73 (91) 96   


 


10/18/17 20:33 98.0 82 18 126/82 (97) 97   


 


10/18/17 20:00  85      


 


10/18/17 16:10     97   21


 


10/18/17 16:00 97.9 80 18 146/77 (100) 97   














I/O      


 


 10/18/17 10/18/17 10/18/17 10/19/17 10/19/17 10/19/17





 07:00 15:00 23:00 07:00 15:00 23:00


 


Intake Total 1752 ml  1160 ml 1520 ml 100 ml 


 


Output Total 2200 ml  2700 ml 1900 ml  


 


Balance -448 ml  -1540 ml -380 ml 100 ml 


 


      


 


Intake Oral 480 ml  1060 ml 720 ml  


 


IV Total 1272 ml  100 ml 800 ml 100 ml 


 


Output Urine Total 2200 ml  2700 ml 1900 ml  


 


# Bowel Movements 0  0 0  








Result Diagram:  


10/17/17 0735                                                                  

              10/19/17 0826





Other Results





Laboratory Tests








Test


  10/19/17


08:26


 


Blood Urea Nitrogen 16 


 


Creatinine 2.05 


 


Random Glucose 149 


 


Calcium Level 8.2 


 


Magnesium Level 1.6 


 


Sodium Level 140 


 


Potassium Level 3.6 


 


Chloride Level 110 


 


Carbon Dioxide Level 24.2 


 


Anion Gap 6 


 


Estimat Glomerular Filtration


Rate 30 


 














 Date/Time


Source Procedure


Growth Status


 


 


 10/15/17 18:56


Blood Peripheral Aerobic Blood Culture - Preliminary


NO GROWTH IN 4 DAYS Resulted


 


 10/15/17 18:56


Blood Peripheral Anaerobic Blood Culture - Final


QNS - SEE AEROBE REPORT Resulted











Physical Exam


Physical Exam


GENERAL: The patient oriented, not in any apparent respiratory distress.


VITAL SIGNS: reviewed


HEAD, EYES, EARS, NOSE, THROAT:  Head is normocephalic and atraumatic. Eyes -


negative conjunctival icterus. Mouth has significant improvement in thrush.


NECK: The neck is supple. No increased jugular venous distention. Central


trachea.


RESPIRATORY SYSTEM: Decreased air entry bibasilarly. Questionable rales versus


rhonchi. No other sounds noted.


CARDIOVASCULAR: S1-S2 audible. Sinus tachycardia. Unable to appreciate any


murmur.


GASTROINTESTINAL: Abdomen soft and nontender. No organomegaly. Positive bowel


sounds.


MUSCULOSKELETAL: Extremities have no cyanosis or pedal edema appreciated.


CENTRAL NERVOUS SYSTEM: alert oriented, normal facial features and moving


all her extremities.


SKIN: Warm and dry.  There is a area approximately 5-7 cm on the right buttock 

with wound superficial sloughing off of his skin with clear border and pinkish 

base no sign of infection


PSYCHIATRIC: Appropriate mood and affect but lethargic.





A/P


Assessment and Plan


1. Acute renal failure.


2. Diabetic ketoacidosis.


3. Aspiration pneumonia.


4. Candida esophagitis.


5. Rhabdomyolysis.


6. Coronary artery disease with increased troponin.


7. Sinus tachycardia with history of  status post ablation with


   pacemaker placement.


8. Anemia.


9. History of systemic lupus erythematosus / rheumatoid arthritis.


10. Hypothyroidism.


11. Peripheral neuropathy.


12. Gastroparesis. History of botox  injection in the past.


14. History of internal and external hemorrhoids


15. History of chronic pain


16. Leukocytosis.


 


PLAN:


1. on  insulin subcutaneous for her diabetes, improving control. reolved DKA .  

Hypoglycemia resolved.  Will continue current insulin long-acting dosage.


2. IV hydration.


3. IV antibiotics.


4. Appreciate neurologist help. 


5. Breathing treatments.


6. Increased troponin likely secondary to acute kidney injury and a history of


   tachycardia but the patient has a history of coronary artery disease. [facility] 

ardiology consult.  Tachycardia improved


7. Monitor increased liver function tests.  Improving


8. labs reviewed.  Improve what blood cell count, stable H&H.  Improving kidney 

function.  Low potassium will replace.  Low magnesium will replace


9.  Improving CPK


10. Diflucan for candida esophagitis and oral thrush 


11. Monitor labs.


12. dw pt 


Plan for BMP tomorrow and wound care consult


landry rn.


 


CONDITION:


guarded


 


Continue physical therapy


Further recommendations to follow as the patient progresses.











Mathieu Arshad MD Oct 19, 2017 13:39

## 2017-10-19 NOTE — [email or web address]
Wound Consult


Description:


Consult for right buttock per Dr Arshad


Communicated with:


SYLVIA Uribe


Patient


Patient daughter


Recommendation:


Skin Protectant to right and left buttocks BID and PRN for moisture


Additional Information:


Patient seen on [address] for wound evaluation of right buttock. Patient 

positioned herself to her left side for assessment. Right buttock is noted with 

a partial thickness skin loss area measuring 7cm x 5.6cm x <0.1cm of moist non 

granulating red/pink tissue noted with jagged wound margins and friable moist 

periwound indicating a moisture etiology. There is also partial thickness skin 

loss noted in the periwound of this wound measuring 0.3cm x 1.3cm x <0.1cm. 

Left buttock has a partial thickness skin loss area measuring 0.5cm x 1cm x <

0.1cm that appears to be an open excoriation (open scratch). These wounds are 

not pressure related. Patient was encouraged to not slide in bed when 

repositioning her self.











Mariangel Cox [facility] Oct 19, 2017 17:32

## 2017-10-20 VITALS
TEMPERATURE: 97.9 F | DIASTOLIC BLOOD PRESSURE: 66 MMHG | RESPIRATION RATE: 20 BRPM | OXYGEN SATURATION: 96 % | SYSTOLIC BLOOD PRESSURE: 145 MMHG | HEART RATE: 68 BPM

## 2017-10-20 VITALS
TEMPERATURE: 98.2 F | HEART RATE: 82 BPM | OXYGEN SATURATION: 97 % | DIASTOLIC BLOOD PRESSURE: 72 MMHG | SYSTOLIC BLOOD PRESSURE: 158 MMHG | RESPIRATION RATE: 16 BRPM

## 2017-10-20 VITALS
HEART RATE: 73 BPM | SYSTOLIC BLOOD PRESSURE: 135 MMHG | TEMPERATURE: 97.7 F | OXYGEN SATURATION: 97 % | RESPIRATION RATE: 16 BRPM | DIASTOLIC BLOOD PRESSURE: 64 MMHG

## 2017-10-20 VITALS — HEART RATE: 69 BPM

## 2017-10-20 VITALS
HEART RATE: 73 BPM | TEMPERATURE: 98.1 F | DIASTOLIC BLOOD PRESSURE: 69 MMHG | SYSTOLIC BLOOD PRESSURE: 151 MMHG | OXYGEN SATURATION: 99 % | RESPIRATION RATE: 20 BRPM

## 2017-10-20 VITALS
RESPIRATION RATE: 20 BRPM | SYSTOLIC BLOOD PRESSURE: 154 MMHG | HEART RATE: 69 BPM | DIASTOLIC BLOOD PRESSURE: 88 MMHG | OXYGEN SATURATION: 98 % | TEMPERATURE: 98.2 F

## 2017-10-20 VITALS — HEART RATE: 73 BPM

## 2017-10-20 LAB
ANION GAP SERPL CALC-SCNC: 7 MEQ/L (ref 5–15)
BUN SERPL-MCNC: 12 MG/DL (ref 7–18)
CHLORIDE SERPL-SCNC: 110 MEQ/L (ref 98–107)
GFR SERPLBLD BASED ON 1.73 SQ M-ARVRAT: 39 ML/MIN (ref 89–?)
HCO3 BLD-SCNC: 23.9 MEQ/L (ref 21–32)
MAGNESIUM SERPL-MCNC: 1.4 MG/DL (ref 1.5–2.5)
POTASSIUM SERPL-SCNC: 3.4 MEQ/L (ref 3.5–5.1)
SODIUM SERPL-SCNC: 141 MEQ/L (ref 136–145)

## 2017-10-20 RX ADMIN — ACYCLOVIR SCH UNITS: 800 TABLET ORAL at 09:50

## 2017-10-20 RX ADMIN — MORPHINE SULFATE SCH MG: 15 TABLET, EXTENDED RELEASE ORAL at 09:02

## 2017-10-20 RX ADMIN — PANTOPRAZOLE SODIUM SCH MG: 40 INJECTION, POWDER, FOR SOLUTION INTRAVENOUS at 09:01

## 2017-10-20 RX ADMIN — Medication SCH ML: at 20:37

## 2017-10-20 RX ADMIN — STANDARDIZED SENNA CONCENTRATE AND DOCUSATE SODIUM SCH TAB: 8.6; 5 TABLET, FILM COATED ORAL at 20:37

## 2017-10-20 RX ADMIN — LABETALOL HCL SCH MG: 200 TABLET, FILM COATED ORAL at 09:02

## 2017-10-20 RX ADMIN — TAZOBACTAM SODIUM AND PIPERACILLIN SODIUM SCH MLS/HR: 250; 2 INJECTION, SOLUTION INTRAVENOUS at 03:21

## 2017-10-20 RX ADMIN — OXYCODONE HYDROCHLORIDE AND ACETAMINOPHEN PRN TAB: 10; 325 TABLET ORAL at 06:42

## 2017-10-20 RX ADMIN — LABETALOL HCL SCH MG: 200 TABLET, FILM COATED ORAL at 12:54

## 2017-10-20 RX ADMIN — TAZOBACTAM SODIUM AND PIPERACILLIN SODIUM SCH MLS/HR: 250; 2 INJECTION, SOLUTION INTRAVENOUS at 15:41

## 2017-10-20 RX ADMIN — MAGNESIUM SULFATE IN DEXTROSE SCH MLS/HR: 10 INJECTION, SOLUTION INTRAVENOUS at 12:54

## 2017-10-20 RX ADMIN — Medication SCH ML: at 09:01

## 2017-10-20 RX ADMIN — INSULIN ASPART SCH: 100 INJECTION, SOLUTION INTRAVENOUS; SUBCUTANEOUS at 20:38

## 2017-10-20 RX ADMIN — MORPHINE SULFATE SCH MG: 15 TABLET, EXTENDED RELEASE ORAL at 20:36

## 2017-10-20 RX ADMIN — OXYCODONE HYDROCHLORIDE AND ACETAMINOPHEN PRN TAB: 10; 325 TABLET ORAL at 00:17

## 2017-10-20 RX ADMIN — INSULIN ASPART SCH: 100 INJECTION, SOLUTION INTRAVENOUS; SUBCUTANEOUS at 08:00

## 2017-10-20 RX ADMIN — INSULIN ASPART SCH: 100 INJECTION, SOLUTION INTRAVENOUS; SUBCUTANEOUS at 13:01

## 2017-10-20 RX ADMIN — LABETALOL HCL SCH MG: 200 TABLET, FILM COATED ORAL at 17:16

## 2017-10-20 RX ADMIN — TAZOBACTAM SODIUM AND PIPERACILLIN SODIUM SCH MLS/HR: 250; 2 INJECTION, SOLUTION INTRAVENOUS at 10:45

## 2017-10-20 RX ADMIN — PHENYTOIN SODIUM SCH MLS/HR: 50 INJECTION INTRAMUSCULAR; INTRAVENOUS at 23:24

## 2017-10-20 RX ADMIN — OXYCODONE HYDROCHLORIDE AND ACETAMINOPHEN PRN TAB: 10; 325 TABLET ORAL at 23:23

## 2017-10-20 RX ADMIN — PHENYTOIN SODIUM SCH MLS/HR: 50 INJECTION INTRAMUSCULAR; INTRAVENOUS at 03:21

## 2017-10-20 RX ADMIN — HEPARIN SODIUM SCH UNITS: 10000 INJECTION, SOLUTION INTRAVENOUS; SUBCUTANEOUS at 20:37

## 2017-10-20 RX ADMIN — LEVOTHYROXINE SODIUM SCH MCG: 125 TABLET ORAL at 05:08

## 2017-10-20 RX ADMIN — TAZOBACTAM SODIUM AND PIPERACILLIN SODIUM SCH MLS/HR: 250; 2 INJECTION, SOLUTION INTRAVENOUS at 22:05

## 2017-10-20 RX ADMIN — INSULIN ASPART SCH: 100 INJECTION, SOLUTION INTRAVENOUS; SUBCUTANEOUS at 17:17

## 2017-10-20 RX ADMIN — METOPROLOL TARTRATE SCH MG: 25 TABLET, FILM COATED ORAL at 05:08

## 2017-10-20 RX ADMIN — ACYCLOVIR SCH UNITS: 800 TABLET ORAL at 20:38

## 2017-10-20 RX ADMIN — FLUCONAZOLE SCH MLS/HR: 2 INJECTION, SOLUTION INTRAVENOUS at 09:00

## 2017-10-20 RX ADMIN — HEPARIN SODIUM SCH UNITS: 10000 INJECTION, SOLUTION INTRAVENOUS; SUBCUTANEOUS at 09:01

## 2017-10-20 RX ADMIN — MAGNESIUM SULFATE IN DEXTROSE SCH MLS/HR: 10 INJECTION, SOLUTION INTRAVENOUS at 11:48

## 2017-10-20 RX ADMIN — CHLORHEXIDINE GLUCONATE SCH PACK: 500 CLOTH TOPICAL at 03:07

## 2017-10-20 RX ADMIN — METOPROLOL TARTRATE SCH MG: 25 TABLET, FILM COATED ORAL at 12:54

## 2017-10-20 RX ADMIN — CHLORHEXIDINE GLUCONATE SCH ML: 1.2 RINSE ORAL at 08:59

## 2017-10-20 RX ADMIN — STANDARDIZED SENNA CONCENTRATE AND DOCUSATE SODIUM SCH TAB: 8.6; 5 TABLET, FILM COATED ORAL at 09:00

## 2017-10-20 RX ADMIN — OXYCODONE HYDROCHLORIDE AND ACETAMINOPHEN PRN TAB: 10; 325 TABLET ORAL at 13:00

## 2017-10-20 RX ADMIN — METOPROLOL TARTRATE SCH MG: 25 TABLET, FILM COATED ORAL at 22:05

## 2017-10-20 NOTE — HHI.PR
Subjective


Remarks


pt sitting on chair feeling lot better


As per patient she ate good last night and had some snacks as well.


alert and active looking 


Has a good appetite


Oriented


no pain 


ROS for 10 point system is unremarkable





Objective


Objective Results


-





Vital Signs








  Date Time  Temp Pulse Resp B/P (MAP) Pulse Ox O2 Delivery O2 Flow Rate FiO2


 


10/20/17 08:00     98 Room Air  


 


10/20/17 08:00 98.2 69 20 154/88 (110) 98   


 


10/20/17 04:00      Room Air  


 


10/20/17 03:55 98.2 82 16 158/72 (100) 97   


 


10/20/17 01:07   17     


 


10/20/17 00:00      Room Air  


 


10/19/17 23:47 97.5 81 16 118/60 (79) 97   


 


10/19/17 23:15   19     


 


10/19/17 21:15      Room Air  


 


10/19/17 20:47 97.3 82 16 126/61 (82) 97   


 


10/19/17 20:00  67      


 


10/19/17 18:00     98 Room Air  


 


10/19/17 15:51 97.5 80 20 133/68 (89) 98   


 


10/19/17 12:00     98 Room Air  


 


10/19/17 11:59 97.9 77 20 164/77 (106) 98   














I/O      


 


 10/19/17 10/19/17 10/19/17 10/20/17 10/20/17 10/20/17





 07:00 15:00 23:00 07:00 15:00 23:00


 


Intake Total 1520 ml 100 ml 150 ml 290 ml 128 ml 


 


Output Total 1900 ml   140 ml  


 


Balance -380 ml 100 ml 150 ml 150 ml 128 ml 


 


      


 


Intake Oral 720 ml   240 ml  


 


IV Total 800 ml 100 ml 150 ml 50 ml 128 ml 


 


Output Urine Total 1900 ml   140 ml  


 


# Voids  4 2   


 


# Bowel Movements 0 3 2 4  








Result Diagram:  


10/17/17 0735                                                                  

              10/20/17 0820





Other Results





Laboratory Tests








Test


  10/19/17


17:54 10/20/17


08:20


 


Random Glucose 112  76 


 


Blood Urea Nitrogen  12 


 


Creatinine  1.63 


 


Calcium Level  7.6 


 


Magnesium Level  1.4 


 


Sodium Level  141 


 


Potassium Level  3.4 


 


Chloride Level  110 


 


Carbon Dioxide Level  23.9 


 


Anion Gap  7 


 


Estimat Glomerular Filtration


Rate 


  39 


 














 Date/Time


Source Procedure


Growth Status


 


 


 10/15/17 18:56


Blood Peripheral Aerobic Blood Culture - Preliminary


NO GROWTH IN 4 DAYS Resulted


 


 10/15/17 18:56


Blood Peripheral Anaerobic Blood Culture - Final


QNS - SEE AEROBE REPORT Resulted











Physical Exam


Physical Exam


GENERAL: The patient oriented, not in any apparent respiratory distress.


VITAL SIGNS: reviewed


HEAD, EYES, EARS, NOSE, THROAT:  Head is normocephalic and atraumatic. Eyes -


negative conjunctival icterus. Mouth has significant improvement in thrush.


NECK: The neck is supple. No increased jugular venous distention. Central


trachea.


RESPIRATORY SYSTEM: Decreased air entry bibasilarly. Questionable rales versus


rhonchi. No other sounds noted.


CARDIOVASCULAR: S1-S2 audible. Sinus tachycardia. Unable to appreciate any


murmur.


GASTROINTESTINAL: Abdomen soft and nontender. No organomegaly. Positive bowel


sounds.


MUSCULOSKELETAL: Extremities have no cyanosis or pedal edema appreciated.


CENTRAL NERVOUS SYSTEM: alert oriented, normal facial features and moving


all her extremities.


SKIN: Warm and dry.  Skin wounds.  Appreciate wound care consult


PSYCHIATRIC: Appropriate mood and affect but lethargic.





A/P


Assessment and Plan


1. Acute renal failure.


2. Diabetic ketoacidosis.


3. Aspiration pneumonia.


4. Candida esophagitis.


5. Rhabdomyolysis.


6. Coronary artery disease with increased troponin.


7. Sinus tachycardia with history of  status post ablation with


   pacemaker placement.


8. Anemia.


9. History of systemic lupus erythematosus / rheumatoid arthritis.


10. Hypothyroidism.


11. Peripheral neuropathy.


12. Gastroparesis. History of botox  injection in the past.


14. History of internal and external hemorrhoids


15. History of chronic pain


16. Leukocytosis.


 


PLAN:


1. on  insulin subcutaneous for her diabetes, improving control. reolved DKA .  

Hypoglycemia.  Will hold Levemir insulin this morning and decrease dose of 

insulin long-acting.  She has been encouraged to take good by mouth food


2. IV hydration.


3. IV antibiotics.


4. Appreciate neurologist help. 


5. Breathing treatments.


6. Increased troponin likely secondary to acute kidney injury and a history of


   tachycardia but the patient has a history of coronary artery disease. [facility] 

cardiology consult.  Tachycardia improved


7. Monitor increased liver function tests.  Improving


8. labs reviewed.  Improve what blood cell count, stable H&H.  Improving kidney 

function.  Low potassium will replace.  Low magnesium will replace


9.  Improving CPK


10. Diflucan for candida esophagitis and oral thrush 


11. Monitor labs.


12. dw pt 


Plan for BMP and magnesium level tomorrow


Appreciate wound care consult


dw rn.


 


CONDITION:


guarded


 


Continue physical therapy


Further recommendations to follow as the patient progresses.











Mathieu Arshad MD Oct 20, 2017 10:28

## 2017-10-21 VITALS
DIASTOLIC BLOOD PRESSURE: 64 MMHG | HEART RATE: 73 BPM | RESPIRATION RATE: 20 BRPM | OXYGEN SATURATION: 99 % | SYSTOLIC BLOOD PRESSURE: 154 MMHG | TEMPERATURE: 97.3 F

## 2017-10-21 VITALS
TEMPERATURE: 97.7 F | HEART RATE: 74 BPM | RESPIRATION RATE: 20 BRPM | OXYGEN SATURATION: 96 % | DIASTOLIC BLOOD PRESSURE: 70 MMHG | SYSTOLIC BLOOD PRESSURE: 155 MMHG

## 2017-10-21 VITALS
HEART RATE: 71 BPM | SYSTOLIC BLOOD PRESSURE: 132 MMHG | DIASTOLIC BLOOD PRESSURE: 64 MMHG | RESPIRATION RATE: 20 BRPM | OXYGEN SATURATION: 98 % | TEMPERATURE: 97.7 F

## 2017-10-21 VITALS
DIASTOLIC BLOOD PRESSURE: 68 MMHG | TEMPERATURE: 98.4 F | HEART RATE: 76 BPM | SYSTOLIC BLOOD PRESSURE: 144 MMHG | OXYGEN SATURATION: 96 % | RESPIRATION RATE: 16 BRPM

## 2017-10-21 VITALS
TEMPERATURE: 98.6 F | HEART RATE: 81 BPM | OXYGEN SATURATION: 96 % | RESPIRATION RATE: 16 BRPM | SYSTOLIC BLOOD PRESSURE: 146 MMHG | DIASTOLIC BLOOD PRESSURE: 68 MMHG

## 2017-10-21 VITALS — HEART RATE: 86 BPM

## 2017-10-21 LAB
ANION GAP SERPL CALC-SCNC: 10 MEQ/L (ref 5–15)
BUN SERPL-MCNC: 11 MG/DL (ref 7–18)
CHLORIDE SERPL-SCNC: 108 MEQ/L (ref 98–107)
GFR SERPLBLD BASED ON 1.73 SQ M-ARVRAT: 42 ML/MIN (ref 89–?)
HCO3 BLD-SCNC: 22.3 MEQ/L (ref 21–32)
MAGNESIUM SERPL-MCNC: 1.8 MG/DL (ref 1.5–2.5)
POTASSIUM SERPL-SCNC: 3.3 MEQ/L (ref 3.5–5.1)
SODIUM SERPL-SCNC: 140 MEQ/L (ref 136–145)

## 2017-10-21 RX ADMIN — MORPHINE SULFATE SCH MG: 15 TABLET, EXTENDED RELEASE ORAL at 09:33

## 2017-10-21 RX ADMIN — CHLORHEXIDINE GLUCONATE SCH ML: 1.2 RINSE ORAL at 09:33

## 2017-10-21 RX ADMIN — METOPROLOL TARTRATE SCH MG: 25 TABLET, FILM COATED ORAL at 14:00

## 2017-10-21 RX ADMIN — OXYCODONE HYDROCHLORIDE AND ACETAMINOPHEN PRN TAB: 10; 325 TABLET ORAL at 18:39

## 2017-10-21 RX ADMIN — TAZOBACTAM SODIUM AND PIPERACILLIN SODIUM SCH MLS/HR: 250; 2 INJECTION, SOLUTION INTRAVENOUS at 16:00

## 2017-10-21 RX ADMIN — HEPARIN SODIUM SCH UNITS: 10000 INJECTION, SOLUTION INTRAVENOUS; SUBCUTANEOUS at 09:32

## 2017-10-21 RX ADMIN — LABETALOL HCL SCH MG: 200 TABLET, FILM COATED ORAL at 09:32

## 2017-10-21 RX ADMIN — FLUCONAZOLE SCH MLS/HR: 2 INJECTION, SOLUTION INTRAVENOUS at 09:22

## 2017-10-21 RX ADMIN — Medication SCH ML: at 09:00

## 2017-10-21 RX ADMIN — METOPROLOL TARTRATE SCH MG: 25 TABLET, FILM COATED ORAL at 05:19

## 2017-10-21 RX ADMIN — INSULIN ASPART SCH: 100 INJECTION, SOLUTION INTRAVENOUS; SUBCUTANEOUS at 16:43

## 2017-10-21 RX ADMIN — CHLORHEXIDINE GLUCONATE SCH PACK: 500 CLOTH TOPICAL at 04:00

## 2017-10-21 RX ADMIN — LEVOTHYROXINE SODIUM SCH MCG: 125 TABLET ORAL at 05:19

## 2017-10-21 RX ADMIN — INSULIN ASPART SCH: 100 INJECTION, SOLUTION INTRAVENOUS; SUBCUTANEOUS at 12:37

## 2017-10-21 RX ADMIN — LABETALOL HCL SCH MG: 200 TABLET, FILM COATED ORAL at 17:30

## 2017-10-21 RX ADMIN — ACYCLOVIR SCH UNITS: 800 TABLET ORAL at 09:00

## 2017-10-21 RX ADMIN — LABETALOL HCL SCH MG: 200 TABLET, FILM COATED ORAL at 12:36

## 2017-10-21 RX ADMIN — OXYCODONE HYDROCHLORIDE AND ACETAMINOPHEN PRN TAB: 10; 325 TABLET ORAL at 05:19

## 2017-10-21 RX ADMIN — TAZOBACTAM SODIUM AND PIPERACILLIN SODIUM SCH MLS/HR: 250; 2 INJECTION, SOLUTION INTRAVENOUS at 04:02

## 2017-10-21 RX ADMIN — OXYCODONE HYDROCHLORIDE AND ACETAMINOPHEN PRN TAB: 10; 325 TABLET ORAL at 12:37

## 2017-10-21 RX ADMIN — STANDARDIZED SENNA CONCENTRATE AND DOCUSATE SODIUM SCH TAB: 8.6; 5 TABLET, FILM COATED ORAL at 09:00

## 2017-10-21 RX ADMIN — INSULIN ASPART SCH: 100 INJECTION, SOLUTION INTRAVENOUS; SUBCUTANEOUS at 08:00

## 2017-10-21 RX ADMIN — TAZOBACTAM SODIUM AND PIPERACILLIN SODIUM SCH MLS/HR: 250; 2 INJECTION, SOLUTION INTRAVENOUS at 09:34

## 2017-10-21 RX ADMIN — PANTOPRAZOLE SODIUM SCH MG: 40 INJECTION, POWDER, FOR SOLUTION INTRAVENOUS at 09:29

## 2017-10-21 NOTE — HHI.PR
Subjective


Remarks


62yr old female seen and examined today.


pt sitting on bed, feeling better. Wants to go home. 


alert and active looking 


Has a good appetite


Oriented


no pain 


no fever. 


ROS for 10 point system is unremarkable





Objective


Objective Results


-





Vital Signs








  Date Time  Temp Pulse Resp B/P (MAP) Pulse Ox O2 Delivery O2 Flow Rate FiO2


 


10/21/17 06:59   18     


 


10/21/17 04:00 98.6 81 16 146/68 (94) 96   


 


10/21/17 04:00      Room Air  


 


10/21/17 00:00      Room Air  


 


10/21/17 00:00 98.4 76 16 144/68 (93) 96   


 


10/20/17 20:35      Room Air  


 


10/20/17 20:22  73      


 


10/20/17 20:00 97.7 73 16 135/64 (87) 97   


 


10/20/17 16:00 97.9 68 20 145/66 (92) 96   


 


10/20/17 12:00 98.1 73 20 151/69 (96) 99   














I/O      


 


 10/20/17 10/20/17 10/20/17 10/21/17 10/21/17 10/21/17





 07:00 15:00 23:00 07:00 15:00 23:00


 


Intake Total 290 ml 378 ml 240 ml   


 


Output Total 140 ml 1450 ml 400 ml 1800 ml  


 


Balance 150 ml -1072 ml -160 ml -1800 ml  


 


      


 


Intake Oral 240 ml  240 ml   


 


IV Total 50 ml 378 ml    


 


Output Urine Total 140 ml 1450 ml 400 ml 1800 ml  


 


# Bowel Movements 4 1 3 1  








Result Diagram:  


10/17/17 0735                                                                  

              10/21/17 0530





Other Results





Laboratory Tests








Test


  10/21/17


05:30


 


Blood Urea Nitrogen 11 


 


Creatinine 1.53 


 


Random Glucose 111 


 


Calcium Level 7.8 


 


Magnesium Level 1.8 


 


Sodium Level 140 


 


Potassium Level 3.3 


 


Chloride Level 108 


 


Carbon Dioxide Level 22.3 


 


Anion Gap 10 


 


Estimat Glomerular Filtration


Rate 42 


 














 Date/Time


Source Procedure


Growth Status


 


 


 10/15/17 18:56


Blood Peripheral Aerobic Blood Culture - Final


NO GROWTH IN 5 DAYS Complete


 


 10/15/17 18:56


Blood Peripheral Anaerobic Blood Culture - Final


QNS - SEE AEROBE REPORT Complete











ROS


General:  No: Fatigue, Weakness, Other


HEENT:  No: Sore Throat, Dysphagia, Other


Cardiac:  No: Chest Pain, Edema, Palpitations, Other


Pulmonary:  No: Cough, SOB, Wheezing, Other


GI:  No: Abdominal Pain, BM, Diarrhea, N/V, Other


/GYN:  No: Dysuria, Urgency, Other


Neuro/MS:  No: Lightheaded, Confusion, Other


Psych:  No: Anxiety, Depression, Other


Skin:  No: Itching, Rash, Other





Physical Exam


Physical Exam


PHYSICAL EXAMINATION





GENERAL: The patient oriented, not in any apparent respiratory distress.


VITAL SIGNS: reviewed


HEAD, EYES, EARS, NOSE, THROAT:  Head is normocephalic and atraumatic. Eyes -


negative conjunctival icterus. Mouth has significant improvement in thrush.


NECK: The neck is supple. No increased jugular venous distention. Central


trachea.


RESPIRATORY SYSTEM: Decreased air entry bibasilarly. No rales/rhonchi. 


CARDIOVASCULAR: S1-S2 audible. Unable to appreciate any murmur.


GASTROINTESTINAL: Abdomen soft and nontender. No organomegaly. Positive bowel


sounds.


MUSCULOSKELETAL: Extremities have no cyanosis or pedal edema appreciated.


CENTRAL NERVOUS SYSTEM: alert oriented, normal facial features and moving


all her extremities.


SKIN: Warm and dry.  Skin wounds.  Appreciate wound care consult


PSYCHIATRIC: Appropriate mood and affect.





A/P


Assessment and Plan





A/P


Assessment and Plan


1. Acute renal failure.


2. Diabetic ketoacidosis.


3. Aspiration pneumonia.


4. Candida esophagitis.


5. Rhabdomyolysis.


6. Coronary artery disease with increased troponin.


7. Sinus tachycardia with history of  status post ablation with


   pacemaker placement.


8. Anemia.


9. History of systemic lupus erythematosus / rheumatoid arthritis.


10. Hypothyroidism.


11. Peripheral neuropathy.


12. Gastroparesis. History of botox  injection in the past.


14. History of internal and external hemorrhoids


15. History of chronic pain


16. Leukocytosis.


17.Hypokalemia


 


PLAN:


1. on  SQ insulin for her diabetes, improving control. resolved DKA . No 

further episodes of hypoglycemia. 


2. IV hydration.


3. IV antibiotics.


4. Appreciate neurologist help. 


5. Breathing treatments.


6. Increased troponin likely secondary to acute kidney injury and a history of


   tachycardia but the patient has a history of coronary artery disease. [facility] 

cardiology consult. Tachycardia improved


7. Monitor increased liver function tests:  Improving


8. labs reviewed: Improve WBC, stable H&H, Improving kidney function, magnesium 

levels wnl s/p replacement. Low potassium: will replace.  


9. Improving CPK


10.Diflucan for candida esophagitis and oral thrush 


dw pt/rn. Patient requesting to go home today. Clinically stable for dc. 


Will give KCL 40meq po x1 today. 


BMP as outpatient.


Follow-up with pcp in 3-5 days. 


Follow-up with nephro in 2 weeks.











Ibrahima Levy MD Oct 21, 2017 08:51

## 2017-10-23 NOTE — MD
cc:

SHABBIR DAMON MD

****

 

ADMISSION DATE: 10/14/2017  DISCHARGE DATE:  10/21/2017

 

ADMITTING PHYSICIAN:

Dr. Lew Gomes.

 

DISCHARGING PHYSICIAN:

Shabbir Damon MD.

 

ADMITTING DIAGNOSIS:

1. Polypharmacy.

2. Renal failure.

3. Coronary artery disease.

4. Hypertension.

5. Diabetes mellitus.

6. Aspiration pneumonia.

7. Acute kidney injury.

8. Esophageal stricture.

9. Candida esophagitis.

10. Diabetic ketoacidosis.

11. Rhabdomyolysis.

 

DISCHARGE DIAGNOSIS:

1. Acute renal failure.

2. Diabetic ketoacidosis.

3. Aspiration pneumonia.

4. Candida esophagitis.

5. Rhabdomyolysis.

6. Coronary artery disease with increased troponin.

7. Sinus tachycardia with history of status post ablation with pacemaker

   placement.

8. Anemia.

9. History of systemic lupus erythematosus / rheumatoid arthritis.

10. Hypothyroidism.

11. Peripheral neuropathy.

12. Gastroparesis.

13. History of Botox injections in the past.

14. History of internal and external hemorrhoids.

15. History of chronic pain.

16. Leukocytosis.

17. Hypokalemia.

DIAGNOSTIC STUDIES DONE:

On 10/14/2017, shoulder x-rays: Periarticular calcific deposit characteristic

of ossific tendinitis or bursitis, mild arthropathy.

 

On 10/14/2017, head CT: No evidence of acute intracranial pathology.

 

On 10/14/2017, chest x-ray: Mild right basilar atelectasis.

 

On 10/14/2017, chest CT: Subsegmental airspace disease in the right upper and

lower lobes, moderate cardiomegaly. 3 cm hyperdense nodule is identified in the

right adrenal gland characteristic of an adenoma.

 

On 10/14/2017, ultrasound of the carotids: Minimal carotid plaque bilaterally.

No evidence of hemodynamically significant stenosis.

 

On 10/14/2017, abdominal ultrasound: A 3 cm hypoechoic nodule in the right

suprarenal region consistent with an adrenal adenoma.

 

On 10/15/2017, chest x-ray: Cardiomegaly and findings of vascular congestion

without overt failure.

 

MICROBIOLOGY:

Blood cultures show no growth in five days.

 

HOSPITAL COURSE:

The patient is a 62-year-old -American female with past medical history

of insulin-dependent diabetes mellitus, hypertension, COPD, ablation of atrial

tachycardia, pacemaker, gastroparesis status post Botox therapy pylorus

10/5/2017, esophageal stricture status post multiple dilations, peripheral

neuropathy, peripheral artery disease, systemic lupus erythematosus /

rheumatoid arthritis, chronic pain who had presented to the [facility] on 10/14/2017 via [facility]. According to [facility], she was hypertensive and

tachycardic on arrival with critically high glucose.  Fluids were initiated per

[facility].  In the emergency department, her blood pressure was 102/52, blood

glucose was 689, beta-hydroxybutyrate was elevated and albumin corrected anion

gap was 18. She received two liters of normal saline bolus in the emergency

department.

 

As the patient was lethargic and weak, a CT scan was obtained to rule out a

CVA.  All of her ____ medications were kept on hold. She was started on DuoNeb

every 4 hours. Oxygen two liters via nasal cannula to keep pulse oximetry above

92%.

 

An ultrasound of the carotids was also done which ruled out any hemodynamically

significant stenosis.

 

She was kept NPO. She was started on sliding scale insulin and her sugars were

closely monitored.

 

She was noted to have Candida esophagitis so she was started on Fluconazole.

 

As she was complaining of right shoulder pain, an x-ray was done.

 

Initially the patient was on diabetic ketoacidosis protocol on insulin drip and

later on as her sugars were improving, she was transitioned to subcutaneous

insulin, Protonix for DVT prophylaxis, heparin for GI prophylaxis.

 

She was monitored carefully. Neurology consult was also requested.

 

As the patient's troponin was elevated and she was tachycardic, cardiology

consult was requested and the patient was seen by Dr. Wiggins who recommended

continuing the supportive care including the fluids.

 

As the patient was in acute renal failure, her ACE inhibitors were kept on hold

and her metoprolol was switched to labetalol and amlodipine was increased to 10

milligrams.

 

For her leukocytosis, she was continued on Zosyn.  Blood cultures were

requested which were negative after five days.

 

The patient was carefully and closely monitored. Physical therapy was requested

and her diet was gradually progressed.

 

On 10/20/2017 as the patient had hypoglycemic episodes, her Levemir was kept on

hold and p.o. intake was encouraged.  Her liver function tests were improving.

Her renal function was improving. Her sugars were improving.  As her magnesium

and potassium were low, they were replaced.

 

On 10/21/2017, her vitals were stable. She was afebrile. Her pulse was 81. Her

potassium was 3.3.  Creatinine was 1.53.  Blood glucose was 111.  The patient

requested to go home.

 

As the patient was clinically stable, she was discharged home with home

health.

 

DISCHARGE CONDITION:

Stable.

 

DISCHARGE DISPOSITION:

Home with home health. A walker was requested for the patient.

 

DISCHARGE MEDICATIONS:

Please see the reconciled medication list.

 

DISCHARGE INSTRUCTIONS:

1. The patient was given potassium chloride 40 milliequivalents prior to

discharge.

2. The patient was advised to make a follow up with the primary care physician

in three to five days.

3. She needs a repeat basic metabolic profile.

4. Follow up with the nephrologist in two weeks.

 

 

 

 

                              _________________________________

                              Shabbir Damon MD SR/RICARDO

D:  10/21/2017/11:19 AM

T:  10/23/2017/8:08 AM

Visit #:  [id number]

Job #:  [id number]

## 2017-10-29 ENCOUNTER — HOSPITAL ENCOUNTER (INPATIENT)
Dept: HOSPITAL 17 - NEPC | Age: 62
LOS: 4 days | Discharge: NURSING, REHAB OR LONG-TERM CARE | DRG: 480 | End: 2017-11-02
Attending: HOSPITALIST | Admitting: HOSPITALIST
Payer: MEDICARE

## 2017-10-29 VITALS
TEMPERATURE: 98.2 F | RESPIRATION RATE: 19 BRPM | HEART RATE: 64 BPM | DIASTOLIC BLOOD PRESSURE: 72 MMHG | OXYGEN SATURATION: 97 % | SYSTOLIC BLOOD PRESSURE: 129 MMHG

## 2017-10-29 VITALS — WEIGHT: 154.1 LBS | BODY MASS INDEX: 26.31 KG/M2 | HEIGHT: 64 IN

## 2017-10-29 VITALS
TEMPERATURE: 96.7 F | SYSTOLIC BLOOD PRESSURE: 159 MMHG | RESPIRATION RATE: 18 BRPM | DIASTOLIC BLOOD PRESSURE: 70 MMHG | OXYGEN SATURATION: 95 % | HEART RATE: 75 BPM

## 2017-10-29 VITALS — DIASTOLIC BLOOD PRESSURE: 75 MMHG | SYSTOLIC BLOOD PRESSURE: 147 MMHG

## 2017-10-29 DIAGNOSIS — I50.9: ICD-10-CM

## 2017-10-29 DIAGNOSIS — R55: ICD-10-CM

## 2017-10-29 DIAGNOSIS — E55.9: ICD-10-CM

## 2017-10-29 DIAGNOSIS — Z87.891: ICD-10-CM

## 2017-10-29 DIAGNOSIS — Z95.810: ICD-10-CM

## 2017-10-29 DIAGNOSIS — E11.649: ICD-10-CM

## 2017-10-29 DIAGNOSIS — I25.10: ICD-10-CM

## 2017-10-29 DIAGNOSIS — S91.312A: ICD-10-CM

## 2017-10-29 DIAGNOSIS — L89.313: ICD-10-CM

## 2017-10-29 DIAGNOSIS — M79.7: ICD-10-CM

## 2017-10-29 DIAGNOSIS — D51.0: ICD-10-CM

## 2017-10-29 DIAGNOSIS — E87.6: ICD-10-CM

## 2017-10-29 DIAGNOSIS — Z95.1: ICD-10-CM

## 2017-10-29 DIAGNOSIS — J44.9: ICD-10-CM

## 2017-10-29 DIAGNOSIS — I25.2: ICD-10-CM

## 2017-10-29 DIAGNOSIS — S72.142A: Primary | ICD-10-CM

## 2017-10-29 DIAGNOSIS — Z79.4: ICD-10-CM
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
## 2017-10-29 DIAGNOSIS — I11.0: ICD-10-CM
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
## 2017-10-29 DIAGNOSIS — M06.9: ICD-10-CM

## 2017-10-29 DIAGNOSIS — R63.0: ICD-10-CM

## 2017-10-29 DIAGNOSIS — E03.9: ICD-10-CM

## 2017-10-29 DIAGNOSIS — E11.42: ICD-10-CM

## 2017-10-29 DIAGNOSIS — K21.9: ICD-10-CM

## 2017-10-29 DIAGNOSIS — I42.9: ICD-10-CM

## 2017-10-29 LAB
ALBUMIN SERPL-MCNC: 2.9 GM/DL (ref 3.4–5)
ALP SERPL-CCNC: 93 U/L (ref 45–117)
ALT SERPL-CCNC: 30 U/L (ref 10–53)
AMORPHOUS SEDIMENT, URINE: (no result)
AST SERPL-CCNC: 24 U/L (ref 15–37)
BACTERIA #/AREA URNS HPF: (no result) /HPF
BASOPHILS # BLD AUTO: 0.1 TH/MM3 (ref 0–0.2)
BASOPHILS NFR BLD: 0.4 % (ref 0–2)
BILIRUB SERPL-MCNC: 0.3 MG/DL (ref 0.2–1)
BUN SERPL-MCNC: 22 MG/DL (ref 7–18)
CALCIUM SERPL-MCNC: 9.1 MG/DL (ref 8.5–10.1)
CHLORIDE SERPL-SCNC: 107 MEQ/L (ref 98–107)
COLOR UR: (no result)
CREAT SERPL-MCNC: 1.63 MG/DL (ref 0.5–1)
EOSINOPHIL # BLD: 0.1 TH/MM3 (ref 0–0.4)
EOSINOPHIL NFR BLD: 0.5 % (ref 0–4)
ERYTHROCYTE [DISTWIDTH] IN BLOOD BY AUTOMATED COUNT: 16.9 % (ref 11.6–17.2)
GFR SERPLBLD BASED ON 1.73 SQ M-ARVRAT: 39 ML/MIN (ref 89–?)
GLUCOSE SERPL-MCNC: 102 MG/DL (ref 74–106)
GLUCOSE UR STRIP-MCNC: (no result) MG/DL
HCO3 BLD-SCNC: 24.2 MEQ/L (ref 21–32)
HCT VFR BLD CALC: 32.6 % (ref 35–46)
HGB BLD-MCNC: 10.5 GM/DL (ref 11.6–15.3)
HGB UR QL STRIP: (no result)
INR PPP: 0.9 RATIO
KETONES UR STRIP-MCNC: (no result) MG/DL
LYMPHOCYTES # BLD AUTO: 1.1 TH/MM3 (ref 1–4.8)
LYMPHOCYTES NFR BLD AUTO: 6.6 % (ref 9–44)
MCH RBC QN AUTO: 27.4 PG (ref 27–34)
MCHC RBC AUTO-ENTMCNC: 32.1 % (ref 32–36)
MCV RBC AUTO: 85.2 FL (ref 80–100)
MONOCYTE #: 1.4 TH/MM3 (ref 0–0.9)
MONOCYTES NFR BLD: 8.5 % (ref 0–8)
MUCOUS THREADS #/AREA URNS LPF: (no result) /LPF
NEUTROPHILS # BLD AUTO: 13.4 TH/MM3 (ref 1.8–7.7)
NEUTROPHILS NFR BLD AUTO: 84 % (ref 16–70)
NITRITE UR QL STRIP: (no result)
PLATELET # BLD: 320 TH/MM3 (ref 150–450)
PMV BLD AUTO: 8 FL (ref 7–11)
PROT SERPL-MCNC: 6.9 GM/DL (ref 6.4–8.2)
PROTHROMBIN TIME: 10.2 SEC (ref 9.8–11.6)
RBC # BLD AUTO: 3.82 MIL/MM3 (ref 4–5.3)
SODIUM SERPL-SCNC: 139 MEQ/L (ref 136–145)
SP GR UR STRIP: 1.01 (ref 1–1.03)
URINE LEUKOCYTE ESTERASE: (no result)
WBC # BLD AUTO: 16 TH/MM3 (ref 4–11)

## 2017-10-29 PROCEDURE — 71010: CPT

## 2017-10-29 PROCEDURE — 86880 COOMBS TEST DIRECT: CPT

## 2017-10-29 PROCEDURE — 96374 THER/PROPH/DIAG INJ IV PUSH: CPT

## 2017-10-29 PROCEDURE — 36430 TRANSFUSION BLD/BLD COMPNT: CPT

## 2017-10-29 PROCEDURE — 85730 THROMBOPLASTIN TIME PARTIAL: CPT

## 2017-10-29 PROCEDURE — 96376 TX/PRO/DX INJ SAME DRUG ADON: CPT

## 2017-10-29 PROCEDURE — 80053 COMPREHEN METABOLIC PANEL: CPT

## 2017-10-29 PROCEDURE — 70450 CT HEAD/BRAIN W/O DYE: CPT

## 2017-10-29 PROCEDURE — 85610 PROTHROMBIN TIME: CPT

## 2017-10-29 PROCEDURE — 82272 OCCULT BLD FECES 1-3 TESTS: CPT

## 2017-10-29 PROCEDURE — 82948 REAGENT STRIP/BLOOD GLUCOSE: CPT

## 2017-10-29 PROCEDURE — 94150 VITAL CAPACITY TEST: CPT

## 2017-10-29 PROCEDURE — 80048 BASIC METABOLIC PNL TOTAL CA: CPT

## 2017-10-29 PROCEDURE — 86850 RBC ANTIBODY SCREEN: CPT

## 2017-10-29 PROCEDURE — 86901 BLOOD TYPING SEROLOGIC RH(D): CPT

## 2017-10-29 PROCEDURE — 73502 X-RAY EXAM HIP UNI 2-3 VIEWS: CPT

## 2017-10-29 PROCEDURE — 51702 INSERT TEMP BLADDER CATH: CPT

## 2017-10-29 PROCEDURE — 85025 COMPLETE CBC W/AUTO DIFF WBC: CPT

## 2017-10-29 PROCEDURE — 87086 URINE CULTURE/COLONY COUNT: CPT

## 2017-10-29 PROCEDURE — 76937 US GUIDE VASCULAR ACCESS: CPT

## 2017-10-29 PROCEDURE — 93005 ELECTROCARDIOGRAM TRACING: CPT

## 2017-10-29 PROCEDURE — 85014 HEMATOCRIT: CPT

## 2017-10-29 PROCEDURE — 72125 CT NECK SPINE W/O DYE: CPT

## 2017-10-29 PROCEDURE — 76000 FLUOROSCOPY <1 HR PHYS/QHP: CPT

## 2017-10-29 PROCEDURE — 82306 VITAMIN D 25 HYDROXY: CPT

## 2017-10-29 PROCEDURE — C1713 ANCHOR/SCREW BN/BN,TIS/BN: HCPCS

## 2017-10-29 PROCEDURE — 86077 PHYS BLOOD BANK SERV XMATCH: CPT

## 2017-10-29 PROCEDURE — 81001 URINALYSIS AUTO W/SCOPE: CPT

## 2017-10-29 PROCEDURE — 86920 COMPATIBILITY TEST SPIN: CPT

## 2017-10-29 PROCEDURE — 87106 FUNGI IDENTIFICATION YEAST: CPT

## 2017-10-29 PROCEDURE — 86900 BLOOD TYPING SEROLOGIC ABO: CPT

## 2017-10-29 PROCEDURE — 96375 TX/PRO/DX INJ NEW DRUG ADDON: CPT

## 2017-10-29 PROCEDURE — 86921 COMPATIBILITY TEST INCUBATE: CPT

## 2017-10-29 PROCEDURE — P9016 RBC LEUKOCYTES REDUCED: HCPCS

## 2017-10-29 PROCEDURE — 86922 COMPATIBILITY TEST ANTIGLOB: CPT

## 2017-10-29 PROCEDURE — 86870 RBC ANTIBODY IDENTIFICATION: CPT

## 2017-10-29 PROCEDURE — 85018 HEMOGLOBIN: CPT

## 2017-10-29 PROCEDURE — 83735 ASSAY OF MAGNESIUM: CPT

## 2017-10-29 NOTE — HHI.HP
__________________________________________________





HPI


Service


[facility]


Primary Care Physician


Harini Teixeira MD


Admission Diagnosis





left hip fracture


Diagnoses:  


Chief Complaint:  


left hip pain


Travel History


International Travel<30 Days:  No


Contact w/Intl Traveler <30 Da:  No


Traveled to Known Affected Are:  No


History of Present Illness


Written by JAVIER Wolff acting as scribe for Dr. Mcquene] on 10/29/17 at 

22:28. 





63 y/o female with a history of CHF, HTN, CAD, DM, COPD, RA,ablation due to 

atrial tachycardia , GERD, hypothyroidism presented to the ED after a 

hypoglycemic event and fall at home. She states her Blood sugar was 19 she then 

tried to go to the bathroom, ended up waking up on the floor. 


She is complaining of constant sharp pain in her left hip with no radiation and 

with associated chest tenderness, worse with movement. She states she does get 

chest pain with exertion but nothing out of the ordinary. She sees Dr. Treadwell 

for cardiology and last stress test 2 years ago, and was told EF is 49%, recent 

echo in 2017 EF was 65-70%. She denies any fever, chills, sob, or abdominal 

pain.





Review of Systems


Except as stated in HPI:  all other systems reviewed are Neg





Past Family Social History


Past Medical History


Hypertension.     


Coronary artery disease.


Diabetes.


COPD.


rheumatoid arthritis.


Gastroesophageal reflux disease (GERD).


Hypothyroidism.


Peripheral neuropathy.


Gastroparesis.


Chronic pain.


Dysphagia.


Esophageal stricture with dilatation in 2016.


Past Surgical History


L5/6 fusion


Left ankle


defibrillator 


Hysterectomy


Ablation due to atrial tachycardia


Reported Medications





Reported Meds & Active Scripts


Active


Walker [place]/5 Adj (Device) 1 Mis Mis Ea EXTERNAL AS DIRECTED PRN


     use while ambulating


Norvasc (Amlodipine Besylate) 10 Mg Tab 10 Mg PO DAILY 30 Days


Augmentin (Amoxicillin-Clavulanate) 875-125 Mg Tab 1 Tab PO BID 10 Days


Reported


Lantus Inj (Insulin Glargine) 100 Unit/Ml Inj 35 Unit SQ HS


Pantoprazole (Pantoprazole Sodium) 20 Mg Tab 20 Mg PO BID


Lyrica (Pregabalin) 225 Mg Cap 225 Mg PO HS


Integra Plus (Multi-Vit/Iron-Vit C-B12-Folic Acid) 191-210-0.01-1 mg Cap 1 Cap 

PO DAILY


Alendronate (Alendronate Sodium) 70 Mg Tab Unknown Dose PO Q7D


Omeprazole 20 Mg Tab 20 Mg PO DAILY


Morphine ER (Morphine Sulfate) 15 Mg Tab 60 Mg PO BID PRN


Nitroglycerin SL (Nitroglycerin) 0.4 Mg Subl 0.4 Mg SL AS DIRECTED PRN


     ONE TABLET UNDER THE TONGUE AS NEEDED FOR CHEST PAIN, MAY REPEAT EVERY


     FIVE MINUTES FOR A TOTAL OF 3 DOSES OR CALL 911 IF NO RELIEF


Percocet (Oxycodone-Acetaminophen)  mg Tab 1 Tab PO Q6H PRN


Metoprolol Tartrate 100 Mg Tab 100 Mg PO BID


Levothyroxine (Levothyroxine Sodium) 125 Mcg Tab 125 Mcg PO DAILY


Cymbalta DR (Duloxetine HCl) 60 Mg Capdr 60 Mg PO TID


Cyanocobalamin Inj (Cyanocobalamin) 1,000 Mcg/Ml Inj 1,000 Mcg SQ 1 MONTH


Carisoprodol 250 Mg Tab 350 Mg PO TID PRN


Allergies:  


Coded Allergies:  


     No Known Allergies (Verified , 10/29/17)


Active Ordered Medications





Current Medications








 Medications


  (Trade)  Dose


 Ordered  Sig/Adilene


 Route  Start Time


 Stop Time Status Last Admin


 


 Sodium Chloride  1,000 ml @ 


 70 mls/hr  [id number]


 IV  10/29/17 21:44


     


 


 


  (NS Flush)  2 ml  UNSCH  PRN


 IV FLUSH  10/29/17 21:45


     


 


 


  (NS Flush)  2 ml  BID


 IV FLUSH  10/30/17 09:00


     


 


 


  (Zofran Inj)  4 mg  Q6H  PRN


 IVP  10/29/17 21:45


     


 


 


  (Narcan Inj)  0.4 mg  UNSCH  PRN


 IV PUSH  10/29/17 21:45


     


 


 


  (Tammi-Colace)  1 tab  BID


 PO  10/30/17 09:00


     


 


 


  (Milk Of


 Magnesia Liq)  30 ml  Q12H  PRN


 PO  10/29/17 21:45


     


 


 


  (Senokot)  17.2 mg  Q12H  PRN


 PO  10/29/17 21:45


     


 


 


  (Dulcolax Supp)  10 mg  DAILY  PRN


 RECTAL  10/29/17 21:45


     


 


 


  (Lactulose Liq)  30 ml  DAILY  PRN


 PO  10/29/17 21:45


     


 


 


  (NovoLOG


 SUPPLEMENTAL


 SCALE)  1  ACHS SLIDING  SCALE


 SQ  10/30/17 08:00


     


 


 


  (D50w (Vial) Inj)  50 ml  UNSCH  PRN


 IV PUSH  10/29/17 22:00


     


 


 


  (Glucagon Inj)  1 mg  UNSCH  PRN


 OTHER  10/29/17 22:00


     


 


 


  (Morphine Inj)  2 mg  Q3H  PRN


 IV PUSH  10/29/17 23:30


     


 


 


  (Morphine Inj)  4 mg  Q3H  PRN


 IV PUSH  10/29/17 23:30


     


 








Family History





Mom: Brain tumor,  at 52


Dad: Stomach cancer,  at 86


Social History


Tobacco use: quit 16 years ago


Alcohol: Denies





Physical Exam


Vital Signs





Vital Signs








  Date Time  Temp Pulse Resp B/P (MAP) Pulse Ox O2 Delivery O2 Flow Rate FiO2


 


10/29/17 22:26  77 19 147/75 (99) 94   


 


10/29/17 19:33 98.2 64 19 129/72 (91) 97   








Physical Exam


GENERAL: This is a well-nourished, well-developed patient, who appears painful


SKIN: laceration to inner left heel with dried blood


HEAD: Atraumatic. Normocephalic. No temporal or scalp tenderness.


EYES: Pupils equal round and reactive. Extraocular motions intact.  


ENT: Nose without bleeding, purulent drainage or septal hematoma.  Airway 

patent.


NECK: Trachea midline. No JVD or lymphadenopathy. Supple, nontender, no 

meningeal signs.


CARDIOVASCULAR: Regular rate and rhythm without murmurs, gallops, or rubs. 


RESPIRATORY: Clear to auscultation. Breath sounds equal bilaterally. No wheezes

, rales, or rhonchi.  


GASTROINTESTINAL: Abdomen soft, non-tender, nondistended. No hepato-splenomegaly

, or palpable masses. No guarding.


MUSCULOSKELETAL: Left hip tendernessNo calf tenderness. 


NEUROLOGICAL: Awake and alert. Motor and sensory grossly within normal limits. 

Pain to left leg with movement. Normal speech.


Laboratory





Laboratory Tests








Test


  10/29/17


20:10 10/29/17


20:18


 


White Blood Count 16.0  


 


Red Blood Count 3.82  


 


Hemoglobin 10.5  


 


Hematocrit 32.6  


 


Mean Corpuscular Volume 85.2  


 


Mean Corpuscular Hemoglobin 27.4  


 


Mean Corpuscular Hemoglobin


Concent 32.1 


  


 


 


Red Cell Distribution Width 16.9  


 


Platelet Count 320  


 


Mean Platelet Volume 8.0  


 


Neutrophils (%) (Auto) 84.0  


 


Lymphocytes (%) (Auto) 6.6  


 


Monocytes (%) (Auto) 8.5  


 


Eosinophils (%) (Auto) 0.5  


 


Basophils (%) (Auto) 0.4  


 


Neutrophils # (Auto) 13.4  


 


Lymphocytes # (Auto) 1.1  


 


Monocytes # (Auto) 1.4  


 


Eosinophils # (Auto) 0.1  


 


Basophils # (Auto) 0.1  


 


CBC Comment DIFF FINAL  


 


Differential Comment   


 


Prothrombin Time 10.2  


 


Prothromb Time International


Ratio 0.9 


  


 


 


Activated Partial


Thromboplast Time 29.0 


  


 


 


Blood Urea Nitrogen 22  


 


Creatinine 1.63  


 


Random Glucose 102  


 


Total Protein 6.9  


 


Albumin 2.9  


 


Calcium Level 9.1  


 


Alkaline Phosphatase 93  


 


Aspartate Amino Transf


(AST/SGOT) 24 


  


 


 


Alanine Aminotransferase


(ALT/SGPT) 30 


  


 


 


Total Bilirubin 0.3  


 


Sodium Level 139  


 


Potassium Level 3.4  


 


Chloride Level 107  


 


Carbon Dioxide Level 24.2  


 


Anion Gap 8  


 


Estimat Glomerular Filtration


Rate 39 


  


 


 


Urine Color  LIGHT-YELLOW 


 


Urine Turbidity  CLEAR 


 


Urine pH  5.0 


 


Urine Specific Gravity  1.006 


 


Urine Protein  NEG 


 


Urine Glucose (UA)  NEG 


 


Urine Ketones  NEG 


 


Urine Occult Blood  NEG 


 


Urine Nitrite  NEG 


 


Urine Bilirubin  NEG 


 


Urine Urobilinogen  LESS THAN 2.0 


 


Urine Leukocyte Esterase  SMALL 


 


Urine RBC  3 


 


Urine WBC  4 


 


Urine Amorphous Sediment  RARE 


 


Urine Bacteria  RARE 


 


Urine Mucus  FEW 


 


Urine Yeast (Budding)  FEW 


 


Microscopic Urinalysis Comment


  


  CATH-CULTURE


IND














 Date/Time


Source Procedure


Growth Status


 


 


 10/29/17 20:18


Urine Catheterized Urine Urine Culture


Pending Received








Result Diagram:  


10/29/17 2010                                                                  

              10/29/17 2010





Imaging





Last Impressions








Hip and Pelvis X-Ray 10/29/17 1916 Signed





Impressions: 





 Service Date/Time:  2017 20:38 - CONCLUSION:  Angulated 

and 





 comminuted fractures of the proximal left femur and lesser trochanter     

Cristian Long MD 


 


Head CT 10/29/17 1916 Signed





Impressions: 





 Service Date/Time:  2017 20:47 - CONCLUSION:  Negative 





 noncontrast CT brain.     Cristian Long MD 


 


Chest X-Ray 10/29/17 1916 Signed





Impressions: 





 Service Date/Time:  2017 20:40 - CONCLUSION:  Stable 





 bilateral central infiltrates.     Cristian Long MD 


 


Cervical Spine CT 10/29/17 1916 Signed





Impressions: 





 Service Date/Time:  2017 20:47 - CONCLUSION:  No evidence 

of 





 fracture or spondylolisthesis.     Thomas J. Yuschok, MD Caprini VTE Risk Assessment


Caprinrose mary VTE Risk Assessment:  Mod/High Risk (score >= 2)


VTE Pharm Contraindication:  High risk for bleeding


Caprini Risk Assessment Model











 Point Value = 1          Point Value = 2  Point Value = 3  Point Value = 5


 


Age 41-60


Minor surgery


BMI > 25 kg/m2


Swollen legs


Varicose veins


Pregnancy or postpartum


History of unexplained or recurrent


   spontaneous 


Oral contraceptives or hormone


   replacement


Sepsis (< 1 month)


Serious lung disease, including


   pneumonia (< 1 month)


Abnormal pulmonary function


Acute myocardial infarction


Congestive heart failure (< 1 month)


History of inflammatory bowel disease


Medical patient at bed rest Age 61-74


Arthroscopic surgery


Major open surgery (> 45 min)


Laparoscopic surgery (> 45 min)


Malignancy


Confined to bed (> 72 hours)


Immobilizing plaster cast


Central venous access Age >= 75


History of VTE


Family history of VTE


Factor V Leiden


Prothrombin 24091B


Lupus anticoagulant


Anticardiolipin antibodies


Elevated serum homocysteine


Heparin-induced thrombocytopenia


Other congenital or acquired


   thrombophilia Stroke (< 1 month)


Elective arthroplasty


Hip, pelvis, or leg fracture


Acute spinal cord injury (< 1 month)








Prophylaxis Regimen











   Total Risk


Factor Score Risk Level Prophylaxis Regimen


 


0-1      Low Early ambulation


 


2 Moderate Order ONE of the following:


*Sequential Compression Device (SCD)


*Heparin 5000 units SQ BID


 


3-4 Higher Order ONE of the following medications:


*Heparin 5000 units SQ TID


*Enoxaparin/Lovenox 40 mg SQ daily (WT < 150 kg, CrCl > 30 mL/min)


*Enoxaparin/Lovenox 30 mg SQ daily (WT < 150 kg, CrCl > 10-29 mL/min)


*Enoxaparin/Lovenox 30 mg SQ BID (WT < 150 kg, CrCl > 30 mL/min)


AND/OR


*Sequential Compression Device (SCD)


 


5 or more Highest Order ONE of the following medications:


*Heparin 5000 units SQ TID (Preferred with Epidurals)


*Enoxaparin/Lovenox 40 mg SQ daily (WT < 150 kg, CrCl > 30 mL/min)


*Enoxaparin/Lovenox 30 mg SQ daily (WT < 150 kg, CrCl > 10-29 mL/min)


*Enoxaparin/Lovenox 30 mg SQ BID (WT < 150 kg, CrCl > 30 mL/min)


AND


*Sequential Compression Device (SCD)











Assessment and Plan


Problem List:  


(1) Closed left hip fracture


ICD Code:  S72.002A - Fracture of unspecified part of neck of left femur, 

initial encounter for closed fracture


(2) HTN (hypertension)


ICD Code:  I10 - Essential (primary) hypertension


Status:  Chronic


(3) Hypoglycemia


ICD Code:  E16.2 - Hypoglycemia, unspecified


Status:  Acute


Assessment and Plan


63 y/o female with a history of CHF, HTN, CAD, DM, COPD, RA,ablation due to 

atrial tachycardia , GERD, hypothyroidism presented to the ED after a 

hypoglycemic event and fall at home. 





Closed hip fracture, fall at home


Hip x ray reviewed and shows a angulated and comminuted left femur fracture and 

lessor trochanter


   -Pain management with IV morphine


   -Consult orthopedic





Hypoglycemia, on chronic DM with long acting insulin use


   -Accu checks


   -Hold long acting for now





Syncope, suspected due to hypoglycemia


Head CT reviewed and is unremarkable


   -Neuro checks





HTN, chronic


   -Resume home medications


   -Monitory vitals





DVT prophylaxis: SCDs


Code Status





Discussed Condition With


This note was transcribed by barry Salazar.  I, Dr. Micheal Kumar 

personally performed the history, physical exam, and medical decision making; 

and confirmed the accuracy of the information in the transcribed note.





Authenticated by Dr. Micheal Kumar on 10/30/17 at 01:04.





Physician Certification


2 Midnight Certification Type:  Admission for Inpatient Services


Order for Inpatient Services


The services are ordered in accordance with Medicare regulations or non-

Medicare payer requirements, as applicable.  In the case of services not 

specified as inpatient-only, they are appropriately provided as inpatient 

services in accordance with the 2-midnight benchmark.


Estimated LOS (days):  3


 days is the estimated time the patient will need to remain in the hospital, 

assuming treatment plan goals are met and no additional complications.


Post-Hospital Plan:  Not yet determined











Carline Salazar Oct 29, 2017 22:35


Micheal Kumar MD Oct 30, 2017 01:05

## 2017-10-29 NOTE — HHI.HP
__________________________________________________





HPI


Service


[facility]


Primary Care Physician


Harini Teixeira MD


Admission Diagnosis





left hip fracture


Diagnoses:  


Chief Complaint:  


left hip pain


Travel History


International Travel<30 Days:  No


Contact w/Intl Traveler <30 Da:  No


Traveled to Known Affected Are:  No


History of Present Illness


Written by JAVIER Wolff acting as scribe for Dr. Mcqueen] on 10/29/17 at 

22:28. 





61 y/o female with a history of CHF, HTN, CAD, DM, COPD, RA,ablation due to 

atrial tachycardia , GERD, hypothyroidism presented to the ED after a 

hypoglycemic event and fall at home. She states her Blood sugar was 19 she then 

tried to go to the bathroom, ended up waking up on the floor. 


She is complaining of constant sharp pain in her left hip with no radiation and 

with associated chest tenderness, worse with movement. She states she does get 

chest pain with exertion but nothing out of the ordinary. She sees Dr. Treadwell 

for cardiology and last stress test 2 years ago, and was told EF is 49%, recent 

echo in 2017 EF was 65-70%. She denies any fever, chills, sob, or abdominal 

pain.





Review of Systems


Except as stated in HPI:  all other systems reviewed are Neg





Past Family Social History


Past Medical History


Hypertension.     


Coronary artery disease.


Diabetes.


COPD.


rheumatoid arthritis.


Gastroesophageal reflux disease (GERD).


Hypothyroidism.


Peripheral neuropathy.


Gastroparesis.


Chronic pain.


Dysphagia.


Esophageal stricture with dilatation in 2016.


Past Surgical History


L5/6 fusion


Left ankle


defibrillator 


Hysterectomy


Ablation due to atrial tachycardia


Reported Medications





Reported Meds & Active Scripts


Active


Walker Perham Wheels/5 Adj (Device) 1 Mis Mis Ea EXTERNAL AS DIRECTED PRN


     use while ambulating


Norvasc (Amlodipine Besylate) 10 Mg Tab 10 Mg PO DAILY 30 Days


Augmentin (Amoxicillin-Clavulanate) 875-125 Mg Tab 1 Tab PO BID 10 Days


Reported


Lantus Inj (Insulin Glargine) 100 Unit/Ml Inj 35 Unit SQ HS


Pantoprazole (Pantoprazole Sodium) 20 Mg Tab 20 Mg PO BID


Lyrica (Pregabalin) 225 Mg Cap 225 Mg PO HS


Integra Plus (Multi-Vit/Iron-Vit C-B12-Folic Acid) 191-210-0.01-1 mg Cap 1 Cap 

PO DAILY


Alendronate (Alendronate Sodium) 70 Mg Tab Unknown Dose PO Q7D


Omeprazole 20 Mg Tab 20 Mg PO DAILY


Morphine ER (Morphine Sulfate) 15 Mg Tab 60 Mg PO BID PRN


Nitroglycerin SL (Nitroglycerin) 0.4 Mg Subl 0.4 Mg SL AS DIRECTED PRN


     ONE TABLET UNDER THE TONGUE AS NEEDED FOR CHEST PAIN, MAY REPEAT EVERY


     FIVE MINUTES FOR A TOTAL OF 3 DOSES OR CALL 911 IF NO RELIEF


Percocet (Oxycodone-Acetaminophen)  mg Tab 1 Tab PO Q6H PRN


Metoprolol Tartrate 100 Mg Tab 100 Mg PO BID


Levothyroxine (Levothyroxine Sodium) 125 Mcg Tab 125 Mcg PO DAILY


Cymbalta DR (Duloxetine HCl) 60 Mg Capdr 60 Mg PO TID


Cyanocobalamin Inj (Cyanocobalamin) 1,000 Mcg/Ml Inj 1,000 Mcg SQ 1 MONTH


Carisoprodol 250 Mg Tab 350 Mg PO TID PRN


Allergies:  


Coded Allergies:  


     No Known Allergies (Verified , 10/29/17)


Active Ordered Medications





Current Medications








 Medications


  (Trade)  Dose


 Ordered  Sig/Adilene


 Route  Start Time


 Stop Time Status Last Admin


 


 Sodium Chloride  1,000 ml @ 


 70 mls/hr  [id number]


 IV  10/29/17 21:44


     


 


 


  (NS Flush)  2 ml  UNSCH  PRN


 IV FLUSH  10/29/17 21:45


     


 


 


  (NS Flush)  2 ml  BID


 IV FLUSH  10/30/17 09:00


     


 


 


  (Zofran Inj)  4 mg  Q6H  PRN


 IVP  10/29/17 21:45


     


 


 


  (Narcan Inj)  0.4 mg  UNSCH  PRN


 IV PUSH  10/29/17 21:45


     


 


 


  (Tammi-Colace)  1 tab  BID


 PO  10/30/17 09:00


     


 


 


  (Milk Of


 Magnesia Liq)  30 ml  Q12H  PRN


 PO  10/29/17 21:45


     


 


 


  (Senokot)  17.2 mg  Q12H  PRN


 PO  10/29/17 21:45


     


 


 


  (Dulcolax Supp)  10 mg  DAILY  PRN


 RECTAL  10/29/17 21:45


     


 


 


  (Lactulose Liq)  30 ml  DAILY  PRN


 PO  10/29/17 21:45


     


 


 


  (NovoLOG


 SUPPLEMENTAL


 SCALE)  1  ACHS SLIDING  SCALE


 SQ  10/30/17 08:00


     


 


 


  (D50w (Vial) Inj)  50 ml  UNSCH  PRN


 IV PUSH  10/29/17 22:00


     


 


 


  (Glucagon Inj)  1 mg  UNSCH  PRN


 OTHER  10/29/17 22:00


     


 


 


  (Morphine Inj)  2 mg  Q3H  PRN


 IV PUSH  10/29/17 23:30


     


 


 


  (Morphine Inj)  4 mg  Q3H  PRN


 IV PUSH  10/29/17 23:30


     


 








Family History





Mom: Brain tumor,  at 52


Dad: Stomach cancer,  at 86


Social History


Tobacco use: quit 16 years ago


Alcohol: Denies





Physical Exam


Vital Signs





Vital Signs








  Date Time  Temp Pulse Resp B/P (MAP) Pulse Ox O2 Delivery O2 Flow Rate FiO2


 


10/29/17 22:26  77 19 147/75 (99) 94   


 


10/29/17 19:33 98.2 64 19 129/72 (91) 97   








Physical Exam


GENERAL: This is a well-nourished, well-developed patient, who appears painful


SKIN: laceration to inner left heel with dried blood


HEAD: Atraumatic. Normocephalic. No temporal or scalp tenderness.


EYES: Pupils equal round and reactive. Extraocular motions intact.  


ENT: Nose without bleeding, purulent drainage or septal hematoma.  Airway 

patent.


NECK: Trachea midline. No JVD or lymphadenopathy. Supple, nontender, no 

meningeal signs.


CARDIOVASCULAR: Regular rate and rhythm without murmurs, gallops, or rubs. 


RESPIRATORY: Clear to auscultation. Breath sounds equal bilaterally. No wheezes

, rales, or rhonchi.  


GASTROINTESTINAL: Abdomen soft, non-tender, nondistended. No hepato-splenomegaly

, or palpable masses. No guarding.


MUSCULOSKELETAL: Left hip tendernessNo calf tenderness. 


NEUROLOGICAL: Awake and alert. Motor and sensory grossly within normal limits. 

Pain to left leg with movement. Normal speech.


Laboratory





Laboratory Tests








Test


  10/29/17


20:10 10/29/17


20:18


 


White Blood Count 16.0  


 


Red Blood Count 3.82  


 


Hemoglobin 10.5  


 


Hematocrit 32.6  


 


Mean Corpuscular Volume 85.2  


 


Mean Corpuscular Hemoglobin 27.4  


 


Mean Corpuscular Hemoglobin


Concent 32.1 


  


 


 


Red Cell Distribution Width 16.9  


 


Platelet Count 320  


 


Mean Platelet Volume 8.0  


 


Neutrophils (%) (Auto) 84.0  


 


Lymphocytes (%) (Auto) 6.6  


 


Monocytes (%) (Auto) 8.5  


 


Eosinophils (%) (Auto) 0.5  


 


Basophils (%) (Auto) 0.4  


 


Neutrophils # (Auto) 13.4  


 


Lymphocytes # (Auto) 1.1  


 


Monocytes # (Auto) 1.4  


 


Eosinophils # (Auto) 0.1  


 


Basophils # (Auto) 0.1  


 


CBC Comment DIFF FINAL  


 


Differential Comment   


 


Prothrombin Time 10.2  


 


Prothromb Time International


Ratio 0.9 


  


 


 


Activated Partial


Thromboplast Time 29.0 


  


 


 


Blood Urea Nitrogen 22  


 


Creatinine 1.63  


 


Random Glucose 102  


 


Total Protein 6.9  


 


Albumin 2.9  


 


Calcium Level 9.1  


 


Alkaline Phosphatase 93  


 


Aspartate Amino Transf


(AST/SGOT) 24 


  


 


 


Alanine Aminotransferase


(ALT/SGPT) 30 


  


 


 


Total Bilirubin 0.3  


 


Sodium Level 139  


 


Potassium Level 3.4  


 


Chloride Level 107  


 


Carbon Dioxide Level 24.2  


 


Anion Gap 8  


 


Estimat Glomerular Filtration


Rate 39 


  


 


 


Urine Color  LIGHT-YELLOW 


 


Urine Turbidity  CLEAR 


 


Urine pH  5.0 


 


Urine Specific Gravity  1.006 


 


Urine Protein  NEG 


 


Urine Glucose (UA)  NEG 


 


Urine Ketones  NEG 


 


Urine Occult Blood  NEG 


 


Urine Nitrite  NEG 


 


Urine Bilirubin  NEG 


 


Urine Urobilinogen  LESS THAN 2.0 


 


Urine Leukocyte Esterase  SMALL 


 


Urine RBC  3 


 


Urine WBC  4 


 


Urine Amorphous Sediment  RARE 


 


Urine Bacteria  RARE 


 


Urine Mucus  FEW 


 


Urine Yeast (Budding)  FEW 


 


Microscopic Urinalysis Comment


  


  CATH-CULTURE


IND














 Date/Time


Source Procedure


Growth Status


 


 


 10/29/17 20:18


Urine Catheterized Urine Urine Culture


Pending Received








Result Diagram:  


10/29/17 2010                                                                  

              10/29/17 2010





Imaging





Last Impressions








Hip and Pelvis X-Ray 10/29/17 1916 Signed





Impressions: 





 Service Date/Time:  2017 20:38 - CONCLUSION:  Angulated 

and 





 comminuted fractures of the proximal left femur and lesser trochanter     

Cristian Long MD 


 


Head CT 10/29/17 1916 Signed





Impressions: 





 Service Date/Time:  2017 20:47 - CONCLUSION:  Negative 





 noncontrast CT brain.     Cristian Long MD 


 


Chest X-Ray 10/29/17 1916 Signed





Impressions: 





 Service Date/Time:  2017 20:40 - CONCLUSION:  Stable 





 bilateral central infiltrates.     Cristian Long MD 


 


Cervical Spine CT 10/29/17 1916 Signed





Impressions: 





 Service Date/Time:  2017 20:47 - CONCLUSION:  No evidence 

of 





 fracture or spondylolisthesis.     Thomas J. Yuschok, MD Caprini VTE Risk Assessment


Caprinrose mary VTE Risk Assessment:  Mod/High Risk (score >= 2)


VTE Pharm Contraindication:  High risk for bleeding


Caprini Risk Assessment Model











 Point Value = 1          Point Value = 2  Point Value = 3  Point Value = 5


 


Age 41-60


Minor surgery


BMI > 25 kg/m2


Swollen legs


Varicose veins


Pregnancy or postpartum


History of unexplained or recurrent


   spontaneous 


Oral contraceptives or hormone


   replacement


Sepsis (< 1 month)


Serious lung disease, including


   pneumonia (< 1 month)


Abnormal pulmonary function


Acute myocardial infarction


Congestive heart failure (< 1 month)


History of inflammatory bowel disease


Medical patient at bed rest Age 61-74


Arthroscopic surgery


Major open surgery (> 45 min)


Laparoscopic surgery (> 45 min)


Malignancy


Confined to bed (> 72 hours)


Immobilizing plaster cast


Central venous access Age >= 75


History of VTE


Family history of VTE


Factor V Leiden


Prothrombin [id number]


Lupus anticoagulant


Anticardiolipin antibodies


Elevated serum homocysteine


Heparin-induced thrombocytopenia


Other congenital or acquired


   thrombophilia Stroke (< 1 month)


Elective arthroplasty


Hip, pelvis, or leg fracture


Acute spinal cord injury (< 1 month)








Prophylaxis Regimen











   Total Risk


Factor Score Risk Level Prophylaxis Regimen


 


0-1      Low Early ambulation


 


2 Moderate Order ONE of the following:


*Sequential Compression Device (SCD)


*Heparin 5000 units SQ BID


 


3-4 Higher Order ONE of the following medications:


*Heparin 5000 units SQ TID


*Enoxaparin/Lovenox 40 mg SQ daily (WT < 150 kg, CrCl > 30 mL/min)


*Enoxaparin/Lovenox 30 mg SQ daily (WT < 150 kg, CrCl > 10-29 mL/min)


*Enoxaparin/Lovenox 30 mg SQ BID (WT < 150 kg, CrCl > 30 mL/min)


AND/OR


*Sequential Compression Device (SCD)


 


5 or more Highest Order ONE of the following medications:


*Heparin 5000 units SQ TID (Preferred with Epidurals)


*Enoxaparin/Lovenox 40 mg SQ daily (WT < 150 kg, CrCl > 30 mL/min)


*Enoxaparin/Lovenox 30 mg SQ daily (WT < 150 kg, CrCl > 10-29 mL/min)


*Enoxaparin/Lovenox 30 mg SQ BID (WT < 150 kg, CrCl > 30 mL/min)


AND


*Sequential Compression Device (SCD)











Assessment and Plan


Problem List:  


(1) Closed left hip fracture


ICD Code:  S72.002A - Fracture of unspecified part of neck of left femur, 

initial encounter for closed fracture


(2) HTN (hypertension)


ICD Code:  I10 - Essential (primary) hypertension


Status:  Chronic


(3) Hypoglycemia


ICD Code:  E16.2 - Hypoglycemia, unspecified


Status:  Acute


Assessment and Plan


61 y/o female with a history of CHF, HTN, CAD, DM, COPD, RA,ablation due to 

atrial tachycardia , GERD, hypothyroidism presented to the ED after a 

hypoglycemic event and fall at home. 





Closed hip fracture, fall at home


Hip x ray reviewed and shows a angulated and comminuted left femur fracture and 

lessor trochanter


   -Pain management with IV morphine


   -Consult orthopedic





Hypoglycemia, on chronic DM with long acting insulin use


   -Accu checks


   -Hold long acting for now





Syncope, suspected due to hypoglycemia


Head CT reviewed and is unremarkable


   -Neuro checks





HTN, chronic


   -Resume home medications


   -Monitory vitals





DVT prophylaxis: SCDs


Code Status





Discussed Condition With


This note was transcribed by barry Salazar.  I, Dr. Micheal Kumar 

personally performed the history, physical exam, and medical decision making; 

and confirmed the accuracy of the information in the transcribed note.





Authenticated by Dr. Micheal Kumar on 10/30/17 at 01:04.





Physician Certification


2 Midnight Certification Type:  Admission for Inpatient Services


Order for Inpatient Services


The services are ordered in accordance with Medicare regulations or non-

Medicare payer requirements, as applicable.  In the case of services not 

specified as inpatient-only, they are appropriately provided as inpatient 

services in accordance with the 2-midnight benchmark.


Estimated LOS (days):  3


 days is the estimated time the patient will need to remain in the hospital, 

assuming treatment plan goals are met and no additional complications.


Post-Hospital Plan:  Not yet determined











Carline Salazar Oct 29, 2017 22:35


Micheal Kumar MD Oct 30, 2017 01:05

## 2017-10-29 NOTE — RADRPT
EXAM DATE/TIME:  10/29/2017 20:40 

 

[facility] COMPARISON:     

CHEST SINGLE AP, October 15, 2017, 14:46.

 

                     

INDICATIONS :     

Pain from fall.

                     

 

MEDICAL HISTORY :            

Cardiovascular disease. Congestive heart failure. Hypertension.   

 

SURGICAL HISTORY :        

CABG. Defibrillator. Hysterectomy.

 

ENCOUNTER:     

Initial                                        

 

ACUITY:     

1 day      

 

PAIN SCORE:     

1/10

 

LOCATION:     

Bilateral chest 

 

FINDINGS:     

Patchy infiltrates in the central one third of both lungs similar to prior examination.  Cardiac pace
r in place.  There is a thin straight linear interface in the lower lateral right chest extending int
o the upper abdomen.  This extends to 4 inferior to represent a pneumothorax.  This could represent a
 skinfold.  Both hemidiaphragms are well delineated.

 

CONCLUSION:     

Stable bilateral central infiltrates.

 

 

 

 Cristian Long MD on October 29, 2017 at 21:10           

Board Certified Radiologist.

 This report was verified electronically.

## 2017-10-29 NOTE — RADRPT
EXAM DATE/TIME:  10/29/2017 20:40 

 

[facility] COMPARISON:     

CHEST SINGLE AP, October 15, 2017, 14:46.

 

                     

INDICATIONS :     

Pain from fall.

                     

 

MEDICAL HISTORY :            

Cardiovascular disease. Congestive heart failure. Hypertension.   

 

SURGICAL HISTORY :        

CABG. Defibrillator. Hysterectomy.

 

ENCOUNTER:     

Initial                                        

 

ACUITY:     

1 day      

 

PAIN SCORE:     

1/10

 

LOCATION:     

Bilateral chest 

 

FINDINGS:     

Patchy infiltrates in the central one third of both lungs similar to prior examination.  Cardiac pace
r in place.  There is a thin straight linear interface in the lower lateral right chest extending int
o the upper abdomen.  This extends to 4 inferior to represent a pneumothorax.  This could represent a
 skinfold.  Both hemidiaphragms are well delineated.

 

CONCLUSION:     

Stable bilateral central infiltrates.

 

 

 

 Cristian Logn MD on October 29, 2017 at 21:10           

Board Certified Radiologist.

 This report was verified electronically.

## 2017-10-29 NOTE — RADRPT
EXAM DATE/TIME:  10/29/2017 20:38 

 

[facility] COMPARISON:     

No previous studies available for comparison.

 

                     

INDICATIONS :     

Pain from fall.

                     

 

MEDICAL HISTORY :     

None.          

 

SURGICAL HISTORY :     

None.   

 

ENCOUNTER:     

Initial                                        

 

ACUITY:     

1 day      

 

PAIN SCORE:     

10/10

 

LOCATION:     

Left  hip.

 

FINDINGS:     

There is an angulated fracture of the junction of the proximal shaft and intratrochanteric region lef
t femur with mild varus angulation.  There is also a free fragment arising from the lesser trochanter
.  The femoral head is situated within the acetabulum.  The bony acetabulum appears to be grossly int
act.  Mild degenerative changes in the right hip.

 

CONCLUSION:     

Angulated and comminuted fractures of the proximal left femur and lesser trochanter

 

 

 

 Cristian Long MD on October 29, 2017 at 21:13           

Board Certified Radiologist.

 This report was verified electronically.

## 2017-10-29 NOTE — RADRPT
EXAM DATE/TIME:  10/29/2017 20:47 

 

[facility] COMPARISON:     

No previous studies available for comparison.

 

 

INDICATIONS :     

Trauma; fall.

                   

 

RADIATION DOSE:     

16.35 CTDIvol (mGy) 

 

 

 

MEDICAL HISTORY :     

Cardiovascular disease. Congestive heart failure. Rheumatoid arthritis.Hypertension. Diabetes. Lupus.
 Fibromyalgia.

 

SURGICAL HISTORY :      

CABG Hysterectomy.Fusion, cervical.Myomectomy. Pacemaker.

 

ENCOUNTER:      

Initial

 

ACUITY:      

1 day

 

PAIN SCALE:      

2/10

 

LOCATION:        

neck 

 

TECHNIQUE:     

Volumetric scanning of the cervical spine was performed. Multiplanar reconstructions in the sagittal,
 coronal and oblique axial planes were performed.   Using automated exposure control and adjustment o
f the mA and/or kV according to patient size, radiation dose was kept as low as reasonably achievable
 to obtain optimal diagnostic quality images.   DICOM format image data is available electronically f
or review and comparison.  

 

FINDINGS:     

There is straightening of the cervical lordosis.  Vertebral body height is maintained.  Anterior cerv
ical plate present at C5-6-7 with bony incorporation of the interspace device.  The posterior element
s are normal alignment without evidence of locked or perched facets.  The spinous processes are intac
t.  Prominent anterior bridging anterior osteophyte extends inferior to C5 anteriorly.

 

C2-C3: 

No fracture seen.  The bony neural foramina are patent.

 

C3-C4:  

No fracture seen.  The bony neural foramina are patent.

 

C4-C5:  

No fracture seen.  The bony neural foramina are patent.

 

C5-C6:  

No fracture seen.  The bony neural foramina are patent.

 

C6-C7:  

No fracture seen.  Mild bony neural foraminal stenosis..

 

C7-T1: 

No fracture seen.  The bony neural foramina are patent.

 

CONCLUSION:     

No evidence of fracture or spondylolisthesis.

 

 

 

 Cristian Long MD on October 29, 2017 at 21:15           

Board Certified Radiologist.

 This report was verified electronically.

## 2017-10-29 NOTE — HHI.HP
__________________________________________________





HPI


Service


[facility]


Primary Care Physician


Harini Teixeira MD


Admission Diagnosis





left hip fracture


Diagnoses:  


Chief Complaint:  


left hip pain


Travel History


International Travel<30 Days:  No


Contact w/Intl Traveler <30 Da:  No


Traveled to Known Affected Are:  No


History of Present Illness


Written by JAVIER Wolff acting as scribe for Dr. Mcqueen] on 10/29/17 at 

22:28. 





61 y/o female with a history of CHF, HTN, CAD, DM, COPD, RA,ablation due to 

atrial tachycardia , GERD, hypothyroidism presented to the ED after a 

hypoglycemic event and fall at home. She states her Blood sugar was 19 she then 

tried to go to the bathroom, ended up waking up on the floor. 


She is complaining of constant sharp pain in her left hip with no radiation and 

with associated chest tenderness, worse with movement. She states she does get 

chest pain with exertion but nothing out of the ordinary. She sees Dr. Treadwell 

for cardiology and last stress test 2 years ago, and was told EF is 49%, recent 

echo in 2017 EF was 65-70%. She denies any fever, chills, sob, or abdominal 

pain.





Review of Systems


Except as stated in HPI:  all other systems reviewed are Neg





Past Family Social History


Past Medical History


Hypertension.     


Coronary artery disease.


Diabetes.


COPD.


rheumatoid arthritis.


Gastroesophageal reflux disease (GERD).


Hypothyroidism.


Peripheral neuropathy.


Gastroparesis.


Chronic pain.


Dysphagia.


Esophageal stricture with dilatation in 2016.


Past Surgical History


L5/6 fusion


Left ankle


defibrillator 


Hysterectomy


Ablation due to atrial tachycardia


Reported Medications





Reported Meds & Active Scripts


Active


Walker Acton Wheels/5 Adj (Device) 1 Mis Mis Ea EXTERNAL AS DIRECTED PRN


     use while ambulating


Norvasc (Amlodipine Besylate) 10 Mg Tab 10 Mg PO DAILY 30 Days


Augmentin (Amoxicillin-Clavulanate) 875-125 Mg Tab 1 Tab PO BID 10 Days


Reported


Lantus Inj (Insulin Glargine) 100 Unit/Ml Inj 35 Unit SQ HS


Pantoprazole (Pantoprazole Sodium) 20 Mg Tab 20 Mg PO BID


Lyrica (Pregabalin) 225 Mg Cap 225 Mg PO HS


Integra Plus (Multi-Vit/Iron-Vit C-B12-Folic Acid) 191-210-0.01-1 mg Cap 1 Cap 

PO DAILY


Alendronate (Alendronate Sodium) 70 Mg Tab Unknown Dose PO Q7D


Omeprazole 20 Mg Tab 20 Mg PO DAILY


Morphine ER (Morphine Sulfate) 15 Mg Tab 60 Mg PO BID PRN


Nitroglycerin SL (Nitroglycerin) 0.4 Mg Subl 0.4 Mg SL AS DIRECTED PRN


     ONE TABLET UNDER THE TONGUE AS NEEDED FOR CHEST PAIN, MAY REPEAT EVERY


     FIVE MINUTES FOR A TOTAL OF 3 DOSES OR CALL 911 IF NO RELIEF


Percocet (Oxycodone-Acetaminophen)  mg Tab 1 Tab PO Q6H PRN


Metoprolol Tartrate 100 Mg Tab 100 Mg PO BID


Levothyroxine (Levothyroxine Sodium) 125 Mcg Tab 125 Mcg PO DAILY


Cymbalta DR (Duloxetine HCl) 60 Mg Capdr 60 Mg PO TID


Cyanocobalamin Inj (Cyanocobalamin) 1,000 Mcg/Ml Inj 1,000 Mcg SQ 1 MONTH


Carisoprodol 250 Mg Tab 350 Mg PO TID PRN


Allergies:  


Coded Allergies:  


     No Known Allergies (Verified , 10/29/17)


Active Ordered Medications





Current Medications








 Medications


  (Trade)  Dose


 Ordered  Sig/Adilene


 Route  Start Time


 Stop Time Status Last Admin


 


 Sodium Chloride  1,000 ml @ 


 70 mls/hr  [id number]


 IV  10/29/17 21:44


     


 


 


  (NS Flush)  2 ml  UNSCH  PRN


 IV FLUSH  10/29/17 21:45


     


 


 


  (NS Flush)  2 ml  BID


 IV FLUSH  10/30/17 09:00


     


 


 


  (Zofran Inj)  4 mg  Q6H  PRN


 IVP  10/29/17 21:45


     


 


 


  (Narcan Inj)  0.4 mg  UNSCH  PRN


 IV PUSH  10/29/17 21:45


     


 


 


  (Tammi-Colace)  1 tab  BID


 PO  10/30/17 09:00


     


 


 


  (Milk Of


 Magnesia Liq)  30 ml  Q12H  PRN


 PO  10/29/17 21:45


     


 


 


  (Senokot)  17.2 mg  Q12H  PRN


 PO  10/29/17 21:45


     


 


 


  (Dulcolax Supp)  10 mg  DAILY  PRN


 RECTAL  10/29/17 21:45


     


 


 


  (Lactulose Liq)  30 ml  DAILY  PRN


 PO  10/29/17 21:45


     


 


 


  (NovoLOG


 SUPPLEMENTAL


 SCALE)  1  ACHS SLIDING  SCALE


 SQ  10/30/17 08:00


     


 


 


  (D50w (Vial) Inj)  50 ml  UNSCH  PRN


 IV PUSH  10/29/17 22:00


     


 


 


  (Glucagon Inj)  1 mg  UNSCH  PRN


 OTHER  10/29/17 22:00


     


 


 


  (Morphine Inj)  2 mg  Q3H  PRN


 IV PUSH  10/29/17 23:30


     


 


 


  (Morphine Inj)  4 mg  Q3H  PRN


 IV PUSH  10/29/17 23:30


     


 








Family History





Mom: Brain tumor,  at 52


Dad: Stomach cancer,  at 86


Social History


Tobacco use: quit 16 years ago


Alcohol: Denies





Physical Exam


Vital Signs





Vital Signs








  Date Time  Temp Pulse Resp B/P (MAP) Pulse Ox O2 Delivery O2 Flow Rate FiO2


 


10/29/17 22:26  77 19 147/75 (99) 94   


 


10/29/17 19:33 98.2 64 19 129/72 (91) 97   








Physical Exam


GENERAL: This is a well-nourished, well-developed patient, who appears painful


SKIN: laceration to inner left heel with dried blood


HEAD: Atraumatic. Normocephalic. No temporal or scalp tenderness.


EYES: Pupils equal round and reactive. Extraocular motions intact.  


ENT: Nose without bleeding, purulent drainage or septal hematoma.  Airway 

patent.


NECK: Trachea midline. No JVD or lymphadenopathy. Supple, nontender, no 

meningeal signs.


CARDIOVASCULAR: Regular rate and rhythm without murmurs, gallops, or rubs. 


RESPIRATORY: Clear to auscultation. Breath sounds equal bilaterally. No wheezes

, rales, or rhonchi.  


GASTROINTESTINAL: Abdomen soft, non-tender, nondistended. No hepato-splenomegaly

, or palpable masses. No guarding.


MUSCULOSKELETAL: Left hip tendernessNo calf tenderness. 


NEUROLOGICAL: Awake and alert. Motor and sensory grossly within normal limits. 

Pain to left leg with movement. Normal speech.


Laboratory





Laboratory Tests








Test


  10/29/17


20:10 10/29/17


20:18


 


White Blood Count 16.0  


 


Red Blood Count 3.82  


 


Hemoglobin 10.5  


 


Hematocrit 32.6  


 


Mean Corpuscular Volume 85.2  


 


Mean Corpuscular Hemoglobin 27.4  


 


Mean Corpuscular Hemoglobin


Concent 32.1 


  


 


 


Red Cell Distribution Width 16.9  


 


Platelet Count 320  


 


Mean Platelet Volume 8.0  


 


Neutrophils (%) (Auto) 84.0  


 


Lymphocytes (%) (Auto) 6.6  


 


Monocytes (%) (Auto) 8.5  


 


Eosinophils (%) (Auto) 0.5  


 


Basophils (%) (Auto) 0.4  


 


Neutrophils # (Auto) 13.4  


 


Lymphocytes # (Auto) 1.1  


 


Monocytes # (Auto) 1.4  


 


Eosinophils # (Auto) 0.1  


 


Basophils # (Auto) 0.1  


 


CBC Comment DIFF FINAL  


 


Differential Comment   


 


Prothrombin Time 10.2  


 


Prothromb Time International


Ratio 0.9 


  


 


 


Activated Partial


Thromboplast Time 29.0 


  


 


 


Blood Urea Nitrogen 22  


 


Creatinine 1.63  


 


Random Glucose 102  


 


Total Protein 6.9  


 


Albumin 2.9  


 


Calcium Level 9.1  


 


Alkaline Phosphatase 93  


 


Aspartate Amino Transf


(AST/SGOT) 24 


  


 


 


Alanine Aminotransferase


(ALT/SGPT) 30 


  


 


 


Total Bilirubin 0.3  


 


Sodium Level 139  


 


Potassium Level 3.4  


 


Chloride Level 107  


 


Carbon Dioxide Level 24.2  


 


Anion Gap 8  


 


Estimat Glomerular Filtration


Rate 39 


  


 


 


Urine Color  LIGHT-YELLOW 


 


Urine Turbidity  CLEAR 


 


Urine pH  5.0 


 


Urine Specific Gravity  1.006 


 


Urine Protein  NEG 


 


Urine Glucose (UA)  NEG 


 


Urine Ketones  NEG 


 


Urine Occult Blood  NEG 


 


Urine Nitrite  NEG 


 


Urine Bilirubin  NEG 


 


Urine Urobilinogen  LESS THAN 2.0 


 


Urine Leukocyte Esterase  SMALL 


 


Urine RBC  3 


 


Urine WBC  4 


 


Urine Amorphous Sediment  RARE 


 


Urine Bacteria  RARE 


 


Urine Mucus  FEW 


 


Urine Yeast (Budding)  FEW 


 


Microscopic Urinalysis Comment


  


  CATH-CULTURE


IND














 Date/Time


Source Procedure


Growth Status


 


 


 10/29/17 20:18


Urine Catheterized Urine Urine Culture


Pending Received








Result Diagram:  


10/29/17 2010                                                                  

              10/29/17 2010





Imaging





Last Impressions








Hip and Pelvis X-Ray 10/29/17 1916 Signed





Impressions: 





 Service Date/Time:  2017 20:38 - CONCLUSION:  Angulated 

and 





 comminuted fractures of the proximal left femur and lesser trochanter     

Cristian Long MD 


 


Head CT 10/29/17 1916 Signed





Impressions: 





 Service Date/Time:  2017 20:47 - CONCLUSION:  Negative 





 noncontrast CT brain.     Cristian Long MD 


 


Chest X-Ray 10/29/17 1916 Signed





Impressions: 





 Service Date/Time:  2017 20:40 - CONCLUSION:  Stable 





 bilateral central infiltrates.     Cristian Long MD 


 


Cervical Spine CT 10/29/17 1916 Signed





Impressions: 





 Service Date/Time:  2017 20:47 - CONCLUSION:  No evidence 

of 





 fracture or spondylolisthesis.     Thomas J. Yuschok, MD Caprini VTE Risk Assessment


Caprinrose mary VTE Risk Assessment:  Mod/High Risk (score >= 2)


VTE Pharm Contraindication:  High risk for bleeding


Caprini Risk Assessment Model











 Point Value = 1          Point Value = 2  Point Value = 3  Point Value = 5


 


Age 41-60


Minor surgery


BMI > 25 kg/m2


Swollen legs


Varicose veins


Pregnancy or postpartum


History of unexplained or recurrent


   spontaneous 


Oral contraceptives or hormone


   replacement


Sepsis (< 1 month)


Serious lung disease, including


   pneumonia (< 1 month)


Abnormal pulmonary function


Acute myocardial infarction


Congestive heart failure (< 1 month)


History of inflammatory bowel disease


Medical patient at bed rest Age 61-74


Arthroscopic surgery


Major open surgery (> 45 min)


Laparoscopic surgery (> 45 min)


Malignancy


Confined to bed (> 72 hours)


Immobilizing plaster cast


Central venous access Age >= 75


History of VTE


Family history of VTE


Factor V Leiden


Prothrombin 28690G


Lupus anticoagulant


Anticardiolipin antibodies


Elevated serum homocysteine


Heparin-induced thrombocytopenia


Other congenital or acquired


   thrombophilia Stroke (< 1 month)


Elective arthroplasty


Hip, pelvis, or leg fracture


Acute spinal cord injury (< 1 month)








Prophylaxis Regimen











   Total Risk


Factor Score Risk Level Prophylaxis Regimen


 


0-1      Low Early ambulation


 


2 Moderate Order ONE of the following:


*Sequential Compression Device (SCD)


*Heparin 5000 units SQ BID


 


3-4 Higher Order ONE of the following medications:


*Heparin 5000 units SQ TID


*Enoxaparin/Lovenox 40 mg SQ daily (WT < 150 kg, CrCl > 30 mL/min)


*Enoxaparin/Lovenox 30 mg SQ daily (WT < 150 kg, CrCl > 10-29 mL/min)


*Enoxaparin/Lovenox 30 mg SQ BID (WT < 150 kg, CrCl > 30 mL/min)


AND/OR


*Sequential Compression Device (SCD)


 


5 or more Highest Order ONE of the following medications:


*Heparin 5000 units SQ TID (Preferred with Epidurals)


*Enoxaparin/Lovenox 40 mg SQ daily (WT < 150 kg, CrCl > 30 mL/min)


*Enoxaparin/Lovenox 30 mg SQ daily (WT < 150 kg, CrCl > 10-29 mL/min)


*Enoxaparin/Lovenox 30 mg SQ BID (WT < 150 kg, CrCl > 30 mL/min)


AND


*Sequential Compression Device (SCD)











Assessment and Plan


Problem List:  


(1) Closed left hip fracture


ICD Code:  S72.002A - Fracture of unspecified part of neck of left femur, 

initial encounter for closed fracture


(2) HTN (hypertension)


ICD Code:  I10 - Essential (primary) hypertension


Status:  Chronic


(3) Hypoglycemia


ICD Code:  E16.2 - Hypoglycemia, unspecified


Status:  Acute


Assessment and Plan


61 y/o female with a history of CHF, HTN, CAD, DM, COPD, RA,ablation due to 

atrial tachycardia , GERD, hypothyroidism presented to the ED after a 

hypoglycemic event and fall at home. 





Closed hip fracture, fall at home


Hip x ray reviewed and shows a angulated and comminuted left femur fracture and 

lessor trochanter


   -Pain management with IV morphine


   -Consult orthopedic





Hypoglycemia, on chronic DM with long acting insulin use


   -Accu checks


   -Hold long acting for now





Syncope, suspected due to hypoglycemia


Head CT reviewed and is unremarkable


   -Neuro checks





HTN, chronic


   -Resume home medications


   -Monitory vitals





DVT prophylaxis: SCDs


Code Status





Discussed Condition With


This note was transcribed by barry Salazar.  I, Dr. Micheal Kumar 

personally performed the history, physical exam, and medical decision making; 

and confirmed the accuracy of the information in the transcribed note.





Authenticated by Dr. Micheal Kumar on 10/30/17 at 01:04.





Physician Certification


2 Midnight Certification Type:  Admission for Inpatient Services


Order for Inpatient Services


The services are ordered in accordance with Medicare regulations or non-

Medicare payer requirements, as applicable.  In the case of services not 

specified as inpatient-only, they are appropriately provided as inpatient 

services in accordance with the 2-midnight benchmark.


Estimated LOS (days):  3


 days is the estimated time the patient will need to remain in the hospital, 

assuming treatment plan goals are met and no additional complications.


Post-Hospital Plan:  Not yet determined











Carline Salazar Oct 29, 2017 22:35


Micheal Kumar MD Oct 30, 2017 01:05
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
## 2017-10-29 NOTE — RADRPT
EXAM DATE/TIME:  10/29/2017 20:47 

 

[facility] COMPARISON:     

CT BRAIN W/O CONTRAST, October 14, 2017, 16:09.

 

 

INDICATIONS :     

Trauma; fall.

                   

 

RADIATION DOSE:     

30.85 CTDIvol (mGy) 

 

 

 

MEDICAL HISTORY :     

Cardiovascular disease. Congestive heart failure. Rheumatoid arthritis.Lupus. Fibromyalgia. Diabetes.
 Hypertension.

 

SURGICAL HISTORY :      

Hysterectomy. CABGFusion, cervical.Myomectomy. Pacemaker.

 

ENCOUNTER:      

Initial

 

ACUITY:      

1 day

 

PAIN SCALE:      

4/10

 

LOCATION:        

cranial 

 

TECHNIQUE:     

Multiple contiguous axial images were obtained of the head.  Using automated exposure control and adj
ustment of the mA and/or kV according to patient size, radiation dose was kept as low as reasonably a
chievable to obtain optimal diagnostic quality images.   DICOM format image data is available electro
nically for review and comparison.  

 

FINDINGS:     

 

CEREBRUM:     

The ventricles are normal for age.  No evidence of midline shift, mass lesion, hemorrhage or acute in
farction.  No extra-axial fluid collections are seen.

 

POSTERIOR FOSSA:     

The cerebellum and brainstem are intact.  The 4th ventricle is midline.  The cerebellopontine angle i
s unremarkable.

 

EXTRACRANIAL:     

The visualized portion of the orbits is intact.

 

SKULL:     

The calvaria is intact.  No evidence of skull fracture.

 

CONCLUSION:     

Negative noncontrast CT brain.

 

 

 

 Cristian Long MD on October 29, 2017 at 21:14           

Board Certified Radiologist.

 This report was verified electronically.
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
## 2017-10-30 VITALS
RESPIRATION RATE: 18 BRPM | OXYGEN SATURATION: 94 % | TEMPERATURE: 98.5 F | SYSTOLIC BLOOD PRESSURE: 142 MMHG | DIASTOLIC BLOOD PRESSURE: 71 MMHG | HEART RATE: 108 BPM

## 2017-10-30 VITALS
DIASTOLIC BLOOD PRESSURE: 62 MMHG | RESPIRATION RATE: 18 BRPM | SYSTOLIC BLOOD PRESSURE: 121 MMHG | HEART RATE: 91 BPM | TEMPERATURE: 99 F | OXYGEN SATURATION: 95 %

## 2017-10-30 VITALS — OXYGEN SATURATION: 97 %

## 2017-10-30 VITALS
DIASTOLIC BLOOD PRESSURE: 70 MMHG | TEMPERATURE: 98.9 F | SYSTOLIC BLOOD PRESSURE: 151 MMHG | OXYGEN SATURATION: 95 % | HEART RATE: 90 BPM | RESPIRATION RATE: 20 BRPM

## 2017-10-30 VITALS — OXYGEN SATURATION: 92 %

## 2017-10-30 LAB
BASOPHILS # BLD AUTO: 0 TH/MM3 (ref 0–0.2)
BASOPHILS NFR BLD: 0.2 % (ref 0–2)
BUN SERPL-MCNC: 22 MG/DL (ref 7–18)
CALCIUM SERPL-MCNC: 8 MG/DL (ref 8.5–10.1)
CHLORIDE SERPL-SCNC: 110 MEQ/L (ref 98–107)
CREAT SERPL-MCNC: 1.4 MG/DL (ref 0.5–1)
EOSINOPHIL # BLD: 0 TH/MM3 (ref 0–0.4)
EOSINOPHIL NFR BLD: 0.1 % (ref 0–4)
ERYTHROCYTE [DISTWIDTH] IN BLOOD BY AUTOMATED COUNT: 17.1 % (ref 11.6–17.2)
GFR SERPLBLD BASED ON 1.73 SQ M-ARVRAT: 46 ML/MIN (ref 89–?)
GLUCOSE SERPL-MCNC: 127 MG/DL (ref 74–106)
HCO3 BLD-SCNC: 24 MEQ/L (ref 21–32)
HCT VFR BLD CALC: 25.9 % (ref 35–46)
HGB BLD-MCNC: 8.6 GM/DL (ref 11.6–15.3)
LYMPHOCYTES # BLD AUTO: 0.6 TH/MM3 (ref 1–4.8)
LYMPHOCYTES NFR BLD AUTO: 3.9 % (ref 9–44)
MCH RBC QN AUTO: 27.8 PG (ref 27–34)
MCHC RBC AUTO-ENTMCNC: 33 % (ref 32–36)
MCV RBC AUTO: 84.3 FL (ref 80–100)
MONOCYTE #: 1.2 TH/MM3 (ref 0–0.9)
MONOCYTES NFR BLD: 7.2 % (ref 0–8)
NEUTROPHILS # BLD AUTO: 14.5 TH/MM3 (ref 1.8–7.7)
NEUTROPHILS NFR BLD AUTO: 88.6 % (ref 16–70)
PLATELET # BLD: 271 TH/MM3 (ref 150–450)
PMV BLD AUTO: 8.4 FL (ref 7–11)
RBC # BLD AUTO: 3.08 MIL/MM3 (ref 4–5.3)
SODIUM SERPL-SCNC: 141 MEQ/L (ref 136–145)
WBC # BLD AUTO: 16.4 TH/MM3 (ref 4–11)

## 2017-10-30 PROCEDURE — 0QS706Z REPOSITION LEFT UPPER FEMUR WITH INTRAMEDULLARY INTERNAL FIXATION DEVICE, OPEN APPROACH: ICD-10-PCS | Performed by: ORTHOPAEDIC SURGERY

## 2017-10-30 RX ADMIN — INSULIN ASPART SCH: 100 INJECTION, SOLUTION INTRAVENOUS; SUBCUTANEOUS at 17:00

## 2017-10-30 RX ADMIN — INSULIN ASPART SCH: 100 INJECTION, SOLUTION INTRAVENOUS; SUBCUTANEOUS at 12:00

## 2017-10-30 RX ADMIN — DULOXETINE SCH MG: 60 CAPSULE, DELAYED RELEASE ORAL at 12:10

## 2017-10-30 RX ADMIN — HYDROCODONE BITARTRATE AND ACETAMINOPHEN PRN TAB: 7.5; 325 TABLET ORAL at 21:33

## 2017-10-30 RX ADMIN — STANDARDIZED SENNA CONCENTRATE AND DOCUSATE SODIUM SCH TAB: 8.6; 5 TABLET, FILM COATED ORAL at 12:11

## 2017-10-30 RX ADMIN — MORPHINE SULFATE PRN MG: 2 INJECTION, SOLUTION INTRAMUSCULAR; INTRAVENOUS at 16:52

## 2017-10-30 RX ADMIN — INSULIN ASPART SCH: 100 INJECTION, SOLUTION INTRAVENOUS; SUBCUTANEOUS at 21:33

## 2017-10-30 RX ADMIN — METOPROLOL TARTRATE SCH MG: 100 TABLET, FILM COATED ORAL at 12:11

## 2017-10-30 RX ADMIN — CALCIUM CARBONATE-CHOLECALCIFEROL TAB 250 MG-125 UNIT SCH MG: 250-125 TAB at 18:22

## 2017-10-30 RX ADMIN — INSULIN ASPART SCH: 100 INJECTION, SOLUTION INTRAVENOUS; SUBCUTANEOUS at 07:30

## 2017-10-30 RX ADMIN — METOPROLOL TARTRATE SCH MG: 100 TABLET, FILM COATED ORAL at 21:33

## 2017-10-30 RX ADMIN — PREGABALIN SCH MG: 75 CAPSULE ORAL at 21:33

## 2017-10-30 RX ADMIN — CALCIUM CARBONATE-CHOLECALCIFEROL TAB 250 MG-125 UNIT SCH MG: 250-125 TAB at 17:59

## 2017-10-30 RX ADMIN — DULOXETINE SCH MG: 60 CAPSULE, DELAYED RELEASE ORAL at 18:22

## 2017-10-30 RX ADMIN — CALCIUM CARBONATE-CHOLECALCIFEROL TAB 250 MG-125 UNIT SCH MG: 250-125 TAB at 12:10

## 2017-10-30 RX ADMIN — STANDARDIZED SENNA CONCENTRATE AND DOCUSATE SODIUM SCH TAB: 8.6; 5 TABLET, FILM COATED ORAL at 21:33

## 2017-10-30 RX ADMIN — SODIUM CHLORIDE, PRESERVATIVE FREE SCH ML: 5 INJECTION INTRAVENOUS at 21:33

## 2017-10-30 RX ADMIN — COLLAGENASE SANTYL PRN APPLIC: 250 OINTMENT TOPICAL at 21:33

## 2017-10-30 RX ADMIN — DULOXETINE SCH MG: 60 CAPSULE, DELAYED RELEASE ORAL at 17:59

## 2017-10-30 RX ADMIN — PANTOPRAZOLE SODIUM SCH MG: 20 TABLET, DELAYED RELEASE ORAL at 12:11

## 2017-10-30 RX ADMIN — MORPHINE SULFATE PRN MG: 2 INJECTION, SOLUTION INTRAMUSCULAR; INTRAVENOUS at 06:38

## 2017-10-30 RX ADMIN — DULOXETINE SCH MG: 60 CAPSULE, DELAYED RELEASE ORAL at 09:00

## 2017-10-30 NOTE — PD.WCN.NOT
Wound Consult


Description:


Consult for wound management of buttocks per Dr Solano


Communicated with:


SYLVIA Cervantes Dr


Recommendation:


Right buttock daily:


Cleanse wound with NORMAL SALINE


Apply Santyl to nickel thick to NS moistened gauze


Cover with dry gauze


Secure with bordered gauze (primapore)


Additional Information:


Patient seen on [address] for wound to right buttock. Patient assisted to her 

left side for assessment. Wound noted to right buttock measures 8cm x 8cm x 

slough. Wound is noted with ~80% adherent yellow/brown slough noted to center 

of wound bed with ~10% moist pink tissue and ~10% moist yellow tissue. There is 

no active drainage and no odor noted. Wound margins are jagged indicating a 

moisture etiology. Wound was cleansed with NS and gauze and left open to air 

until orders are obtained for recommended medication/dressing.











Mariangel Cox [facility] Oct 30, 2017 18:44

## 2017-10-30 NOTE — HHI.PR
Subjective


Remarks


Seen in PACU, appears in nad. Pain is controlled by meds. 


No cp, sob, n/v/d/c. 


No fever or chills.





Objective


Vitals





Vital Signs








  Date Time  Temp Pulse Resp B/P (MAP) Pulse Ox O2 Delivery O2 Flow Rate FiO2


 


10/30/17 11:00  107 12 137/0 (45) 98   


 


10/30/17 10:45  105 12 146/72 (96) 92   


 


10/30/17 10:30  105 13 145/74 (97) 94   


 


10/30/17 10:15  104 17 145/75 (98) 99 Nasal Cannula 3 


 


10/30/17 10:10 99.0 104 16 155/72 (99) 100 Simple Mask 6 


 


10/30/17 07:25      Room Air  


 


10/29/17 23:00 96.7 75 18 159/70 (99) 95   


 


10/29/17 22:26  77 19 147/75 (99) 94   


 


10/29/17 19:33 98.2 64 19 129/72 (91) 97   














I/O      


 


 10/29/17 10/29/17 10/29/17 10/30/17 10/30/17 10/30/17





 07:00 15:00 23:00 07:00 15:00 23:00


 


Intake Total   1000 ml 0 ml 500 ml 


 


Output Total    1300 ml 300 ml 


 


Balance   1000 ml -1300 ml 200 ml 


 


      


 


Intake Oral    0 ml  


 


IV Total   1000 ml   


 


Other     500 ml 


 


Output Urine Total    1300 ml 250 ml 


 


Estimated Blood Loss     50 ml 


 


# Bowel Movements    0  








Result Diagram:  


10/30/17 0454                                                                  

              10/30/17 0454





Imaging





Last Impressions








Hip X-Ray 10/30/17 0000 Signed





Impressions: 





 Service Date/Time:  Monday, October 30, 2017 09:38 - CONCLUSION: Anatomic 





 alignment.     Clyde Hein MD  FACR


 


Hip and Pelvis X-Ray 10/29/17 1916 Signed





Impressions: 





 Service Date/Time:  Sunday, October 29, 2017 20:38 - CONCLUSION:  Angulated 

and 





 comminuted fractures of the proximal left femur and lesser trochanter     

Cristian Long MD 


 


Head CT 10/29/17 1916 Signed





Impressions: 





 Service Date/Time:  Sunday, October 29, 2017 20:47 - CONCLUSION:  Negative 





 noncontrast CT brain.     Cristian Long MD 


 


Chest X-Ray 10/29/17 1916 Signed





Impressions: 





 Service Date/Time:  Sunday, October 29, 2017 20:40 - CONCLUSION:  Stable 





 bilateral central infiltrates.     Cristian Long MD 


 


Cervical Spine CT 10/29/17 1916 Signed





Impressions: 





 Service Date/Time:  Sunday, October 29, 2017 20:47 - CONCLUSION:  No evidence 

of 





 fracture or spondylolisthesis.     Cristian Long MD 








Objective Remarks


GENERAL: This is a well-nourished, well-developed patient, who appears painful


SKIN: laceration to inner left heel with dried blood


HEAD: Atraumatic. Normocephalic. No temporal or scalp tenderness.


CARDIOVASCULAR: Regular rate and rhythm without murmurs, gallops, or rubs. 


RESPIRATORY: Clear to auscultation. Breath sounds equal bilaterally. No wheezes

, rales, or rhonchi.  


GASTROINTESTINAL: Abdomen soft, non-tender, nondistended. No hepato-splenomegaly

, or palpable masses. No guarding.


MUSCULOSKELETAL: Left hip after surgery. No calf tenderness. 


NEUROLOGICAL: Awake and alert. Motor and sensory grossly within normal limits. 

Pain to left leg with movement. Normal speech.








A/P


Problem List:  


(1) Closed left hip fracture


ICD Code:  S72.002A - Fracture of unspecified part of neck of left femur, 

initial encounter for closed fracture


(2) HTN (hypertension)


ICD Code:  I10 - Essential (primary) hypertension


Status:  Chronic


(3) Hypoglycemia


ICD Code:  E16.2 - Hypoglycemia, unspecified


Status:  Acute


Assessment and Plan


63 y/o female with a history of CHF, HTN, CAD, DM, COPD, RA,ablation due to 

atrial tachycardia , GERD, hypothyroidism presented to the ED after a 

hypoglycemic event and fall at home. 





Left hip subtrochanteric fracture, fall at home S/P Left femur reduction and 

intramedullary nail fixation on 10/30/17 by Dr Villanueva 


Hip x ray reviewed and shows a angulated and comminuted left femur fracture and 

lessor trochanter


   -Pain management with IV morphine


   -Consult orthopedic, s/p Surgery 10/30





Hypoglycemia, on chronic DM with long acting insulin use. Monitor BS 

hypoglycemia protocol


   -Accu checks


   -Hold long acting for now





Syncope, suspected due to hypoglycemia


Head CT reviewed and is unremarkable


   -Neuro checks





HTN, chronic


   -Resume home medications


   -Monitory vitals





DVT prophylaxis: SCDs








Discussed with the patient, nurse





Problem Qualifiers





(1) Closed left hip fracture:  


Qualified Codes:  S72.002A - Fracture of unspecified part of neck of left femur

, initial encounter for closed fracture








Shy Solano MD Oct 30, 2017 11:19

## 2017-10-30 NOTE — MB
cc:

ADELE FORD

****

 

 

DATE OF ADMISSION

10/29/2017

 

DATE OF CONSULTATION

10/30/2017

 

REASON FOR CONSULTATION

Left hip intertrochanteric fracture.

 

HISTORY

Maite is a 62-year-old female who has multiple medical problems including

CHF, hypertension, coronary artery disease, diabetes, COPD, rheumatoid

arthritis, reflux, hypothyroidism.  She states that she had low blood sugar.

She tried to get up to go to the bathroom.  She ended up falling.  She had

immediate left hip pain.  She was unable to stand or ambulate.  She presented

to the emergency room where x-rays reveal a left hip intertrochanteric

fracture.  She has been admitted for treatment of this injury.  She is

currently awake and alert on the orthopedic floor.  Her only complaint is her

left hip.  She describes a syncopal episode at the time of the fall.

 

PAST MEDICAL HISTORY

ILLNESSES

1. Hypertension.

2. Coronary artery disease.

3. Diabetes.

4. COPD.

5. Rheumatoid arthritis.

6. Hypothyroidism.

7. Neuropathy.

8. Dysphagia.

9. Esophageal stricture.

 

SURGERIES

1. L5-6 fusion.

2. Left ankle surgery.

3. Defibrillator placement.

4. Hysterectomy.

5. Cardiac ablation.

 

 

 

MEDICATIONS

1. Norvasc.

1. Augmentin.

2. Insulin.

3. Pantoprazole.

4. Lyrica.

5. Integra.

6. Alendronate.

7. Morphine.

8. Nitroglycerin.

9. Cymbalta.

10. Levothyroxine.

11. Soma.

 

ALLERGIES

No known drug allergies.

 

FAMILY HISTORY

Positive for brain tumor in her mother and stomach cancer in her father.

 

SOCIAL HISTORY

The patient quit smoking 16 years ago.  She denies drug or alcohol use.

 

REVIEW OF SYSTEMS

The patient denies headache, visual changes, neck pain, chest pain, shortness

of breath, abdominal pain, nausea or recent weight loss or numbness or tingling

of extremities.  She did have an episode of hypoglycemia yesterday which

resulted in her passing out and falling down.  She complains of left hip pain.

 

PHYSICAL EXAMINATION

GENERAL:  The patient is a well-developed, well-nourished 62-year female in no

acute distress.  She is awake and alert.  She is alert and oriented x3.

VITAL SIGNS:  Temperature 96.7, pulse 75, respirations 18, blood pressure

159/70, O2 sat 95% on room air.

HEAD:  The patient is normocephalic.  Pupils are equal.

NECK:  Soft, nontender.  Trachea is midline.

ABDOMEN:  Soft, nontender, nondistended.

EXTREMITIES:  Examination of bilateral upper extremities reveals no significant

pain with shoulder, elbow or wrist motion.  She has intact sensation in all

fingers.  She has good capillary refill in all fingers.  Skin is intact in both

hands.  Radial pulses are palpable.

 

Examination of the right leg reveals no significant pain with hip, knee or

ankle motion.  Skin is intact.  She has good capillary refill in her foot.  She

has diminished sensation in both feet secondary to neuropathy.

 

Examination of the left leg reveals pain with any hip motion.  She is diffusely

tender to palpation around the proximal femur.  She has minimal tenderness in

her knee, tibia and ankle.  Sensation is diminished in both feet.  She has good

capillary refill in her toes.

 

X-RAYS

X-rays of the left hip were reviewed.  X-rays reveal a displaced left hip

intertrochanteric fracture.

 

IMPRESSION

1. Diabetes.

2. Hypertension.

3. Hypothyroidism.

4. Left hip intertrochanteric fracture.

 

PLAN

The treatment options were discussed with the patient.  At this point I would

recommend reduction and intramedullary nail fixation of left femur.  The risks

of surgery include bleeding, infection, injury to arteries, nerves or blood

vessels, nonunion, malunion, painful hardware as well as medical complications

including blood clot, stroke, heart attack and death.  All questions were

answered.  I will plan on surgery today.

 

 

A mid-level provider in my office, nurse practitioner or PA, may see this

patient on a follow-up basis and continue to implement the objective of this

plan including: Starting or adjusting medications, injections of muscle,

tendon, bursa or joints, cast application, orthotic or brace application,

physical therapy, further radiographic studies including x-ray, MRI, CT,

ultrasounds or bone scan, vascular studies, neurologic studies, or other

specialist consultations, and proceeding with surgical management as

appropriate.

 

                              _________________________________

                              MD YANI Ross/ASTRID

D:  10/30/2017/6:48 AM

T:  10/30/2017/7:10 AM

Visit #:  [id number]

Job #:  [id number]

## 2017-10-30 NOTE — RADRPT
EXAM DATE/TIME:  10/30/2017 09:38 

 

[facility] COMPARISON:     No previous studies available for comparison.

 

                     

INDICATIONS :     Left hip troch nail.

                     

MEDICAL HISTORY :     None.          

SURGICAL HISTORY :     None.   

ENCOUNTER:     Initial                                        

ACUITY:     1 day      

PAIN SCORE:     Non-responsive.

LOCATION:     Left  Hip

 

FINDINGS:     

Alignment is anatomic following ORIF left hip with TROCH nail

  CONCLUSION: Anatomic alignment.  

 

 Clyde Hein MD FACR on October 30, 2017 at 10:24           

Board Certified Radiologist.

 This report was verified electronically.

## 2017-10-30 NOTE — PD.OP
cc:   Art Villanueva MD


__________________________________________________





Operative Report


Date of Surgery:  Oct 30, 2017


Preoperative Diagnosis:  


left hip subtroch fracture


Postoperative Diagnosis:  


Procedure:


Left femur reduction and intramedullary nail fixation


Anesthesia:


Gen.


Surgeon:


Art Villanueva


Assistant(s):


WADE Avilez PA-C


 


The surgical procedure was assisted by my physician assistant. My P.A. presence 

was necessary throughout this case for the manipulation and positioning of the 

surgical extremity. My P.A. was assisting me throughout the duration of this 

procedure. The skill set of a physician assistant was medically necessary to 

complete this procedure. During the surgical case the surgical tech was working 

at the back table and the physician assistant was directly assisting me.


Operation and Findings:


Implants used: 12 mm x [340]mm 130 Synthes TFNA  troch nail





Plan of activity: Weight-bear as tolerated





Patient was seen and evaluated preoperatively.  The patient has significant hip 

pain from proximal femur fracture.  The risk and benefits of surgery were 

discussed in depth with the patient to include bleeding, infection, nonunion, 

malunion,  need for hip replacement, painful hardware, as well as medical 

competitions including blood clots, stroke, heart attack, and death.  Informed 

consent was obtained.  Operative site was marked.  Patient was brought to the 

operating room and placed on fracture table.  IV sedation was administered by 

anesthesiologist.  Timeout procedure was performed.  Hip and leg were prepped 

with alcohol followed by DuraPrep and draped in the usual sterile fashion.  IV 

antibiotics were given prior to incision.





Procedure began with reduction of fracture.  Traction was applied.  The leg was 

manipulated to achieve reduction.  Excellent reduction was achieved.  

Fluoroscopy was used to confirm reduction.  A three inch incision was made 

proximal to the trochanter.  Subcutaneous tissue was dissected bluntly.  

Guidepin was placed at the tip of the trochanter and advanced into the femoral 

canal.  Fluoroscopy confirmed appropriate guidepin placement.  A opening reamer 

was placed over the guidepin.  A long ball tipped guide pin was now placed down 

the femoral canal into the center of the distal femur.  The nail length was now 

measured.  Fluoroscopy confirmed appropriate guidepin placement.  Flexible 

reamers were now passed over the guidepin to ream the intramedullary canal.





The Synthes TFNA nail was attached to the insertion handle.  Nail was now 

placed over the guidepin into the femoral canal.  Fluoroscopy confirmed 

appropriate nail placement.  A second incision was made over the lateral thigh.

  Cannulas were placed through the insertion handle down to the femur.  

Guidepin was now placed through the femoral nail into the center of the femoral 

head.  Fluoroscopy confirmed appropriate guidepin placement.  Screw length was 

measured.  Cannulated drill was placed over the guidepin.  Appropriate length 

lag screw was now placed.  Traction was released and compression was applied. 

The set screw was now tightened in static mode.





Next, using perfect [place] technique two distal interlocking screws were 

placed.  Screw holes were predrilled and screw lengths were measured.  Final 

fluoroscopy revealed well aligned fracture with well-placed hardware.  Incision 

was closed with 3-0 Vicryl and staples.  Sterile dressings were applied.  

Patient was awakened and transferred to recovery room.











Art Villanueva MD Oct 30, 2017 09:53

## 2017-10-30 NOTE — MB
cc:

ADELE FORD

****

 

 

DATE OF ADMISSION

10/29/2017

 

DATE OF CONSULTATION

10/30/2017

 

REASON FOR CONSULTATION

Left hip intertrochanteric fracture.

 

HISTORY

Maite is a 62-year-old female who has multiple medical problems including

CHF, hypertension, coronary artery disease, diabetes, COPD, rheumatoid

arthritis, reflux, hypothyroidism.  She states that she had low blood sugar.

She tried to get up to go to the bathroom.  She ended up falling.  She had

immediate left hip pain.  She was unable to stand or ambulate.  She presented

to the emergency room where x-rays reveal a left hip intertrochanteric

fracture.  She has been admitted for treatment of this injury.  She is

currently awake and alert on the orthopedic floor.  Her only complaint is her

left hip.  She describes a syncopal episode at the time of the fall.

 

PAST MEDICAL HISTORY

ILLNESSES

1. Hypertension.

2. Coronary artery disease.

3. Diabetes.

4. COPD.

5. Rheumatoid arthritis.

6. Hypothyroidism.

7. Neuropathy.

8. Dysphagia.

9. Esophageal stricture.

 

SURGERIES

1. L5-6 fusion.

2. Left ankle surgery.

3. Defibrillator placement.

4. Hysterectomy.

5. Cardiac ablation.

 

 

 

MEDICATIONS

1. Norvasc.

1. Augmentin.

2. Insulin.

3. Pantoprazole.

4. Lyrica.

5. Integra.

6. Alendronate.

7. Morphine.

8. Nitroglycerin.

9. Cymbalta.

10. Levothyroxine.

11. Soma.

 

ALLERGIES

No known drug allergies.

 

FAMILY HISTORY

Positive for brain tumor in her mother and stomach cancer in her father.

 

SOCIAL HISTORY

The patient quit smoking 16 years ago.  She denies drug or alcohol use.

 

REVIEW OF SYSTEMS

The patient denies headache, visual changes, neck pain, chest pain, shortness

of breath, abdominal pain, nausea or recent weight loss or numbness or tingling

of extremities.  She did have an episode of hypoglycemia yesterday which

resulted in her passing out and falling down.  She complains of left hip pain.

 

PHYSICAL EXAMINATION

GENERAL:  The patient is a well-developed, well-nourished 62-year female in no

acute distress.  She is awake and alert.  She is alert and oriented x3.

VITAL SIGNS:  Temperature 96.7, pulse 75, respirations 18, blood pressure

159/70, O2 sat 95% on room air.

HEAD:  The patient is normocephalic.  Pupils are equal.

NECK:  Soft, nontender.  Trachea is midline.

ABDOMEN:  Soft, nontender, nondistended.

EXTREMITIES:  Examination of bilateral upper extremities reveals no significant

pain with shoulder, elbow or wrist motion.  She has intact sensation in all

fingers.  She has good capillary refill in all fingers.  Skin is intact in both

hands.  Radial pulses are palpable.

 

Examination of the right leg reveals no significant pain with hip, knee or

ankle motion.  Skin is intact.  She has good capillary refill in her foot.  She

has diminished sensation in both feet secondary to neuropathy.

 

Examination of the left leg reveals pain with any hip motion.  She is diffusely

tender to palpation around the proximal femur.  She has minimal tenderness in

her knee, tibia and ankle.  Sensation is diminished in both feet.  She has good

capillary refill in her toes.

 

X-RAYS

X-rays of the left hip were reviewed.  X-rays reveal a displaced left hip

intertrochanteric fracture.

 

IMPRESSION

1. Diabetes.

2. Hypertension.

3. Hypothyroidism.

4. Left hip intertrochanteric fracture.

 

PLAN

The treatment options were discussed with the patient.  At this point I would

recommend reduction and intramedullary nail fixation of left femur.  The risks

of surgery include bleeding, infection, injury to arteries, nerves or blood

vessels, nonunion, malunion, painful hardware as well as medical complications

including blood clot, stroke, heart attack and death.  All questions were

answered.  I will plan on surgery today.

 

 

A mid-level provider in my office, nurse practitioner or PA, may see this

patient on a follow-up basis and continue to implement the objective of this

plan including: Starting or adjusting medications, injections of muscle,

tendon, bursa or joints, cast application, orthotic or brace application,

physical therapy, further radiographic studies including x-ray, MRI, CT,

ultrasounds or bone scan, vascular studies, neurologic studies, or other

specialist consultations, and proceeding with surgical management as

appropriate.

 

                              _________________________________

                              MD YANI Ross/ASTRID

D:  10/30/2017/6:48 AM

T:  10/30/2017/7:10 AM

Visit #:  [id number]

Job #:  [id number]

## 2017-10-30 NOTE — PD.WCN.NOT
Wound Consult


Description:


Consult for wound management of buttocks per Dr Solano


Communicated with:


SYLVIA Cervantes Dr


Recommendation:


Right buttock daily:


Cleanse wound with NORMAL SALINE


Apply Santyl to nickel thick to NS moistened gauze


Cover with dry gauze


Secure with bordered gauze (primapore)


Additional Information:


Patient seen on [address] for wound to right buttock. Patient assisted to her 

left side for assessment. Wound noted to right buttock measures 8cm x 8cm x 

slough. Wound is noted with ~80% adherent yellow/brown slough noted to center 

of wound bed with ~10% moist pink tissue and ~10% moist yellow tissue. There is 

no active drainage and no odor noted. Wound margins are jagged indicating a 

moisture etiology. Wound was cleansed with NS and gauze and left open to air 

until orders are obtained for recommended medication/dressing.











Mariangel Cox [facility] Oct 30, 2017 18:44

## 2017-10-30 NOTE — PD.WCN.NOT
Wound Consult


Description:


Consult for wound management of buttocks per Dr Solano


Communicated with:


SYLVIA Cervantes Dr


Recommendation:


Right buttock daily:


Cleanse wound with NORMAL SALINE


Apply Santyl to nickel thick to NS moistened gauze


Cover with dry gauze


Secure with bordered gauze (primapore)


Additional Information:


Patient seen on [address] for wound to right buttock. Patient assisted to her 

left side for assessment. Wound noted to right buttock measures 8cm x 8cm x 

slough. Wound is noted with ~80% adherent yellow/brown slough noted to center 

of wound bed with ~10% moist pink tissue and ~10% moist yellow tissue. There is 

no active drainage and no odor noted. Wound margins are jagged indicating a 

moisture etiology. Wound was cleansed with NS and gauze and left open to air 

until orders are obtained for recommended medication/dressing.











Mariangel Cox [facility] Oct 30, 2017 18:44

## 2017-10-30 NOTE — MB
cc:

ADELE FORD

****

 

 

DATE OF ADMISSION

10/29/2017

 

DATE OF CONSULTATION

10/30/2017

 

REASON FOR CONSULTATION

Left hip intertrochanteric fracture.

 

HISTORY

Maite is a 62-year-old female who has multiple medical problems including

CHF, hypertension, coronary artery disease, diabetes, COPD, rheumatoid

arthritis, reflux, hypothyroidism.  She states that she had low blood sugar.

She tried to get up to go to the bathroom.  She ended up falling.  She had

immediate left hip pain.  She was unable to stand or ambulate.  She presented

to the emergency room where x-rays reveal a left hip intertrochanteric

fracture.  She has been admitted for treatment of this injury.  She is

currently awake and alert on the orthopedic floor.  Her only complaint is her

left hip.  She describes a syncopal episode at the time of the fall.

 

PAST MEDICAL HISTORY

ILLNESSES

1. Hypertension.

2. Coronary artery disease.

3. Diabetes.

4. COPD.

5. Rheumatoid arthritis.

6. Hypothyroidism.

7. Neuropathy.

8. Dysphagia.

9. Esophageal stricture.

 

SURGERIES

1. L5-6 fusion.

2. Left ankle surgery.

3. Defibrillator placement.

4. Hysterectomy.

5. Cardiac ablation.

 

 

 

MEDICATIONS

1. Norvasc.

1. Augmentin.

2. Insulin.

3. Pantoprazole.

4. Lyrica.

5. Integra.

6. Alendronate.

7. Morphine.

8. Nitroglycerin.

9. Cymbalta.

10. Levothyroxine.

11. Soma.

 

ALLERGIES

No known drug allergies.

 

FAMILY HISTORY

Positive for brain tumor in her mother and stomach cancer in her father.

 

SOCIAL HISTORY

The patient quit smoking 16 years ago.  She denies drug or alcohol use.

 

REVIEW OF SYSTEMS

The patient denies headache, visual changes, neck pain, chest pain, shortness

of breath, abdominal pain, nausea or recent weight loss or numbness or tingling

of extremities.  She did have an episode of hypoglycemia yesterday which

resulted in her passing out and falling down.  She complains of left hip pain.

 

PHYSICAL EXAMINATION

GENERAL:  The patient is a well-developed, well-nourished 62-year female in no

acute distress.  She is awake and alert.  She is alert and oriented x3.

VITAL SIGNS:  Temperature 96.7, pulse 75, respirations 18, blood pressure

159/70, O2 sat 95% on room air.

HEAD:  The patient is normocephalic.  Pupils are equal.

NECK:  Soft, nontender.  Trachea is midline.

ABDOMEN:  Soft, nontender, nondistended.

EXTREMITIES:  Examination of bilateral upper extremities reveals no significant

pain with shoulder, elbow or wrist motion.  She has intact sensation in all

fingers.  She has good capillary refill in all fingers.  Skin is intact in both

hands.  Radial pulses are palpable.

 

Examination of the right leg reveals no significant pain with hip, knee or

ankle motion.  Skin is intact.  She has good capillary refill in her foot.  She

has diminished sensation in both feet secondary to neuropathy.

 

Examination of the left leg reveals pain with any hip motion.  She is diffusely

tender to palpation around the proximal femur.  She has minimal tenderness in

her knee, tibia and ankle.  Sensation is diminished in both feet.  She has good

capillary refill in her toes.

 

X-RAYS

X-rays of the left hip were reviewed.  X-rays reveal a displaced left hip

intertrochanteric fracture.

 

IMPRESSION

1. Diabetes.

2. Hypertension.

3. Hypothyroidism.

4. Left hip intertrochanteric fracture.

 

PLAN

The treatment options were discussed with the patient.  At this point I would

recommend reduction and intramedullary nail fixation of left femur.  The risks

of surgery include bleeding, infection, injury to arteries, nerves or blood

vessels, nonunion, malunion, painful hardware as well as medical complications

including blood clot, stroke, heart attack and death.  All questions were

answered.  I will plan on surgery today.

 

 

A mid-level provider in my office, nurse practitioner or PA, may see this

patient on a follow-up basis and continue to implement the objective of this

plan including: Starting or adjusting medications, injections of muscle,

tendon, bursa or joints, cast application, orthotic or brace application,

physical therapy, further radiographic studies including x-ray, MRI, CT,

ultrasounds or bone scan, vascular studies, neurologic studies, or other

specialist consultations, and proceeding with surgical management as

appropriate.

 

                              _________________________________

                              MD YANI Ross/ASTRID

D:  10/30/2017/6:48 AM

T:  10/30/2017/7:10 AM

Visit #:  [id number]

Job #:  [id number]

## 2017-10-30 NOTE — HHI.PR
Subjective


Remarks


Seen in PACU, appears in nad. Pain is controlled by meds. 


No cp, sob, n/v/d/c. 


No fever or chills.





Objective


Vitals





Vital Signs








  Date Time  Temp Pulse Resp B/P (MAP) Pulse Ox O2 Delivery O2 Flow Rate FiO2


 


10/30/17 11:00  107 12 137/0 (45) 98   


 


10/30/17 10:45  105 12 146/72 (96) 92   


 


10/30/17 10:30  105 13 145/74 (97) 94   


 


10/30/17 10:15  104 17 145/75 (98) 99 Nasal Cannula 3 


 


10/30/17 10:10 99.0 104 16 155/72 (99) 100 Simple Mask 6 


 


10/30/17 07:25      Room Air  


 


10/29/17 23:00 96.7 75 18 159/70 (99) 95   


 


10/29/17 22:26  77 19 147/75 (99) 94   


 


10/29/17 19:33 98.2 64 19 129/72 (91) 97   














I/O      


 


 10/29/17 10/29/17 10/29/17 10/30/17 10/30/17 10/30/17





 07:00 15:00 23:00 07:00 15:00 23:00


 


Intake Total   1000 ml 0 ml 500 ml 


 


Output Total    1300 ml 300 ml 


 


Balance   1000 ml -1300 ml 200 ml 


 


      


 


Intake Oral    0 ml  


 


IV Total   1000 ml   


 


Other     500 ml 


 


Output Urine Total    1300 ml 250 ml 


 


Estimated Blood Loss     50 ml 


 


# Bowel Movements    0  








Result Diagram:  


10/30/17 0454                                                                  

              10/30/17 0454





Imaging





Last Impressions








Hip X-Ray 10/30/17 0000 Signed





Impressions: 





 Service Date/Time:  Monday, October 30, 2017 09:38 - CONCLUSION: Anatomic 





 alignment.     Clyde Hein MD  FACR


 


Hip and Pelvis X-Ray 10/29/17 1916 Signed





Impressions: 





 Service Date/Time:  Sunday, October 29, 2017 20:38 - CONCLUSION:  Angulated 

and 





 comminuted fractures of the proximal left femur and lesser trochanter     

Cristian Long MD 


 


Head CT 10/29/17 1916 Signed





Impressions: 





 Service Date/Time:  Sunday, October 29, 2017 20:47 - CONCLUSION:  Negative 





 noncontrast CT brain.     Cristian Long MD 


 


Chest X-Ray 10/29/17 1916 Signed





Impressions: 





 Service Date/Time:  Sunday, October 29, 2017 20:40 - CONCLUSION:  Stable 





 bilateral central infiltrates.     Cristian Long MD 


 


Cervical Spine CT 10/29/17 1916 Signed





Impressions: 





 Service Date/Time:  Sunday, October 29, 2017 20:47 - CONCLUSION:  No evidence 

of 





 fracture or spondylolisthesis.     Cristian Long MD 








Objective Remarks


GENERAL: This is a well-nourished, well-developed patient, who appears painful


SKIN: laceration to inner left heel with dried blood


HEAD: Atraumatic. Normocephalic. No temporal or scalp tenderness.


CARDIOVASCULAR: Regular rate and rhythm without murmurs, gallops, or rubs. 


RESPIRATORY: Clear to auscultation. Breath sounds equal bilaterally. No wheezes

, rales, or rhonchi.  


GASTROINTESTINAL: Abdomen soft, non-tender, nondistended. No hepato-splenomegaly

, or palpable masses. No guarding.


MUSCULOSKELETAL: Left hip after surgery. No calf tenderness. 


NEUROLOGICAL: Awake and alert. Motor and sensory grossly within normal limits. 

Pain to left leg with movement. Normal speech.








A/P


Problem List:  


(1) Closed left hip fracture


ICD Code:  S72.002A - Fracture of unspecified part of neck of left femur, 

initial encounter for closed fracture


(2) HTN (hypertension)


ICD Code:  I10 - Essential (primary) hypertension


Status:  Chronic


(3) Hypoglycemia


ICD Code:  E16.2 - Hypoglycemia, unspecified


Status:  Acute


Assessment and Plan


61 y/o female with a history of CHF, HTN, CAD, DM, COPD, RA,ablation due to 

atrial tachycardia , GERD, hypothyroidism presented to the ED after a 

hypoglycemic event and fall at home. 





Left hip subtrochanteric fracture, fall at home S/P Left femur reduction and 

intramedullary nail fixation on 10/30/17 by Dr Villanueva 


Hip x ray reviewed and shows a angulated and comminuted left femur fracture and 

lessor trochanter


   -Pain management with IV morphine


   -Consult orthopedic, s/p Surgery 10/30





Hypoglycemia, on chronic DM with long acting insulin use. Monitor BS 

hypoglycemia protocol


   -Accu checks


   -Hold long acting for now





Syncope, suspected due to hypoglycemia


Head CT reviewed and is unremarkable


   -Neuro checks





HTN, chronic


   -Resume home medications


   -Monitory vitals





DVT prophylaxis: SCDs








Discussed with the patient, nurse





Problem Qualifiers





(1) Closed left hip fracture:  


Qualified Codes:  S72.002A - Fracture of unspecified part of neck of left femur

, initial encounter for closed fracture








Shy Solano MD Oct 30, 2017 11:19

## 2017-10-30 NOTE — PD.ORT.PN
Subjective


Subjective Remarks


Fall at home after EVAC arrives due to low blood sugar. She requested to go to 

the bathroom prior to leaving for hospital and after going to the bathroom she 

fell in bathroom and complained of left hip pain and inability to ambulate





Objective


Vitals





Vital Signs








  Date Time  Temp Pulse Resp B/P (MAP) Pulse Ox O2 Delivery O2 Flow Rate FiO2


 


10/29/17 23:00 96.7 75 18 159/70 (99) 95   


 


10/29/17 22:26  77 19 147/75 (99) 94   


 


10/29/17 19:33 98.2 64 19 129/72 (91) 97   














I/O      


 


 10/29/17 10/29/17 10/29/17 10/30/17 10/30/17 10/30/17





 07:00 15:00 23:00 07:00 15:00 23:00


 


Intake Total   1000 ml 0 ml  


 


Output Total    1300 ml  


 


Balance   1000 ml -1300 ml  


 


      


 


Intake Oral    0 ml  


 


IV Total   1000 ml   


 


Output Urine Total    1300 ml  


 


# Bowel Movements    0  








Result Diagram:  


10/30/17 0454                                                                  

              10/30/17 0454





Other Results





Laboratory Tests








Test


  10/29/17


20:10


 


Prothromb Time International


Ratio 0.9 RATIO 


 


 


Prothrombin Time


  10.2 SEC


(9.8-11.6)








Imaging





Last 24 hours Impressions








Hip and Pelvis X-Ray 10/29/17 1916 Signed





Impressions: 





 Service Date/Time:  Sunday, October 29, 2017 20:38 - CONCLUSION:  Angulated 

and 





 comminuted fractures of the proximal left femur and lesser trochanter     

Cristian Long MD 


 


Head CT 10/29/17 1916 Signed





Impressions: 





 Service Date/Time:  Sunday, October 29, 2017 20:47 - CONCLUSION:  Negative 





 noncontrast CT brain.     Cristian Long MD 


 


Chest X-Ray 10/29/17 1916 Signed





Impressions: 





 Service Date/Time:  Sunday, October 29, 2017 20:40 - CONCLUSION:  Stable 





 bilateral central infiltrates.     Cristian Long MD 


 


Cervical Spine CT 10/29/17 1916 Signed





Impressions: 





 Service Date/Time:  Sunday, October 29, 2017 20:47 - CONCLUSION:  No evidence 

of 





 fracture or spondylolisthesis.     Cristian Long MD 








Objective Remarks


Bilateral upper extremities: Full range of motion neurovascularly intact with 

slight tenderness to left shoulder movement


Right lower extremity: Full range of motion neurovascularly intact


Left lower extremity: Pain to palpation of hip. No tenderness at knee or ankle. 

Distally diminished sensation but does feel sensation over plantar and dorsal 

surface of foot





Assessment & Plan


Assessment and Plan


Left intertrochanteric femur fracture


Nothing by mouth


Sign consents


Surgery this morning with Dr. Wayne for IM nail fixation











Romaine Sweet Jr. Oct 30, 2017 06:39

## 2017-10-30 NOTE — OTSOAPIP
PATIENT OFF THE FLOOR FOR SURGERY. WILL REATTEMPT NEXT TREATMENT DAY.     



Therapist: Elisa Lai OTR/L

                          Signature on file

## 2017-10-30 NOTE — HHI.PR
Subjective


Remarks


Seen in PACU, appears in nad. Pain is controlled by meds. 


No cp, sob, n/v/d/c. 


No fever or chills.





Objective


Vitals





Vital Signs








  Date Time  Temp Pulse Resp B/P (MAP) Pulse Ox O2 Delivery O2 Flow Rate FiO2


 


10/30/17 11:00  107 12 137/0 (45) 98   


 


10/30/17 10:45  105 12 146/72 (96) 92   


 


10/30/17 10:30  105 13 145/74 (97) 94   


 


10/30/17 10:15  104 17 145/75 (98) 99 Nasal Cannula 3 


 


10/30/17 10:10 99.0 104 16 155/72 (99) 100 Simple Mask 6 


 


10/30/17 07:25      Room Air  


 


10/29/17 23:00 96.7 75 18 159/70 (99) 95   


 


10/29/17 22:26  77 19 147/75 (99) 94   


 


10/29/17 19:33 98.2 64 19 129/72 (91) 97   














I/O      


 


 10/29/17 10/29/17 10/29/17 10/30/17 10/30/17 10/30/17





 07:00 15:00 23:00 07:00 15:00 23:00


 


Intake Total   1000 ml 0 ml 500 ml 


 


Output Total    1300 ml 300 ml 


 


Balance   1000 ml -1300 ml 200 ml 


 


      


 


Intake Oral    0 ml  


 


IV Total   1000 ml   


 


Other     500 ml 


 


Output Urine Total    1300 ml 250 ml 


 


Estimated Blood Loss     50 ml 


 


# Bowel Movements    0  








Result Diagram:  


10/30/17 0454                                                                  

              10/30/17 0454





Imaging





Last Impressions








Hip X-Ray 10/30/17 0000 Signed





Impressions: 





 Service Date/Time:  Monday, October 30, 2017 09:38 - CONCLUSION: Anatomic 





 alignment.     Clyde Hein MD  FACR


 


Hip and Pelvis X-Ray 10/29/17 1916 Signed





Impressions: 





 Service Date/Time:  Sunday, October 29, 2017 20:38 - CONCLUSION:  Angulated 

and 





 comminuted fractures of the proximal left femur and lesser trochanter     

Cristian Long MD 


 


Head CT 10/29/17 1916 Signed





Impressions: 





 Service Date/Time:  Sunday, October 29, 2017 20:47 - CONCLUSION:  Negative 





 noncontrast CT brain.     Cristian Long MD 


 


Chest X-Ray 10/29/17 1916 Signed





Impressions: 





 Service Date/Time:  Sunday, October 29, 2017 20:40 - CONCLUSION:  Stable 





 bilateral central infiltrates.     Cristian oLng MD 


 


Cervical Spine CT 10/29/17 1916 Signed





Impressions: 





 Service Date/Time:  Sunday, October 29, 2017 20:47 - CONCLUSION:  No evidence 

of 





 fracture or spondylolisthesis.     Cristian Long MD 








Objective Remarks


GENERAL: This is a well-nourished, well-developed patient, who appears painful


SKIN: laceration to inner left heel with dried blood


HEAD: Atraumatic. Normocephalic. No temporal or scalp tenderness.


CARDIOVASCULAR: Regular rate and rhythm without murmurs, gallops, or rubs. 


RESPIRATORY: Clear to auscultation. Breath sounds equal bilaterally. No wheezes

, rales, or rhonchi.  


GASTROINTESTINAL: Abdomen soft, non-tender, nondistended. No hepato-splenomegaly

, or palpable masses. No guarding.


MUSCULOSKELETAL: Left hip after surgery. No calf tenderness. 


NEUROLOGICAL: Awake and alert. Motor and sensory grossly within normal limits. 

Pain to left leg with movement. Normal speech.








A/P


Problem List:  


(1) Closed left hip fracture


ICD Code:  S72.002A - Fracture of unspecified part of neck of left femur, 

initial encounter for closed fracture


(2) HTN (hypertension)


ICD Code:  I10 - Essential (primary) hypertension


Status:  Chronic


(3) Hypoglycemia


ICD Code:  E16.2 - Hypoglycemia, unspecified


Status:  Acute


Assessment and Plan


63 y/o female with a history of CHF, HTN, CAD, DM, COPD, RA,ablation due to 

atrial tachycardia , GERD, hypothyroidism presented to the ED after a 

hypoglycemic event and fall at home. 





Left hip subtrochanteric fracture, fall at home S/P Left femur reduction and 

intramedullary nail fixation on 10/30/17 by Dr Villanueva 


Hip x ray reviewed and shows a angulated and comminuted left femur fracture and 

lessor trochanter


   -Pain management with IV morphine


   -Consult orthopedic, s/p Surgery 10/30





Hypoglycemia, on chronic DM with long acting insulin use. Monitor BS 

hypoglycemia protocol


   -Accu checks


   -Hold long acting for now





Syncope, suspected due to hypoglycemia


Head CT reviewed and is unremarkable


   -Neuro checks





HTN, chronic


   -Resume home medications


   -Monitory vitals





DVT prophylaxis: SCDs








Discussed with the patient, nurse





Problem Qualifiers





(1) Closed left hip fracture:  


Qualified Codes:  S72.002A - Fracture of unspecified part of neck of left femur

, initial encounter for closed fracture








Shy Solano MD Oct 30, 2017 11:19

## 2017-10-30 NOTE — EKG
Date Performed: 10/29/2017       Time Performed: 20:20:56

 

PTAGE:      62 years

 

EKG:      Sinus rhythm 

 

 BORDERLINE LEFT AXIS DEVIATION BORDERLINE ECG

 

PREVIOUS TRACING       : 10/15/2017 13.45 Compared to prior tracing no significant change

 

DOCTOR:   Alejandro Waldron  Interpretating Date/Time  10/30/2017 13:23:00

## 2017-10-30 NOTE — PD.OP
cc:   Art Villanueva MD


__________________________________________________





Operative Report


Date of Surgery:  Oct 30, 2017


Preoperative Diagnosis:  


left hip subtroch fracture


Postoperative Diagnosis:  


Procedure:


Left femur reduction and intramedullary nail fixation


Anesthesia:


Gen.


Surgeon:


Art Villanueva


Assistant(s):


WADE Avilez PA-C


 


The surgical procedure was assisted by my physician assistant. My P.A. presence 

was necessary throughout this case for the manipulation and positioning of the 

surgical extremity. My P.A. was assisting me throughout the duration of this 

procedure. The skill set of a physician assistant was medically necessary to 

complete this procedure. During the surgical case the surgical tech was working 

at the back table and the physician assistant was directly assisting me.


Operation and Findings:


Implants used: 12 mm x [340]mm 130 Synthes TFNA  troch nail





Plan of activity: Weight-bear as tolerated





Patient was seen and evaluated preoperatively.  The patient has significant hip 

pain from proximal femur fracture.  The risk and benefits of surgery were 

discussed in depth with the patient to include bleeding, infection, nonunion, 

malunion,  need for hip replacement, painful hardware, as well as medical 

competitions including blood clots, stroke, heart attack, and death.  Informed 

consent was obtained.  Operative site was marked.  Patient was brought to the 

operating room and placed on fracture table.  IV sedation was administered by 

anesthesiologist.  Timeout procedure was performed.  Hip and leg were prepped 

with alcohol followed by DuraPrep and draped in the usual sterile fashion.  IV 

antibiotics were given prior to incision.





Procedure began with reduction of fracture.  Traction was applied.  The leg was 

manipulated to achieve reduction.  Excellent reduction was achieved.  

Fluoroscopy was used to confirm reduction.  A three inch incision was made 

proximal to the trochanter.  Subcutaneous tissue was dissected bluntly.  

Guidepin was placed at the tip of the trochanter and advanced into the femoral 

canal.  Fluoroscopy confirmed appropriate guidepin placement.  A opening reamer 

was placed over the guidepin.  A long ball tipped guide pin was now placed down 

the femoral canal into the center of the distal femur.  The nail length was now 

measured.  Fluoroscopy confirmed appropriate guidepin placement.  Flexible 

reamers were now passed over the guidepin to ream the intramedullary canal.





The Synthes TFNA nail was attached to the insertion handle.  Nail was now 

placed over the guidepin into the femoral canal.  Fluoroscopy confirmed 

appropriate nail placement.  A second incision was made over the lateral thigh.

  Cannulas were placed through the insertion handle down to the femur.  

Guidepin was now placed through the femoral nail into the center of the femoral 

head.  Fluoroscopy confirmed appropriate guidepin placement.  Screw length was 

measured.  Cannulated drill was placed over the guidepin.  Appropriate length 

lag screw was now placed.  Traction was released and compression was applied. 

The set screw was now tightened in static mode.





Next, using perfect [place] technique two distal interlocking screws were 

placed.  Screw holes were predrilled and screw lengths were measured.  Final 

fluoroscopy revealed well aligned fracture with well-placed hardware.  Incision 

was closed with 3-0 Vicryl and staples.  Sterile dressings were applied.  

Patient was awakened and transferred to recovery room.











Art Villanueva MD Oct 30, 2017 09:53

## 2017-10-30 NOTE — PD.OP
cc:   Art Villanueva MD


__________________________________________________





Operative Report


Date of Surgery:  Oct 30, 2017


Preoperative Diagnosis:  


left hip subtroch fracture


Postoperative Diagnosis:  


Procedure:


Left femur reduction and intramedullary nail fixation


Anesthesia:


Gen.


Surgeon:


Art Villanueva


Assistant(s):


WADE Avilez PA-C


 


The surgical procedure was assisted by my physician assistant. My P.A. presence 

was necessary throughout this case for the manipulation and positioning of the 

surgical extremity. My P.A. was assisting me throughout the duration of this 

procedure. The skill set of a physician assistant was medically necessary to 

complete this procedure. During the surgical case the surgical tech was working 

at the back table and the physician assistant was directly assisting me.


Operation and Findings:


Implants used: 12 mm x [340]mm 130 Synthes TFNA  troch nail





Plan of activity: Weight-bear as tolerated





Patient was seen and evaluated preoperatively.  The patient has significant hip 

pain from proximal femur fracture.  The risk and benefits of surgery were 

discussed in depth with the patient to include bleeding, infection, nonunion, 

malunion,  need for hip replacement, painful hardware, as well as medical 

competitions including blood clots, stroke, heart attack, and death.  Informed 

consent was obtained.  Operative site was marked.  Patient was brought to the 

operating room and placed on fracture table.  IV sedation was administered by 

anesthesiologist.  Timeout procedure was performed.  Hip and leg were prepped 

with alcohol followed by DuraPrep and draped in the usual sterile fashion.  IV 

antibiotics were given prior to incision.





Procedure began with reduction of fracture.  Traction was applied.  The leg was 

manipulated to achieve reduction.  Excellent reduction was achieved.  

Fluoroscopy was used to confirm reduction.  A three inch incision was made 

proximal to the trochanter.  Subcutaneous tissue was dissected bluntly.  

Guidepin was placed at the tip of the trochanter and advanced into the femoral 

canal.  Fluoroscopy confirmed appropriate guidepin placement.  A opening reamer 

was placed over the guidepin.  A long ball tipped guide pin was now placed down 

the femoral canal into the center of the distal femur.  The nail length was now 

measured.  Fluoroscopy confirmed appropriate guidepin placement.  Flexible 

reamers were now passed over the guidepin to ream the intramedullary canal.





The Synthes TFNA nail was attached to the insertion handle.  Nail was now 

placed over the guidepin into the femoral canal.  Fluoroscopy confirmed 

appropriate nail placement.  A second incision was made over the lateral thigh.

  Cannulas were placed through the insertion handle down to the femur.  

Guidepin was now placed through the femoral nail into the center of the femoral 

head.  Fluoroscopy confirmed appropriate guidepin placement.  Screw length was 

measured.  Cannulated drill was placed over the guidepin.  Appropriate length 

lag screw was now placed.  Traction was released and compression was applied. 

The set screw was now tightened in static mode.





Next, using perfect [place] technique two distal interlocking screws were 

placed.  Screw holes were predrilled and screw lengths were measured.  Final 

fluoroscopy revealed well aligned fracture with well-placed hardware.  Incision 

was closed with 3-0 Vicryl and staples.  Sterile dressings were applied.  

Patient was awakened and transferred to recovery room.











Art Villanueva MD Oct 30, 2017 09:53

## 2017-10-31 VITALS
TEMPERATURE: 99.6 F | HEART RATE: 100 BPM | SYSTOLIC BLOOD PRESSURE: 160 MMHG | RESPIRATION RATE: 16 BRPM | OXYGEN SATURATION: 96 % | DIASTOLIC BLOOD PRESSURE: 76 MMHG

## 2017-10-31 VITALS
OXYGEN SATURATION: 99 % | HEART RATE: 79 BPM | DIASTOLIC BLOOD PRESSURE: 75 MMHG | SYSTOLIC BLOOD PRESSURE: 173 MMHG | RESPIRATION RATE: 16 BRPM | TEMPERATURE: 98.7 F

## 2017-10-31 VITALS
RESPIRATION RATE: 16 BRPM | DIASTOLIC BLOOD PRESSURE: 79 MMHG | OXYGEN SATURATION: 98 % | SYSTOLIC BLOOD PRESSURE: 177 MMHG | TEMPERATURE: 97.6 F

## 2017-10-31 VITALS
SYSTOLIC BLOOD PRESSURE: 173 MMHG | TEMPERATURE: 98.7 F | RESPIRATION RATE: 16 BRPM | DIASTOLIC BLOOD PRESSURE: 75 MMHG | OXYGEN SATURATION: 99 % | HEART RATE: 79 BPM

## 2017-10-31 VITALS
DIASTOLIC BLOOD PRESSURE: 65 MMHG | SYSTOLIC BLOOD PRESSURE: 143 MMHG | OXYGEN SATURATION: 96 % | TEMPERATURE: 99.9 F | RESPIRATION RATE: 20 BRPM | HEART RATE: 92 BPM

## 2017-10-31 VITALS
DIASTOLIC BLOOD PRESSURE: 63 MMHG | SYSTOLIC BLOOD PRESSURE: 132 MMHG | OXYGEN SATURATION: 98 % | HEART RATE: 93 BPM | RESPIRATION RATE: 17 BRPM | TEMPERATURE: 98.6 F

## 2017-10-31 VITALS
OXYGEN SATURATION: 94 % | HEART RATE: 99 BPM | SYSTOLIC BLOOD PRESSURE: 171 MMHG | RESPIRATION RATE: 16 BRPM | DIASTOLIC BLOOD PRESSURE: 75 MMHG | TEMPERATURE: 100 F

## 2017-10-31 VITALS
OXYGEN SATURATION: 97 % | TEMPERATURE: 98.7 F | DIASTOLIC BLOOD PRESSURE: 71 MMHG | SYSTOLIC BLOOD PRESSURE: 177 MMHG | HEART RATE: 90 BPM | RESPIRATION RATE: 18 BRPM

## 2017-10-31 VITALS
RESPIRATION RATE: 17 BRPM | DIASTOLIC BLOOD PRESSURE: 67 MMHG | OXYGEN SATURATION: 96 % | SYSTOLIC BLOOD PRESSURE: 138 MMHG | TEMPERATURE: 100.3 F | HEART RATE: 102 BPM

## 2017-10-31 VITALS
HEART RATE: 92 BPM | TEMPERATURE: 98.1 F | DIASTOLIC BLOOD PRESSURE: 74 MMHG | SYSTOLIC BLOOD PRESSURE: 169 MMHG | RESPIRATION RATE: 16 BRPM | OXYGEN SATURATION: 99 %

## 2017-10-31 VITALS
HEART RATE: 95 BPM | DIASTOLIC BLOOD PRESSURE: 69 MMHG | OXYGEN SATURATION: 96 % | TEMPERATURE: 100 F | RESPIRATION RATE: 20 BRPM | SYSTOLIC BLOOD PRESSURE: 164 MMHG

## 2017-10-31 VITALS
TEMPERATURE: 98.2 F | SYSTOLIC BLOOD PRESSURE: 180 MMHG | RESPIRATION RATE: 18 BRPM | HEART RATE: 90 BPM | DIASTOLIC BLOOD PRESSURE: 79 MMHG | OXYGEN SATURATION: 98 %

## 2017-10-31 VITALS — OXYGEN SATURATION: 98 %

## 2017-10-31 LAB
HCT VFR BLD CALC: 19.4 % (ref 35–46)
HCT VFR BLD CALC: 26.5 % (ref 35–46)
HGB BLD-MCNC: 6.4 GM/DL (ref 11.6–15.3)
HGB BLD-MCNC: 9 GM/DL (ref 11.6–15.3)

## 2017-10-31 PROCEDURE — 30233N1 TRANSFUSION OF NONAUTOLOGOUS RED BLOOD CELLS INTO PERIPHERAL VEIN, PERCUTANEOUS APPROACH: ICD-10-PCS | Performed by: HOSPITALIST

## 2017-10-31 RX ADMIN — SODIUM CHLORIDE, PRESERVATIVE FREE SCH ML: 5 INJECTION INTRAVENOUS at 21:40

## 2017-10-31 RX ADMIN — HYDROCODONE BITARTRATE AND ACETAMINOPHEN PRN TAB: 7.5; 325 TABLET ORAL at 22:12

## 2017-10-31 RX ADMIN — INSULIN ASPART SCH: 100 INJECTION, SOLUTION INTRAVENOUS; SUBCUTANEOUS at 08:00

## 2017-10-31 RX ADMIN — CALCIUM CARBONATE-CHOLECALCIFEROL TAB 250 MG-125 UNIT SCH MG: 250-125 TAB at 12:09

## 2017-10-31 RX ADMIN — METOPROLOL TARTRATE SCH MG: 100 TABLET, FILM COATED ORAL at 08:07

## 2017-10-31 RX ADMIN — CALCIUM CARBONATE-CHOLECALCIFEROL TAB 250 MG-125 UNIT SCH MG: 250-125 TAB at 08:07

## 2017-10-31 RX ADMIN — PREGABALIN SCH MG: 75 CAPSULE ORAL at 21:38

## 2017-10-31 RX ADMIN — COLLAGENASE SANTYL PRN APPLIC: 250 OINTMENT TOPICAL at 10:05

## 2017-10-31 RX ADMIN — LOPERAMIDE HYDROCHLORIDE PRN MG: 2 CAPSULE ORAL at 17:36

## 2017-10-31 RX ADMIN — HYDROCODONE BITARTRATE AND ACETAMINOPHEN PRN TAB: 7.5; 325 TABLET ORAL at 12:09

## 2017-10-31 RX ADMIN — DULOXETINE SCH MG: 60 CAPSULE, DELAYED RELEASE ORAL at 08:19

## 2017-10-31 RX ADMIN — COLLAGENASE SANTYL PRN APPLIC: 250 OINTMENT TOPICAL at 15:37

## 2017-10-31 RX ADMIN — CALCIUM CARBONATE-CHOLECALCIFEROL TAB 250 MG-125 UNIT SCH MG: 250-125 TAB at 17:35

## 2017-10-31 RX ADMIN — PANTOPRAZOLE SODIUM SCH MG: 20 TABLET, DELAYED RELEASE ORAL at 08:07

## 2017-10-31 RX ADMIN — METOPROLOL TARTRATE SCH MG: 100 TABLET, FILM COATED ORAL at 21:38

## 2017-10-31 RX ADMIN — STANDARDIZED SENNA CONCENTRATE AND DOCUSATE SODIUM SCH TAB: 8.6; 5 TABLET, FILM COATED ORAL at 08:06

## 2017-10-31 RX ADMIN — SODIUM CHLORIDE, PRESERVATIVE FREE SCH ML: 5 INJECTION INTRAVENOUS at 08:08

## 2017-10-31 RX ADMIN — DULOXETINE SCH MG: 60 CAPSULE, DELAYED RELEASE ORAL at 17:36

## 2017-10-31 RX ADMIN — LEVOTHYROXINE SODIUM SCH MCG: 125 TABLET ORAL at 04:47

## 2017-10-31 RX ADMIN — STANDARDIZED SENNA CONCENTRATE AND DOCUSATE SODIUM SCH TAB: 8.6; 5 TABLET, FILM COATED ORAL at 21:38

## 2017-10-31 RX ADMIN — ENOXAPARIN SODIUM SCH MG: 30 INJECTION SUBCUTANEOUS at 08:19

## 2017-10-31 RX ADMIN — HYDROCODONE BITARTRATE AND ACETAMINOPHEN PRN TAB: 7.5; 325 TABLET ORAL at 18:25

## 2017-10-31 RX ADMIN — INSULIN ASPART SCH: 100 INJECTION, SOLUTION INTRAVENOUS; SUBCUTANEOUS at 12:00

## 2017-10-31 RX ADMIN — HYDROCODONE BITARTRATE AND ACETAMINOPHEN PRN TAB: 7.5; 325 TABLET ORAL at 15:36

## 2017-10-31 RX ADMIN — HYDROCODONE BITARTRATE AND ACETAMINOPHEN PRN TAB: 7.5; 325 TABLET ORAL at 08:07

## 2017-10-31 RX ADMIN — COLLAGENASE SANTYL PRN APPLIC: 250 OINTMENT TOPICAL at 14:00

## 2017-10-31 RX ADMIN — LOPERAMIDE HYDROCHLORIDE PRN MG: 2 CAPSULE ORAL at 15:35

## 2017-10-31 RX ADMIN — INSULIN ASPART SCH: 100 INJECTION, SOLUTION INTRAVENOUS; SUBCUTANEOUS at 17:00

## 2017-10-31 RX ADMIN — CHOLECALCIFEROL CAP 125 MCG (5000 UNIT) SCH UNITS: 125 CAP at 08:07

## 2017-10-31 RX ADMIN — INSULIN ASPART SCH: 100 INJECTION, SOLUTION INTRAVENOUS; SUBCUTANEOUS at 21:40

## 2017-10-31 RX ADMIN — Medication SCH TAB: at 21:38

## 2017-10-31 RX ADMIN — DULOXETINE SCH MG: 60 CAPSULE, DELAYED RELEASE ORAL at 12:09

## 2017-10-31 RX ADMIN — HYDROCODONE BITARTRATE AND ACETAMINOPHEN PRN TAB: 7.5; 325 TABLET ORAL at 04:47

## 2017-10-31 NOTE — PD.ORT.PN
Subjective


Subjective Remarks


POD 1 s/p IMN left hip


doing well. reports soreness





Objective


Vitals





Vital Signs








  Date Time  Temp Pulse Resp B/P (MAP) Pulse Ox O2 Delivery O2 Flow Rate FiO2


 


10/31/17 04:40 100.0 95 20 164/69 (100) 96   


 


10/31/17 00:15 99.9 92 20 143/65 (91) 96   


 


10/30/17 21:35 98.9 90 20 151/70 (97) 95   


 


10/30/17 21:10     92 Nasal Cannula 3.00 


 


10/30/17 16:47     97 Nasal Cannula 3.00 


 


10/30/17 16:00 99.0 91 18 121/62 (81) 95   


 


10/30/17 12:00 98.5 108 18 142/71 (94) 94   


 


10/30/17 11:30      Nasal Cannula 3.00 


 


10/30/17 11:10 98.9 107 12 137/73 (94) 98 Nasal Cannula 3 


 


10/30/17 11:00  107 12 137/0 (45) 98   


 


10/30/17 10:45  105 12 146/72 (96) 92   


 


10/30/17 10:30  105 13 145/74 (97) 94   


 


10/30/17 10:15  104 17 145/75 (98) 99 Nasal Cannula 3 


 


10/30/17 10:10 99.0 104 16 155/72 (99) 100 Simple Mask 6 


 


10/30/17 07:25      Room Air  














I/O      


 


 10/30/17 10/30/17 10/30/17 10/31/17 10/31/17 10/31/17





 07:00 15:00 23:00 07:00 15:00 23:00


 


Intake Total 0 ml 620 ml 480 ml 480 ml  


 


Output Total 1300 ml 750 ml 400 ml 650 ml  


 


Balance -1300 ml -130 ml 80 ml -170 ml  


 


      


 


Intake Oral 0 ml 120 ml 480 ml 480 ml  


 


Other  500 ml    


 


Output Urine Total 1300 ml 700 ml 400 ml 650 ml  


 


Estimated Blood Loss  50 ml    


 


# Bowel Movements 0 0 1 2  








Result Diagram:  


10/31/17 0510                                                                  

              10/30/17 0454





Imaging





Last 24 hours Impressions








Hip and Pelvis X-Ray 10/29/17 1166 Signed





Impressions: 





 Service Date/Time:  Sunday, October 29, 2017 20:38 - CONCLUSION:  Angulated 

and 





 comminuted fractures of the proximal left femur and lesser trochanter     

Cristian Long MD 


 


Head CT 10/29/17 1916 Signed





Impressions: 





 Service Date/Time:  Sunday, October 29, 2017 20:47 - CONCLUSION:  Negative 





 noncontrast CT brain.     Cristian Long MD 


 


Chest X-Ray 10/29/17 1916 Signed





Impressions: 





 Service Date/Time:  Sunday, October 29, 2017 20:40 - CONCLUSION:  Stable 





 bilateral central infiltrates.     Cristian Long MD 


 


Cervical Spine CT 10/29/17 1916 Signed





Impressions: 





 Service Date/Time:  Sunday, October 29, 2017 20:47 - CONCLUSION:  No evidence 

of 





 fracture or spondylolisthesis.     Cristian Long MD 








Objective Remarks


LLE: dressings clean and dry. intact. NVI





Assessment & Plan


Assessment and Plan


1) Left intertrochanteric femur fracture s/p IMN - POD 1


   -WBAT


   -daily dressing changes POD2


   -2 units PRBC today for critical H/H


   -CM for rehab placement


   -DVT prophylaxis


   -plan for DC to SNF once arrangements made and stable


   -f/u with Rosana or PA in 2 weeks











John Herrera Oct 31, 2017 06:55

## 2017-10-31 NOTE — PD.ORT.PN
Subjective


Subjective Remarks


POD 1 s/p IMN left hip


doing well. reports soreness





Objective


Vitals





Vital Signs








  Date Time  Temp Pulse Resp B/P (MAP) Pulse Ox O2 Delivery O2 Flow Rate FiO2


 


10/31/17 04:40 100.0 95 20 164/69 (100) 96   


 


10/31/17 00:15 99.9 92 20 143/65 (91) 96   


 


10/30/17 21:35 98.9 90 20 151/70 (97) 95   


 


10/30/17 21:10     92 Nasal Cannula 3.00 


 


10/30/17 16:47     97 Nasal Cannula 3.00 


 


10/30/17 16:00 99.0 91 18 121/62 (81) 95   


 


10/30/17 12:00 98.5 108 18 142/71 (94) 94   


 


10/30/17 11:30      Nasal Cannula 3.00 


 


10/30/17 11:10 98.9 107 12 137/73 (94) 98 Nasal Cannula 3 


 


10/30/17 11:00  107 12 137/0 (45) 98   


 


10/30/17 10:45  105 12 146/72 (96) 92   


 


10/30/17 10:30  105 13 145/74 (97) 94   


 


10/30/17 10:15  104 17 145/75 (98) 99 Nasal Cannula 3 


 


10/30/17 10:10 99.0 104 16 155/72 (99) 100 Simple Mask 6 


 


10/30/17 07:25      Room Air  














I/O      


 


 10/30/17 10/30/17 10/30/17 10/31/17 10/31/17 10/31/17





 07:00 15:00 23:00 07:00 15:00 23:00


 


Intake Total 0 ml 620 ml 480 ml 480 ml  


 


Output Total 1300 ml 750 ml 400 ml 650 ml  


 


Balance -1300 ml -130 ml 80 ml -170 ml  


 


      


 


Intake Oral 0 ml 120 ml 480 ml 480 ml  


 


Other  500 ml    


 


Output Urine Total 1300 ml 700 ml 400 ml 650 ml  


 


Estimated Blood Loss  50 ml    


 


# Bowel Movements 0 0 1 2  








Result Diagram:  


10/31/17 0510                                                                  

              10/30/17 0454





Imaging





Last 24 hours Impressions








Hip and Pelvis X-Ray 10/29/17 4376 Signed





Impressions: 





 Service Date/Time:  Sunday, October 29, 2017 20:38 - CONCLUSION:  Angulated 

and 





 comminuted fractures of the proximal left femur and lesser trochanter     

Cristian Long MD 


 


Head CT 10/29/17 1916 Signed





Impressions: 





 Service Date/Time:  Sunday, October 29, 2017 20:47 - CONCLUSION:  Negative 





 noncontrast CT brain.     Cristian Long MD 


 


Chest X-Ray 10/29/17 1916 Signed





Impressions: 





 Service Date/Time:  Sunday, October 29, 2017 20:40 - CONCLUSION:  Stable 





 bilateral central infiltrates.     Cristian Long MD 


 


Cervical Spine CT 10/29/17 1916 Signed





Impressions: 





 Service Date/Time:  Sunday, October 29, 2017 20:47 - CONCLUSION:  No evidence 

of 





 fracture or spondylolisthesis.     Cristian Long MD 








Objective Remarks


LLE: dressings clean and dry. intact. NVI





Assessment & Plan


Assessment and Plan


1) Left intertrochanteric femur fracture s/p IMN - POD 1


   -WBAT


   -daily dressing changes POD2


   -2 units PRBC today for critical H/H


   -CM for rehab placement


   -DVT prophylaxis


   -plan for DC to SNF once arrangements made and stable


   -f/u with Rosana or PA in 2 weeks











John Herrera Oct 31, 2017 06:55

## 2017-10-31 NOTE — HHI.PR
Subjective


Remarks


HGB dropped to 6.4  after surgery. Type and screen transfuse. Feels tired. No 

fever ro chills. No n/v/d/c. Has some pain at the surgical site.





Objective


Vitals





Vital Signs








  Date Time  Temp Pulse Resp B/P (MAP) Pulse Ox O2 Delivery O2 Flow Rate FiO2


 


10/31/17 04:40 100.0 95 20 164/69 (100) 96   


 


10/31/17 00:15 99.9 92 20 143/65 (91) 96   


 


10/30/17 21:35 98.9 90 20 151/70 (97) 95   


 


10/30/17 21:10     92 Nasal Cannula 3.00 


 


10/30/17 16:47     97 Nasal Cannula 3.00 


 


10/30/17 16:00 99.0 91 18 121/62 (81) 95   


 


10/30/17 12:00 98.5 108 18 142/71 (94) 94   


 


10/30/17 11:30      Nasal Cannula 3.00 


 


10/30/17 11:10 98.9 107 12 137/73 (94) 98 Nasal Cannula 3 


 


10/30/17 11:00  107 12 137/0 (45) 98   


 


10/30/17 10:45  105 12 146/72 (96) 92   


 


10/30/17 10:30  105 13 145/74 (97) 94   


 


10/30/17 10:15  104 17 145/75 (98) 99 Nasal Cannula 3 


 


10/30/17 10:10 99.0 104 16 155/72 (99) 100 Simple Mask 6 


 


10/30/17 07:25      Room Air  














I/O      


 


 10/30/17 10/30/17 10/30/17 10/31/17 10/31/17 10/31/17





 07:00 15:00 23:00 07:00 15:00 23:00


 


Intake Total 0 ml 620 ml 480 ml 480 ml  


 


Output Total 1300 ml 750 ml 400 ml 650 ml  


 


Balance -1300 ml -130 ml 80 ml -170 ml  


 


      


 


Intake Oral 0 ml 120 ml 480 ml 480 ml  


 


Other  500 ml    


 


Output Urine Total 1300 ml 700 ml 400 ml 650 ml  


 


Estimated Blood Loss  50 ml    


 


# Bowel Movements 0 0 1 2  








Result Diagram:  


10/31/17 0510                                                                  

              10/30/17 0454





Imaging





Last Impressions








Hip X-Ray 10/30/17 0000 Signed





Impressions: 





 Service Date/Time:  Monday, October 30, 2017 09:38 - CONCLUSION: Anatomic 





 alignment.     Clyde Hein MD  FACR


 


Hip and Pelvis X-Ray 10/29/17 1916 Signed





Impressions: 





 Service Date/Time:  Sunday, October 29, 2017 20:38 - CONCLUSION:  Angulated 

and 





 comminuted fractures of the proximal left femur and lesser trochanter     

Cristian Long MD 


 


Head CT 10/29/17 1916 Signed





Impressions: 





 Service Date/Time:  Sunday, October 29, 2017 20:47 - CONCLUSION:  Negative 





 noncontrast CT brain.     Cristian Long MD 


 


Chest X-Ray 10/29/17 1916 Signed





Impressions: 





 Service Date/Time:  Sunday, October 29, 2017 20:40 - CONCLUSION:  Stable 





 bilateral central infiltrates.     Cristian Long MD 


 


Cervical Spine CT 10/29/17 1916 Signed





Impressions: 





 Service Date/Time:  Sunday, October 29, 2017 20:47 - CONCLUSION:  No evidence 

of 





 fracture or spondylolisthesis.     Cristian Long MD 








Objective Remarks


GENERAL: This is a well-nourished, well-developed patient, who appears painful


SKIN: laceration to inner left heel with dried blood. Right buttock pressure 

wound present on admission. 


HEAD: Atraumatic. Normocephalic. No temporal or scalp tenderness.


CARDIOVASCULAR: Regular rate and rhythm without murmurs, gallops, or rubs. 


RESPIRATORY: Clear to auscultation. Breath sounds equal bilaterally. No wheezes

, rales, or rhonchi.  


GASTROINTESTINAL: Abdomen soft, non-tender, nondistended. No hepato-splenomegaly

, or palpable masses. No guarding.


MUSCULOSKELETAL: Left hip after surgery. No calf tenderness. 


NEUROLOGICAL: Awake and alert. Motor and sensory grossly within normal limits. 

Pain to left leg with movement. Normal speech.








A/P


Problem List:  


(1) Closed left hip fracture


ICD Code:  S72.002A - Fracture of unspecified part of neck of left femur, 

initial encounter for closed fracture


(2) HTN (hypertension)


ICD Code:  I10 - Essential (primary) hypertension


Status:  Chronic


(3) Hypoglycemia


ICD Code:  E16.2 - Hypoglycemia, unspecified


Status:  Acute


Assessment and Plan


63 y/o female with a history of CHF, HTN, CAD, DM, COPD, RA,ablation due to 

atrial tachycardia , GERD, hypothyroidism presented to the ED after a 

hypoglycemic event and fall at home. 





Left hip subtrochanteric fracture, fall at home S/P Left femur reduction and 

intramedullary nail fixation on 10/30/17 by Dr Villanueva 


Hip x ray reviewed and shows a angulated and comminuted left femur fracture and 

lessor trochanter


   -Pain management with IV morphine


   -Consult orthopedic, s/p Surgery 10/30





Postsurgical anemia H/H dropped critically at  6.4/19/4. Type and screen . 

Patient with critical anemia transfusing 2PRBC at this time. Monitor H/H and 

transfuse if HGB< 7 or if symptomatic.  Will also order FOBT. 





Right buttock pressure wound present on admission. 


Discussed with wound care. Daily:  Cleanse wound with NORMAL SALINE. Apply 

Santyl to nickel thick to NS moistened gauze. Cover with dry gauze. Secure with 

bordered gauze ([place]). 





Hypoglycemia, on chronic DM with long acting insulin use. Monitor BS 

hypoglycemia protocol


   -Accu checks


   -Hold long acting for now





Syncope, suspected due to hypoglycemia


Head CT reviewed and is unremarkable


   -Neuro checks





HTN, chronic


   -Resume home medications


   -Monitory vitals





DVT prophylaxis: SCDs








Discussed with the patient, nurse





Plan to DC to SNF once improved and cleared by ortho 





Patient with critical anemia transfusing 2PRBC at this time. Monitor H/H and 

transfuse if HGB< 7 or if symptomatic.





Problem Qualifiers





(1) Closed left hip fracture:  


Qualified Codes:  S72.002A - Fracture of unspecified part of neck of left femur

, initial encounter for closed fracture








Shy Solano MD Oct 31, 2017 06:59

## 2017-10-31 NOTE — PD.ORT.PN
Subjective


Subjective Remarks


POD 1 s/p IMN left hip


doing well. reports soreness





Objective


Vitals





Vital Signs








  Date Time  Temp Pulse Resp B/P (MAP) Pulse Ox O2 Delivery O2 Flow Rate FiO2


 


10/31/17 04:40 100.0 95 20 164/69 (100) 96   


 


10/31/17 00:15 99.9 92 20 143/65 (91) 96   


 


10/30/17 21:35 98.9 90 20 151/70 (97) 95   


 


10/30/17 21:10     92 Nasal Cannula 3.00 


 


10/30/17 16:47     97 Nasal Cannula 3.00 


 


10/30/17 16:00 99.0 91 18 121/62 (81) 95   


 


10/30/17 12:00 98.5 108 18 142/71 (94) 94   


 


10/30/17 11:30      Nasal Cannula 3.00 


 


10/30/17 11:10 98.9 107 12 137/73 (94) 98 Nasal Cannula 3 


 


10/30/17 11:00  107 12 137/0 (45) 98   


 


10/30/17 10:45  105 12 146/72 (96) 92   


 


10/30/17 10:30  105 13 145/74 (97) 94   


 


10/30/17 10:15  104 17 145/75 (98) 99 Nasal Cannula 3 


 


10/30/17 10:10 99.0 104 16 155/72 (99) 100 Simple Mask 6 


 


10/30/17 07:25      Room Air  














I/O      


 


 10/30/17 10/30/17 10/30/17 10/31/17 10/31/17 10/31/17





 07:00 15:00 23:00 07:00 15:00 23:00


 


Intake Total 0 ml 620 ml 480 ml 480 ml  


 


Output Total 1300 ml 750 ml 400 ml 650 ml  


 


Balance -1300 ml -130 ml 80 ml -170 ml  


 


      


 


Intake Oral 0 ml 120 ml 480 ml 480 ml  


 


Other  500 ml    


 


Output Urine Total 1300 ml 700 ml 400 ml 650 ml  


 


Estimated Blood Loss  50 ml    


 


# Bowel Movements 0 0 1 2  








Result Diagram:  


10/31/17 0510                                                                  

              10/30/17 0454





Imaging





Last 24 hours Impressions








Hip and Pelvis X-Ray 10/29/17 9106 Signed





Impressions: 





 Service Date/Time:  Sunday, October 29, 2017 20:38 - CONCLUSION:  Angulated 

and 





 comminuted fractures of the proximal left femur and lesser trochanter     

Cristian Long MD 


 


Head CT 10/29/17 1916 Signed





Impressions: 





 Service Date/Time:  Sunday, October 29, 2017 20:47 - CONCLUSION:  Negative 





 noncontrast CT brain.     Cristian Long MD 


 


Chest X-Ray 10/29/17 1916 Signed





Impressions: 





 Service Date/Time:  Sunday, October 29, 2017 20:40 - CONCLUSION:  Stable 





 bilateral central infiltrates.     Cristian Long MD 


 


Cervical Spine CT 10/29/17 1916 Signed





Impressions: 





 Service Date/Time:  Sunday, October 29, 2017 20:47 - CONCLUSION:  No evidence 

of 





 fracture or spondylolisthesis.     Cristian Long MD 








Objective Remarks


LLE: dressings clean and dry. intact. NVI





Assessment & Plan


Assessment and Plan


1) Left intertrochanteric femur fracture s/p IMN - POD 1


   -WBAT


   -daily dressing changes POD2


   -2 units PRBC today for critical H/H


   -CM for rehab placement


   -DVT prophylaxis


   -plan for DC to SNF once arrangements made and stable


   -f/u with Rosana or PA in 2 weeks











John Herrera Oct 31, 2017 06:55

## 2017-10-31 NOTE — HHI.PR
Subjective


Remarks


HGB dropped to 6.4  after surgery. Type and screen transfuse. Feels tired. No 

fever ro chills. No n/v/d/c. Has some pain at the surgical site.





Objective


Vitals





Vital Signs








  Date Time  Temp Pulse Resp B/P (MAP) Pulse Ox O2 Delivery O2 Flow Rate FiO2


 


10/31/17 04:40 100.0 95 20 164/69 (100) 96   


 


10/31/17 00:15 99.9 92 20 143/65 (91) 96   


 


10/30/17 21:35 98.9 90 20 151/70 (97) 95   


 


10/30/17 21:10     92 Nasal Cannula 3.00 


 


10/30/17 16:47     97 Nasal Cannula 3.00 


 


10/30/17 16:00 99.0 91 18 121/62 (81) 95   


 


10/30/17 12:00 98.5 108 18 142/71 (94) 94   


 


10/30/17 11:30      Nasal Cannula 3.00 


 


10/30/17 11:10 98.9 107 12 137/73 (94) 98 Nasal Cannula 3 


 


10/30/17 11:00  107 12 137/0 (45) 98   


 


10/30/17 10:45  105 12 146/72 (96) 92   


 


10/30/17 10:30  105 13 145/74 (97) 94   


 


10/30/17 10:15  104 17 145/75 (98) 99 Nasal Cannula 3 


 


10/30/17 10:10 99.0 104 16 155/72 (99) 100 Simple Mask 6 


 


10/30/17 07:25      Room Air  














I/O      


 


 10/30/17 10/30/17 10/30/17 10/31/17 10/31/17 10/31/17





 07:00 15:00 23:00 07:00 15:00 23:00


 


Intake Total 0 ml 620 ml 480 ml 480 ml  


 


Output Total 1300 ml 750 ml 400 ml 650 ml  


 


Balance -1300 ml -130 ml 80 ml -170 ml  


 


      


 


Intake Oral 0 ml 120 ml 480 ml 480 ml  


 


Other  500 ml    


 


Output Urine Total 1300 ml 700 ml 400 ml 650 ml  


 


Estimated Blood Loss  50 ml    


 


# Bowel Movements 0 0 1 2  








Result Diagram:  


10/31/17 0510                                                                  

              10/30/17 0454





Imaging





Last Impressions








Hip X-Ray 10/30/17 0000 Signed





Impressions: 





 Service Date/Time:  Monday, October 30, 2017 09:38 - CONCLUSION: Anatomic 





 alignment.     Clyde Hein MD  FACR


 


Hip and Pelvis X-Ray 10/29/17 1916 Signed





Impressions: 





 Service Date/Time:  Sunday, October 29, 2017 20:38 - CONCLUSION:  Angulated 

and 





 comminuted fractures of the proximal left femur and lesser trochanter     

Cristian Long MD 


 


Head CT 10/29/17 1916 Signed





Impressions: 





 Service Date/Time:  Sunday, October 29, 2017 20:47 - CONCLUSION:  Negative 





 noncontrast CT brain.     Cristian Long MD 


 


Chest X-Ray 10/29/17 1916 Signed





Impressions: 





 Service Date/Time:  Sunday, October 29, 2017 20:40 - CONCLUSION:  Stable 





 bilateral central infiltrates.     Cristian Long MD 


 


Cervical Spine CT 10/29/17 1916 Signed





Impressions: 





 Service Date/Time:  Sunday, October 29, 2017 20:47 - CONCLUSION:  No evidence 

of 





 fracture or spondylolisthesis.     Cristian Long MD 








Objective Remarks


GENERAL: This is a well-nourished, well-developed patient, who appears painful


SKIN: laceration to inner left heel with dried blood. Right buttock pressure 

wound present on admission. 


HEAD: Atraumatic. Normocephalic. No temporal or scalp tenderness.


CARDIOVASCULAR: Regular rate and rhythm without murmurs, gallops, or rubs. 


RESPIRATORY: Clear to auscultation. Breath sounds equal bilaterally. No wheezes

, rales, or rhonchi.  


GASTROINTESTINAL: Abdomen soft, non-tender, nondistended. No hepato-splenomegaly

, or palpable masses. No guarding.


MUSCULOSKELETAL: Left hip after surgery. No calf tenderness. 


NEUROLOGICAL: Awake and alert. Motor and sensory grossly within normal limits. 

Pain to left leg with movement. Normal speech.








A/P


Problem List:  


(1) Closed left hip fracture


ICD Code:  S72.002A - Fracture of unspecified part of neck of left femur, 

initial encounter for closed fracture


(2) HTN (hypertension)


ICD Code:  I10 - Essential (primary) hypertension


Status:  Chronic


(3) Hypoglycemia


ICD Code:  E16.2 - Hypoglycemia, unspecified


Status:  Acute


Assessment and Plan


61 y/o female with a history of CHF, HTN, CAD, DM, COPD, RA,ablation due to 

atrial tachycardia , GERD, hypothyroidism presented to the ED after a 

hypoglycemic event and fall at home. 





Left hip subtrochanteric fracture, fall at home S/P Left femur reduction and 

intramedullary nail fixation on 10/30/17 by Dr Villanueva 


Hip x ray reviewed and shows a angulated and comminuted left femur fracture and 

lessor trochanter


   -Pain management with IV morphine


   -Consult orthopedic, s/p Surgery 10/30





Postsurgical anemia H/H dropped critically at  6.4/19/4. Type and screen . 

Patient with critical anemia transfusing 2PRBC at this time. Monitor H/H and 

transfuse if HGB< 7 or if symptomatic.  Will also order FOBT. 





Right buttock pressure wound present on admission. 


Discussed with wound care. Daily:  Cleanse wound with NORMAL SALINE. Apply 

Santyl to nickel thick to NS moistened gauze. Cover with dry gauze. Secure with 

bordered gauze (primapore). 





Hypoglycemia, on chronic DM with long acting insulin use. Monitor BS 

hypoglycemia protocol


   -Accu checks


   -Hold long acting for now





Syncope, suspected due to hypoglycemia


Head CT reviewed and is unremarkable


   -Neuro checks





HTN, chronic


   -Resume home medications


   -Monitory vitals





DVT prophylaxis: SCDs








Discussed with the patient, nurse





Plan to DC to SNF once improved and cleared by ortho 





Patient with critical anemia transfusing 2PRBC at this time. Monitor H/H and 

transfuse if HGB< 7 or if symptomatic.





Problem Qualifiers





(1) Closed left hip fracture:  


Qualified Codes:  S72.002A - Fracture of unspecified part of neck of left femur

, initial encounter for closed fracture








Shy Solano MD Oct 31, 2017 06:59

## 2017-11-01 VITALS
DIASTOLIC BLOOD PRESSURE: 81 MMHG | OXYGEN SATURATION: 99 % | TEMPERATURE: 97.2 F | HEART RATE: 81 BPM | RESPIRATION RATE: 18 BRPM | SYSTOLIC BLOOD PRESSURE: 163 MMHG

## 2017-11-01 VITALS
OXYGEN SATURATION: 98 % | RESPIRATION RATE: 16 BRPM | SYSTOLIC BLOOD PRESSURE: 168 MMHG | HEART RATE: 80 BPM | TEMPERATURE: 98.1 F | DIASTOLIC BLOOD PRESSURE: 82 MMHG

## 2017-11-01 VITALS
HEART RATE: 86 BPM | TEMPERATURE: 97.8 F | DIASTOLIC BLOOD PRESSURE: 78 MMHG | SYSTOLIC BLOOD PRESSURE: 163 MMHG | RESPIRATION RATE: 16 BRPM | OXYGEN SATURATION: 98 %

## 2017-11-01 VITALS
OXYGEN SATURATION: 96 % | SYSTOLIC BLOOD PRESSURE: 156 MMHG | DIASTOLIC BLOOD PRESSURE: 70 MMHG | HEART RATE: 90 BPM | RESPIRATION RATE: 17 BRPM | TEMPERATURE: 98.1 F

## 2017-11-01 VITALS — DIASTOLIC BLOOD PRESSURE: 75 MMHG | SYSTOLIC BLOOD PRESSURE: 161 MMHG

## 2017-11-01 VITALS
DIASTOLIC BLOOD PRESSURE: 83 MMHG | HEART RATE: 95 BPM | RESPIRATION RATE: 16 BRPM | OXYGEN SATURATION: 94 % | TEMPERATURE: 97.3 F | SYSTOLIC BLOOD PRESSURE: 170 MMHG

## 2017-11-01 VITALS — OXYGEN SATURATION: 92 %

## 2017-11-01 LAB
BASOPHILS # BLD AUTO: 0 TH/MM3 (ref 0–0.2)
BASOPHILS NFR BLD: 0.2 % (ref 0–2)
BUN SERPL-MCNC: 7 MG/DL (ref 7–18)
CALCIUM SERPL-MCNC: 7.6 MG/DL (ref 8.5–10.1)
CHLORIDE SERPL-SCNC: 100 MEQ/L (ref 98–107)
CREAT SERPL-MCNC: 0.72 MG/DL (ref 0.5–1)
EOSINOPHIL # BLD: 0 TH/MM3 (ref 0–0.4)
EOSINOPHIL NFR BLD: 0.1 % (ref 0–4)
ERYTHROCYTE [DISTWIDTH] IN BLOOD BY AUTOMATED COUNT: 15.7 % (ref 11.6–17.2)
GFR SERPLBLD BASED ON 1.73 SQ M-ARVRAT: 100 ML/MIN (ref 89–?)
GLUCOSE SERPL-MCNC: 178 MG/DL (ref 74–106)
HCO3 BLD-SCNC: 28.5 MEQ/L (ref 21–32)
HCT VFR BLD CALC: 24.3 % (ref 35–46)
HGB BLD-MCNC: 8.4 GM/DL (ref 11.6–15.3)
LYMPHOCYTES # BLD AUTO: 0.9 TH/MM3 (ref 1–4.8)
LYMPHOCYTES NFR BLD AUTO: 6.4 % (ref 9–44)
MCH RBC QN AUTO: 28.3 PG (ref 27–34)
MCHC RBC AUTO-ENTMCNC: 34.6 % (ref 32–36)
MCV RBC AUTO: 81.8 FL (ref 80–100)
MONOCYTE #: 1.8 TH/MM3 (ref 0–0.9)
MONOCYTES NFR BLD: 12.9 % (ref 0–8)
NEUTROPHILS # BLD AUTO: 11 TH/MM3 (ref 1.8–7.7)
NEUTROPHILS NFR BLD AUTO: 80.4 % (ref 16–70)
PLATELET # BLD: 188 TH/MM3 (ref 150–450)
PMV BLD AUTO: 8.4 FL (ref 7–11)
RBC # BLD AUTO: 2.97 MIL/MM3 (ref 4–5.3)
SODIUM SERPL-SCNC: 139 MEQ/L (ref 136–145)
WBC # BLD AUTO: 13.7 TH/MM3 (ref 4–11)

## 2017-11-01 RX ADMIN — HYDROCODONE BITARTRATE AND ACETAMINOPHEN PRN TAB: 7.5; 325 TABLET ORAL at 07:46

## 2017-11-01 RX ADMIN — POTASSIUM CHLORIDE SCH MLS/HR: 200 INJECTION, SOLUTION INTRAVENOUS at 07:44

## 2017-11-01 RX ADMIN — STANDARDIZED SENNA CONCENTRATE AND DOCUSATE SODIUM SCH TAB: 8.6; 5 TABLET, FILM COATED ORAL at 10:32

## 2017-11-01 RX ADMIN — SODIUM CHLORIDE, PRESERVATIVE FREE SCH ML: 5 INJECTION INTRAVENOUS at 20:55

## 2017-11-01 RX ADMIN — HYDROCODONE BITARTRATE AND ACETAMINOPHEN PRN TAB: 7.5; 325 TABLET ORAL at 03:19

## 2017-11-01 RX ADMIN — METOPROLOL TARTRATE SCH MG: 100 TABLET, FILM COATED ORAL at 20:53

## 2017-11-01 RX ADMIN — PANTOPRAZOLE SODIUM SCH MG: 20 TABLET, DELAYED RELEASE ORAL at 07:45

## 2017-11-01 RX ADMIN — STANDARDIZED SENNA CONCENTRATE AND DOCUSATE SODIUM SCH TAB: 8.6; 5 TABLET, FILM COATED ORAL at 20:53

## 2017-11-01 RX ADMIN — CALCIUM CARBONATE-CHOLECALCIFEROL TAB 250 MG-125 UNIT SCH MG: 250-125 TAB at 09:00

## 2017-11-01 RX ADMIN — CALCIUM CARBONATE-CHOLECALCIFEROL TAB 250 MG-125 UNIT SCH MG: 250-125 TAB at 17:54

## 2017-11-01 RX ADMIN — LEVOTHYROXINE SODIUM SCH MCG: 125 TABLET ORAL at 05:11

## 2017-11-01 RX ADMIN — INSULIN ASPART SCH: 100 INJECTION, SOLUTION INTRAVENOUS; SUBCUTANEOUS at 08:00

## 2017-11-01 RX ADMIN — POTASSIUM CHLORIDE SCH MLS/HR: 200 INJECTION, SOLUTION INTRAVENOUS at 10:32

## 2017-11-01 RX ADMIN — INSULIN ASPART SCH: 100 INJECTION, SOLUTION INTRAVENOUS; SUBCUTANEOUS at 17:54

## 2017-11-01 RX ADMIN — HYDROCODONE BITARTRATE AND ACETAMINOPHEN PRN TAB: 7.5; 325 TABLET ORAL at 21:01

## 2017-11-01 RX ADMIN — ENOXAPARIN SODIUM SCH MG: 30 INJECTION SUBCUTANEOUS at 10:32

## 2017-11-01 RX ADMIN — Medication SCH TAB: at 10:32

## 2017-11-01 RX ADMIN — HYDROCODONE BITARTRATE AND ACETAMINOPHEN PRN TAB: 7.5; 325 TABLET ORAL at 16:56

## 2017-11-01 RX ADMIN — HYDROCODONE BITARTRATE AND ACETAMINOPHEN PRN TAB: 7.5; 325 TABLET ORAL at 11:56

## 2017-11-01 RX ADMIN — INSULIN ASPART SCH: 100 INJECTION, SOLUTION INTRAVENOUS; SUBCUTANEOUS at 20:57

## 2017-11-01 RX ADMIN — CHOLECALCIFEROL CAP 125 MCG (5000 UNIT) SCH UNITS: 125 CAP at 07:45

## 2017-11-01 RX ADMIN — DULOXETINE SCH MG: 60 CAPSULE, DELAYED RELEASE ORAL at 10:32

## 2017-11-01 RX ADMIN — PREGABALIN SCH MG: 75 CAPSULE ORAL at 20:55

## 2017-11-01 RX ADMIN — METOPROLOL TARTRATE SCH MG: 100 TABLET, FILM COATED ORAL at 10:33

## 2017-11-01 RX ADMIN — DULOXETINE SCH MG: 60 CAPSULE, DELAYED RELEASE ORAL at 17:54

## 2017-11-01 RX ADMIN — Medication SCH TAB: at 20:52

## 2017-11-01 RX ADMIN — DULOXETINE SCH MG: 60 CAPSULE, DELAYED RELEASE ORAL at 13:24

## 2017-11-01 RX ADMIN — SODIUM CHLORIDE, PRESERVATIVE FREE SCH ML: 5 INJECTION INTRAVENOUS at 09:00

## 2017-11-01 RX ADMIN — CALCIUM CARBONATE-CHOLECALCIFEROL TAB 250 MG-125 UNIT SCH MG: 250-125 TAB at 13:24

## 2017-11-01 RX ADMIN — STANDARDIZED SENNA CONCENTRATE AND DOCUSATE SODIUM SCH TAB: 8.6; 5 TABLET, FILM COATED ORAL at 20:58

## 2017-11-01 RX ADMIN — INSULIN ASPART SCH: 100 INJECTION, SOLUTION INTRAVENOUS; SUBCUTANEOUS at 13:25

## 2017-11-01 NOTE — PD.ORT.PN
Subjective


Subjective Remarks


Resting comfortably with no new complaints





Objective


Vitals





Vital Signs








  Date Time  Temp Pulse Resp B/P (MAP) Pulse Ox O2 Delivery O2 Flow Rate FiO2


 


11/1/17 01:00 98.1 90 17 156/70 (98) 96   


 


10/31/17 19:30 99.6 100 16 160/76 (104) 96   


 


10/31/17 17:15 98.2 90 18 180/79 98   


 


10/31/17 16:45 98.7 90 18 177/71 (106) 97   


 


10/31/17 14:25 98.6 93 17 132/63 98   


 


10/31/17 14:10 98.7 79 16 173/75 99   


 


10/31/17 13:25 97.6  16 177/79 98   


 


10/31/17 12:32 98.7 79 16 173/75 (107) 99   


 


10/31/17 11:25     98 Nasal Cannula 3.00 


 


10/31/17 10:35 98.1 92 16 169/74 99   


 


10/31/17 10:20 100.3 102 17 138/67 96   


 


10/31/17 08:00 100.0 99 16 171/75 (107) 94   














I/O      


 


 10/31/17 10/31/17 10/31/17 11/1/17 11/1/17 11/1/17





 07:00 15:00 23:00 07:00 15:00 23:00


 


Intake Total 1114 ml 910 ml 410 ml 720 ml  


 


Output Total 650 ml     


 


Balance 464 ml 910 ml 410 ml 720 ml  


 


      


 


Intake Oral 480 ml 480 ml  720 ml  


 


IV Total 634 ml     


 


Packed Cells  400 ml 400 ml   


 


Blood Product IV Normal Saline Flush  30 ml 10 ml   


 


Output Urine Total 650 ml     


 


# Voids  6  4  


 


# Bowel Movements 2 5  2  








Result Diagram:  


11/1/17 0554                                                                   

             10/30/17 0454





Imaging





Last 24 hours Impressions








Hip and Pelvis X-Ray 10/29/17 1916 Signed





Impressions: 





 Service Date/Time:  Sunday, October 29, 2017 20:38 - CONCLUSION:  Angulated 

and 





 comminuted fractures of the proximal left femur and lesser trochanter     

Cristian Long MD 


 


Head CT 10/29/17 1916 Signed





Impressions: 





 Service Date/Time:  Sunday, October 29, 2017 20:47 - CONCLUSION:  Negative 





 noncontrast CT brain.     Cristian Long MD 


 


Chest X-Ray 10/29/17 1916 Signed





Impressions: 





 Service Date/Time:  Sunday, October 29, 2017 20:40 - CONCLUSION:  Stable 





 bilateral central infiltrates.     Cristian Long MD 


 


Cervical Spine CT 10/29/17 1916 Signed





Impressions: 





 Service Date/Time:  Sunday, October 29, 2017 20:47 - CONCLUSION:  No evidence 

of 





 fracture or spondylolisthesis.     Cristian Long MD 








Objective Remarks


LLE: dressings clean and dry. intact. NVI





Assessment & Plan


Assessment and Plan


1) Left intertrochanteric femur fracture s/p IMN - POD 2


   -WBAT


   -daily dressing changes


   -CM for rehab placement


   -DVT prophylaxis


   -plan for DC to SNF once arrangements and medically stable


   -f/u with Rosana or PA in 2 weeks











Romaine Sweet Jr. Nov 1, 2017 06:37

## 2017-11-01 NOTE — HHI.PR
Subjective


Remarks


Paged by the nurse, critical lab value of K at 2.9. Replaced. Monitor and 

replace as need. check Mag. Also has decreased appetite will add Glucerna 

dietary supplement. 


No fever or chills. Feels tire, no sob or chest pain. No n/v/d/c.





Objective


Vitals





Vital Signs








  Date Time  Temp Pulse Resp B/P (MAP) Pulse Ox O2 Delivery O2 Flow Rate FiO2


 


11/1/17 03:10    161/75 (103)    


 


11/1/17 01:00 98.1 90 17 156/70 (98) 96   


 


10/31/17 19:30 99.6 100 16 160/76 (104) 96   


 


10/31/17 17:15 98.2 90 18 180/79 98   


 


10/31/17 16:45 98.7 90 18 177/71 (106) 97   


 


10/31/17 14:25 98.6 93 17 132/63 98   


 


10/31/17 14:10 98.7 79 16 173/75 99   


 


10/31/17 13:25 97.6  16 177/79 98   


 


10/31/17 12:32 98.7 79 16 173/75 (107) 99   


 


10/31/17 11:25     98 Nasal Cannula 3.00 


 


10/31/17 10:35 98.1 92 16 169/74 99   


 


10/31/17 10:20 100.3 102 17 138/67 96   


 


10/31/17 08:00 100.0 99 16 171/75 (107) 94   














I/O      


 


 10/31/17 10/31/17 10/31/17 11/1/17 11/1/17 11/1/17





 07:00 15:00 23:00 07:00 15:00 23:00


 


Intake Total 1114 ml 910 ml 410 ml 720 ml  


 


Output Total 650 ml     


 


Balance 464 ml 910 ml 410 ml 720 ml  


 


      


 


Intake Oral 480 ml 480 ml  720 ml  


 


IV Total 634 ml     


 


Packed Cells  400 ml 400 ml   


 


Blood Product IV Normal Saline Flush  30 ml 10 ml   


 


Output Urine Total 650 ml     


 


# Voids  6  4  


 


# Bowel Movements 2 5  2  








Result Diagram:  


11/1/17 0554                                                                   

             11/1/17 0554





Imaging





Last Impressions








Hip X-Ray 10/30/17 0000 Signed





Impressions: 





 Service Date/Time:  Monday, October 30, 2017 09:38 - CONCLUSION: Anatomic 





 alignment.     Clyde Heni MD  FACR


 


Hip and Pelvis X-Ray 10/29/17 1916 Signed





Impressions: 





 Service Date/Time:  Sunday, October 29, 2017 20:38 - CONCLUSION:  Angulated 

and 





 comminuted fractures of the proximal left femur and lesser trochanter     

Cristian Long MD 


 


Head CT 10/29/17 1916 Signed





Impressions: 





 Service Date/Time:  Sunday, October 29, 2017 20:47 - CONCLUSION:  Negative 





 noncontrast CT brain.     Cristian Long MD 


 


Chest X-Ray 10/29/17 1916 Signed





Impressions: 





 Service Date/Time:  Sunday, October 29, 2017 20:40 - CONCLUSION:  Stable 





 bilateral central infiltrates.     Cristian Long MD 


 


Cervical Spine CT 10/29/17 1916 Signed





Impressions: 





 Service Date/Time:  Sunday, October 29, 2017 20:47 - CONCLUSION:  No evidence 

of 





 fracture or spondylolisthesis.     Cristian Long MD 








Objective Remarks


GENERAL: This is a well-nourished, well-developed patient, who appears painful


SKIN: laceration to inner left heel with dried blood. Right buttock pressure 

wound present on admission. 


HEAD: Atraumatic. Normocephalic. No temporal or scalp tenderness.


CARDIOVASCULAR: Regular rate and rhythm without murmurs, gallops, or rubs. 


RESPIRATORY: Clear to auscultation. Breath sounds equal bilaterally. No wheezes

, rales, or rhonchi.  


GASTROINTESTINAL: Abdomen soft, non-tender, nondistended. No hepato-splenomegaly

, or palpable masses. No guarding.


MUSCULOSKELETAL: Left hip after surgery. No calf tenderness. 


NEUROLOGICAL: Awake and alert. Motor and sensory grossly within normal limits. 

Pain to left leg with movement. Normal speech.








A/P


Problem List:  


(1) Closed left hip fracture


ICD Code:  S72.002A - Fracture of unspecified part of neck of left femur, 

initial encounter for closed fracture


(2) HTN (hypertension)


ICD Code:  I10 - Essential (primary) hypertension


Status:  Chronic


(3) Hypoglycemia


ICD Code:  E16.2 - Hypoglycemia, unspecified


Status:  Acute


Assessment and Plan


63 y/o female with a history of CHF, HTN, CAD, DM, COPD, RA,ablation due to 

atrial tachycardia , GERD, hypothyroidism presented to the ED after a 

hypoglycemic event and fall at home. 





Left hip subtrochanteric fracture, fall at home S/P Left femur reduction and 

intramedullary nail fixation on 10/30/17 by Dr Villanueva 


Hip x ray reviewed and shows a angulated and comminuted left femur fracture and 

lessor trochanter


   -Pain management with IV morphine


   -Consult orthopedic, s/p Surgery 10/30





Postsurgical anemia H/H dropped critically at  6.4/19/4. Type and screen . 

Patient with critical anemia transfusing 2PRBC at this time. Monitor H/H and 

transfuse if HGB< 7 or if symptomatic.  Will also order FOBT. 





Severe Hypokalemia K at 2.9. Replaced with 40 mEq IV and 40 mq PO. Check mag. 

Monitor and replace as need. 





Vit D deficiency . Vit D level of 7/9. Start ergocalciferol. q7days. 





Right buttock pressure wound present on admission. 


Discussed with wound care. Daily:  Cleanse wound with NORMAL SALINE. Apply 

Santyl to nickel thick to NS moistened gauze. Cover with dry gauze. Secure with 

bordered gauze (prim[place]). 





Hypoglycemia, on chronic DM with long acting insulin use. Monitor BS 

hypoglycemia protocol


   -Accu checks


   -Hold long acting for now





Syncope, suspected due to hypoglycemia


Head CT reviewed and is unremarkable


   -Neuro checks





HTN, chronic


   -Resume home medications


   -Monitory vitals





DVT prophylaxis: SCDs








Discussed with the patient, nurse





Plan to DC to SNF once improved and cleared by ortho, poss DC tomorrow if 

improved. 





Received 2U pRBCs, monitor H/H , also monitor K after replacement.





Problem Qualifiers





(1) Closed left hip fracture:  


Qualified Codes:  S72.002A - Fracture of unspecified part of neck of left femur

, initial encounter for closed fracture








Shy Solano MD Nov 1, 2017 07:23

## 2017-11-01 NOTE — HHI.PR
Subjective


Remarks


Paged by the nurse, critical lab value of K at 2.9. Replaced. Monitor and 

replace as need. check Mag. Also has decreased appetite will add Glucerna 

dietary supplement. 


No fever or chills. Feels tire, no sob or chest pain. No n/v/d/c.





Objective


Vitals





Vital Signs








  Date Time  Temp Pulse Resp B/P (MAP) Pulse Ox O2 Delivery O2 Flow Rate FiO2


 


11/1/17 03:10    161/75 (103)    


 


11/1/17 01:00 98.1 90 17 156/70 (98) 96   


 


10/31/17 19:30 99.6 100 16 160/76 (104) 96   


 


10/31/17 17:15 98.2 90 18 180/79 98   


 


10/31/17 16:45 98.7 90 18 177/71 (106) 97   


 


10/31/17 14:25 98.6 93 17 132/63 98   


 


10/31/17 14:10 98.7 79 16 173/75 99   


 


10/31/17 13:25 97.6  16 177/79 98   


 


10/31/17 12:32 98.7 79 16 173/75 (107) 99   


 


10/31/17 11:25     98 Nasal Cannula 3.00 


 


10/31/17 10:35 98.1 92 16 169/74 99   


 


10/31/17 10:20 100.3 102 17 138/67 96   


 


10/31/17 08:00 100.0 99 16 171/75 (107) 94   














I/O      


 


 10/31/17 10/31/17 10/31/17 11/1/17 11/1/17 11/1/17





 07:00 15:00 23:00 07:00 15:00 23:00


 


Intake Total 1114 ml 910 ml 410 ml 720 ml  


 


Output Total 650 ml     


 


Balance 464 ml 910 ml 410 ml 720 ml  


 


      


 


Intake Oral 480 ml 480 ml  720 ml  


 


IV Total 634 ml     


 


Packed Cells  400 ml 400 ml   


 


Blood Product IV Normal Saline Flush  30 ml 10 ml   


 


Output Urine Total 650 ml     


 


# Voids  6  4  


 


# Bowel Movements 2 5  2  








Result Diagram:  


11/1/17 0554                                                                   

             11/1/17 0554





Imaging





Last Impressions








Hip X-Ray 10/30/17 0000 Signed





Impressions: 





 Service Date/Time:  Monday, October 30, 2017 09:38 - CONCLUSION: Anatomic 





 alignment.     Clyde Hein MD  FACR


 


Hip and Pelvis X-Ray 10/29/17 1916 Signed





Impressions: 





 Service Date/Time:  Sunday, October 29, 2017 20:38 - CONCLUSION:  Angulated 

and 





 comminuted fractures of the proximal left femur and lesser trochanter     

Cristian Long MD 


 


Head CT 10/29/17 1916 Signed





Impressions: 





 Service Date/Time:  Sunday, October 29, 2017 20:47 - CONCLUSION:  Negative 





 noncontrast CT brain.     Cristian Long MD 


 


Chest X-Ray 10/29/17 1916 Signed





Impressions: 





 Service Date/Time:  Sunday, October 29, 2017 20:40 - CONCLUSION:  Stable 





 bilateral central infiltrates.     Cristian Long MD 


 


Cervical Spine CT 10/29/17 1916 Signed





Impressions: 





 Service Date/Time:  Sunday, October 29, 2017 20:47 - CONCLUSION:  No evidence 

of 





 fracture or spondylolisthesis.     Cristian Long MD 








Objective Remarks


GENERAL: This is a well-nourished, well-developed patient, who appears painful


SKIN: laceration to inner left heel with dried blood. Right buttock pressure 

wound present on admission. 


HEAD: Atraumatic. Normocephalic. No temporal or scalp tenderness.


CARDIOVASCULAR: Regular rate and rhythm without murmurs, gallops, or rubs. 


RESPIRATORY: Clear to auscultation. Breath sounds equal bilaterally. No wheezes

, rales, or rhonchi.  


GASTROINTESTINAL: Abdomen soft, non-tender, nondistended. No hepato-splenomegaly

, or palpable masses. No guarding.


MUSCULOSKELETAL: Left hip after surgery. No calf tenderness. 


NEUROLOGICAL: Awake and alert. Motor and sensory grossly within normal limits. 

Pain to left leg with movement. Normal speech.








A/P


Problem List:  


(1) Closed left hip fracture


ICD Code:  S72.002A - Fracture of unspecified part of neck of left femur, 

initial encounter for closed fracture


(2) HTN (hypertension)


ICD Code:  I10 - Essential (primary) hypertension


Status:  Chronic


(3) Hypoglycemia


ICD Code:  E16.2 - Hypoglycemia, unspecified


Status:  Acute


Assessment and Plan


61 y/o female with a history of CHF, HTN, CAD, DM, COPD, RA,ablation due to 

atrial tachycardia , GERD, hypothyroidism presented to the ED after a 

hypoglycemic event and fall at home. 





Left hip subtrochanteric fracture, fall at home S/P Left femur reduction and 

intramedullary nail fixation on 10/30/17 by Dr Villanueva 


Hip x ray reviewed and shows a angulated and comminuted left femur fracture and 

lessor trochanter


   -Pain management with IV morphine


   -Consult orthopedic, s/p Surgery 10/30





Postsurgical anemia H/H dropped critically at  6.4/19/4. Type and screen . 

Patient with critical anemia transfusing 2PRBC at this time. Monitor H/H and 

transfuse if HGB< 7 or if symptomatic.  Will also order FOBT. 





Severe Hypokalemia K at 2.9. Replaced with 40 mEq IV and 40 mq PO. Check mag. 

Monitor and replace as need. 





Vit D deficiency . Vit D level of 7/9. Start ergocalciferol. q7days. 





Right buttock pressure wound present on admission. 


Discussed with wound care. Daily:  Cleanse wound with NORMAL SALINE. Apply 

Santyl to nickel thick to NS moistened gauze. Cover with dry gauze. Secure with 

bordered gauze (prim[place]). 





Hypoglycemia, on chronic DM with long acting insulin use. Monitor BS 

hypoglycemia protocol


   -Accu checks


   -Hold long acting for now





Syncope, suspected due to hypoglycemia


Head CT reviewed and is unremarkable


   -Neuro checks





HTN, chronic


   -Resume home medications


   -Monitory vitals





DVT prophylaxis: SCDs








Discussed with the patient, nurse





Plan to DC to SNF once improved and cleared by ortho, poss DC tomorrow if 

improved. 





Received 2U pRBCs, monitor H/H , also monitor K after replacement.





Problem Qualifiers





(1) Closed left hip fracture:  


Qualified Codes:  S72.002A - Fracture of unspecified part of neck of left femur

, initial encounter for closed fracture








Shy Solano MD Nov 1, 2017 07:23

## 2017-11-02 VITALS
HEART RATE: 86 BPM | RESPIRATION RATE: 14 BRPM | DIASTOLIC BLOOD PRESSURE: 61 MMHG | OXYGEN SATURATION: 95 % | TEMPERATURE: 99.8 F | SYSTOLIC BLOOD PRESSURE: 129 MMHG

## 2017-11-02 VITALS
HEART RATE: 93 BPM | OXYGEN SATURATION: 95 % | DIASTOLIC BLOOD PRESSURE: 71 MMHG | RESPIRATION RATE: 16 BRPM | TEMPERATURE: 99.7 F | SYSTOLIC BLOOD PRESSURE: 145 MMHG

## 2017-11-02 VITALS
HEART RATE: 92 BPM | OXYGEN SATURATION: 95 % | SYSTOLIC BLOOD PRESSURE: 167 MMHG | TEMPERATURE: 99 F | DIASTOLIC BLOOD PRESSURE: 77 MMHG | RESPIRATION RATE: 18 BRPM

## 2017-11-02 LAB
BASOPHILS # BLD AUTO: 0 TH/MM3 (ref 0–0.2)
BASOPHILS NFR BLD: 0.4 % (ref 0–2)
BUN SERPL-MCNC: 8 MG/DL (ref 7–18)
CALCIUM SERPL-MCNC: 7.6 MG/DL (ref 8.5–10.1)
CHLORIDE SERPL-SCNC: 101 MEQ/L (ref 98–107)
CREAT SERPL-MCNC: 0.73 MG/DL (ref 0.5–1)
EOSINOPHIL # BLD: 0 TH/MM3 (ref 0–0.4)
EOSINOPHIL NFR BLD: 0.5 % (ref 0–4)
ERYTHROCYTE [DISTWIDTH] IN BLOOD BY AUTOMATED COUNT: 15.2 % (ref 11.6–17.2)
GFR SERPLBLD BASED ON 1.73 SQ M-ARVRAT: 98 ML/MIN (ref 89–?)
GLUCOSE SERPL-MCNC: 120 MG/DL (ref 74–106)
HCO3 BLD-SCNC: 28 MEQ/L (ref 21–32)
HCT VFR BLD CALC: 24.1 % (ref 35–46)
HGB BLD-MCNC: 8.2 GM/DL (ref 11.6–15.3)
LYMPHOCYTES # BLD AUTO: 1.1 TH/MM3 (ref 1–4.8)
LYMPHOCYTES NFR BLD AUTO: 10.6 % (ref 9–44)
MAGNESIUM SERPL-MCNC: 1.5 MG/DL (ref 1.5–2.5)
MCH RBC QN AUTO: 28 PG (ref 27–34)
MCHC RBC AUTO-ENTMCNC: 34.1 % (ref 32–36)
MCV RBC AUTO: 82.2 FL (ref 80–100)
MONOCYTE #: 1.2 TH/MM3 (ref 0–0.9)
MONOCYTES NFR BLD: 11.8 % (ref 0–8)
NEUTROPHILS # BLD AUTO: 7.8 TH/MM3 (ref 1.8–7.7)
NEUTROPHILS NFR BLD AUTO: 76.7 % (ref 16–70)
PLATELET # BLD: 190 TH/MM3 (ref 150–450)
PMV BLD AUTO: 8.6 FL (ref 7–11)
RBC # BLD AUTO: 2.93 MIL/MM3 (ref 4–5.3)
SODIUM SERPL-SCNC: 138 MEQ/L (ref 136–145)
WBC # BLD AUTO: 10.2 TH/MM3 (ref 4–11)

## 2017-11-02 RX ADMIN — Medication SCH TAB: at 08:25

## 2017-11-02 RX ADMIN — CALCIUM CARBONATE-CHOLECALCIFEROL TAB 250 MG-125 UNIT SCH MG: 250-125 TAB at 08:26

## 2017-11-02 RX ADMIN — HYDROCODONE BITARTRATE AND ACETAMINOPHEN PRN TAB: 7.5; 325 TABLET ORAL at 08:26

## 2017-11-02 RX ADMIN — DULOXETINE SCH MG: 60 CAPSULE, DELAYED RELEASE ORAL at 08:25

## 2017-11-02 RX ADMIN — PANTOPRAZOLE SODIUM SCH MG: 20 TABLET, DELAYED RELEASE ORAL at 08:25

## 2017-11-02 RX ADMIN — CHOLECALCIFEROL CAP 125 MCG (5000 UNIT) SCH UNITS: 125 CAP at 08:25

## 2017-11-02 RX ADMIN — SODIUM CHLORIDE, PRESERVATIVE FREE SCH ML: 5 INJECTION INTRAVENOUS at 08:27

## 2017-11-02 RX ADMIN — ENOXAPARIN SODIUM SCH MG: 30 INJECTION SUBCUTANEOUS at 08:25

## 2017-11-02 RX ADMIN — STANDARDIZED SENNA CONCENTRATE AND DOCUSATE SODIUM SCH TAB: 8.6; 5 TABLET, FILM COATED ORAL at 08:25

## 2017-11-02 RX ADMIN — LEVOTHYROXINE SODIUM SCH MCG: 125 TABLET ORAL at 05:07

## 2017-11-02 RX ADMIN — METOPROLOL TARTRATE SCH MG: 100 TABLET, FILM COATED ORAL at 08:25

## 2017-11-02 RX ADMIN — HYDROCODONE BITARTRATE AND ACETAMINOPHEN PRN TAB: 7.5; 325 TABLET ORAL at 02:13

## 2017-11-02 RX ADMIN — INSULIN ASPART SCH: 100 INJECTION, SOLUTION INTRAVENOUS; SUBCUTANEOUS at 08:00

## 2017-11-02 NOTE — HHI.PR
Subjective


Remarks


Late entry, seen this morning, no overnight events,





Mild anemia, no hematochezia.  Blood pressure stable.  No complaints.  Pain is 

controlled





Objective


Vitals





Vital Signs








  Date Time  Temp Pulse Resp B/P (MAP) Pulse Ox O2 Delivery O2 Flow Rate FiO2


 


11/2/17 08:30     95 Room Air  


 


11/2/17 08:00 99.0 92 18 167/77 (107) 95   


 


11/2/17 04:00 99.7 93 16 145/71 (95) 95   


 


11/2/17 03:31   17     


 


11/2/17 01:21      Room Air  


 


11/2/17 00:00 99.8 86 14 129/61 (83) 95   


 


11/1/17 21:55   18     


 


11/1/17 19:00 97.8 86 16 163/78 (106) 98   














I/O      


 


 11/1/17 11/1/17 11/1/17 11/2/17 11/2/17 11/2/17





 07:00 15:00 23:00 07:00 15:00 23:00


 


Intake Total 720 ml 240 ml 480 ml 300 ml 240 ml 


 


Output Total     0 ml 


 


Balance 720 ml 240 ml 480 ml 300 ml 240 ml 


 


      


 


Intake Oral 720 ml 240 ml 480 ml 300 ml 240 ml 


 


Stool Total     0 ml 


 


# Voids 4 3 3 3 3 


 


# Bowel Movements 2 1 0 0  








Result Diagram:  


11/2/17 0646                                                                   

             11/2/17 0646





Objective Remarks





GENERAL: This is a well-nourished, well-developed patient, who appears painful


CARDIOVASCULAR: Regular rate and rhythm without murmurs, gallops, or rubs. 


RESPIRATORY: Clear to auscultation. Breath sounds equal bilaterally. No wheezes

, rales, or rhonchi.  


GASTROINTESTINAL: Abdomen soft, non-tender, nondistended. 


MUSCULOSKELETAL: Left hip after surgery. No calf tenderness. 


NEUROLOGICAL: Awake and alert. Motor and sensory grossly within normal limits. 

Pain to left leg with movement. Normal speech.








A/P


Problem List:  


(1) Closed left hip fracture


ICD Code:  S72.002A - Fracture of unspecified part of neck of left femur, 

initial encounter for closed fracture


Status:  Acute


(2) HTN (hypertension)


ICD Code:  I10 - Essential (primary) hypertension


Status:  Chronic


(3) Hypoglycemia


ICD Code:  E16.2 - Hypoglycemia, unspecified


Status:  Acute


Assessment and Plan





63 y/o female with a history of CHF, HTN, CAD, DM, COPD, RA,ablation due to 

atrial tachycardia , GERD, hypothyroidism presented to the ED after a 

hypoglycemic event and fall at home. 





Left hip subtrochanteric fracture, fall at home S/P Left femur reduction and 

intramedullary nail fixation on 10/30/17 by Dr Villanueva 


Hip x ray reviewed and shows a angulated and comminuted left femur fracture and 

lessor trochanter, status post surgery.  Discharge once cleared by orthopedics.








Postsurgical anemia H/H dropped critically at  6.4/19/4. Type and screen . 

Patient with critical anemia transfusing 2PRBC at this time. Monitor H/H and 

transfuse if HGB< 7 or if symptomatic.  





Severe Hypokalemia K at 2.9. Replaced with 40 mEq IV and 40 mq PO. Check mag. 

Monitor and replace as need. 





Vit D deficiency . Vit D level of 7/9. Start ergocalciferol. q7days. 





Right buttock pressure wound present on admission. 


Discussed with wound care. Daily:  Cleanse wound with NORMAL SALINE. Apply 

Santyl to nickel thick to NS moistened gauze. Cover with dry gauze. Secure with 

bordered gauze (primapore). 





Hypoglycemia, on chronic DM with long acting insulin use. Monitor BS 

hypoglycemia protocol


   -Accu checks








Syncope, suspected due to hypoglycemia


Head CT reviewed and is unremarkable


   -Neuro checks





HTN, chronic


   -Resume home medications


   -Monitory vitals





DVT prophylaxis: SCDs





Discharge Planning


Discharged once cleared by orthopedics





Problem Qualifiers





(1) Closed left hip fracture:  


Qualified Codes:  S72.002A - Fracture of unspecified part of neck of left femur

, initial encounter for closed fracture








Zohra Douglas MD Nov 2, 2017 16:11

## 2017-11-02 NOTE — PD.ORT.PN
Subjective


Subjective Remarks


Resting comfortably with no new complaints





Objective


Vitals





Vital Signs








  Date Time  Temp Pulse Resp B/P (MAP) Pulse Ox O2 Delivery O2 Flow Rate FiO2


 


11/2/17 04:00 99.7 93 16 145/71 (95) 95   


 


11/2/17 03:31   17     


 


11/2/17 01:21      Room Air  


 


11/2/17 00:00 99.8 86 14 129/61 (83) 95   


 


11/1/17 21:55   18     


 


11/1/17 19:00 97.8 86 16 163/78 (106) 98   


 


11/1/17 16:10 98.1 80 16 168/82 (110) 98   


 


11/1/17 12:00 97.2 81 18 163/81 (108) 99   


 


11/1/17 11:17     92   21


 


11/1/17 08:00 97.3 95 16 170/83 (112) 94   














I/O      


 


 11/1/17 11/1/17 11/1/17 11/2/17 11/2/17 11/2/17





 07:00 15:00 23:00 07:00 15:00 23:00


 


Intake Total 720 ml 240 ml 480 ml 300 ml  


 


Balance 720 ml 240 ml 480 ml 300 ml  


 


      


 


Intake Oral 720 ml 240 ml 480 ml 300 ml  


 


# Voids 4 3 3 3  


 


# Bowel Movements 2 1 0 0  








Result Diagram:  


11/1/17 0554                                                                   

             11/1/17 0554





Imaging





Last 24 hours Impressions








Hip and Pelvis X-Ray 10/29/17 1916 Signed





Impressions: 





 Service Date/Time:  Sunday, October 29, 2017 20:38 - CONCLUSION:  Angulated 

and 





 comminuted fractures of the proximal left femur and lesser trochanter     

Cristian Long MD 


 


Head CT 10/29/17 1916 Signed





Impressions: 





 Service Date/Time:  Sunday, October 29, 2017 20:47 - CONCLUSION:  Negative 





 noncontrast CT brain.     Cristian Long MD 


 


Chest X-Ray 10/29/17 1916 Signed





Impressions: 





 Service Date/Time:  Sunday, October 29, 2017 20:40 - CONCLUSION:  Stable 





 bilateral central infiltrates.     Cristian Long MD 


 


Cervical Spine CT 10/29/17 1916 Signed





Impressions: 





 Service Date/Time:  Sunday, October 29, 2017 20:47 - CONCLUSION:  No evidence 

of 





 fracture or spondylolisthesis.     Cristian Long MD 








Objective Remarks


LLE: dressings clean and dry. intact. NVI





Assessment & Plan


Assessment and Plan


1) Left intertrochanteric femur fracture s/p IMN - POD 3


   -WBAT


   -daily dressing changes


   -CM for rehab placement


   -DVT prophylaxis


   -ortho cleared for discharge  to SNF once arrangements and medically stable


   -f/u with [facility] or [facility] in 2 weeks











Romaine Sweet Jr. Nov 2, 2017 06:23

## 2017-11-03 NOTE — PQ
Physician Query Response Document

 

 

PATIENT:               MELISSA HINES

ACCT #:                  [id number]

MRN:                       [id number]

:                       1955

ADMIT DATE:       10/29/2017 9:47 PM

DISCH DATE:        2017 11:21 AM

RESPONDING

PROVIDER #:        bro[place]a

 

 

QUERY TEXT:

 

Anemia Type

 

Anemia is documented in the Medical Record.  Please specify the cause (includes suspected or probable
 cause)

Such as:

-- Due to acute blood loss

-- Due to chronic blood loss

-- Due to iron deficiency

-- Due to postoperative blood loss

-- Due to chronic disease

-- Other, please specify

 

Your prompt response is appreciated, please do not hesitate to contact the CDI/Coding Hotline with an
y questions, comments and/or concerns you may have at ext. [phone].

 

The patient's Clinical Indicators include:

POSTSURGICAL ANEMIA. H/H DROPPED CRITICALLY AT 6.4/19.4. TYPE AND SCREEN, TRANSFUCE 2 PRBC AT THIS TI
ME.  PROG NOTE 10/31

ORIF INTERTROCHANTERIC FRACTURE 10/30

PERNICIOUS ANEMIA-ED RECORD

VITAMIN D DEFICIENCY-PROG NOTES

H/H 10/30 8.6/25.9

 

 

Query created by: Maria Luisa Helm on 11/3/2017 10:39 AM

 

RESPONSE TEXT:

 

Anemia is postsurgical, also patient has a h/o pernicious anemia

Pressure ulcer stage 2 on buttock , present on admission

 

 

 

 

 

 

 

 

Electronically signed by:  Shy Solano MD 11/3/2017 3:36 PM

## 2017-11-03 NOTE — PQ
Physician Query Response Document

 

 

PATIENT:               MELISSA HINES

ACCT #:                  [id number]

MRN:                       [id number]

:                       1955

ADMIT DATE:       10/29/2017 9:47 PM

DISCH DATE:        2017 11:21 AM

RESPONDING

PROVIDER #:        [email or web address]

 

 

QUERY TEXT:

 

Pressure Ulcer Type

 

Pressure ulcer is documented in the Medical Record. Please specify the location, present on admission
 status and/or stage:

Location and laterality of pressure ulcer(s):

 

 

POA status of each pressure ulcer:

-- Not present on admission

-- Present on admission

-- Other

-- Clinically unable to determine

-- Unknown

 

Stage of each pressure ulcer (National Pressure Ulcer Advisory Panel definitions):

-- Stage I:  Intact skin with non-blanchable redness of a localized area

-- Stage II:  Partial thickness skin loss involving dermis with a shallow open ulcer or an open serum
-filled blister

-- Stage III:  Full thickness skin loss involving damage or necrosis of subcutaneous tissue

-- Stage IV:  Full thickness skin loss with exposed bone, tendon or muscle

-- Unstageable:  Full thickness tissue loss in which the base of the ulcer is covered by slough and/o
r eschar in the wound bed

 

Your prompt response is appreciated, please do not hesitate to contact the CDI/Coding Hotline with an
y questions, comments and/or concerns you may have at ext. [phone].

 

The patient's Clinical Indicators include:

RIGHT BUTTOCK PRESSURE WOUND PRESENT ON ADMISSION-PROG NOTES 10/30-

WOUND CARE NOTES:

Tissue in would bed:  Yellow Slough

Length 5cm, Width 8cm, Depth 0 cm

Wound left open

Reddened, clean, dry

 

 

 

 

 

 

Query created by: Maria Luisa Helm on 11/3/2017 10:33 AM

 

RESPONSE TEXT:

 

Stage III buttock pressure ulcer

 

 

 

 

 

 

 

Electronically signed by:  Shy Solano MD 11/3/2017 5:25 PM

## 2017-11-03 NOTE — PQ
Physician Query Response Document

 

 

PATIENT:               MELISSA HINES

ACCT #:                  [id number]

MRN:                       [id number]

:                       1955

ADMIT DATE:       10/29/2017 9:47 PM

DISCH DATE:        2017 11:21 AM

RESPONDING

PROVIDER #:        mcnicolea

 

 

QUERY TEXT:

 

Pressure Ulcer Type

 

Pressure ulcer is documented in the Medical Record. Please specify the location, present on admission
 status and/or stage:

Location and laterality of pressure ulcer(s):

 

 

POA status of each pressure ulcer:

-- Not present on admission

-- Present on admission

-- Other

-- Clinically unable to determine

-- Unknown

 

Stage of each pressure ulcer (National Pressure Ulcer Advisory Panel definitions):

-- Stage I:  Intact skin with non-blanchable redness of a localized area

-- Stage II:  Partial thickness skin loss involving dermis with a shallow open ulcer or an open serum
-filled blister

-- Stage III:  Full thickness skin loss involving damage or necrosis of subcutaneous tissue

-- Stage IV:  Full thickness skin loss with exposed bone, tendon or muscle

-- Unstageable:  Full thickness tissue loss in which the base of the ulcer is covered by slough and/o
r eschar in the wound bed

 

Your prompt response is appreciated, please do not hesitate to contact the CDI/Coding Hotline with an
y questions, comments and/or concerns you may have at ext. [phone].

 

The patient's Clinical Indicators include:

RIGHT BUTTOCK PRESSURE WOUND PRESENT ON ADMISSION-PROG NOTES 10/30-

WOUND CARE NOTES:

Tissue in would bed:  Yellow Slough

Length 5cm, Width 8cm, Depth 0 cm

Wound left open

Reddened, clean, dry

 

 

 

 

 

 

Query created by: Maria Luisa Helm on 11/3/2017 10:33 AM

 

RESPONSE TEXT:

 

Stage III buttock pressure ulcer

 

 

 

 

 

 

 

Electronically signed by:  Shy Solano MD 11/3/2017 5:25 PM

## 2017-11-03 NOTE — PQ
Physician Query Response Document

 

 

PATIENT:               MELISSA HINES

ACCT #:                  [id number]

MRN:                       [id number]

:                       1955

ADMIT DATE:       10/29/2017 9:47 PM

DISCH DATE:        2017 11:21 AM

RESPONDING

PROVIDER #:        [place]

 

 

QUERY TEXT:

 

Anemia Type

 

Anemia is documented in the Medical Record.  Please specify the cause (includes suspected or probable
 cause)

Such as:

-- Due to acute blood loss

-- Due to chronic blood loss

-- Due to iron deficiency

-- Due to postoperative blood loss

-- Due to chronic disease

-- Other, please specify

 

Your prompt response is appreciated, please do not hesitate to contact the CDI/Coding Hotline with an
y questions, comments and/or concerns you may have at ext. [phone].

 

The patient's Clinical Indicators include:

POSTSURGICAL ANEMIA. H/H DROPPED CRITICALLY AT 6.4/19.4. TYPE AND SCREEN, TRANSFUCE 2 PRBC AT THIS TI
ME.  PROG NOTE 10/31

ORIF INTERTROCHANTERIC FRACTURE 10/30

PERNICIOUS ANEMIA-ED RECORD

VITAMIN D DEFICIENCY-PROG NOTES

H/H 10/30 8.6/25.9

 

 

Query created by: Maria Luisa Helm on 11/3/2017 10:39 AM

 

RESPONSE TEXT:

 

Anemia is postsurgical, also patient has a h/o pernicious anemia

Pressure ulcer stage 2 on buttock , present on admission

 

 

 

 

 

 

 

 

Electronically signed by:  Shy Solano MD 11/3/2017 3:36 PM

## 2017-11-03 NOTE — PQ
Physician Query Response Document

 

 

PATIENT:               MELISSA HINES

ACCT #:                  [id number]

MRN:                       [id number]

:                       1955

ADMIT DATE:       10/29/2017 9:47 PM

DISCH DATE:        2017 11:21 AM

RESPONDING

PROVIDER #:        mcnic[place]a

 

 

QUERY TEXT:

 

Pressure Ulcer Type

 

Pressure ulcer is documented in the Medical Record. Please specify the location, present on admission
 status and/or stage:

Location and laterality of pressure ulcer(s):

 

 

POA status of each pressure ulcer:

-- Not present on admission

-- Present on admission

-- Other

-- Clinically unable to determine

-- Unknown

 

Stage of each pressure ulcer (National Pressure Ulcer Advisory Panel definitions):

-- Stage I:  Intact skin with non-blanchable redness of a localized area

-- Stage II:  Partial thickness skin loss involving dermis with a shallow open ulcer or an open serum
-filled blister

-- Stage III:  Full thickness skin loss involving damage or necrosis of subcutaneous tissue

-- Stage IV:  Full thickness skin loss with exposed bone, tendon or muscle

-- Unstageable:  Full thickness tissue loss in which the base of the ulcer is covered by slough and/o
r eschar in the wound bed

 

Your prompt response is appreciated, please do not hesitate to contact the CDI/Coding Hotline with an
y questions, comments and/or concerns you may have at ext. [phone].

 

The patient's Clinical Indicators include:

RIGHT BUTTOCK PRESSURE WOUND PRESENT ON ADMISSION-PROG NOTES 10/30-

WOUND CARE NOTES:

Tissue in would bed:  Yellow Slough

Length 5cm, Width 8cm, Depth 0 cm

Wound left open

Reddened, clean, dry

 

 

 

 

 

 

Query created by: Maria Luisa Helm on 11/3/2017 10:33 AM

 

RESPONSE TEXT:

 

Stage III buttock pressure ulcer

 

 

 

 

 

 

 

Electronically signed by:  Shy Solano MD 11/3/2017 5:25 PM

## 2018-03-11 ENCOUNTER — HOSPITAL ENCOUNTER (EMERGENCY)
Dept: HOSPITAL 17 - NEPC | Age: 63
Discharge: HOME | End: 2018-03-11
Payer: MEDICARE

## 2018-03-11 VITALS — DIASTOLIC BLOOD PRESSURE: 114 MMHG | HEART RATE: 88 BPM | SYSTOLIC BLOOD PRESSURE: 201 MMHG

## 2018-03-11 VITALS
HEART RATE: 68 BPM | RESPIRATION RATE: 17 BRPM | SYSTOLIC BLOOD PRESSURE: 186 MMHG | OXYGEN SATURATION: 97 % | DIASTOLIC BLOOD PRESSURE: 90 MMHG

## 2018-03-11 VITALS
SYSTOLIC BLOOD PRESSURE: 95 MMHG | OXYGEN SATURATION: 99 % | HEART RATE: 70 BPM | DIASTOLIC BLOOD PRESSURE: 53 MMHG | RESPIRATION RATE: 18 BRPM | TEMPERATURE: 98.2 F

## 2018-03-11 VITALS — SYSTOLIC BLOOD PRESSURE: 154 MMHG | DIASTOLIC BLOOD PRESSURE: 78 MMHG | HEART RATE: 82 BPM

## 2018-03-11 VITALS — WEIGHT: 123.46 LBS | BODY MASS INDEX: 21.88 KG/M2 | HEIGHT: 63 IN

## 2018-03-11 VITALS — OXYGEN SATURATION: 98 %

## 2018-03-11 DIAGNOSIS — E03.9: ICD-10-CM

## 2018-03-11 DIAGNOSIS — N39.0: ICD-10-CM

## 2018-03-11 DIAGNOSIS — I11.0: ICD-10-CM

## 2018-03-11 DIAGNOSIS — Z79.4: ICD-10-CM

## 2018-03-11 DIAGNOSIS — E11.649: ICD-10-CM

## 2018-03-11 DIAGNOSIS — I50.9: ICD-10-CM

## 2018-03-11 DIAGNOSIS — R53.1: Primary | ICD-10-CM

## 2018-03-11 DIAGNOSIS — R94.31: ICD-10-CM

## 2018-03-11 LAB
BACTERIA #/AREA URNS HPF: (no result) /HPF
BASOPHILS # BLD AUTO: 0 TH/MM3 (ref 0–0.2)
BASOPHILS NFR BLD: 0.3 % (ref 0–2)
BUN SERPL-MCNC: 9 MG/DL (ref 7–18)
CALCIUM SERPL-MCNC: 8 MG/DL (ref 8.5–10.1)
CHLORIDE SERPL-SCNC: 102 MEQ/L (ref 98–107)
COLOR UR: YELLOW
CREAT SERPL-MCNC: 0.9 MG/DL (ref 0.5–1)
EOSINOPHIL # BLD: 0 TH/MM3 (ref 0–0.4)
EOSINOPHIL NFR BLD: 0.4 % (ref 0–4)
ERYTHROCYTE [DISTWIDTH] IN BLOOD BY AUTOMATED COUNT: 17.1 % (ref 11.6–17.2)
GFR SERPLBLD BASED ON 1.73 SQ M-ARVRAT: 77 ML/MIN (ref 89–?)
GLUCOSE SERPL-MCNC: 57 MG/DL (ref 74–106)
GLUCOSE UR STRIP-MCNC: 70 MG/DL
HCO3 BLD-SCNC: 30.6 MEQ/L (ref 21–32)
HCT VFR BLD CALC: 31.2 % (ref 35–46)
HGB BLD-MCNC: 10.2 GM/DL (ref 11.6–15.3)
HGB UR QL STRIP: (no result)
HYALINE CASTS #/AREA URNS LPF: 27 /LPF
KETONES UR STRIP-MCNC: 10 MG/DL
LYMPHOCYTES # BLD AUTO: 1.9 TH/MM3 (ref 1–4.8)
LYMPHOCYTES NFR BLD AUTO: 18.6 % (ref 9–44)
MCH RBC QN AUTO: 25.3 PG (ref 27–34)
MCHC RBC AUTO-ENTMCNC: 32.6 % (ref 32–36)
MCV RBC AUTO: 77.6 FL (ref 80–100)
MONOCYTE #: 1.1 TH/MM3 (ref 0–0.9)
MONOCYTES NFR BLD: 10.5 % (ref 0–8)
NEUTROPHILS # BLD AUTO: 7.2 TH/MM3 (ref 1.8–7.7)
NEUTROPHILS NFR BLD AUTO: 70.2 % (ref 16–70)
NITRITE UR QL STRIP: (no result)
PLATELET # BLD: 289 TH/MM3 (ref 150–450)
PMV BLD AUTO: 8.1 FL (ref 7–11)
RBC # BLD AUTO: 4.02 MIL/MM3 (ref 4–5.3)
SODIUM SERPL-SCNC: 139 MEQ/L (ref 136–145)
SP GR UR STRIP: 1.02 (ref 1–1.03)
TROPONIN I SERPL-MCNC: (no result) NG/ML (ref 0.02–0.05)
URINE LEUKOCYTE ESTERASE: (no result)
WBC # BLD AUTO: 10.2 TH/MM3 (ref 4–11)
WHITE BLOOD CELL CLUMPS: (no result)

## 2018-03-11 PROCEDURE — 87186 SC STD MICRODIL/AGAR DIL: CPT

## 2018-03-11 PROCEDURE — 80048 BASIC METABOLIC PNL TOTAL CA: CPT

## 2018-03-11 PROCEDURE — 99285 EMERGENCY DEPT VISIT HI MDM: CPT

## 2018-03-11 PROCEDURE — 70450 CT HEAD/BRAIN W/O DYE: CPT

## 2018-03-11 PROCEDURE — 82550 ASSAY OF CK (CPK): CPT

## 2018-03-11 PROCEDURE — 81001 URINALYSIS AUTO W/SCOPE: CPT

## 2018-03-11 PROCEDURE — 87077 CULTURE AEROBIC IDENTIFY: CPT

## 2018-03-11 PROCEDURE — 87086 URINE CULTURE/COLONY COUNT: CPT

## 2018-03-11 PROCEDURE — 85025 COMPLETE CBC W/AUTO DIFF WBC: CPT

## 2018-03-11 PROCEDURE — 96374 THER/PROPH/DIAG INJ IV PUSH: CPT

## 2018-03-11 PROCEDURE — P9612 CATHETERIZE FOR URINE SPEC: HCPCS

## 2018-03-11 PROCEDURE — 84484 ASSAY OF TROPONIN QUANT: CPT

## 2018-03-11 PROCEDURE — 93005 ELECTROCARDIOGRAM TRACING: CPT

## 2018-03-11 NOTE — PD
HPI


.


Weakness


Chief Complaint:  Abnormal Results


Time Seen by Provider:  14:15


Travel History


International Travel<30 days:  No


Contact w/Intl Traveler<30days:  No





History of Present Illness


HPI


Patient presents to us with chief complaint of generalized weakness.  Onset was 

1 hour prior to presentation.  EMS actually initiated a stroke alert because 

the patient developed garbled speech, left-sided facial droop and left-sided 

weakness while in their presence.  The patient does not offer any further 

history except for generalized weakness.





The nurse was able to learn that the patient has home health and that the home 

health nurse actually called EMS because of hypotension.





PFSH


Past Medical History


Hx Anticoagulant Therapy:  Yes (low dose ASA daily. )


Anemia:  Yes (PERNICIOUS)


Arthritis:  Yes


Asthma:  No


Autoimmune Disease:  No


Blood Disorders:  No


Anxiety:  No


Depression:  No


Heart Rhythm Problems:  Yes (Afib )


Cancer:  No


Cardiac Catheterization:  Yes (in  for ablation)


Cardiomyopathy:  Yes


Cardiovascular Problems:  Yes


High Cholesterol:  No


Chemotherapy:  No


Chest Pain:  No


Congestive Heart Failure:  Yes


COPD:  No


Cerebrovascular Accident:  No


Coronary Artery Disease:  Yes


Diabetes:  Yes (DM 2)


Patient Takes Glucophage:  No


Diminished Hearing:  No


Endocrine:  Yes


Fibromyalgia:  Yes


Gastrointestinal Disorders:  Yes (Gastroparesis)


GERD:  No


Glaucoma:  No


Genitourinary:  Yes


Headaches:  Yes


Hepatitis:  No


Hiatal Hernia:  Yes


Heparin Induced Thrombocytopen:  No


Herniated Disk:  Yes


Hypertension:  Yes


Immune Disorder:  No


Implanted Vascular Access Dvce:  Yes


Kidney Stones:  Yes


Medical other:  Yes (LUPUS, MIGRAINES, ANEMIA, FIBROMYALGIA, RA )


Musculoskeletal:  Yes


Neurologic:  Yes


Psychiatric:  No


Reproductive:  No


Respiratory:  No


Immunizations Current:  Yes


Migraines:  No


Myocardial Infarction:  Yes ()


Radiation Therapy:  No


Renal Failure:  No


Seizures:  No


Sickle Cell Disease:  No


Sleep Apnea:  No


Thyroid Disease:  Yes (Hypothyroidism)


Ulcer:  No


Menopausal:  Yes


:  2


Para:  1


Miscarriage:  1


Dilation and Curettage (D&C):  Yes





Past Surgical History


Abdominal Surgery:  Yes (Hiatal hernia repair)


AICD:  Yes (Defibrilator)


Arteriovenous Shunt:  No


Cardiac Surgery:  Yes (Defibrilator placed )


 Section:  Yes (x1)


Coronary Artery Bypass Graft:  Yes


Ear Surgery:  No


Endocrine Surgery:  No


Eye Surgery:  No


Genitourinary Surgery:  No


Gynecologic Surgery:  Yes (Hysterectomy)


Hysterectomy:  Yes


Insulin Pump:  No


Joint Replacement:  Yes (Left hip fx)


Neurologic Surgery:  No


Oral Surgery:  Yes (Dentures 2010)


Pacemaker:  No


Thoracic Surgery:  No


Other Surgery:  Yes





Social History


Alcohol Use:  No


Tobacco Use:  No


Substance Use:  No





Allergies-Medications


(Allergen,Severity, Reaction):  


Coded Allergies:  


     No Known Allergies (Verified , 10/29/17)


Reported Meds & Prescriptions





Reported Meds & Active Scripts


Active


Levemir Inj (Insulin Detemir) 1,000 unit/ 10 ML Vial 12 Units SQ HS 30 Days


     Do not mix with any other Insulin.


Oyster Shell 250 mg + Vit D Tb (Calcium/Vitamin D) 250 Mg Calcium (625 Mg)-125 

Unit Tablet 500 Mg PO TID


Oxycodone-Acetaminophen  (Oxycodone HCl/Acetaminophen) 10 Mg-325 Mg 

Tablet 1 Tab PO Q6HR PRN


Lisinopril 5 Mg Tab 5 Mg PO HS


Zanaflex (Tizanidine HCl) 4 Mg Tab 4 Mg PO Q8H PRN


Senna Plus 8.6-50 mg (Sennosides-Docusate Sodium) 8.6 Mg-50 Mg Tab 1 Tab PO BID


Vitamin D3 Maximum Strength (Cholecalciferol) 5,000 Unit Cap 5,000 Units PO 

DAILY


Norvasc (Amlodipine Besylate) 10 Mg Tab 10 Mg PO DAILY 30 Days


Metoprolol Tartrate 100 Mg Tab 100 Mg PO BID


Levothyroxine (Levothyroxine Sodium) 125 Mcg Tab 125 Mcg PO DAILY


Cymbalta DR (Duloxetine HCl) 60 Mg Capdr 60 Mg PO TID


Tub Transfer Board (Device) 1 Mis Mis Ea .ROUTE AS DIRECTED


Transport Chair Ultra Lig (Device) 1 Mis Mis Ea .ROUTE AS DIRECTED


Walker/Adult/Folding (Device) 1 Mis Mis Ea .ROUTE AS DIRECTED


Walker Scurry Wheels/5 Adj (Device) 1 Mis Mis Ea EXTERNAL AS DIRECTED PRN


     use while ambulating


Reported


Lyrica (Pregabalin) 225 Mg Cap 225 Mg PO HS


Integra Plus (Multi-Vit/Iron-Vit C-B12-Folic Acid) 191-210-0.01-1 mg Cap 1 Cap 

PO DAILY


Alendronate (Alendronate Sodium) 70 Mg Tab Unknown Dose PO Q7D


Nitroglycerin SL (Nitroglycerin) 0.4 Mg Subl 0.4 Mg SL AS DIRECTED PRN


     ONE TABLET UNDER THE TONGUE AS NEEDED FOR CHEST PAIN, MAY REPEAT EVERY


     FIVE MINUTES FOR A TOTAL OF 3 DOSES OR CALL 911 IF NO RELIEF


Cyanocobalamin Inj (Cyanocobalamin) 1,000 Mcg/Ml Inj 1,000 Mcg SQ 1 MONTH








Review of Systems


Except as stated in HPI:  all other systems reviewed are Neg


General / Constitutional:  No: Fever, Chills


Eyes:  No: Blurred Vision


HENT:  No: Headaches


Cardiovascular:  No: Chest Pain or Discomfort


Respiratory:  No: Shortness of Breath


Gastrointestinal:  No: Nausea, Vomiting, Diarrhea


Neurologic:  Positive: Weakness





Physical Exam


Narrative


GENERAL: Patient is awake and alert and in no distress.


SKIN:  warm/dry.  Normal color.


HEAD: Normocephalic.  Atraumatic.


EYES: Pupils equal and round. No scleral icterus. No injection or drainage. 


ENT: No nasal bleeding or discharge.  Mucous membranes pink and moist.


NECK: Trachea midline.  Full range of motion without pain.. 


CARDIOVASCULAR: Regular rate and rhythm.  Heart sounds are normal.


RESPIRATORY: No accessory muscle use. Clear to auscultation. Breath sounds 

equal bilaterally. 


GASTROINTESTINAL: Abdomen soft.  Nontender.  Bowel sounds present.  

Nondistended.


MUSCULOSKELETAL: No obvious deformities. 


NEUROLOGICAL: Awake and alert.  Cranial nerves II through XII grossly intact.  

Tongue protrudes in the midline.  There is no arm drift.   strengths are 

full and equal.  Finger-nose-finger exam is intact.  She is unable to lift her 

left leg up off the bed as well as her right leg because of a previous left hip 

fracture.


PSYCHIATRIC: Appropriate mood and affect; insight and judgment normal.





Data


Data


Last Documented VS





Vital Signs








  Date Time  Temp Pulse Resp B/P (MAP) Pulse Ox O2 Delivery O2 Flow Rate FiO2


 


3/11/18 16:19  88  201/114 (143)    


 


3/11/18 15:40   17  97 Room Air  


 


3/11/18 14:17 98.2       








Orders





 Orders


Electrocardiogram (3/11/18 14:16)


Basic Metabolic Panel (Bmp) (3/11/18 14:16)


Complete Blood Count With Diff (3/11/18 14:16)


Ckmb (Isoenzyme) Profile (3/11/18 14:16)


Troponin I (3/11/18 14:16)


Urinalysis - C+S If Indicated (3/11/18 14:16)


Ct Brain W/O Iv Contrast(Rout) (3/11/18 14:16)


Ecg Monitoring (3/11/18 14:16)


Iv Access Insert/Monitor (3/11/18 14:16)


Oximetry (3/11/18 14:16)


Sodium Chloride 0.9% Flush (Ns Flush) (3/11/18 14:30)


Cath For Specimen (3/11/18 14:16)


Sodium Chlor 0.9% 1000 Ml Inj (Ns 1000 M (3/11/18 15:30)


Urine Culture (3/11/18 14:35)


Ceftriaxone Inj (Rocephin Inj) (3/11/18 16:00)





Labs





Laboratory Tests








Test


  3/11/18


14:30 3/11/18


14:35


 


White Blood Count 10.2 TH/MM3  


 


Red Blood Count 4.02 MIL/MM3  


 


Hemoglobin 10.2 GM/DL  


 


Hematocrit 31.2 %  


 


Mean Corpuscular Volume 77.6 FL  


 


Mean Corpuscular Hemoglobin 25.3 PG  


 


Mean Corpuscular Hemoglobin


Concent 32.6 % 


  


 


 


Red Cell Distribution Width 17.1 %  


 


Platelet Count 289 TH/MM3  


 


Mean Platelet Volume 8.1 FL  


 


Neutrophils (%) (Auto) 70.2 %  


 


Lymphocytes (%) (Auto) 18.6 %  


 


Monocytes (%) (Auto) 10.5 %  


 


Eosinophils (%) (Auto) 0.4 %  


 


Basophils (%) (Auto) 0.3 %  


 


Neutrophils # (Auto) 7.2 TH/MM3  


 


Lymphocytes # (Auto) 1.9 TH/MM3  


 


Monocytes # (Auto) 1.1 TH/MM3  


 


Eosinophils # (Auto) 0.0 TH/MM3  


 


Basophils # (Auto) 0.0 TH/MM3  


 


CBC Comment DIFF FINAL  


 


Differential Comment   


 


Blood Urea Nitrogen 9 MG/DL  


 


Creatinine 0.90 MG/DL  


 


Random Glucose 57 MG/DL  


 


Calcium Level 8.0 MG/DL  


 


Sodium Level 139 MEQ/L  


 


Potassium Level 3.6 MEQ/L  


 


Chloride Level 102 MEQ/L  


 


Carbon Dioxide Level 30.6 MEQ/L  


 


Anion Gap 6 MEQ/L  


 


Estimat Glomerular Filtration


Rate 77 ML/MIN 


  


 


 


Total Creatine Kinase 75 U/L  


 


Troponin I


  LESS THAN 0.02


NG/ML 


 


 


Urine Color  YELLOW 


 


Urine Turbidity  CLOUDY 


 


Urine pH  6.0 


 


Urine Specific Gravity  1.022 


 


Urine Protein  30 mg/dL 


 


Urine Glucose (UA)  70 mg/dL 


 


Urine Ketones  10 mg/dL 


 


Urine Occult Blood  SMALL 


 


Urine Nitrite  NEG 


 


Urine Bilirubin  NEG 


 


Urine Urobilinogen  2.0 MG/DL 


 


Urine Leukocyte Esterase  LARGE 


 


Urine RBC  10 /hpf 


 


Urine WBC   /hpf 


 


Urine WBC Clumps  MANY 


 


Urine Bacteria  MANY /hpf 


 


Urine Hyaline Casts  27 /lpf 


 


Microscopic Urinalysis Comment


  


  CATH-CULTURE


IND











MDM


Medical Decision Making


Medical Screen Exam Complete:  Yes


Emergency Medical Condition:  Yes


Medical Record Reviewed:  Yes (Medical history of hypertension, diabetes, 

coronary artery disease and hypothyroidism)


Interpretation(s)


EKG shows a sinus rhythm with no acute ischemic changes


Differential Diagnosis


Differential diagnosis of weakness includes but is not limited to infection, CVA

, electrolyte disturbance, renal failure, hypoglycemia


Narrative Course


This patient presents for the evaluation of weakness.  She was reportedly 

hypotensive according to her home health nurse.  Rescue initiated a stroke 

alert.  Her NIH stroke scale was 0.





She is being treated with a fluid bolus.





The nurses informed me that the patient's blood pressure has returned to her 

elevated normal.  Therefore, the IV fluids have been stopped.





UA>>large LE, TNTC WBCs, many WBC clumps, many bact.





Rocephin has been ordered.





CBC & BMP Diagram


3/11/18 14:30








Calcium Level 8.0 L





trop < 0.02





The hypoglycemia has been treated with orange juice





The patient reports that she would like to go home.  I believe that this is 

reasonable.





Diagnosis





 Primary Impression:  


 Weakness


 Additional Impressions:  


 Urinary tract infection


 Qualified Codes:  N39.0 - Urinary tract infection, site not specified


 Hypoglycemia


Patient Instructions:  General Instructions, Hypoglycemia in a Person with 

Diabetes (DC), Urinary Tract Infection in Women (DC)


***Med/Other Pt SpecificInfo:  Prescription(s) given


Scripts


Cephalexin (Keflex) 500 Mg Capsule


500 MG PO TID for Infection for 7 Days, CAP 0 Refills


   Prov: Hailee Olivo MD         3/11/18


Disposition:  01 DISCHARGE HOME


Condition:  Stable











Hailee Olivo MD Mar 11, 2018 15:28

## 2018-03-11 NOTE — RADRPT
EXAM DATE/TIME:  03/11/2018 15:21 

 

[facility] COMPARISON:     

No previous studies available for comparison.

 

 

INDICATIONS :     

Dizziness.

                  

 

RADIATION DOSE:     

36.13 CTDIvol (mGy) 

 

 

 

MEDICAL HISTORY :     

Cardiovascular disease. Congestive heart failure. Hypertension.Osteoporosis, hialtial hernia, diabete
s, lupus, RA, Fibromyalgia

 

SURGICAL HISTORY :      

CABG Hysterectomy.

 

ENCOUNTER:      

Initial

 

ACUITY:      

1 day

 

PAIN SCALE:      

0/10

 

LOCATION:        

cranial 

 

TECHNIQUE:     

Multiple contiguous axial images were obtained of the head.  Using automated exposure control and adj
ustment of the mA and/or kV according to patient size, radiation dose was kept as low as reasonably a
chievable to obtain optimal diagnostic quality images.   DICOM format image data is available electro
nically for review and comparison.  

 

FINDINGS:     

 

CEREBRUM:     

The ventricles are normal for age.  No evidence of midline shift, mass lesion, hemorrhage or acute in
farction.  No extra-axial fluid collections are seen.

 

POSTERIOR FOSSA:     

The cerebellum and brainstem are intact.  The 4th ventricle is midline.  The cerebellopontine angle i
s unremarkable.

 

EXTRACRANIAL:     

The visualized portion of the orbits is intact.

 

SKULL:     

The calvaria is intact.  No evidence of skull fracture.

 

CONCLUSION:     

1. No acute intracranial abnormalities.

 

 

 

 Dileep Vu MD on March 11, 2018 at 15:29           

Board Certified Radiologist.

 This report was verified electronically.

## 2018-03-12 NOTE — EKG
Date Performed: 03/11/2018       Time Performed: 14:30:57

 

PTAGE:      62 years

 

EKG:      Sinus rhythm 

 

 BORDERLINE LEFT AXIS DEVIATION NONSPECIFIC T-WAVE ABNORMALITY PROLONGED QT INTERVAL Probable Left ve
ntricular hypertrophy Compared to previous tracing, the patient has developed criteria for Left ventr
icular hypertrophy that include increased voltage and nonspecific ST-T wave changes. Clinical correla
tion will be important to assess the serial changed ABNORMAL ECG

 

PREVIOUS TRACING       : 10/29/2017 20.20

 

DOCTOR:   Alma Alonso  Interpretating Date/Time  03/12/2018 18:53:37

## 2018-04-08 ENCOUNTER — HOSPITAL ENCOUNTER (INPATIENT)
Dept: HOSPITAL 17 - NEPC | Age: 63
LOS: 8 days | Discharge: HOME HEALTH | DRG: 309 | End: 2018-04-16
Attending: HOSPITALIST | Admitting: HOSPITALIST
Payer: MEDICARE

## 2018-04-08 VITALS
OXYGEN SATURATION: 100 % | SYSTOLIC BLOOD PRESSURE: 147 MMHG | HEART RATE: 101 BPM | DIASTOLIC BLOOD PRESSURE: 70 MMHG | RESPIRATION RATE: 25 BRPM

## 2018-04-08 VITALS
RESPIRATION RATE: 22 BRPM | HEART RATE: 119 BPM | SYSTOLIC BLOOD PRESSURE: 150 MMHG | OXYGEN SATURATION: 99 % | TEMPERATURE: 98.9 F | DIASTOLIC BLOOD PRESSURE: 67 MMHG

## 2018-04-08 VITALS
DIASTOLIC BLOOD PRESSURE: 73 MMHG | RESPIRATION RATE: 16 BRPM | SYSTOLIC BLOOD PRESSURE: 154 MMHG | OXYGEN SATURATION: 98 % | HEART RATE: 114 BPM

## 2018-04-08 VITALS — BODY MASS INDEX: 23.9 KG/M2 | HEIGHT: 64 IN | WEIGHT: 139.99 LBS

## 2018-04-08 VITALS
OXYGEN SATURATION: 100 % | HEART RATE: 67 BPM | DIASTOLIC BLOOD PRESSURE: 83 MMHG | SYSTOLIC BLOOD PRESSURE: 180 MMHG | RESPIRATION RATE: 16 BRPM

## 2018-04-08 VITALS
HEART RATE: 113 BPM | OXYGEN SATURATION: 100 % | TEMPERATURE: 98.7 F | RESPIRATION RATE: 21 BRPM | SYSTOLIC BLOOD PRESSURE: 138 MMHG | DIASTOLIC BLOOD PRESSURE: 70 MMHG

## 2018-04-08 VITALS
RESPIRATION RATE: 18 BRPM | HEART RATE: 101 BPM | SYSTOLIC BLOOD PRESSURE: 153 MMHG | DIASTOLIC BLOOD PRESSURE: 72 MMHG | OXYGEN SATURATION: 100 %

## 2018-04-08 VITALS
DIASTOLIC BLOOD PRESSURE: 67 MMHG | RESPIRATION RATE: 20 BRPM | HEART RATE: 127 BPM | OXYGEN SATURATION: 98 % | SYSTOLIC BLOOD PRESSURE: 150 MMHG | TEMPERATURE: 98.8 F

## 2018-04-08 VITALS
DIASTOLIC BLOOD PRESSURE: 66 MMHG | HEART RATE: 69 BPM | OXYGEN SATURATION: 100 % | SYSTOLIC BLOOD PRESSURE: 153 MMHG | RESPIRATION RATE: 18 BRPM

## 2018-04-08 VITALS
HEART RATE: 70 BPM | RESPIRATION RATE: 28 BRPM | SYSTOLIC BLOOD PRESSURE: 173 MMHG | OXYGEN SATURATION: 99 % | DIASTOLIC BLOOD PRESSURE: 83 MMHG

## 2018-04-08 VITALS
HEART RATE: 89 BPM | DIASTOLIC BLOOD PRESSURE: 79 MMHG | OXYGEN SATURATION: 100 % | SYSTOLIC BLOOD PRESSURE: 159 MMHG | RESPIRATION RATE: 30 BRPM

## 2018-04-08 VITALS
OXYGEN SATURATION: 100 % | HEART RATE: 118 BPM | DIASTOLIC BLOOD PRESSURE: 67 MMHG | RESPIRATION RATE: 18 BRPM | SYSTOLIC BLOOD PRESSURE: 137 MMHG

## 2018-04-08 VITALS
OXYGEN SATURATION: 100 % | SYSTOLIC BLOOD PRESSURE: 163 MMHG | RESPIRATION RATE: 18 BRPM | DIASTOLIC BLOOD PRESSURE: 72 MMHG | HEART RATE: 103 BPM

## 2018-04-08 VITALS
RESPIRATION RATE: 19 BRPM | DIASTOLIC BLOOD PRESSURE: 80 MMHG | HEART RATE: 72 BPM | SYSTOLIC BLOOD PRESSURE: 171 MMHG | OXYGEN SATURATION: 100 %

## 2018-04-08 VITALS
OXYGEN SATURATION: 99 % | SYSTOLIC BLOOD PRESSURE: 175 MMHG | HEART RATE: 67 BPM | RESPIRATION RATE: 17 BRPM | DIASTOLIC BLOOD PRESSURE: 81 MMHG

## 2018-04-08 VITALS
DIASTOLIC BLOOD PRESSURE: 65 MMHG | RESPIRATION RATE: 16 BRPM | HEART RATE: 127 BPM | OXYGEN SATURATION: 96 % | SYSTOLIC BLOOD PRESSURE: 140 MMHG

## 2018-04-08 VITALS
RESPIRATION RATE: 28 BRPM | SYSTOLIC BLOOD PRESSURE: 169 MMHG | HEART RATE: 71 BPM | OXYGEN SATURATION: 99 % | DIASTOLIC BLOOD PRESSURE: 93 MMHG

## 2018-04-08 VITALS
SYSTOLIC BLOOD PRESSURE: 150 MMHG | DIASTOLIC BLOOD PRESSURE: 69 MMHG | HEART RATE: 69 BPM | OXYGEN SATURATION: 100 % | RESPIRATION RATE: 20 BRPM

## 2018-04-08 VITALS
HEART RATE: 129 BPM | TEMPERATURE: 98.8 F | OXYGEN SATURATION: 97 % | RESPIRATION RATE: 17 BRPM | SYSTOLIC BLOOD PRESSURE: 126 MMHG | DIASTOLIC BLOOD PRESSURE: 64 MMHG

## 2018-04-08 VITALS
DIASTOLIC BLOOD PRESSURE: 70 MMHG | SYSTOLIC BLOOD PRESSURE: 156 MMHG | RESPIRATION RATE: 30 BRPM | HEART RATE: 70 BPM | OXYGEN SATURATION: 100 %

## 2018-04-08 VITALS
RESPIRATION RATE: 30 BRPM | HEART RATE: 105 BPM | SYSTOLIC BLOOD PRESSURE: 161 MMHG | OXYGEN SATURATION: 98 % | DIASTOLIC BLOOD PRESSURE: 78 MMHG

## 2018-04-08 DIAGNOSIS — I25.10: ICD-10-CM

## 2018-04-08 DIAGNOSIS — F32.9: ICD-10-CM

## 2018-04-08 DIAGNOSIS — E77.8: ICD-10-CM

## 2018-04-08 DIAGNOSIS — I27.20: ICD-10-CM

## 2018-04-08 DIAGNOSIS — E11.65: ICD-10-CM

## 2018-04-08 DIAGNOSIS — E11.51: ICD-10-CM

## 2018-04-08 DIAGNOSIS — I47.2: Primary | ICD-10-CM

## 2018-04-08 DIAGNOSIS — E78.00: ICD-10-CM

## 2018-04-08 DIAGNOSIS — Z95.810: ICD-10-CM

## 2018-04-08 DIAGNOSIS — M06.9: ICD-10-CM

## 2018-04-08 DIAGNOSIS — E83.39: ICD-10-CM

## 2018-04-08 DIAGNOSIS — E83.42: ICD-10-CM

## 2018-04-08 DIAGNOSIS — E07.9: ICD-10-CM

## 2018-04-08 DIAGNOSIS — Z79.891: ICD-10-CM

## 2018-04-08 DIAGNOSIS — E27.9: ICD-10-CM

## 2018-04-08 DIAGNOSIS — M79.7: ICD-10-CM

## 2018-04-08 DIAGNOSIS — E11.42: ICD-10-CM

## 2018-04-08 DIAGNOSIS — Z87.891: ICD-10-CM

## 2018-04-08 DIAGNOSIS — D51.0: ICD-10-CM

## 2018-04-08 DIAGNOSIS — Z95.1: ICD-10-CM

## 2018-04-08 DIAGNOSIS — J44.9: ICD-10-CM

## 2018-04-08 DIAGNOSIS — F41.9: ICD-10-CM

## 2018-04-08 DIAGNOSIS — E87.6: ICD-10-CM

## 2018-04-08 DIAGNOSIS — A04.72: ICD-10-CM

## 2018-04-08 DIAGNOSIS — Z79.4: ICD-10-CM

## 2018-04-08 DIAGNOSIS — I25.2: ICD-10-CM

## 2018-04-08 DIAGNOSIS — Z79.82: ICD-10-CM

## 2018-04-08 DIAGNOSIS — I07.1: ICD-10-CM

## 2018-04-08 DIAGNOSIS — E11.649: ICD-10-CM

## 2018-04-08 DIAGNOSIS — R15.9: ICD-10-CM

## 2018-04-08 DIAGNOSIS — E03.9: ICD-10-CM

## 2018-04-08 DIAGNOSIS — Z45.02: ICD-10-CM

## 2018-04-08 DIAGNOSIS — M19.90: ICD-10-CM

## 2018-04-08 DIAGNOSIS — K57.90: ICD-10-CM

## 2018-04-08 DIAGNOSIS — I48.91: ICD-10-CM

## 2018-04-08 DIAGNOSIS — L89.159: ICD-10-CM

## 2018-04-08 DIAGNOSIS — I11.0: ICD-10-CM

## 2018-04-08 DIAGNOSIS — K21.9: ICD-10-CM

## 2018-04-08 DIAGNOSIS — I42.8: ICD-10-CM

## 2018-04-08 DIAGNOSIS — M32.9: ICD-10-CM

## 2018-04-08 DIAGNOSIS — M81.0: ICD-10-CM

## 2018-04-08 DIAGNOSIS — I50.22: ICD-10-CM

## 2018-04-08 DIAGNOSIS — K76.6: ICD-10-CM

## 2018-04-08 DIAGNOSIS — E78.5: ICD-10-CM

## 2018-04-08 DIAGNOSIS — K76.0: ICD-10-CM

## 2018-04-08 DIAGNOSIS — E11.43: ICD-10-CM

## 2018-04-08 DIAGNOSIS — K63.5: ICD-10-CM

## 2018-04-08 DIAGNOSIS — G43.909: ICD-10-CM

## 2018-04-08 DIAGNOSIS — E83.51: ICD-10-CM

## 2018-04-08 LAB
BASOPHILS # BLD AUTO: 0.2 TH/MM3 (ref 0–0.2)
BASOPHILS NFR BLD: 1.2 % (ref 0–2)
BUN SERPL-MCNC: 10 MG/DL (ref 7–18)
CALCIUM SERPL-MCNC: 6 MG/DL (ref 8.5–10.1)
CALCIUM TP COR SERPL-MCNC: 7.1 MG/DL (ref 8.5–10.1)
CHLORIDE SERPL-SCNC: 108 MEQ/L (ref 98–107)
CREAT SERPL-MCNC: 0.81 MG/DL (ref 0.5–1)
EOSINOPHIL # BLD: 0 TH/MM3 (ref 0–0.4)
EOSINOPHIL NFR BLD: 0.1 % (ref 0–4)
ERYTHROCYTE [DISTWIDTH] IN BLOOD BY AUTOMATED COUNT: 16.1 % (ref 11.6–17.2)
GFR SERPLBLD BASED ON 1.73 SQ M-ARVRAT: 87 ML/MIN (ref 89–?)
GLUCOSE SERPL-MCNC: 322 MG/DL (ref 74–106)
HCO3 BLD-SCNC: 20.8 MEQ/L (ref 21–32)
HCT VFR BLD CALC: 35.8 % (ref 35–46)
HGB BLD-MCNC: 11.8 GM/DL (ref 11.6–15.3)
INR PPP: 1.1 RATIO
LYMPHOCYTES # BLD AUTO: 0.8 TH/MM3 (ref 1–4.8)
LYMPHOCYTES NFR BLD AUTO: 4.5 % (ref 9–44)
LYMPHOCYTES: 6 % (ref 9–44)
MAGNESIUM SERPL-MCNC: 1.4 MG/DL (ref 1.5–2.5)
MAGNESIUM SERPL-MCNC: 2.7 MG/DL (ref 1.5–2.5)
MCH RBC QN AUTO: 25.4 PG (ref 27–34)
MCHC RBC AUTO-ENTMCNC: 32.9 % (ref 32–36)
MCV RBC AUTO: 77.4 FL (ref 80–100)
MONOCYTE #: 1.6 TH/MM3 (ref 0–0.9)
MONOCYTES NFR BLD: 8.6 % (ref 0–8)
MONOCYTES: 4 % (ref 0–8)
MYELOCYTES NFR BLD: 1 % (ref 0–0)
NEUTROPHILS # BLD AUTO: 16.3 TH/MM3 (ref 1.8–7.7)
NEUTROPHILS NFR BLD AUTO: 85.6 % (ref 16–70)
NEUTS BAND # BLD MANUAL: 17.1 TH/MM3 (ref 1.8–7.7)
NEUTS BAND NFR BLD: 12 % (ref 0–6)
NEUTS SEG NFR BLD MANUAL: 77 % (ref 16–70)
PHOSPHATE SERPL-MCNC: 1.5 MG/DL (ref 2.5–4.9)
PLATELET # BLD: 305 TH/MM3 (ref 150–450)
PMV BLD AUTO: 8.4 FL (ref 7–11)
PROT SERPL-MCNC: 4.8 GM/DL (ref 6.4–8.2)
PROTHROMBIN TIME: 11.5 SEC (ref 9.8–11.6)
RBC # BLD AUTO: 4.63 MIL/MM3 (ref 4–5.3)
SODIUM SERPL-SCNC: 141 MEQ/L (ref 136–145)
TROPONIN I SERPL-MCNC: 0.14 NG/ML (ref 0.02–0.05)
TROPONIN I SERPL-MCNC: 1.43 NG/ML (ref 0.02–0.05)
WBC # BLD AUTO: 19 TH/MM3 (ref 4–11)

## 2018-04-08 PROCEDURE — 84443 ASSAY THYROID STIM HORMONE: CPT

## 2018-04-08 PROCEDURE — 81001 URINALYSIS AUTO W/SCOPE: CPT

## 2018-04-08 PROCEDURE — 83735 ASSAY OF MAGNESIUM: CPT

## 2018-04-08 PROCEDURE — 87186 SC STD MICRODIL/AGAR DIL: CPT

## 2018-04-08 PROCEDURE — 93306 TTE W/DOPPLER COMPLETE: CPT

## 2018-04-08 PROCEDURE — 87493 C DIFF AMPLIFIED PROBE: CPT

## 2018-04-08 PROCEDURE — 80053 COMPREHEN METABOLIC PANEL: CPT

## 2018-04-08 PROCEDURE — 84439 ASSAY OF FREE THYROXINE: CPT

## 2018-04-08 PROCEDURE — 74177 CT ABD & PELVIS W/CONTRAST: CPT

## 2018-04-08 PROCEDURE — 87040 BLOOD CULTURE FOR BACTERIA: CPT

## 2018-04-08 PROCEDURE — 87077 CULTURE AEROBIC IDENTIFY: CPT

## 2018-04-08 PROCEDURE — 85610 PROTHROMBIN TIME: CPT

## 2018-04-08 PROCEDURE — 96367 TX/PROPH/DG ADDL SEQ IV INF: CPT

## 2018-04-08 PROCEDURE — 93005 ELECTROCARDIOGRAM TRACING: CPT

## 2018-04-08 PROCEDURE — 71045 X-RAY EXAM CHEST 1 VIEW: CPT

## 2018-04-08 PROCEDURE — 85027 COMPLETE CBC AUTOMATED: CPT

## 2018-04-08 PROCEDURE — 94640 AIRWAY INHALATION TREATMENT: CPT

## 2018-04-08 PROCEDURE — 84155 ASSAY OF PROTEIN SERUM: CPT

## 2018-04-08 PROCEDURE — 94664 DEMO&/EVAL PT USE INHALER: CPT

## 2018-04-08 PROCEDURE — 85730 THROMBOPLASTIN TIME PARTIAL: CPT

## 2018-04-08 PROCEDURE — 84484 ASSAY OF TROPONIN QUANT: CPT

## 2018-04-08 PROCEDURE — 76937 US GUIDE VASCULAR ACCESS: CPT

## 2018-04-08 PROCEDURE — 84132 ASSAY OF SERUM POTASSIUM: CPT

## 2018-04-08 PROCEDURE — 83036 HEMOGLOBIN GLYCOSYLATED A1C: CPT

## 2018-04-08 PROCEDURE — 85025 COMPLETE CBC W/AUTO DIFF WBC: CPT

## 2018-04-08 PROCEDURE — 84100 ASSAY OF PHOSPHORUS: CPT

## 2018-04-08 PROCEDURE — 96365 THER/PROPH/DIAG IV INF INIT: CPT

## 2018-04-08 PROCEDURE — 83605 ASSAY OF LACTIC ACID: CPT

## 2018-04-08 PROCEDURE — 80048 BASIC METABOLIC PNL TOTAL CA: CPT

## 2018-04-08 PROCEDURE — 82948 REAGENT STRIP/BLOOD GLUCOSE: CPT

## 2018-04-08 PROCEDURE — 87086 URINE CULTURE/COLONY COUNT: CPT

## 2018-04-08 PROCEDURE — 87641 MR-STAPH DNA AMP PROBE: CPT

## 2018-04-08 PROCEDURE — 85007 BL SMEAR W/DIFF WBC COUNT: CPT

## 2018-04-08 RX ADMIN — MAGNESIUM SULFATE IN DEXTROSE SCH MLS/HR: 10 INJECTION, SOLUTION INTRAVENOUS at 16:46

## 2018-04-08 RX ADMIN — IPRATROPIUM BROMIDE AND ALBUTEROL SULFATE SCH AMPULE: .5; 3 SOLUTION RESPIRATORY (INHALATION) at 23:03

## 2018-04-08 RX ADMIN — AMIODARONE HYDROCHLORIDE PRN MLS/HR: 50 INJECTION, SOLUTION INTRAVENOUS at 13:21

## 2018-04-08 RX ADMIN — MORPHINE SULFATE PRN MG: 2 INJECTION, SOLUTION INTRAMUSCULAR; INTRAVENOUS at 20:50

## 2018-04-08 RX ADMIN — PREGABALIN SCH MG: 75 CAPSULE ORAL at 20:49

## 2018-04-08 RX ADMIN — POTASSIUM CHLORIDE PRN MLS/HR: 200 INJECTION, SOLUTION INTRAVENOUS at 22:51

## 2018-04-08 RX ADMIN — INSULIN ASPART SCH: 100 INJECTION, SOLUTION INTRAVENOUS; SUBCUTANEOUS at 21:00

## 2018-04-08 RX ADMIN — MAGNESIUM SULFATE IN DEXTROSE SCH MLS/HR: 10 INJECTION, SOLUTION INTRAVENOUS at 18:02

## 2018-04-08 RX ADMIN — POTASSIUM CHLORIDE SCH MLS/HR: 10 INJECTION, SOLUTION INTRAVENOUS at 16:48

## 2018-04-08 RX ADMIN — CALCIUM CARBONATE-CHOLECALCIFEROL TAB 250 MG-125 UNIT SCH MG: 250-125 TAB at 18:02

## 2018-04-08 RX ADMIN — MORPHINE SULFATE PRN MG: 2 INJECTION, SOLUTION INTRAMUSCULAR; INTRAVENOUS at 22:58

## 2018-04-08 RX ADMIN — AMIODARONE HYDROCHLORIDE PRN MLS/HR: 50 INJECTION, SOLUTION INTRAVENOUS at 20:27

## 2018-04-08 RX ADMIN — STANDARDIZED SENNA CONCENTRATE AND DOCUSATE SODIUM SCH TAB: 8.6; 5 TABLET, FILM COATED ORAL at 20:50

## 2018-04-08 RX ADMIN — GABAPENTIN SCH MG: 300 CAPSULE ORAL at 18:03

## 2018-04-08 RX ADMIN — SODIUM CHLORIDE AND POTASSIUM CHLORIDE SCH MLS/HR: 9; 1.49 INJECTION, SOLUTION INTRAVENOUS at 20:00

## 2018-04-08 RX ADMIN — INSULIN ASPART SCH: 100 INJECTION, SOLUTION INTRAVENOUS; SUBCUTANEOUS at 17:25

## 2018-04-08 RX ADMIN — IPRATROPIUM BROMIDE AND ALBUTEROL SULFATE SCH AMPULE: .5; 3 SOLUTION RESPIRATORY (INHALATION) at 15:38

## 2018-04-08 RX ADMIN — POTASSIUM CHLORIDE SCH MLS/HR: 10 INJECTION, SOLUTION INTRAVENOUS at 16:11

## 2018-04-08 RX ADMIN — POTASSIUM CHLORIDE SCH MLS/HR: 10 INJECTION, SOLUTION INTRAVENOUS at 14:07

## 2018-04-08 RX ADMIN — Medication SCH ML: at 20:49

## 2018-04-08 RX ADMIN — HEPARIN SODIUM SCH UNITS: 10000 INJECTION, SOLUTION INTRAVENOUS; SUBCUTANEOUS at 16:00

## 2018-04-08 RX ADMIN — METOPROLOL TARTRATE SCH MG: 100 TABLET, FILM COATED ORAL at 20:49

## 2018-04-08 NOTE — HHI.HP
[facility]


Service


Critical Care Medicine


Primary Care Physician


Unknown


Admission Diagnosis





Severe hypokalemia, hypocalcemia, AICD firing


Diagnosis:  


(1) Hypoproteinemia


Diagnosis:  Principal





(2) Sacral decubitus ulcer


Diagnosis:  Principal





(3) Hyperglycemia


Diagnosis:  Principal





(4) AICD discharge


Diagnosis:  Principal





(5) Hypokalemia


Diagnosis:  Principal





(6) Hypomagnesemia


Diagnosis:  Principal





(7) Hypocalcemia


Diagnosis:  Principal





(8) Leukocytosis


Diagnosis:  Secondary





(9) Elevated troponin I level


Diagnosis:  Principal





(10) HTN (hypertension)


Diagnosis:  Principal





(11) Diabetes mellitus


Diagnosis:  Principal





(12) CAD (coronary artery disease)


Diagnosis:  Principal





(13) CHF (congestive heart failure)


Diagnosis:  Principal





(14) Hypothyroidism


Diagnosis:  Principal





(15) GERD (gastroesophageal reflux disease)


Diagnosis:  Principal





(16) Vitamin D deficiency


Diagnosis:  Secondary





(17) Lupus


Diagnosis:  Secondary





(18) Depression


Diagnosis:  Principal





(19) Vitamin B12 deficiency


Diagnosis:  Secondary





(20) Neuropathy


Diagnosis:  Secondary





(21) Chronic prescription opiate use


Diagnosis:  Secondary





(22) Pernicious anemia


Diagnosis:  Secondary





(23) Fibromyalgia


Diagnosis:  Secondary





(24) Diverticulosis


Diagnosis:  Secondary





(25) COPD (chronic obstructive pulmonary disease)


Diagnosis:  Secondary





(26) Dyslipidemia


Diagnosis:  Secondary





(27) Pulmonary hypertension


Diagnosis:  Secondary





(28) Tricuspid regurgitation


Diagnosis:  Secondary





(29) Osteoporosis


Diagnosis:  Secondary





(30) Rheumatoid arthritis


Diagnosis:  Secondary





(31) Peripheral vascular disease


Diagnosis:  Secondary





(32) Benign colonic polyp


Diagnosis:  Secondary





Chief Complaint:  


AICD firing multiple times


Travel History


International Travel<30 Days:  No


Contact w/Intl Traveler <30 Da:  No


Traveled to Known Affected Are:  No


History of Present Illness


This is a 62-year-old -American female.  Date of admission 2018.  

Complex past medical history including history of chronic systolic heart 

failure ejection fraction 20% status post AICD placement .  Most recent 

echo revealed EF 65-70% with moderate TR/moderate to severe pulmonary 

hypertension, fibromyalgia, arthritis, rheumatoid arthritis, hypertension, 

diabetes mellitus with neuropathy, depression, hypothyroidism and COPD.  Today 

she presented to [facility] [address] after having her AICD fire 5 times at home with 

no loss conscious.  Patient was incontinent.  2 weeks ago patient had wound VAC 

placed on 8 sacral decubitus ulcer by Dr. Navarro.





Workup included an EKG which revealed no acute ST elevation.  Potassium is 1.9.

  Magnesium is 1.4.  Troponin 0 0.14.  She received 150 mg amiodarone bolus 1.

  Currently is received 30 mEq IV potassium chloride and 90 mEq potassium 

chloride by mouth.  3 g mag sulfate were provided.  Will recheck at 7:00.  

Pacemaker will be interrogated.  Dr. Treadwell will be notified of admission.





Patient is neurologically intact denying chest pain currently.  Very pleasant.





Review of Systems


Constitutional:  DENIES: Fatigue, Fever, Weight gain, Weight loss


Endocrine:  DENIES: Abnorml menstrual pattern, Heat/cold intolerance, Polydipsia


Eyes:  COMPLAINS OF: Vision loss, DENIES: Blurred vision, Double Vision


Ears, nose, mouth, throat:  DENIES: Tinnitus


Respiratory:  DENIES: Apneas, Sputum production, Shortness of breath


Cardiovascular:  DENIES: Chest pain


Gastrointestinal:  COMPLAINS OF: Difficulty Swallowing, DENIES: Abdominal pain, 

Nausea, Vomiting


Genitourinary:  DENIES: Abnormal vaginal bleeding


Musculoskeletal:  DENIES: Joint pain


Integumentary:  DENIES: Abnormal pigmentation, Rash


Immunologic/allergic:  DENIES: Eczema


Neurologic:  DENIES: Abnormal gait


Psychiatric:  DENIES: Anxiety, Depression





Past Family Social History


Allergies:  


Coded Allergies:  


     No Known Allergies (Verified  Allergy, Unknown, 18)


Past Medical History


Migraine headache


Depression/anxiety


Osteoarthritis


Rheumatoid arthritis


SLE


Pernicious anemia


Atrial fibrillation status post ablation


Congestive heart failure likely diastolic


Moderate tricuspid regurgitation


Coronary artery disease


Moderate to severe pulmonary hypertension


Osteoporosis


COPD with prior tobaccoism quit 


History of Candida esophagitis


History of dysphagia status post esophageal dilatation


Internal hemorrhoids


Diverticulosis


Benign colonic polyp


Gastroparesis


Hypothyroidism


COPD


Hiatal hernia


Peripheral vascular disease


Past Surgical History


AICD/defibrillator 


Total abdominal hysterectomy/bilateral salpingo-oophorectomy


Hiatal hernia repair





CABG


Left hip


Left ankle


Hemorrhoid


Myomectomy


Esophageal dilatation


Right carpal tunnel release


Cardiac catheterization


Reported Medications


Tizanidine 4 mg p.o. every 8 hours


Clonidine 0.1 mg p.o. daily


Multivitamin 1 tablet daily


Metoprolol tartrate 100 mg p.o. twice daily


Amlodipine 10 mg p.o. daily


Aspirin 81 mg p.o. daily


Oxycodone/acetaminophen 10/325 1 tablet every 6 hours as needed


Pregabalin 225 mg p.o. twice daily


Gabapentin 600 mg p.o. 3x a  daily


Potassium chloride 20 mEq p.o. daily


Insulin glargine 20 units subcu daily


Cyanocobalamin 1000 units IM monthly


Alendronate 70 mg by mouth weekly


Levothyroxine 125 mcg p.o. daily


Active Ordered Medications


Reviewed in EMR


Family History


Mother with brain tumor.  Father with gastric cancer.  Positive family history 

leukemia.


Social History


Occasional alcohol use.  Quit tobacco .  No documentation of illicit drug 

use.





Physical Exam


Vital Signs





Vital Signs








  Date Time  Temp Pulse Resp B/P (MAP) Pulse Ox O2 Delivery O2 Flow Rate FiO2


 


18 14:08  118 18 137/67 (90) 100 Room Air  


 


18 14:08  117 18  100 Room Air  


 


18 13:21  119  127/65    


 


18 13:10  113  127/65    


 


18 12:38  123  113/92    


 


18 12:05 98.8 129 17 126/64 (84) 97 Room Air  








Physical Exam


GENERAL: 62-year-old AA female currently on room air in no acute distress


SKIN: Warm and dry.  Wound VAC over sacral/coccyx region with unstageable ulcer


HEAD: Atraumatic. Normocephalic. 


EYES: Pupils equal and round about 2 mm bilaterally and reactive. No scleral 

icterus. No injection or drainage. 


ENT: No nasal bleeding or discharge.  Mucous membranes pink and moist.


NECK: Trachea midline. No JVD. 


CARDIOVASCULAR: Regular rate and rhythm.  S1, S2 predose for without murmur


RESPIRATORY:. Clear to auscultation. Breath sounds equal bilaterally. 


GASTROINTESTINAL: Abdomen soft, non-tender, nondistended.  No prior scars noted


MUSCULOSKELETAL: Extremities without significant edema. No obvious deformities. 


NEUROLOGICAL: Awake and alert. No obvious cranial nerve deficits.  Motor 

grossly within normal limits. Five out of 5 muscle strength in the arms and 

legs.  Normal speech.


PSYCHIATRIC: Appropriate mood and affect; insight and judgment normal.


Laboratory





Laboratory Tests








Test


  18


12:36


 


White Blood Count 19.0 


 


Red Blood Count 4.63 


 


Hemoglobin 11.8 


 


Hematocrit 35.8 


 


Mean Corpuscular Volume 77.4 


 


Mean Corpuscular Hemoglobin 25.4 


 


Mean Corpuscular Hemoglobin


Concent 32.9 


 


 


Red Cell Distribution Width 16.1 


 


Platelet Count 305 


 


Mean Platelet Volume 8.4 


 


Neutrophils (%) (Auto) 85.6 


 


Lymphocytes (%) (Auto) 4.5 


 


Monocytes (%) (Auto) 8.6 


 


Eosinophils (%) (Auto) 0.1 


 


Basophils (%) (Auto) 1.2 


 


Neutrophils # (Auto) 16.3 


 


Lymphocytes # (Auto) 0.8 


 


Monocytes # (Auto) 1.6 


 


Eosinophils # (Auto) 0.0 


 


Basophils # (Auto) 0.2 


 


CBC Comment AUTO DIFF 


 


Differential Total Cells


Counted 100 


 


 


Neutrophils % (Manual) 77 


 


Band Neutrophils % 12 


 


Lymphocytes % 6 


 


Monocytes % 4 


 


Neutrophils # (Manual) 17.1 


 


Myelocytes 1 


 


Differential Comment


  FINAL DIFF


MANUAL


 


Platelet Estimate NORMAL 


 


Platelet Morphology Comment NORMAL 


 


Prothrombin Time 11.5 


 


Prothromb Time International


Ratio 1.1 


 


 


Blood Urea Nitrogen 10 


 


Creatinine 0.81 


 


Random Glucose 322 


 


Total Protein 4.8 


 


Calcium Level 6.0 


 


Magnesium Level 1.4 


 


Sodium Level 141 


 


Potassium Level 1.9 


 


Chloride Level 108 


 


Carbon Dioxide Level 20.8 


 


Anion Gap 12 


 


Estimat Glomerular Filtration


Rate 87 


 


 


Lactic Acid Level 1.2 


 


Protein Corrected Calcium 7.1 


 


Troponin I 0.14 














 Date/Time


Source Procedure


Growth Status


 


 


 18 12:36


Blood Peripheral Aerobic Blood Culture


Pending Received


 


 18 12:36


Blood Peripheral Anaerobic Blood Culture


Pending Received








Result Diagram:  


18 1236                                                                    

            18 1236





Imaging





Last Impressions








Chest X-Ray 18 0000 Signed





Impressions: 





 Service Date/Time:  2018 12:24 - CONCLUSION:  1. Cardiomegaly 





 with mild basilar atelectasis. Pacer lead overlies right ventricle.     Dileep Vu MD 











Septic Shock Reassessment


Septic shock perfusion:  reassessment completed





Caprini VTE Risk Assessment


Caprini VTE Risk Assessment:  Mod/High Risk (score >= 2)


Caprini Risk Assessment Model











 Point Value = 1          Point Value = 2  Point Value = 3  Point Value = 5


 


Age 41-60


Minor surgery


BMI > 25 kg/m2


Swollen legs


Varicose veins


Pregnancy or postpartum


History of unexplained or recurrent


   spontaneous 


Oral contraceptives or hormone


   replacement


Sepsis (< 1 month)


Serious lung disease, including


   pneumonia (< 1 month)


Abnormal pulmonary function


Acute myocardial infarction


Congestive heart failure (< 1 month)


History of inflammatory bowel disease


Medical patient at bed rest Age 61-74


Arthroscopic surgery


Major open surgery (> 45 min)


Laparoscopic surgery (> 45 min)


Malignancy


Confined to bed (> 72 hours)


Immobilizing plaster cast


Central venous access Age >= 75


History of VTE


Family history of VTE


Factor V Leiden


Prothrombin 20247K


Lupus anticoagulant


Anticardiolipin antibodies


Elevated serum homocysteine


Heparin-induced thrombocytopenia


Other congenital or acquired


   thrombophilia Stroke (< 1 month)


Elective arthroplasty


Hip, pelvis, or leg fracture


Acute spinal cord injury (< 1 month)








Prophylaxis Regimen











   Total Risk


Factor Score Risk Level Prophylaxis Regimen


 


0-1      Low Early ambulation


 


2 Moderate Order ONE of the following:


*Sequential Compression Device (SCD)


*Heparin 5000 units SQ BID


 


3-4 Higher Order ONE of the following medications:


*Heparin 5000 units SQ TID


*Enoxaparin/Lovenox 40 mg SQ daily (WT < 150 kg, CrCl > 30 mL/min)


*Enoxaparin/Lovenox 30 mg SQ daily (WT < 150 kg, CrCl > 10-29 mL/min)


*Enoxaparin/Lovenox 30 mg SQ BID (WT < 150 kg, CrCl > 30 mL/min)


AND/OR


*Sequential Compression Device (SCD)


 


5 or more Highest Order ONE of the following medications:


*Heparin 5000 units SQ TID (Preferred with Epidurals)


*Enoxaparin/Lovenox 40 mg SQ daily (WT < 150 kg, CrCl > 30 mL/min)


*Enoxaparin/Lovenox 30 mg SQ daily (WT < 150 kg, CrCl > 10-29 mL/min)


*Enoxaparin/Lovenox 30 mg SQ BID (WT < 150 kg, CrCl > 30 mL/min)


AND


*Sequential Compression Device (SCD)











Assessment and Plan


Assessment and Plan


Neuro/Psych:





Migraine headache


Chronic opioid use


Diabetic peripheral neuropathy


Depression/anxiety


History of fibromyalgia





Acetaminophen 650 mg p.o. every 6 hours as needed fever


Hydrocodone/acetaminophen 5/325 1 tab every 4 hours as needed pain 1 through 5


Morphine sulfate 2 mg IV every 2 hours as needed pain 6-10


Holding tizanidine 4 mg p.o. every 8 hours for muscle relaxant


Continue duloxetine 90 mg p.o. daily for depression


Patient is on Percocet 10/325 1 tablet every 6 hours as needed pain at home


Patient is on pregabalin 225 mg p.o. twice daily at home along with gabapentin 

600 mg p.o. 3 times daily.  This is been resumed


 


CV: 





Essential hypertension


History of chronic systolic heart failure currently EF 65-70%


Status post AICD placement


Coronary artery disease status post CABG


History of atrial fib relation status post ablation


Elevated troponin 0 0.4





Resume metoprolol tartrate 100 mg p.o. twice daily, amlodipine 10 mg p.o. daily 

and valsartan 160 mg p.o. daily for hypertension


Resume clonidine 0.1 mg p.o. daily for hypertension


Echocardiogram 10/2017 revealed EF 65-70%.  Severe portal hypertension.  

Moderate TR.


Interrogate pacemaker


Aggressively replete electrolytes


Dr. Treadwell consult


Serial troponin every 6 hours 2





Resp: 





History of COPD/prior tobaccoism quit 





Nasal cannula to maintain saturations greater than or equal to 92%


Incentive spirometry while awake


Chest x-ray revealed cardiomegaly/signs of mild heart failure





GI: 





History of hiatal hernia


History of esophageal dilatation


History of diverticulosis


History of internal and external hemorrhoids


Diverticulosis


Benign colonic polyp





 ADA diet


Pantoprazole for GI prophylaxis


Docusate sodium/senna 1 tablet twice daily for bowel regimen





:  





Romano catheter if indicated for accurate I's and O's in a critically ill patient





Endo:  





Diabetes mellitus type 2 with hyperglycemia


Hypothyroidism





Start insulin detemir 8 units subcu twice daily with NovoLog sliding scale 

insulin Accu-Chek's before meals/at bedtime/high regimen


She is on insulin glargine 20 units daily at home


Continue levothyroxine 125 mcg p.o. daily.  Check TSH in a.m.





Renal: 





Creatinine currently 0.8


Started on normal saline with 20 mEq KCl 84 cc an hour


Monitor urine output


Accurate I's and O





Heme:





Leukocytosis


History of pernicious anemia





Monitor CBC daily.  Follow trends


No indication for transfusion of blood products at this time





ID:





Monitor for infection





Blood cultures 2  ordered by ED





MSK/Rheum:





Osteoporosis


Vitamin B-12 deficiency


SLE


Rheumatoid arthritis


Sacral decubitus ulcer





Holding vitamin B12 1000 units subcu monthly and alendronate 70 mg by mouth 

weekly


Wound care evaluate and treat





FEN:





Hypokalemia -severe symptomatic


Hypomagnesia


Hypocalcemia





Receiving 1 g calcium gluconate, 3 g mag sulfate and a total of 120 mg 

potassium chloride.  Recheck potassium and magnesium in .


ICU electrolyte protocol initiated


Patient is on potassium chloride 20 mg daily at home





Access:


-Utilize peripheral IV.  Central line if indicated





Prophylaxis


-GI -pantoprazole


-DVT -SCD/heparin





Critical Care:


The total critical care time was 35 minutes. Time to perform other separately 

billable procedures was not included in the critical care time.


Code Status


Full code


Discussed Condition With


Patient.  Dr. Dang  Care plan discussed and all questions answered.





Problem Qualifiers





(1) Sacral decubitus ulcer:  


Qualified Codes:  L89.159 - Pressure ulcer of sacral region, unspecified stage


(2) Leukocytosis:  


Qualified Codes:  D72.829 - Elevated white blood cell count, unspecified


(3) HTN (hypertension):  


Qualified Codes:  I10 - Essential (primary) hypertension


(4) Diabetes mellitus:  


Qualified Codes:  E11.65 - Type 2 diabetes mellitus with hyperglycemia; Z79.4 - 

Long term (current) use of insulin


(5) CAD (coronary artery disease):  


Qualified Codes:  I25.10 - Atherosclerotic heart disease of native coronary 

artery without angina pectoris


(6) CHF (congestive heart failure):  


Qualified Codes:  I50.32 - Chronic diastolic (congestive) heart failure


(7) Hypothyroidism:  


Qualified Codes:  E03.9 - Hypothyroidism, unspecified


(8) GERD (gastroesophageal reflux disease):  


Qualified Codes:  K21.9 - Gastro-esophageal reflux disease without esophagitis


(9) Lupus:  


Qualified Codes:  L93.0 - Discoid lupus erythematosus


(10) Depression:  





(11) Diverticulosis:  


Qualified Codes:  K57.90 - Diverticulosis of intestine, part unspecified, 

without perforation or abscess without bleeding


(12) COPD (chronic obstructive pulmonary disease):  


Qualified Codes:  J44.9 - Chronic obstructive pulmonary disease, unspecified


(13) Tricuspid regurgitation:  


Qualified Codes:  I07.1 - Rheumatic tricuspid insufficiency


(14) Osteoporosis:  


Qualified Codes:  M81.0 - Age-related osteoporosis without current pathological 

fracture


(15) Rheumatoid arthritis:  


Qualified Codes:  M06.9 - Rheumatoid arthritis, unspecified








Timothy Jonas MD 2018 14:37

## 2018-04-08 NOTE — PD
HPI


Chief Complaint:  Cardiac Complaint


Time Seen by Provider:  12:11


Travel History


International Travel<30 days:  No


Contact w/Intl Traveler<30days:  No


Traveled to known affect area:  No





History of Present Illness


HPI


62-year-old female came to the emergency room brought by EMS emergently after 

her AICD fired 5 times.  Patient says that the defibrillator fired 

consecutively 5 times and she became incontinent during that.  Patient has a 

wound VAC on her sacrum for a decubitus ulcer.  Currently her vital signs show 

tachycardia.  As per EMS patient was in V. tach just prior to the defibrillator 

firing.  She has a [employer] device.  This was last replaced in .  

Currently patient is awake but in distress.





[facility]


Past Medical History


*** Narrative Medical


List of her past medical, surgical, social and family history is reviewed from 

the nursing note.


Hx Anticoagulant Therapy:  Yes (low dose ASA daily. )


Anemia:  Yes (PERNICIOUS)


Arthritis:  Yes


Asthma:  No


Autoimmune Disease:  No


Blood Disorders:  No


Anxiety:  No


Depression:  No


Heart Rhythm Problems:  Yes (Afib )


Cancer:  No


Cardiac Catheterization:  Yes (in  for ablation)


Cardiomyopathy:  Yes


Cardiovascular Problems:  Yes


High Cholesterol:  Yes


Chemotherapy:  No


Chest Pain:  No


Congestive Heart Failure:  Yes


COPD:  No


Cerebrovascular Accident:  No


Coronary Artery Disease:  Yes


Diabetes:  Yes (DM 2)


Patient Takes Glucophage:  No


Diminished Hearing:  No


Endocrine:  Yes


Fibromyalgia:  Yes


Gastrointestinal Disorders:  Yes (Gastroparesis)


GERD:  No


Glaucoma:  No


Genitourinary:  Yes


Headaches:  Yes


Hepatitis:  No


Hiatal Hernia:  Yes


Heparin Induced Thrombocytopen:  No


Herniated Disk:  Yes


Hypertension:  Yes


Immune Disorder:  No


Implanted Vascular Access Dvce:  Yes


Kidney Stones:  Yes


Medical other:  Yes (LUPUS, MIGRAINES, ANEMIA, FIBROMYALGIA, RA )


Musculoskeletal:  Yes


Neurologic:  Yes


Psychiatric:  No


Reproductive:  No


Respiratory:  No


Immunizations Current:  Yes


Migraines:  No


Myocardial Infarction:  Yes ()


Radiation Therapy:  No


Renal Failure:  No


Seizures:  No


Sickle Cell Disease:  No


Sleep Apnea:  No


Thyroid Disease:  Yes (Hypothyroidism)


Ulcer:  No


Menopausal:  Yes


:  2


Para:  1


Miscarriage:  1


Dilation and Curettage (D&C):  Yes





Past Surgical History


Abdominal Surgery:  Yes (Hiatal hernia repair)


AICD:  Yes (Defibrilator)


Arteriovenous Shunt:  No


Cardiac Surgery:  Yes (Defibrilator placed )


 Section:  Yes (x1)


Coronary Artery Bypass Graft:  Yes


Ear Surgery:  No


Endocrine Surgery:  No


Eye Surgery:  No


Genitourinary Surgery:  No


Gynecologic Surgery:  Yes (Hysterectomy)


Hysterectomy:  Yes


Insulin Pump:  No


Joint Replacement:  Yes (Left hip fx)


Neurologic Surgery:  No


Oral Surgery:  Yes (Dentures )


Pacemaker:  No


Thoracic Surgery:  No


Other Surgery:  Yes





Family History


Family Myocardial Infarction:  No





Social History


Alcohol Use:  No


Tobacco Use:  No


Substance Use:  No





Allergies-Medications


(Allergen,Severity, Reaction):  


Coded Allergies:  


     No Known Allergies (Verified  Allergy, Unknown, 18)


Comments


No known drug allergies.


Reported Meds & Prescriptions





Reported Meds & Active Scripts


Active


Oyster Shell 250 mg + Vit D Tb (Calcium/Vitamin D) 250 Mg Calcium (625 Mg)-125 

Unit Tablet 500 Mg PO TID


Oxycodone-Acetaminophen  (Oxycodone HCl/Acetaminophen) 10 Mg-325 Mg 

Tablet 1 Tab PO Q6HR PRN


Zanaflex (Tizanidine HCl) 4 Mg Tab 4 Mg PO Q8H PRN


Senna Plus 8.6-50 mg (Sennosides-Docusate Sodium) 8.6 Mg-50 Mg Tab 1 Tab PO BID


Norvasc (Amlodipine Besylate) 10 Mg Tab 10 Mg PO DAILY 30 Days


Metoprolol Tartrate 100 Mg Tab 100 Mg PO BID


Levothyroxine (Levothyroxine Sodium) 125 Mcg Tab 125 Mcg PO DAILY


Tub Transfer Board (Device) 1 Mis Mis Ea .ROUTE AS DIRECTED


Transport Chair Ultra Lig (Device) 1 Mis Mis Ea .ROUTE AS DIRECTED


Walker/Adult/Folding (Device) 1 Mis Mis Ea .ROUTE AS DIRECTED


Walker [place] Wheels/5 Adj (Device) 1 Mis Mis Ea EXTERNAL AS DIRECTED PRN


     use while ambulating


Reported


Aspirin Children's (Aspirin) 81 Mg Chew 81 Mg CHEW DAILY


Lantus Inj (Insulin Glargine) 1,000 Unit/10 Ml Vial 20 Units SQ DAILY


Valsartan 160 Mg Tab 160 Mg PO DAILY


Clonidine (Clonidine HCl) 0.1 Mg Tab 0.1 Mg PO DAILY


Potassium Chloride ER (Potassium Chloride) 20 Meq Tab 20 Meq PO DAILY


Integra Plus (Multi-Vit/Iron-Vit C-B12-Folic Acid) 191-210-0.01-1 mg Cap 1 Cap 

PO DAILY


Gabapentin 300 Mg Cap 600 Mg PO TID


Duloxetine DR (Duloxetine HCl) 30 Mg Capdr 90 Mg PO DAILY


Lyrica (Pregabalin) 225 Mg Cap 225 Mg PO BID


Alendronate (Alendronate Sodium) 70 Mg Tab Unknown Dose PO Q7D


Nitroglycerin SL (Nitroglycerin) 0.4 Mg Subl 0.4 Mg SL AS DIRECTED PRN


     ONE TABLET UNDER THE TONGUE AS NEEDED FOR CHEST PAIN, MAY REPEAT EVERY


     FIVE MINUTES FOR A TOTAL OF 3 DOSES OR CALL 911 IF NO RELIEF


Cyanocobalamin Inj (Cyanocobalamin) 1,000 Mcg/Ml Inj 1,000 Mcg SQ 1 MONTH





Narrative Medication


List of her home medications reviewed from the nursing note.





Review of Systems


Except as stated in HPI:  all other systems reviewed are Neg


Cardiovascular:  Positive: Tachycardia





Physical Exam


Narrative


GENERAL: Awake, alert, moderate distress


SKIN: Focused skin assessment warm/dry.  Large wound VAC on the sacral area


HEAD: Atraumatic. Normocephalic. 


EYES: Pupils equal and round. No scleral icterus. No injection or drainage. 


ENT: No nasal bleeding or discharge.  Mucous membranes pink and moist.


NECK: Trachea midline. No JVD. 


CARDIOVASCULAR: Regular rate and rhythm.  Tachycardia.  No murmur appreciated.


RESPIRATORY: No accessory muscle use. Clear to auscultation. Breath sounds 

equal bilaterally. 


GASTROINTESTINAL: Abdomen soft, non-tender, nondistended. Hepatic and splenic 

margins not palpable. 


MUSCULOSKELETAL: No obvious deformities. No clubbing.  No cyanosis.  No edema. 


NEUROLOGICAL: Awake and alert. No obvious cranial nerve deficits.  Motor 

grossly within normal limits. Normal speech.


PSYCHIATRIC: Appropriate mood and affect; insight and judgment normal.





Data


Data


Last Documented VS





Vital Signs








  Date Time  Temp Pulse Resp B/P (MAP) Pulse Ox O2 Delivery O2 Flow Rate FiO2


 


18 13:21  119  127/65    


 


18 12:05 98.8  17  97 Room Air  








Orders





 Orders


Electrocardiogram (18 12:16)


Basic Metabolic Panel (Bmp) (18 12:16)


Complete Blood Count With Diff (18 12:16)


Magnesium (Mg) (18 12:16)


Prothrombin Time / Inr (Pt) (18 12:16)


Troponin I (18 12:16)


Ecg Monitoring (18 12:16)


Bilateral Bp Monitoring (18 12:16)


Iv Access Insert/Monitor (18 12:16)


Oximetry (18 12:16)


Oxygen Administration (18 12:16)


Sodium Chloride 0.9% Flush (Ns Flush) (18 12:30)


^ Medication Alert (18 12:16)


^ Discontinue (18 12:16)


Dextrose 5% In [place]... W/Amiodarone Inj (18 12:16)


Vital Signs (Adult) MANUEL.Q4H (18 12:16)


Blood Culture (18 12:22)


Lactic Acid (18 12:22)


Chest, Single Ap (18 )


Sodium Chlor 0.9% (... W/Amiodarone Inj (18 13:00)


Protein Corrected Calcium(Pcc) (18 12:36)


Potassium Chloride (Kcl) (18 13:30)


Potassium Chlor 10 Meq Premix (Kcl 10 Me (18 13:30)


Calcium Gluconate Inj (Calcium Gluconate (18 13:45)


Admit Order (Ed Use Only) (18 13:48)





Labs





Laboratory Tests








Test


  18


12:36


 


White Blood Count 19.0 TH/MM3 


 


Red Blood Count 4.63 MIL/MM3 


 


Hemoglobin 11.8 GM/DL 


 


Hematocrit 35.8 % 


 


Mean Corpuscular Volume 77.4 FL 


 


Mean Corpuscular Hemoglobin 25.4 PG 


 


Mean Corpuscular Hemoglobin


Concent 32.9 % 


 


 


Red Cell Distribution Width 16.1 % 


 


Platelet Count 305 TH/MM3 


 


Mean Platelet Volume 8.4 FL 


 


Neutrophils (%) (Auto) 85.6 % 


 


Lymphocytes (%) (Auto) 4.5 % 


 


Monocytes (%) (Auto) 8.6 % 


 


Eosinophils (%) (Auto) 0.1 % 


 


Basophils (%) (Auto) 1.2 % 


 


Neutrophils # (Auto) 16.3 TH/MM3 


 


Lymphocytes # (Auto) 0.8 TH/MM3 


 


Monocytes # (Auto) 1.6 TH/MM3 


 


Eosinophils # (Auto) 0.0 TH/MM3 


 


Basophils # (Auto) 0.2 TH/MM3 


 


CBC Comment AUTO DIFF 


 


Differential Total Cells


Counted 100 


 


 


Neutrophils % (Manual) 77 % 


 


Band Neutrophils % 12 % 


 


Lymphocytes % 6 % 


 


Monocytes % 4 % 


 


Neutrophils # (Manual) 17.1 TH/MM3 


 


Myelocytes 1 % 


 


Differential Comment


  FINAL DIFF


MANUAL


 


Platelet Estimate NORMAL 


 


Platelet Morphology Comment NORMAL 


 


Prothrombin Time 11.5 SEC 


 


Prothromb Time International


Ratio 1.1 RATIO 


 


 


Blood Urea Nitrogen 10 MG/DL 


 


Creatinine 0.81 MG/DL 


 


Random Glucose 322 MG/DL 


 


Total Protein 4.8 GM/DL 


 


Calcium Level 6.0 MG/DL 


 


Magnesium Level 1.4 MG/DL 


 


Sodium Level 141 MEQ/L 


 


Potassium Level 1.9 MEQ/L 


 


Chloride Level 108 MEQ/L 


 


Carbon Dioxide Level 20.8 MEQ/L 


 


Anion Gap 12 MEQ/L 


 


Estimat Glomerular Filtration


Rate 87 ML/MIN 


 


 


Lactic Acid Level 1.2 mmol/L 


 


Protein Corrected Calcium 7.1 MG/DL 


 


Troponin I 0.14 NG/ML 











[facility]


Medical Decision Making


Medical Screen Exam Complete:  Yes


Emergency Medical Condition:  Yes


Medical Record Reviewed:  Yes


Interpretation(s)


Twelve-lead EKG is reviewed by me.  Normal sinus rhythm, left axis deviation, 

tachycardia, LVH.  Heart rate of 125 bpm


Differential Diagnosis


Electrolyte abnormality, ACS, non-STEMI


Narrative Course


1:54 PM blood test results are back and patient has critically low potassium of 

1.9.  Magnesium and calcium are low as well.  Patient was initially getting 1 L 

of IV fluid bolus and amiodarone bolus and drip.  I have ordered for p.o. and 

IV potassium replacement.  She is also getting IV calcium replacement and 

magnesium replacement.  Given her critically low potassium patient should in 

the ICU.  I discussed the case with the intensivist and he is accepted the 

patient.  Troponin is mildly elevated probably from the multiple 

defibrillations.  She has leukocytosis probably from the stress of multiple 

AICD discharge.  [employer] has been called to come and interrogate the 

device.


Critical Care Narrative


Aggregate critical care time was 45 minutes. Time to perform other separately 

billable procedures was not  


included in the critical care time. My time did not include minutes spent 

treating any other patients simultaneously or on  


activities that did not directly contribute to the patient's treatment.  





The services I provided to this patient were to treat and/or prevent clinically 

significant deterioration that could result  


in: AICD firing, critically low potassium, p.o. and IV potassium replacement, 

amiodarone bolus and drip.





I provided critical care services requiring my management, as noted below:


Chart data review, documentation time, medication orders and management, vital 

sign assessments/reviewing monitor data,  


ordering and reviewing lab tests, ordering and interpreting/reviewing x-rays 

and diagnostic studies, care of the patient  


and discussion of the patient with the admitting physicians.





Procedures


EKG Prior to Arrival:  No





Physician Communication


Physician Communication


Dr. Jonas





Diagnosis





 Primary Impression:  


 AICD discharge


 Additional Impressions:  


 Hypokalemia


 Hypocalcemia


 Hypomagnesemia


 Elevated troponin I level


 Leukocytosis


 Qualified Codes:  D72.829 - Elevated white blood cell count, unspecified





Admitting Information


Admitting Physician Requests:  Aj Josue MD 2018 13:57

## 2018-04-08 NOTE — RADRPT
EXAM DATE/TIME:  04/08/2018 12:24 

 

[facility] COMPARISON:     

CHEST SINGLE AP, October 29, 2017, 20:40.

 

                     

INDICATIONS :     

Chest pain. 

                     

 

MEDICAL HISTORY :            

Cardiovascular disease. Congestive heart failure. Rheumatoid arthritis.Lupus.Fibromyalgia. Diabets. H
ypertension.    

 

SURGICAL HISTORY :        

Hysterectomy. CABG. Fusion, cervical. Myomectomy. Pacemaker.

 

ENCOUNTER:     

Initial                                        

 

ACUITY:     

1 day      

 

PAIN SCORE:     

Non-responsive.

 

LOCATION:     

Bilateral chest 

 

FINDINGS:     

A single view of the chest demonstrates pacer lead overlying right ventricle. Cardiomegaly. Mild basi
lar atelectasis. No effusion. No pneumothorax. Fusion lower cervical spine.

 

CONCLUSION:     

1. Cardiomegaly with mild basilar atelectasis. Pacer lead overlies right ventricle.

 

 

 

 Dileep Vu MD on April 08, 2018 at 12:37           

Board Certified Radiologist.

 This report was verified electronically.

## 2018-04-09 VITALS — OXYGEN SATURATION: 100 %

## 2018-04-09 VITALS
SYSTOLIC BLOOD PRESSURE: 135 MMHG | OXYGEN SATURATION: 100 % | RESPIRATION RATE: 15 BRPM | HEART RATE: 68 BPM | DIASTOLIC BLOOD PRESSURE: 68 MMHG

## 2018-04-09 VITALS
SYSTOLIC BLOOD PRESSURE: 139 MMHG | HEART RATE: 67 BPM | RESPIRATION RATE: 15 BRPM | DIASTOLIC BLOOD PRESSURE: 65 MMHG | OXYGEN SATURATION: 99 %

## 2018-04-09 VITALS
DIASTOLIC BLOOD PRESSURE: 63 MMHG | OXYGEN SATURATION: 99 % | SYSTOLIC BLOOD PRESSURE: 132 MMHG | RESPIRATION RATE: 14 BRPM | HEART RATE: 69 BPM

## 2018-04-09 VITALS
DIASTOLIC BLOOD PRESSURE: 80 MMHG | HEART RATE: 62 BPM | RESPIRATION RATE: 16 BRPM | OXYGEN SATURATION: 99 % | SYSTOLIC BLOOD PRESSURE: 174 MMHG

## 2018-04-09 VITALS
SYSTOLIC BLOOD PRESSURE: 155 MMHG | HEART RATE: 72 BPM | DIASTOLIC BLOOD PRESSURE: 76 MMHG | OXYGEN SATURATION: 99 % | RESPIRATION RATE: 16 BRPM

## 2018-04-09 VITALS
TEMPERATURE: 98.8 F | SYSTOLIC BLOOD PRESSURE: 169 MMHG | HEART RATE: 70 BPM | RESPIRATION RATE: 17 BRPM | OXYGEN SATURATION: 100 % | DIASTOLIC BLOOD PRESSURE: 81 MMHG

## 2018-04-09 VITALS — RESPIRATION RATE: 17 BRPM | HEART RATE: 66 BPM | OXYGEN SATURATION: 100 %

## 2018-04-09 VITALS
DIASTOLIC BLOOD PRESSURE: 79 MMHG | RESPIRATION RATE: 19 BRPM | HEART RATE: 72 BPM | SYSTOLIC BLOOD PRESSURE: 165 MMHG | OXYGEN SATURATION: 99 %

## 2018-04-09 VITALS — HEART RATE: 94 BPM

## 2018-04-09 VITALS
HEART RATE: 70 BPM | DIASTOLIC BLOOD PRESSURE: 68 MMHG | RESPIRATION RATE: 16 BRPM | SYSTOLIC BLOOD PRESSURE: 137 MMHG | OXYGEN SATURATION: 99 %

## 2018-04-09 VITALS — HEART RATE: 69 BPM

## 2018-04-09 VITALS
OXYGEN SATURATION: 99 % | RESPIRATION RATE: 18 BRPM | DIASTOLIC BLOOD PRESSURE: 72 MMHG | HEART RATE: 72 BPM | SYSTOLIC BLOOD PRESSURE: 147 MMHG

## 2018-04-09 VITALS
RESPIRATION RATE: 21 BRPM | OXYGEN SATURATION: 100 % | DIASTOLIC BLOOD PRESSURE: 69 MMHG | HEART RATE: 85 BPM | TEMPERATURE: 98 F | SYSTOLIC BLOOD PRESSURE: 148 MMHG

## 2018-04-09 VITALS
OXYGEN SATURATION: 100 % | RESPIRATION RATE: 16 BRPM | SYSTOLIC BLOOD PRESSURE: 165 MMHG | DIASTOLIC BLOOD PRESSURE: 76 MMHG | HEART RATE: 61 BPM

## 2018-04-09 VITALS
SYSTOLIC BLOOD PRESSURE: 142 MMHG | HEART RATE: 65 BPM | OXYGEN SATURATION: 100 % | RESPIRATION RATE: 15 BRPM | DIASTOLIC BLOOD PRESSURE: 63 MMHG

## 2018-04-09 VITALS
HEART RATE: 70 BPM | OXYGEN SATURATION: 100 % | RESPIRATION RATE: 15 BRPM | DIASTOLIC BLOOD PRESSURE: 66 MMHG | SYSTOLIC BLOOD PRESSURE: 136 MMHG

## 2018-04-09 VITALS
RESPIRATION RATE: 19 BRPM | DIASTOLIC BLOOD PRESSURE: 79 MMHG | HEART RATE: 72 BPM | OXYGEN SATURATION: 100 % | SYSTOLIC BLOOD PRESSURE: 154 MMHG

## 2018-04-09 VITALS
SYSTOLIC BLOOD PRESSURE: 142 MMHG | RESPIRATION RATE: 16 BRPM | HEART RATE: 68 BPM | DIASTOLIC BLOOD PRESSURE: 69 MMHG | OXYGEN SATURATION: 100 %

## 2018-04-09 VITALS
HEART RATE: 73 BPM | SYSTOLIC BLOOD PRESSURE: 129 MMHG | DIASTOLIC BLOOD PRESSURE: 64 MMHG | RESPIRATION RATE: 21 BRPM | OXYGEN SATURATION: 100 % | TEMPERATURE: 98.1 F

## 2018-04-09 VITALS
OXYGEN SATURATION: 99 % | HEART RATE: 69 BPM | DIASTOLIC BLOOD PRESSURE: 65 MMHG | RESPIRATION RATE: 15 BRPM | SYSTOLIC BLOOD PRESSURE: 130 MMHG

## 2018-04-09 VITALS
HEART RATE: 69 BPM | RESPIRATION RATE: 15 BRPM | OXYGEN SATURATION: 98 % | SYSTOLIC BLOOD PRESSURE: 161 MMHG | DIASTOLIC BLOOD PRESSURE: 73 MMHG

## 2018-04-09 VITALS
TEMPERATURE: 97.9 F | HEART RATE: 65 BPM | SYSTOLIC BLOOD PRESSURE: 162 MMHG | OXYGEN SATURATION: 100 % | DIASTOLIC BLOOD PRESSURE: 98 MMHG | RESPIRATION RATE: 20 BRPM

## 2018-04-09 VITALS
DIASTOLIC BLOOD PRESSURE: 79 MMHG | OXYGEN SATURATION: 100 % | SYSTOLIC BLOOD PRESSURE: 164 MMHG | HEART RATE: 70 BPM | RESPIRATION RATE: 19 BRPM

## 2018-04-09 VITALS
HEART RATE: 72 BPM | SYSTOLIC BLOOD PRESSURE: 165 MMHG | DIASTOLIC BLOOD PRESSURE: 80 MMHG | OXYGEN SATURATION: 100 % | RESPIRATION RATE: 17 BRPM

## 2018-04-09 VITALS
OXYGEN SATURATION: 100 % | RESPIRATION RATE: 31 BRPM | DIASTOLIC BLOOD PRESSURE: 64 MMHG | SYSTOLIC BLOOD PRESSURE: 121 MMHG | TEMPERATURE: 97.8 F | HEART RATE: 97 BPM

## 2018-04-09 VITALS
RESPIRATION RATE: 15 BRPM | SYSTOLIC BLOOD PRESSURE: 167 MMHG | HEART RATE: 71 BPM | DIASTOLIC BLOOD PRESSURE: 80 MMHG | OXYGEN SATURATION: 99 %

## 2018-04-09 VITALS
DIASTOLIC BLOOD PRESSURE: 79 MMHG | OXYGEN SATURATION: 100 % | HEART RATE: 65 BPM | SYSTOLIC BLOOD PRESSURE: 166 MMHG | RESPIRATION RATE: 16 BRPM

## 2018-04-09 VITALS
SYSTOLIC BLOOD PRESSURE: 166 MMHG | HEART RATE: 62 BPM | TEMPERATURE: 98.6 F | DIASTOLIC BLOOD PRESSURE: 77 MMHG | OXYGEN SATURATION: 100 % | RESPIRATION RATE: 14 BRPM

## 2018-04-09 VITALS
RESPIRATION RATE: 23 BRPM | SYSTOLIC BLOOD PRESSURE: 175 MMHG | HEART RATE: 71 BPM | OXYGEN SATURATION: 99 % | DIASTOLIC BLOOD PRESSURE: 100 MMHG

## 2018-04-09 VITALS — OXYGEN SATURATION: 99 %

## 2018-04-09 VITALS — HEART RATE: 73 BPM

## 2018-04-09 VITALS — HEART RATE: 70 BPM

## 2018-04-09 VITALS — HEART RATE: 85 BPM

## 2018-04-09 LAB
ALBUMIN SERPL-MCNC: 2 GM/DL (ref 3.4–5)
ALP SERPL-CCNC: 127 U/L (ref 45–117)
ALT SERPL-CCNC: 15 U/L (ref 10–53)
AST SERPL-CCNC: 16 U/L (ref 15–37)
BASOPHILS # BLD AUTO: 0 TH/MM3 (ref 0–0.2)
BASOPHILS NFR BLD: 0.1 % (ref 0–2)
BILIRUB SERPL-MCNC: 0.3 MG/DL (ref 0.2–1)
BUN SERPL-MCNC: 11 MG/DL (ref 7–18)
CALCIUM SERPL-MCNC: 7.6 MG/DL (ref 8.5–10.1)
CHLORIDE SERPL-SCNC: 107 MEQ/L (ref 98–107)
CREAT SERPL-MCNC: 0.85 MG/DL (ref 0.5–1)
EOSINOPHIL # BLD: 0 TH/MM3 (ref 0–0.4)
EOSINOPHIL NFR BLD: 0.3 % (ref 0–4)
ERYTHROCYTE [DISTWIDTH] IN BLOOD BY AUTOMATED COUNT: 16.7 % (ref 11.6–17.2)
GFR SERPLBLD BASED ON 1.73 SQ M-ARVRAT: 82 ML/MIN (ref 89–?)
GLUCOSE SERPL-MCNC: 327 MG/DL (ref 74–106)
HCO3 BLD-SCNC: 21.4 MEQ/L (ref 21–32)
HCT VFR BLD CALC: 35.2 % (ref 35–46)
HGB BLD-MCNC: 11.4 GM/DL (ref 11.6–15.3)
INR PPP: 2 RATIO
LYMPHOCYTES # BLD AUTO: 1.2 TH/MM3 (ref 1–4.8)
LYMPHOCYTES NFR BLD AUTO: 8.2 % (ref 9–44)
MAGNESIUM SERPL-MCNC: 2.3 MG/DL (ref 1.5–2.5)
MCH RBC QN AUTO: 25 PG (ref 27–34)
MCHC RBC AUTO-ENTMCNC: 32.4 % (ref 32–36)
MCV RBC AUTO: 77 FL (ref 80–100)
MONOCYTE #: 1.3 TH/MM3 (ref 0–0.9)
MONOCYTES NFR BLD: 8.9 % (ref 0–8)
NEUTROPHILS # BLD AUTO: 11.8 TH/MM3 (ref 1.8–7.7)
NEUTROPHILS NFR BLD AUTO: 82.5 % (ref 16–70)
PHOSPHATE SERPL-MCNC: 2.6 MG/DL (ref 2.5–4.9)
PLATELET # BLD: 310 TH/MM3 (ref 150–450)
PMV BLD AUTO: 8.4 FL (ref 7–11)
PROT SERPL-MCNC: 6 GM/DL (ref 6.4–8.2)
PROTHROMBIN TIME: 20.1 SEC (ref 9.8–11.6)
RBC # BLD AUTO: 4.57 MIL/MM3 (ref 4–5.3)
SODIUM SERPL-SCNC: 139 MEQ/L (ref 136–145)
TROPONIN I SERPL-MCNC: 0.89 NG/ML (ref 0.02–0.05)
WBC # BLD AUTO: 14.4 TH/MM3 (ref 4–11)

## 2018-04-09 RX ADMIN — ASPIRIN 81 MG SCH MG: 81 TABLET ORAL at 08:27

## 2018-04-09 RX ADMIN — Medication SCH ML: at 08:52

## 2018-04-09 RX ADMIN — IPRATROPIUM BROMIDE AND ALBUTEROL SULFATE SCH AMPULE: .5; 3 SOLUTION RESPIRATORY (INHALATION) at 16:00

## 2018-04-09 RX ADMIN — Medication SCH ML: at 20:12

## 2018-04-09 RX ADMIN — GABAPENTIN SCH MG: 300 CAPSULE ORAL at 08:30

## 2018-04-09 RX ADMIN — PREGABALIN SCH MG: 75 CAPSULE ORAL at 08:27

## 2018-04-09 RX ADMIN — GABAPENTIN SCH MG: 300 CAPSULE ORAL at 13:00

## 2018-04-09 RX ADMIN — SODIUM CHLORIDE AND POTASSIUM CHLORIDE SCH MLS/HR: 9; 1.49 INJECTION, SOLUTION INTRAVENOUS at 02:55

## 2018-04-09 RX ADMIN — LEVOTHYROXINE SODIUM SCH MCG: 125 TABLET ORAL at 04:28

## 2018-04-09 RX ADMIN — CALCIUM CARBONATE-CHOLECALCIFEROL TAB 250 MG-125 UNIT SCH MG: 250-125 TAB at 08:30

## 2018-04-09 RX ADMIN — METOPROLOL TARTRATE SCH MG: 100 TABLET, FILM COATED ORAL at 08:30

## 2018-04-09 RX ADMIN — POTASSIUM CHLORIDE PRN MLS/HR: 200 INJECTION, SOLUTION INTRAVENOUS at 09:08

## 2018-04-09 RX ADMIN — CALCIUM CARBONATE-CHOLECALCIFEROL TAB 250 MG-125 UNIT SCH MG: 250-125 TAB at 17:04

## 2018-04-09 RX ADMIN — IPRATROPIUM BROMIDE AND ALBUTEROL SULFATE SCH AMPULE: .5; 3 SOLUTION RESPIRATORY (INHALATION) at 09:19

## 2018-04-09 RX ADMIN — Medication SCH CAP: at 08:51

## 2018-04-09 RX ADMIN — IPRATROPIUM BROMIDE AND ALBUTEROL SULFATE SCH AMPULE: .5; 3 SOLUTION RESPIRATORY (INHALATION) at 22:00

## 2018-04-09 RX ADMIN — CHLORHEXIDINE GLUCONATE SCH PACK: 500 CLOTH TOPICAL at 04:00

## 2018-04-09 RX ADMIN — HEPARIN SODIUM SCH UNITS: 10000 INJECTION, SOLUTION INTRAVENOUS; SUBCUTANEOUS at 17:04

## 2018-04-09 RX ADMIN — PREGABALIN SCH MG: 75 CAPSULE ORAL at 08:29

## 2018-04-09 RX ADMIN — IPRATROPIUM BROMIDE AND ALBUTEROL SULFATE SCH AMPULE: .5; 3 SOLUTION RESPIRATORY (INHALATION) at 04:00

## 2018-04-09 RX ADMIN — INSULIN ASPART SCH: 100 INJECTION, SOLUTION INTRAVENOUS; SUBCUTANEOUS at 20:13

## 2018-04-09 RX ADMIN — GABAPENTIN SCH MG: 300 CAPSULE ORAL at 17:04

## 2018-04-09 RX ADMIN — POTASSIUM CHLORIDE PRN MLS/HR: 200 INJECTION, SOLUTION INTRAVENOUS at 01:12

## 2018-04-09 RX ADMIN — AMIODARONE HYDROCHLORIDE PRN MLS/HR: 50 INJECTION, SOLUTION INTRAVENOUS at 05:33

## 2018-04-09 RX ADMIN — POTASSIUM CHLORIDE PRN MLS/HR: 200 INJECTION, SOLUTION INTRAVENOUS at 17:03

## 2018-04-09 RX ADMIN — VALSARTAN SCH MG: 160 TABLET ORAL at 08:51

## 2018-04-09 RX ADMIN — PANTOPRAZOLE SCH MG: 40 TABLET, DELAYED RELEASE ORAL at 08:28

## 2018-04-09 RX ADMIN — POTASSIUM CHLORIDE PRN MLS/HR: 200 INJECTION, SOLUTION INTRAVENOUS at 17:02

## 2018-04-09 RX ADMIN — MORPHINE SULFATE PRN MG: 2 INJECTION, SOLUTION INTRAMUSCULAR; INTRAVENOUS at 08:50

## 2018-04-09 RX ADMIN — INSULIN ASPART SCH: 100 INJECTION, SOLUTION INTRAVENOUS; SUBCUTANEOUS at 08:00

## 2018-04-09 RX ADMIN — INSULIN ASPART SCH: 100 INJECTION, SOLUTION INTRAVENOUS; SUBCUTANEOUS at 17:00

## 2018-04-09 RX ADMIN — METOPROLOL TARTRATE SCH MG: 100 TABLET, FILM COATED ORAL at 20:12

## 2018-04-09 RX ADMIN — MORPHINE SULFATE PRN MG: 2 INJECTION, SOLUTION INTRAMUSCULAR; INTRAVENOUS at 20:12

## 2018-04-09 RX ADMIN — INSULIN ASPART SCH: 100 INJECTION, SOLUTION INTRAVENOUS; SUBCUTANEOUS at 12:00

## 2018-04-09 RX ADMIN — STANDARDIZED SENNA CONCENTRATE AND DOCUSATE SODIUM SCH TAB: 8.6; 5 TABLET, FILM COATED ORAL at 20:12

## 2018-04-09 RX ADMIN — STANDARDIZED SENNA CONCENTRATE AND DOCUSATE SODIUM SCH TAB: 8.6; 5 TABLET, FILM COATED ORAL at 09:00

## 2018-04-09 RX ADMIN — DULOXETINE HYDROCHLORIDE SCH MG: 30 CAPSULE, DELAYED RELEASE ORAL at 08:31

## 2018-04-09 RX ADMIN — HYDROCODONE BITARTRATE AND ACETAMINOPHEN PRN TAB: 5; 325 TABLET ORAL at 23:10

## 2018-04-09 RX ADMIN — HEPARIN SODIUM SCH UNITS: 10000 INJECTION, SOLUTION INTRAVENOUS; SUBCUTANEOUS at 04:28

## 2018-04-09 RX ADMIN — CALCIUM CARBONATE-CHOLECALCIFEROL TAB 250 MG-125 UNIT SCH MG: 250-125 TAB at 13:00

## 2018-04-09 NOTE — PD.[email or web address].NOT
Wound Consult


Description:


Consult for VAC MANAGEMENT of sacrum per Dr Jonas


Communicated with:


Dr Sumi powell for recommendations and findings.


Recommendation:


Apply wound VAC to sacral wounds using a bridge method with settings @125mmHg 

low continuous suction change M-W-F.


Additional Information:


Patient seen on [facility] with Farzad WARD, [facility] for sacral wounds. Patient turned 

herself to her left side for assessment. Bordered gauze dressing removed from 

sacrum to reveal two wounds side by side  by ~3cm of intact skin. 

Right side of sacrum wound measures 3.4cm x 2cm x 1.5cm of 100% red moist 

muscle tissue indicating a full thickness wound that appears to be of pressure 

etiology with the circular wound bed. There is undermining noted from 1-5 o'

clock  with the deepest noted @4 o'clock of 1.8cm. Wound has mild odor with 

minimal yellow thick exudate noted in wound bed when cleansed with NS and 

gauze. Wound margins are epibole from 1-7 o'clock with intermittent epithelial 

tissue and granulation tissue noted from 7-1 o'clock. Left side of sacrum wound 

measures 2.7cm x 4.2cm x 2.0cm of 100% red moist muscle tissue indicating full 

thickness with 4cm undermining noted from 10-2 o'clock. Wound has mild odor 

with minimal yellow thick exudate noted when cleansed with NS and gauze. Wound 

margins are open and macerated. Periwounds are noted with pink scar tissue 

indicating that these wounds were larger previously. Wounds were lightly packed 

with a moist to dry dressing until orders obtained for wound VAC placement.





Neg Pressure Wound Therapy


Wound Location


Wound Location:


Right side of Sacrum





Wound Description


Length:


3.4cm


Width:


2cm


Depth:


1.5cm


Undermining:


from 1-5 o'clock deepest noted at 4o'clock.


Wound bed appearance:


100% moist red muscle tissue


Periwound appearance:  Other (scar tissue)





Settings


Suction:  125 mmHg, Continuous


Intensity:  Low


Other Information:  Bridged, Windowpaned, Mushroomed


Foam type:  Black


Number of pieces:  1





Wound Location


Wound Location:


Left side of Sacrum





Wound Description


Length:


2.7cm


Width:


4.2cm


Depth:


2.0cm


Undermining:


from 10-12 of 4cm


Wound bed appearance:


100% moist red muscle tissue


Periwound appearance:  Other (scar tissue)





Settings


Suction:  125 mmHg, Continuous


Intensity:  Low


Other Information:  Bridged, Windowpaned, Mushroomed


Foam type:  Black


Number of pieces:  1











M[facility]angel [facility] Apr 9, 2018 15:06

## 2018-04-09 NOTE — HHI.CCPN
Subjective


Remarks/Hospital Course


This is a 62-year-old -American female.  Date of admission 4/8/2018.  

Complex past medical history including history of chronic systolic heart 

failure ejection fraction 20% status post AICD placement 2006.  Most recent 

echo revealed EF 65-70% with moderate TR/moderate to severe pulmonary 

hypertension, fibromyalgia, arthritis, rheumatoid arthritis, hypertension, 

diabetes mellitus with neuropathy, depression, hypothyroidism and COPD.  Today 

she presented to [facility] after having her AICD fire 5 times at home with 

no loss conscious.  Patient was incontinent.  2 weeks ago patient had wound VAC 

placed on 8 sacral decubitus ulcer by Dr. Navarro.





Workup included an EKG which revealed no acute ST elevation.  Potassium is 1.9.

  Magnesium is 1.4.  Troponin 0 0.14.  She received 150 mg amiodarone bolus 1.

  Currently is received 30 mEq IV potassium chloride and 90 mEq potassium 

chloride by mouth.  3 g mag sulfate were provided.  Will recheck at 7:00.  

Pacemaker will be interrogated.  Dr. Treadwell will be notified of admission.





Patient is neurologically intact denying chest pain currently.  Very pleasant.





Subjective





4/9: ICD is not fired since admission.  Potassium magnesium aggressively 

replaced currently potassium 4.8 magnesium above 2.  Suboptimal blood pressure 

control.  Increase clonidine 0.2 mg twice daily for cardiology.  Advance diet 

as tolerated.





Objective





Vital Signs








  Date Time  Temp Pulse Resp B/P (MAP) Pulse Ox O2 Delivery O2 Flow Rate FiO2


 


4/9/18 08:50     100   


 


4/9/18 06:00  69      


 


4/9/18 05:33    162/75    


 


4/9/18 05:15   15     


 


4/9/18 04:00 98.6       


 


4/8/18 16:44      Room Air  














Intake and Output   


 


 4/9/18 4/9/18 4/10/18





 08:00 16:00 00:00


 


Intake Total 1290 ml  


 


Balance 1290 ml  








Result Diagram:  


4/9/18 0554                                                                    

            4/9/18 0554





Other Results





Microbiology








 Date/Time


Source Procedure


Growth Status


 


 


 4/8/18 12:36


Blood Peripheral Aerobic Blood Culture - Preliminary


NO GROWTH IN 1 DAY Resulted


 


 4/8/18 12:36


Blood Peripheral Anaerobic Blood Culture - Preliminary


NO GROWTH IN 1 DAY Resulted








Imaging





Last Impressions








Chest X-Ray 4/8/18 0000 Signed





Impressions: 





 Service Date/Time:  Sunday, April 8, 2018 12:24 - CONCLUSION:  1. Cardiomegaly 





 with mild basilar atelectasis. Pacer lead overlies right ventricle.     Dileep Vu MD 








Objective Remarks


GENERAL: 62-year-old AA female currently on room air in no acute distress


SKIN: Warm and dry.  Wound VAC over sacral/coccyx region with unstageable ulcer


HEAD: Atraumatic. Normocephalic. 


EYES: Pupils equal and round about 2 mm bilaterally and reactive. No scleral 

icterus. No injection or drainage. 


ENT: No nasal bleeding or discharge.  Mucous membranes pink and moist.


NECK: Trachea midline. No JVD. 


CARDIOVASCULAR: Regular rate and rhythm.  S1, S2 predose for without murmur


RESPIRATORY:. Clear to auscultation. Breath sounds equal bilaterally. 


GASTROINTESTINAL: Abdomen soft, non-tender, nondistended.  No prior scars noted


MUSCULOSKELETAL: Extremities without significant edema. No obvious deformities. 


NEUROLOGICAL: Awake and alert. No obvious cranial nerve deficits.  Motor 

grossly within normal limits. Five out of 5 muscle strength in the arms and 

legs.  Normal speech.


PSYCHIATRIC: Appropriate mood and affect; insight and judgment normal.





A/P


Assessment and Plan


Neuro/Psych:





Migraine headache


Chronic opioid use


Diabetic peripheral neuropathy


Depression/anxiety


History of fibromyalgia





Acetaminophen 650 mg p.o. every 6 hours as needed fever


Hydrocodone/acetaminophen 5/325 1 tab every 4 hours as needed pain 1 through 5


Morphine sulfate 2 mg IV every 2 hours as needed pain 6-10


Holding tizanidine 4 mg p.o. every 8 hours for muscle relaxant


Continue duloxetine 90 mg p.o. daily for depression


Patient is on Percocet 10/325 1 tablet every 6 hours as needed pain at home


Patient is on pregabalin 225 mg p.o. twice daily at home along with gabapentin 

600 mg p.o. 3 times daily.  This is been resumed


 


CV: 





Essential hypertension


History of chronic systolic heart failure currently EF 65-70%


Status post AICD placement


Coronary artery disease status post CABG


History of atrial fib relation status post ablation


Elevated troponin 





Resume metoprolol tartrate 100 mg p.o. twice daily, amlodipine 10 mg p.o. daily 

and valsartan 160 mg p.o. daily for hypertension


Resume clonidine 0.1 mg p.o. daily for hypertension


   Increase 0.2 mg twice daily per cardiology/Dr. Treadwell


Echocardiogram 10/2017 revealed EF 65-70%.  Severe portal hypertension.  

Moderate TR.


Interrogate pacemaker results pending


Aggressively replete electrolytes


Dr. Treadwell consult


Serial troponin every 6 hours 2





Resp: 





History of COPD/prior tobaccoism quit 2001





Nasal cannula to maintain saturations greater than or equal to 92%


Incentive spirometry while awake


Chest x-ray revealed cardiomegaly/signs of mild heart failure





GI: 





History of hiatal hernia


History of esophageal dilatation


History of diverticulosis


History of internal and external hemorrhoids


Diverticulosis


Benign colonic polyp





2000 ADA diet


Pantoprazole for GI prophylaxis


Docusate sodium/senna 1 tablet twice daily for bowel regimen





:  





Romano catheter if indicated for accurate I's and O's in a critically ill patient





Endo:  





Diabetes mellitus type 2 with hyperglycemia


Hypothyroidism





Start insulin detemir 8 units subcu twice daily with NovoLog sliding scale 

insulin Accu-Chek's before meals/at bedtime/high regimen


She is on insulin glargine 20 units daily at home


Continue levothyroxine 125 mcg p.o. daily.  Check TSH in a.m.





Renal: 





Creatinine currently 0.8


Started on normal saline with 20 mEq KCl 84 cc an hour


Monitor urine output


Accurate I's and O





Heme:





Leukocytosis


History of pernicious anemia





Monitor CBC daily.  Follow trends


No indication for transfusion of blood products at this time





ID:





Monitor for infection





Blood cultures 2 4/8 ordered by ED





MSK/Rheum:





Osteoporosis


Vitamin B-12 deficiency


SLE


Rheumatoid arthritis


Sacral decubitus ulcer





Holding vitamin B12 1000 units subcu monthly and alendronate 70 mg by mouth 

weekly


Wound care evaluate and treat





FEN:





Hypokalemia -severe symptomatic


Hypomagnesia


Hypocalcemia





ICU electrolyte protocol initiated


Patient is on potassium chloride 20 mEq daily at home





Access:


-Utilize peripheral IV.  Central line if indicated





Prophylaxis


-GI -pantoprazole


-DVT -SCD/heparin





Level 2 follow-up











Timothy Jonas MD Apr 9, 2018 13:19

## 2018-04-09 NOTE — MB
cc:

Ming Fernandez DO

****

 

 

DATE:

2018

 

REASON FOR CONSULTATION:

ICD firing.

 

HISTORY OF PRESENT ILLNESS:

Maite Damon is a pleasant 62-year-old female who presented to 

[facility] Emergency Room due to her ICD firing.  

Apparently, it fired 2-3 times and then she called EMS and while EMS 

was picking her up, fired 2 more times.  She states that she had no 

chest pain, shortness of breath or palpitations before the ICD firing.

 Patient has been incontinent of stool and had a wound VAC placed 2 

weeks ago for a sacral decubitus ulcer.  On arrival, she was found to 

have a potassium of 1.9 and magnesium of 1.4 and she was started on 

amiodarone.  She sees my partner, Dr. Treadwell in the office and has also

seen Dr. Renteria in the past.  At the time of seeing her the pacemaker 

has not yet been interrogated.

 

PAST MEDICAL HISTORY:

1.  Migraine headaches.

2.  Depression/anxiety.

3.  Osteoarthritis.

4.  Rheumatoid arthritis.

5.  Systemic lupus erythematosus.

6.  Pernicious anemia.

7.  Atrial fibrillation.

8.  Congestive heart failure.

9.  Moderate tricuspid regurgitation.

10.  Coronary artery disease.

11.  Moderate to severe pulmonary hypertension.

12.  Osteoporosis.

13.  Chronic obstructive pulmonary disease.

14.  History of dysphagia.

15.  Internal hemorrhoids.

16.  Diverticulosis.

17.  Benign colon polyp.

18.  Gastroparesis.

19.  Hypothyroidism.

20.  Peripheral vascular disease.

 

PAST SURGICAL HISTORY:

1.  Cardiac catheterization (2015).  Overall mild coronary 

artery disease.

2.  Left femur reduction and intramedullary nail fixation 

(10/30/2017).

3.  Radiofrequency ablation of AV yun atrial tachycardia 

(2005).

4.  AICD/defibrillator placement ().

5.  Total abdominal hysterectomy with bilateral salpingo-oophorectomy.

 

6.  Hiatal hernia repair.

7.  .

8.  Left hip surgery.

9.  Left ankle surgery.

 

ALLERGIES:

NO KNOWN DRUG ALLERGIES.

 

MEDICATIONS:

1.  Zanaflex 4 mg every 8 hours as needed for spasms.

2.  Clonidine 0.1 mg daily.

3.  Nitro sublingual as needed.

4.  Metoprolol tartrate 100 mg b.i.d.

5.  Norvasc 10 mg daily.

6.  Valsartan 160 mg daily.

7.  Aspirin 81 mg daily.

8.  Oxycodone/acetaminophen 10/325 every 6 hours as needed for pain.

9.  Gabapentin 600 mg three times a day.

10.  Lyrica 225 mg b.i.d.

11.  Duloxetine 90 mg daily.

12.  Lantus 20 units subcutaneous daily.

13.  Synthroid 125 mcg daily.

14.  Alendronate 70 mg weekly.

 

FAMILY HISTORY:

Denies premature coronary artery disease or sudden cardiac death 

within the family.  Mother had brain tumor.  Father with gastric 

cancer.  Leukemia has also been noted in the family history.

 

SOCIAL HISTORY:

The patient occasionally drinks alcohol.  She previously smoked but 

quit in .  Denies drug abuse.

 

REVIEW OF SYSTEMS:

Fourteen systems were reviewed including osteopathic pertinent 

positives and negatives above, otherwise negative.

 

PHYSICAL EXAMINATION:

VITAL SIGNS:  Temperature 98.8, heart rate 113, blood pressure 127/65,

respirations 18, pulse oximetry 97% on room air.

GENERAL:  The patient appears well in no acute distress, alert, awake 

and oriented x 3.

HEENT:  Extraocular muscles intact.  Mucous membranes moist.

NECK:  Supple.  No JVD at 45 degrees.  No carotid bruits heard 

bilaterally.  Carotid upstroke is brisk in nature.

HEART:  Regular rate and rhythm.  Positive first and second heart 

sounds with no noted murmurs, gallops or rubs.

LUNGS:  Clear to auscultation bilaterally.  No wheezes, rales or 

rhonchi.

ABDOMEN:  Soft, nontender, nondistended.  No organomegaly noted.

EXTREMITIES:  Show no clubbing, cyanosis or edema.  Femoral and distal

pulses intact bilaterally.

NEUROLOGIC:  No focal deficits.

SKIN:  Warm, dry and intact other than wound VAC noted over the sacral

coccyx region.

 

LABORATORY DATA:

Hemoglobin 11.8, hematocrit 35.8, platelets 305.  Potassium 1.9, BUN 

10, creatinine 0.81, magnesium 1.4.  Troponin 0.14.

 

Electrocardiogram (2018 at 12:06) sinus tachycardia, marked left

axis deviation, nonspecific ST-T wave changes.

 

IMPRESSIONS:

1.  ICD firing less likely due to ventricular tachycardia.

2.  Possible ventricular tachycardia due to electrolyte abnormalities.

3.  Severe hypokalemia and hypomagnesemia.

4.  Depression/anxiety.

5.  History of fibromyalgia.

6.  Hypertension.

7.  History of cardiomyopathy with most recent ejection fraction of 

65-70%.

8.  History of atrial fibrillation with status post ablation.

9.  Elevated troponin, most likely due to ICD firing.

10.  Remote history of tobacco.

 

RECOMMENDATIONS:

1.  Ms. Damon presented after ICD fired and we will have her ICD 

interrogated.

2.  She has significant hypokalemia as well as hypomagnesemia, most 

likely due to her stool incontinence, this most likely led to her 

arrhythmia.

3.  Electrolytes will be replenished as possible.

4.  She has been started on an amiodarone drip and since her 

electrolytes have been somewhat replenished we will continue this.

5.  We will continue to check her troponins, although these will most 

likely trend upward as she had a significant arrhythmia as well as ICD

firing 5 times.

6.  Dr. Treadwell will followup in the morning.

 

Thank you for allowing me to see Maite Damon. If there are any 

questions, please do not hesitate to call.

 

 

__________________________________

Ming Fernandez DO

 

 

VGP/TL

D: 2018, 10:47 AM

T: 2018, 11:30 AM

Visit #: [id number]

Job #: [id number]

[email or web address]

## 2018-04-09 NOTE — PD.CARD.PN
Subjective


Subjective Remarks


Denies further ICD shocks, CP, dyspnea, palpitations, syncope, dizziness.





Objective


Medications











Item Value  Date Time


 


Amlodipine 10 mg 4/9/18 0900





Besylate DAILY/PO 





 (Norvasc)  


 


Aspirin 81 mg 4/9/18 0900





 (Aspirin Chew) DAILY/CHEW 


 


Clonidine 0.1 mg 4/9/18 0900





 (Catapres) DAILY/PO 


 


Valsartan 160 mg 4/9/18 0900





 (Diovan) DAILY/PO 


 


Metoprolol 100 mg 4/8/18 2100





Tartrate BID/PO 4/8/18 2049





 (Lopressor)  


 


Amiodarone HCl 250 ml @ 33.33 mls/hr 4/8/18 1300





450 mg/Sodium .Q7H31M  PRN/IV 4/9/18 0533





Chloride  











Current Medications








 Medications


  (Trade)  Dose


 Ordered  Sig/Adilene


 Route  Start Time


 Stop Time Status Last Admin


 


  (NS Flush)  2 ml  UNSCH  PRN


 IVF  4/8/18 12:30


     


 


 


 Amiodarone HCl


 450 mg/Sodium


 Chloride  250 ml @ 


 33.33 mls/


 hr  Q7H31M PRN


 IV  4/8/18 13:00


    4/9/18 05:33


 


 


 Potassium Chloride  100 ml @ 


 50 mls/hr  Q2H  PRN


 IV  4/8/18 14:30


     


 


 


 Potassium Chloride  100 ml @ 


 50 mls/hr  Q2H  PRN


 IV  4/8/18 14:30


    4/9/18 01:12


 


 


  (K-Lyte Cl  Eff)  50 meq  UNSCH  PRN


 PO  4/8/18 14:30


     


 


 


 Potassium Chloride  100 ml @ 


 25 mls/hr  UNSCH  PRN


 IV  4/8/18 14:30


     


 


 


 Potassium Chloride  100 ml @ 


 50 mls/hr  Q2H  PRN


 IV  4/8/18 14:30


     


 


 


 Magnesium Sulfate


 4 gm/Sodium


 Chloride  100 ml @ 


 50 mls/hr  UNSCH  PRN


 IV  4/8/18 14:30


     


 


 


  (Mag-Ox)  800 mg  UNSCH  PRN


 PO  4/8/18 14:30


     


 


 


 Magnesium Sulfate


 2 gm/Sodium


 Chloride  100 ml @ 


 50 mls/hr  UNSCH  PRN


 IV  4/8/18 14:30


     


 


 


  (K-Phos)  2,000 mg  Q4H  PRN


 PO  4/8/18 14:30


     


 


 


 Sodium Phosphate


 30 mmol/Sodium


 Chloride  250 ml @ 


 42 mls/hr  UNSCH  PRN


 IV  4/8/18 14:30


     


 


 


  (K-Phos)  2,000 mg  UNSCH  PRN


 PO/TUBE  4/8/18 14:17


     


 


 


 Potassium


 Phosphate 30 mmol/


 Sodium Chloride  260 ml @ 


 42 mls/hr  UNSCH  PRN


 IV  4/8/18 14:17


     


 


 


  (Norvasc)  10 mg  DAILY


 PO  4/9/18 09:00


     


 


 


  (Aspirin Chew)  81 mg  DAILY


 CHEW  4/9/18 09:00


     


 


 


  (Oscal-D 250-125)  500 mg  TID


 PO  4/8/18 18:00


    4/8/18 18:02


 


 


  (Catapres)  0.1 mg  DAILY


 PO  4/9/18 09:00


     


 


 


  (Cymbalta Dr)  90 mg  DAILY


 PO  4/9/18 09:00


     


 


 


  (Neurontin)  600 mg  TID


 PO  4/8/18 18:00


    4/8/18 18:03


 


 


  (Synthroid)  125 mcg  DAILY@0600


 PO  4/9/18 06:00


    4/9/18 04:28


 


 


  (Lopressor)  100 mg  BID


 PO  4/8/18 21:00


    4/8/18 20:49


 


 


  (Lyrica)  225 mg  BID


 PO  4/8/18 21:00


    4/8/18 20:49


 


 


  (Diovan)  160 mg  DAILY


 PO  4/9/18 09:00


     


 


 


  (Nephrocaps)  1 cap  DAILY


 PO  4/9/18 09:00


     


 


 


  (D50w (Vial) Inj)  50 ml  UNSCH  PRN


 IV PUSH  4/8/18 14:30


     


 


 


  (Glucagon Inj)  1 mg  UNSCH  PRN


 OTHER  4/8/18 14:30


     


 


 


  (NovoLOG


 SUPPLEMENTAL


 SCALE)  1  ACHS SLIDING  SCALE


 SQ  4/8/18 17:00


    4/8/18 17:25


 


 


  (Levemir Inj)  8 units  Q12HR


 SQ  4/8/18 21:00


     


 


 


  (NS Flush)  2 ml  UNSCH  PRN


 IV FLUSH  4/8/18 14:30


     


 


 


  (NS Flush)  2 ml  BID


 IV FLUSH  4/8/18 21:00


    4/8/18 20:49


 


 


  (Tylenol)  650 mg  Q6H  PRN


 PO  4/8/18 14:30


     


 


 


  (Zofran Inj)  4 mg  Q6H  PRN


 IV PUSH  4/8/18 14:30


     


 


 


  (Duoneb Neb)  1 ampule  Q6HR  NEB


 INH  4/8/18 16:00


    4/8/18 23:03


 


 


  (Albuterol Neb)  2.5 mg  Q2HR NEB  PRN


 INH  4/8/18 14:30


     


 


 


  (Heparin Inj)  5,000 units  Q12H


 SQ  4/8/18 16:00


    4/9/18 04:28


 


 


 Miscellaneous


 Information  1  Q361D


 XX  4/8/18 14:30


    4/8/18 19:00


 


 


  (Chlorhexidine


 2% Cloth)  3 pack


 Taper  DAILY@04


 TOP  4/9/18 04:00


 4/5/19 03:59  4/9/18 04:00


 


 


  (Chlorhexidine


 2% Cloth)  3 pack  UNSCH  PRN


 TOP  4/8/18 14:30


     


 


 


  (Tammi-Colace)  1 tab  BID


 PO  4/8/18 21:00


     


 


 


  (Milk Of


 Magnesia Liq)  30 ml  Q12H  PRN


 PO  4/8/18 14:30


     


 


 


  (Senokot)  17.2 mg  Q12H  PRN


 PO  4/8/18 14:30


     


 


 


  (Dulcolax Supp)  10 mg  DAILY  PRN


 RECTAL  4/8/18 14:30


     


 


 


  (Lactulose Liq)  30 ml  DAILY  PRN


 PO  4/8/18 14:30


     


 


 


 Potassium


 Chloride/Sodium


 Chloride  1,000 ml @ 


 84 mls/hr  [id number]


 IV  4/8/18 15:00


    4/8/18 20:00


 


 


  (Protonix)  40 mg  DAILY


 PO  4/9/18 09:00


     


 


 


  (Norco  5-325 Mg)  1 tab  Q4H  PRN


 PO  4/8/18 19:00


     


 


 


  (Morphine Inj)  2 mg  Q2H  PRN


 IV PUSH  4/8/18 19:00


    4/8/18 22:58


 








Vital Signs / I&O





Vital Signs








  Date Time  Temp Pulse Resp B/P (MAP) Pulse Ox O2 Delivery O2 Flow Rate FiO2


 


4/9/18 06:00  69      


 


4/9/18 05:33  69  162/75    


 


4/9/18 05:15  69 15 161/73 (102) 98   


 


4/9/18 05:00  69 15 130/65 (86) 99   


 


4/9/18 04:45  69 14 132/63 (86) 99   


 


4/9/18 04:30  67 15 139/65 (89) 99   


 


4/9/18 04:15  65 15 142/63 (89) 100   


 


4/9/18 04:00  62      


 


4/9/18 04:00 98.6 62 14 166/77 (106) 100   


 


4/9/18 03:45  62 16 174/80 (111) 99   


 


4/9/18 03:30  61 16 165/76 (105) 100   


 


4/9/18 03:15  71 23 175/100 (125) 99   


 


4/9/18 03:00  65 16 166/79 (108) 100   


 


4/9/18 02:46  70 19 164/79 (107) 100   


 


4/9/18 02:45  66 17  100   


 


4/9/18 02:40  71  142/69    


 


4/9/18 02:30  68 16 142/69 (93) 100   


 


4/9/18 02:15  68 15 135/68 (90) 100   


 


4/9/18 02:00  69      


 


4/9/18 02:00  70 16 137/68 (91) 99   


 


4/9/18 01:45  70 15 136/66 (89) 100   


 


4/9/18 01:30  72 19 165/79 (107) 99   


 


4/9/18 01:15  72 18 147/72 (97) 99   


 


4/9/18 01:00  72 19 154/79 (104) 100   


 


4/9/18 00:45  72 16 155/76 (102) 99   


 


4/9/18 00:30  72 17 165/80 (108) 100   


 


4/9/18 00:15  71 15 167/80 (109) 99   


 


4/9/18 00:00  70      


 


4/9/18 00:00 98.8 70 17 169/81 (110) 100   


 


4/8/18 23:45  71 28 169/93 (118) 99   


 


4/8/18 23:30  70 28 173/83 (113) 99   


 


4/8/18 23:15  67 17 175/81 (112) 99   


 


4/8/18 23:00  67 16 180/83 (115) 100   


 


4/8/18 22:45  69 20 150/69 (96) 100   


 


4/8/18 22:30  70 30 156/70 (98) 100   


 


4/8/18 22:15  69 18 153/66 (95) 100   


 


4/8/18 22:00  72      


 


4/8/18 22:00  72 19 171/80 (110) 100   


 


4/8/18 21:45  89 30 159/79 (105) 100   


 


4/8/18 21:30  105 30 161/78 (105) 98   


 


4/8/18 21:15  101 25 147/70 (95) 100   


 


4/8/18 21:00  101 18 153/72 (99) 100   


 


4/8/18 20:45  103 18 163/72 (102) 100   


 


4/8/18 20:27  109  138/70    


 


4/8/18 20:00 98.7 110 21 138/70 (92) 100   


 


4/8/18 20:00  113      


 


4/8/18 18:00  127      


 


4/8/18 18:00 98.9 119 22 150/67 (94) 99   


 


4/8/18 17:45 98.8 127 20 150/67 (94) 98   


 


4/8/18 17:29        


 


4/8/18 16:44  127 16 140/65 (90) 96 Room Air  


 


4/8/18 15:00  114 16 154/73 (100) 98 Room Air  


 


4/8/18 14:08  118 18 137/67 (90) 100 Room Air  


 


4/8/18 14:08  117 18  100 Room Air  


 


4/8/18 13:21  119  127/65    


 


4/8/18 13:10  113  127/65    


 


4/8/18 12:38  123  113/92    


 


4/8/18 12:05 98.8 129 17 126/64 (84) 97 Room Air  














I/O      


 


 4/8/18 4/8/18 4/8/18 4/9/18 4/9/18 4/9/18





 07:00 15:00 23:00 07:00 15:00 23:00


 


Intake Total   500 ml 1290 ml  


 


Balance   500 ml 1290 ml  


 


      


 


Intake IV Total   500 ml 1290 ml  


 


# Voids   1 2  


 


# Bowel Movements   1 4  








Physical Exam


GENERAL: Well developed, well nourished. No acute distress.


HEENT: Jugular venous pressure is normal. 


CHEST: Lungs clear to auscultation anteriorly.


CARDIAC: Regular rate and rhythm without S3, S4, or murmur.


ABDOMEN: Soft, nontender, no hepatosplenomegaly. Bowel sounds present.


EXTREMITIES: No clubbing, cyanosis, or edema.


Laboratory





Laboratory Tests








Test


  4/8/18


12:36 4/8/18


17:50 4/8/18


21:17 4/9/18


03:55


 


White Blood Count 19.0 TH/MM3    


 


Red Blood Count 4.63 MIL/MM3    


 


Hemoglobin 11.8 GM/DL    


 


Hematocrit 35.8 %    


 


Mean Corpuscular Volume 77.4 FL    


 


Mean Corpuscular Hemoglobin 25.4 PG    


 


Mean Corpuscular Hemoglobin


Concent 32.9 % 


  


  


  


 


 


Red Cell Distribution Width 16.1 %    


 


Platelet Count 305 TH/MM3    


 


Mean Platelet Volume 8.4 FL    


 


Neutrophils (%) (Auto) 85.6 %    


 


Lymphocytes (%) (Auto) 4.5 %    


 


Monocytes (%) (Auto) 8.6 %    


 


Eosinophils (%) (Auto) 0.1 %    


 


Basophils (%) (Auto) 1.2 %    


 


Neutrophils # (Auto) 16.3 TH/MM3    


 


Lymphocytes # (Auto) 0.8 TH/MM3    


 


Monocytes # (Auto) 1.6 TH/MM3    


 


Eosinophils # (Auto) 0.0 TH/MM3    


 


Basophils # (Auto) 0.2 TH/MM3    


 


CBC Comment AUTO DIFF    


 


Differential Total Cells


Counted 100 


  


  


  


 


 


Neutrophils % (Manual) 77 %    


 


Band Neutrophils % 12 %    


 


Lymphocytes % 6 %    


 


Monocytes % 4 %    


 


Neutrophils # (Manual) 17.1 TH/MM3    


 


Myelocytes 1 %    


 


Differential Comment


  FINAL DIFF


MANUAL 


  


  


 


 


Platelet Estimate NORMAL    


 


Platelet Morphology Comment NORMAL    


 


Prothrombin Time 11.5 SEC    20.1 SEC 


 


Prothromb Time International


Ratio 1.1 RATIO 


  


  


  2.0 RATIO 


 


 


Blood Urea Nitrogen 10 MG/DL    


 


Creatinine 0.81 MG/DL    


 


Random Glucose 322 MG/DL    


 


Total Protein 4.8 GM/DL    


 


Calcium Level 6.0 MG/DL    


 


Magnesium Level 1.4 MG/DL   2.7 MG/DL  


 


Sodium Level 141 MEQ/L    


 


Potassium Level 1.9 MEQ/L   2.7 MEQ/L  


 


Chloride Level 108 MEQ/L    


 


Carbon Dioxide Level 20.8 MEQ/L    


 


Anion Gap 12 MEQ/L    


 


Estimat Glomerular Filtration


Rate 87 ML/MIN 


  


  


  


 


 


Lactic Acid Level 1.2 mmol/L    


 


Protein Corrected Calcium 7.1 MG/DL    


 


Troponin I 0.14 NG/ML   1.43 NG/ML  


 


Nasal Screen MRSA (PCR)


  


  MRSA NOT


DETECTED 


  


 


 


Phosphorus Level   1.5 MG/DL  


 


Activated Partial


Thromboplast Time 


  


  


  32.2 SEC 


 


 


Test


  4/9/18


05:54 


  


  


 


 


White Blood Count 14.4 TH/MM3    


 


Red Blood Count 4.57 MIL/MM3    


 


Hemoglobin 11.4 GM/DL    


 


Hematocrit 35.2 %    


 


Mean Corpuscular Volume 77.0 FL    


 


Mean Corpuscular Hemoglobin 25.0 PG    


 


Mean Corpuscular Hemoglobin


Concent 32.4 % 


  


  


  


 


 


Red Cell Distribution Width 16.7 %    


 


Platelet Count 310 TH/MM3    


 


Mean Platelet Volume 8.4 FL    


 


Neutrophils (%) (Auto) 82.5 %    


 


Lymphocytes (%) (Auto) 8.2 %    


 


Monocytes (%) (Auto) 8.9 %    


 


Eosinophils (%) (Auto) 0.3 %    


 


Basophils (%) (Auto) 0.1 %    


 


Neutrophils # (Auto) 11.8 TH/MM3    


 


Lymphocytes # (Auto) 1.2 TH/MM3    


 


Monocytes # (Auto) 1.3 TH/MM3    


 


Eosinophils # (Auto) 0.0 TH/MM3    


 


Basophils # (Auto) 0.0 TH/MM3    


 


CBC Comment DIFF FINAL    


 


Differential Comment     


 


Blood Urea Nitrogen 11 MG/DL    


 


Creatinine 0.85 MG/DL    


 


Random Glucose 327 MG/DL    


 


Total Protein 6.0 GM/DL    


 


Albumin 2.0 GM/DL    


 


Calcium Level 7.6 MG/DL    


 


Phosphorus Level 2.6 MG/DL    


 


Magnesium Level 2.3 MG/DL    


 


Alkaline Phosphatase 127 U/L    


 


Aspartate Amino Transf


(AST/SGOT) 16 U/L 


  


  


  


 


 


Alanine Aminotransferase


(ALT/SGPT) 15 U/L 


  


  


  


 


 


Total Bilirubin 0.3 MG/DL    


 


Sodium Level 139 MEQ/L    


 


Potassium Level 4.8 MEQ/L    


 


Chloride Level 107 MEQ/L    


 


Carbon Dioxide Level 21.4 MEQ/L    


 


Anion Gap 11 MEQ/L    


 


Estimat Glomerular Filtration


Rate 82 ML/MIN 


  


  


  


 


 


Lactic Acid Level 1.0 mmol/L    


 


Troponin I 0.89 NG/ML    


 


Thyroid Stimulating Hormone


3rd Gen 0.608 uIU/ML 


  


  


  


 











Assessment and Plan


Problem List:  


(1) AICD discharge


ICD Codes:  Z45.02 - Encounter for adjustment and management of automatic 

implantable cardiac defibrillator


Status:  Acute


Plan:  Stable overnight.  ICD interrogation by rep pending.  Continue IV 

Amiodarone for now.  Await echo.





(2) Non-ischemic cardiomyopathy


ICD Codes:  I42.8 - Other cardiomyopathies


Status:  Chronic


Plan:  EF 40-45% on last available echo 6/2017.  Stable.  Compensated.  No 

acute CHF.  Continue beta blocker, ARB.





(3) CAD (coronary artery disease)


ICD Codes:  I25.10 - Atherosclerotic heart disease of native coronary artery 

without angina pectoris


Status:  Chronic


Plan:  Stable.  Only mild CAD by cath 2015.  Continue daily aspirin





(4) Hypertension


ICD Codes:  I10 - Essential (primary) hypertension


Status:  Chronic


Plan:  Suboptimal BP control on current regimen.  Rec increase clonidine dosing.





Code Status


full code


Discussed Condition With


patient





Problem Qualifiers





(1) CAD (coronary artery disease):  


Qualified Codes:  I25.10 - Atherosclerotic heart disease of native coronary 

artery without angina pectoris


(2) Hypertension:  


Qualified Codes:  I10 - Essential (primary) hypertension








Narinder Treadwell MD Apr 9, 2018 08:22

## 2018-04-10 VITALS
TEMPERATURE: 98.9 F | OXYGEN SATURATION: 97 % | RESPIRATION RATE: 21 BRPM | HEART RATE: 87 BPM | SYSTOLIC BLOOD PRESSURE: 167 MMHG | DIASTOLIC BLOOD PRESSURE: 77 MMHG

## 2018-04-10 VITALS
DIASTOLIC BLOOD PRESSURE: 63 MMHG | RESPIRATION RATE: 23 BRPM | SYSTOLIC BLOOD PRESSURE: 130 MMHG | HEART RATE: 70 BPM | TEMPERATURE: 98.4 F

## 2018-04-10 VITALS
SYSTOLIC BLOOD PRESSURE: 126 MMHG | DIASTOLIC BLOOD PRESSURE: 89 MMHG | RESPIRATION RATE: 15 BRPM | OXYGEN SATURATION: 96 % | TEMPERATURE: 98.6 F | HEART RATE: 77 BPM

## 2018-04-10 VITALS
DIASTOLIC BLOOD PRESSURE: 64 MMHG | TEMPERATURE: 98.8 F | RESPIRATION RATE: 20 BRPM | HEART RATE: 64 BPM | OXYGEN SATURATION: 98 % | SYSTOLIC BLOOD PRESSURE: 127 MMHG

## 2018-04-10 VITALS
DIASTOLIC BLOOD PRESSURE: 71 MMHG | SYSTOLIC BLOOD PRESSURE: 156 MMHG | HEART RATE: 73 BPM | TEMPERATURE: 98.9 F | RESPIRATION RATE: 23 BRPM

## 2018-04-10 VITALS
OXYGEN SATURATION: 98 % | SYSTOLIC BLOOD PRESSURE: 133 MMHG | TEMPERATURE: 98.8 F | DIASTOLIC BLOOD PRESSURE: 64 MMHG | RESPIRATION RATE: 26 BRPM | HEART RATE: 73 BPM

## 2018-04-10 VITALS — HEART RATE: 74 BPM

## 2018-04-10 VITALS — OXYGEN SATURATION: 97 %

## 2018-04-10 VITALS — HEART RATE: 87 BPM

## 2018-04-10 VITALS — HEART RATE: 69 BPM

## 2018-04-10 VITALS — HEART RATE: 88 BPM

## 2018-04-10 VITALS — HEART RATE: 108 BPM

## 2018-04-10 VITALS — HEART RATE: 81 BPM

## 2018-04-10 LAB
AMORPHOUS SEDIMENT, URINE: (no result)
BUN SERPL-MCNC: 7 MG/DL (ref 7–18)
CALCIUM SERPL-MCNC: 8 MG/DL (ref 8.5–10.1)
CHLORIDE SERPL-SCNC: 105 MEQ/L (ref 98–107)
COLOR UR: YELLOW
CREAT SERPL-MCNC: 0.78 MG/DL (ref 0.5–1)
ERYTHROCYTE [DISTWIDTH] IN BLOOD BY AUTOMATED COUNT: 16.5 % (ref 11.6–17.2)
GFR SERPLBLD BASED ON 1.73 SQ M-ARVRAT: 91 ML/MIN (ref 89–?)
GLUCOSE SERPL-MCNC: 220 MG/DL (ref 74–106)
GLUCOSE UR STRIP-MCNC: (no result) MG/DL
HCO3 BLD-SCNC: 21.1 MEQ/L (ref 21–32)
HCT VFR BLD CALC: 30.9 % (ref 35–46)
HGB BLD-MCNC: 10.2 GM/DL (ref 11.6–15.3)
HGB UR QL STRIP: (no result)
KETONES UR STRIP-MCNC: (no result) MG/DL
MAGNESIUM SERPL-MCNC: 2 MG/DL (ref 1.5–2.5)
MCH RBC QN AUTO: 25.6 PG (ref 27–34)
MCHC RBC AUTO-ENTMCNC: 33.2 % (ref 32–36)
MCV RBC AUTO: 77.2 FL (ref 80–100)
MUCOUS THREADS #/AREA URNS LPF: (no result) /LPF
NITRITE UR QL STRIP: (no result)
PHOSPHATE SERPL-MCNC: 1.1 MG/DL (ref 2.5–4.9)
PLATELET # BLD: 276 TH/MM3 (ref 150–450)
PMV BLD AUTO: 8.5 FL (ref 7–11)
RBC # BLD AUTO: 4 MIL/MM3 (ref 4–5.3)
RENAL EPI CELLS #/AREA URNS HPF: 4 /HPF
SODIUM SERPL-SCNC: 136 MEQ/L (ref 136–145)
SP GR UR STRIP: 1.02 (ref 1–1.03)
SQUAMOUS #/AREA URNS HPF: 8 /HPF (ref 0–5)
URINE LEUKOCYTE ESTERASE: (no result)
WBC # BLD AUTO: 9.5 TH/MM3 (ref 4–11)
WHITE BLOOD CELL CLUMPS: (no result)

## 2018-04-10 RX ADMIN — IPRATROPIUM BROMIDE AND ALBUTEROL SULFATE SCH AMPULE: .5; 3 SOLUTION RESPIRATORY (INHALATION) at 04:37

## 2018-04-10 RX ADMIN — STANDARDIZED SENNA CONCENTRATE AND DOCUSATE SODIUM SCH TAB: 8.6; 5 TABLET, FILM COATED ORAL at 21:32

## 2018-04-10 RX ADMIN — METOPROLOL TARTRATE SCH MG: 100 TABLET, FILM COATED ORAL at 21:31

## 2018-04-10 RX ADMIN — LEVOTHYROXINE SODIUM SCH MCG: 125 TABLET ORAL at 06:29

## 2018-04-10 RX ADMIN — VANCOMYCIN HYDROCHLORIDE SCH MG: 500 INJECTION, POWDER, LYOPHILIZED, FOR SOLUTION INTRAVENOUS at 18:00

## 2018-04-10 RX ADMIN — CALCIUM CARBONATE-CHOLECALCIFEROL TAB 250 MG-125 UNIT SCH MG: 250-125 TAB at 09:21

## 2018-04-10 RX ADMIN — IPRATROPIUM BROMIDE AND ALBUTEROL SULFATE SCH AMPULE: .5; 3 SOLUTION RESPIRATORY (INHALATION) at 10:00

## 2018-04-10 RX ADMIN — GABAPENTIN SCH MG: 300 CAPSULE ORAL at 16:38

## 2018-04-10 RX ADMIN — MORPHINE SULFATE PRN MG: 2 INJECTION, SOLUTION INTRAMUSCULAR; INTRAVENOUS at 01:42

## 2018-04-10 RX ADMIN — CALCIUM CARBONATE-CHOLECALCIFEROL TAB 250 MG-125 UNIT SCH MG: 250-125 TAB at 16:39

## 2018-04-10 RX ADMIN — ONDANSETRON PRN MG: 2 INJECTION, SOLUTION INTRAMUSCULAR; INTRAVENOUS at 21:33

## 2018-04-10 RX ADMIN — Medication SCH ML: at 09:23

## 2018-04-10 RX ADMIN — Medication SCH CAP: at 09:00

## 2018-04-10 RX ADMIN — VANCOMYCIN HYDROCHLORIDE SCH MG: 500 INJECTION, POWDER, LYOPHILIZED, FOR SOLUTION INTRAVENOUS at 10:00

## 2018-04-10 RX ADMIN — DULOXETINE HYDROCHLORIDE SCH MG: 30 CAPSULE, DELAYED RELEASE ORAL at 09:20

## 2018-04-10 RX ADMIN — MORPHINE SULFATE PRN MG: 2 INJECTION, SOLUTION INTRAMUSCULAR; INTRAVENOUS at 21:31

## 2018-04-10 RX ADMIN — MAGNESIUM SULFATE IN DEXTROSE SCH MLS/HR: 10 INJECTION, SOLUTION INTRAVENOUS at 10:01

## 2018-04-10 RX ADMIN — MORPHINE SULFATE PRN MG: 2 INJECTION, SOLUTION INTRAMUSCULAR; INTRAVENOUS at 23:39

## 2018-04-10 RX ADMIN — MORPHINE SULFATE PRN MG: 2 INJECTION, SOLUTION INTRAMUSCULAR; INTRAVENOUS at 03:59

## 2018-04-10 RX ADMIN — IPRATROPIUM BROMIDE AND ALBUTEROL SULFATE SCH AMPULE: .5; 3 SOLUTION RESPIRATORY (INHALATION) at 16:00

## 2018-04-10 RX ADMIN — VALSARTAN SCH MG: 160 TABLET ORAL at 09:22

## 2018-04-10 RX ADMIN — CHLORHEXIDINE GLUCONATE SCH PACK: 500 CLOTH TOPICAL at 04:00

## 2018-04-10 RX ADMIN — INSULIN ASPART SCH: 100 INJECTION, SOLUTION INTRAVENOUS; SUBCUTANEOUS at 12:00

## 2018-04-10 RX ADMIN — GABAPENTIN SCH MG: 300 CAPSULE ORAL at 09:20

## 2018-04-10 RX ADMIN — VANCOMYCIN HYDROCHLORIDE SCH MG: 500 INJECTION, POWDER, LYOPHILIZED, FOR SOLUTION INTRAVENOUS at 21:32

## 2018-04-10 RX ADMIN — METOPROLOL TARTRATE SCH MG: 100 TABLET, FILM COATED ORAL at 09:22

## 2018-04-10 RX ADMIN — MAGNESIUM SULFATE IN DEXTROSE SCH MLS/HR: 10 INJECTION, SOLUTION INTRAVENOUS at 10:00

## 2018-04-10 RX ADMIN — Medication SCH ML: at 21:31

## 2018-04-10 RX ADMIN — PREGABALIN SCH MG: 75 CAPSULE ORAL at 09:21

## 2018-04-10 RX ADMIN — AMIODARONE HYDROCHLORIDE SCH MG: 200 TABLET ORAL at 21:31

## 2018-04-10 RX ADMIN — ASPIRIN 81 MG SCH MG: 81 TABLET ORAL at 09:20

## 2018-04-10 RX ADMIN — VANCOMYCIN HYDROCHLORIDE SCH MG: 500 INJECTION, POWDER, LYOPHILIZED, FOR SOLUTION INTRAVENOUS at 16:39

## 2018-04-10 RX ADMIN — SODIUM PHOSPHATE, DIBASIC, ANHYDROUS, POTASSIUM PHOSPHATE, MONOBASIC, AND SODIUM PHOSPHATE, MONOBASIC, MONOHYDRATE SCH MG: 852; 155; 130 TABLET, COATED ORAL at 14:00

## 2018-04-10 RX ADMIN — INSULIN ASPART SCH: 100 INJECTION, SOLUTION INTRAVENOUS; SUBCUTANEOUS at 21:32

## 2018-04-10 RX ADMIN — STANDARDIZED SENNA CONCENTRATE AND DOCUSATE SODIUM SCH TAB: 8.6; 5 TABLET, FILM COATED ORAL at 09:00

## 2018-04-10 RX ADMIN — HEPARIN SODIUM SCH UNITS: 10000 INJECTION, SOLUTION INTRAVENOUS; SUBCUTANEOUS at 16:46

## 2018-04-10 RX ADMIN — ACYCLOVIR SCH UNITS: 800 TABLET ORAL at 21:32

## 2018-04-10 RX ADMIN — CALCIUM CARBONATE-CHOLECALCIFEROL TAB 250 MG-125 UNIT SCH MG: 250-125 TAB at 18:00

## 2018-04-10 RX ADMIN — PANTOPRAZOLE SCH MG: 40 TABLET, DELAYED RELEASE ORAL at 09:22

## 2018-04-10 RX ADMIN — HYDROCODONE BITARTRATE AND ACETAMINOPHEN PRN TAB: 5; 325 TABLET ORAL at 22:24

## 2018-04-10 RX ADMIN — POTASSIUM CHLORIDE SCH MEQ: 20 TABLET, EXTENDED RELEASE ORAL at 09:22

## 2018-04-10 RX ADMIN — HEPARIN SODIUM SCH UNITS: 10000 INJECTION, SOLUTION INTRAVENOUS; SUBCUTANEOUS at 03:44

## 2018-04-10 RX ADMIN — INSULIN ASPART SCH: 100 INJECTION, SOLUTION INTRAVENOUS; SUBCUTANEOUS at 08:00

## 2018-04-10 RX ADMIN — IPRATROPIUM BROMIDE AND ALBUTEROL SULFATE SCH AMPULE: .5; 3 SOLUTION RESPIRATORY (INHALATION) at 21:15

## 2018-04-10 RX ADMIN — SODIUM PHOSPHATE, DIBASIC, ANHYDROUS, POTASSIUM PHOSPHATE, MONOBASIC, AND SODIUM PHOSPHATE, MONOBASIC, MONOHYDRATE SCH MG: 852; 155; 130 TABLET, COATED ORAL at 09:00

## 2018-04-10 RX ADMIN — MORPHINE SULFATE PRN MG: 2 INJECTION, SOLUTION INTRAMUSCULAR; INTRAVENOUS at 16:39

## 2018-04-10 RX ADMIN — SODIUM PHOSPHATE, DIBASIC, ANHYDROUS, POTASSIUM PHOSPHATE, MONOBASIC, AND SODIUM PHOSPHATE, MONOBASIC, MONOHYDRATE SCH MG: 852; 155; 130 TABLET, COATED ORAL at 22:00

## 2018-04-10 RX ADMIN — GABAPENTIN SCH MG: 300 CAPSULE ORAL at 18:00

## 2018-04-10 RX ADMIN — PREGABALIN SCH MG: 75 CAPSULE ORAL at 21:30

## 2018-04-10 RX ADMIN — INSULIN ASPART SCH: 100 INJECTION, SOLUTION INTRAVENOUS; SUBCUTANEOUS at 17:00

## 2018-04-10 NOTE — HHI.CCPN
Subjective


Remarks/Hospital Course


This is a 62-year-old -American female.  Date of admission 4/8/2018.  

Complex past medical history including history of chronic systolic heart 

failure ejection fraction 20% status post AICD placement 2006.  Most recent 

echo revealed EF 65-70% with moderate TR/moderate to severe pulmonary 

hypertension, fibromyalgia, arthritis, rheumatoid arthritis, hypertension, 

diabetes mellitus with neuropathy, depression, hypothyroidism and COPD.  Today 

she presented to [facility] after having her AICD fire 5 times at home with 

no loss conscious.  Patient was incontinent.  2 weeks ago patient had wound VAC 

placed on 8 sacral decubitus ulcer by Dr. Navarro.





Workup included an EKG which revealed no acute ST elevation.  Potassium is 1.9.

  Magnesium is 1.4.  Troponin 0 0.14.  She received 150 mg amiodarone bolus 1.

  Currently is received 30 mEq IV potassium chloride and 90 mEq potassium 

chloride by mouth.  3 g mag sulfate were provided.  Will recheck at 7:00.  

Pacemaker will be interrogated.  Dr. Treadwell will be notified of admission.





Patient is neurologically intact denying chest pain currently.  Very pleasant.





4/9: ICD is not fired since admission.  Potassium magnesium aggressively 

replaced currently potassium 4.8 magnesium above 2.  Suboptimal blood pressure 

control.  Increase clonidine 0.2 mg twice daily for cardiology.  Advance diet 

as tolerated.





Subjective





4/10: C. difficile positive.  Hemodynamically stable.  Currently receiving 

supplemental phosphorus.  Wound VAC is been replaced.  AICD has not fired





Objective





Vital Signs








  Date Time  Temp Pulse Resp B/P (MAP) Pulse Ox O2 Delivery O2 Flow Rate FiO2


 


4/10/18 06:00  81      


 


4/10/18 04:00 98.8  26 133/64 (87) 98   


 


4/9/18 22:04        21


 


4/8/18 16:44      Room Air  














Intake and Output   


 


 4/10/18 4/10/18 4/11/18





 08:00 16:00 00:00


 


Output Total 1400 ml  


 


Balance -1400 ml  








Result Diagram:  


4/10/18 0425                                                                   

             4/10/18 0425





Other Results





Microbiology








 Date/Time


Source Procedure


Growth Status


 


 


 4/8/18 12:36


Blood Peripheral Aerobic Blood Culture - Preliminary


NO GROWTH IN 1 DAY Resulted


 


 4/8/18 12:36


Blood Peripheral Anaerobic Blood Culture - Preliminary


NO GROWTH IN 1 DAY Resulted


 


 4/9/18 23:15


Urine Catheterized Urine Urine Culture


Pending Received








Imaging





Last Impressions








Chest X-Ray 4/8/18 0000 Signed





Impressions: 





 Service Date/Time:  Sunday, April 8, 2018 12:24 - CONCLUSION:  1. Cardiomegaly 





 with mild basilar atelectasis. Pacer lead overlies right ventricle.     Dileep Vu MD 








Objective Remarks


GENERAL: 62-year-old AA female currently on room air in no acute distress


SKIN: Warm and dry.  Wound VAC over sacral/coccyx region with unstageable ulcer


HEAD: Atraumatic. Normocephalic. 


EYES: Pupils equal and round about 2 mm bilaterally and reactive. No scleral 

icterus. No injection or drainage. 


ENT: No nasal bleeding or discharge.  Mucous membranes pink and moist.


NECK: Trachea midline. No JVD. 


CARDIOVASCULAR: Regular rate and rhythm.  S1, S2 predose for without murmur


RESPIRATORY:. Clear to auscultation. Breath sounds equal bilaterally. 


GASTROINTESTINAL: Abdomen soft, non-tender, nondistended.  No prior scars noted


MUSCULOSKELETAL: Extremities without significant edema. No obvious deformities. 


NEUROLOGICAL: Awake and alert. No obvious cranial nerve deficits.  Motor 

grossly within normal limits. Five out of 5 muscle strength in the arms and 

legs.  Normal speech.


PSYCHIATRIC: Appropriate mood and affect; insight and judgment normal.


Urinary Catheter:  Yes


Assessment to:  Continue


Romano insert reason:  Prolonged Immobilization


Vascular Central Line Catheter:  No


Assessment to:  Continue





A/P


Assessment and Plan


Neuro/Psych:





Migraine headache


Chronic opioid use


Diabetic peripheral neuropathy


Depression/anxiety


History of fibromyalgia





Acetaminophen 650 mg p.o. every 6 hours as needed fever


Hydrocodone/acetaminophen 5/325 1 tab every 4 hours as needed pain 1 through 5


Morphine sulfate 2 mg IV every 2 hours as needed pain 6-10


Holding tizanidine 4 mg p.o. every 8 hours for muscle relaxant


Continue duloxetine 90 mg p.o. daily for depression


Patient is on Percocet 10/325 1 tablet every 6 hours as needed pain at home


Patient is on pregabalin 225 mg p.o. twice daily at home along with gabapentin 

600 mg p.o. 3 times daily.  This has been resumed


 


CV: 





Essential hypertension


History of chronic systolic heart failure currently EF 65-70%


Status post AICD placement


Coronary artery disease status post CABG


History of atrial fib relation status post ablation


Elevated troponin 





Resume metoprolol tartrate 100 mg p.o. twice daily, amlodipine 10 mg p.o. daily 

and valsartan 160 mg p.o. daily for hypertension


Resume clonidine 0.1 mg p.o. daily for hypertension


   Increase 0.2 mg twice daily per cardiology/Dr. Treadwell


Echocardiogram 10/2017 revealed EF 65-70%.  Severe portal hypertension.  

Moderate TR.


Interrogate pacemaker completed 4/9


Aggressively replete electrolytes


Dr. Treadwell consult


Serial troponin every 6 hours 2 peaked at 1.43.  Currently downward





Resp: 





History of COPD/prior tobaccoism quit 2001





Nasal cannula to maintain saturations greater than or equal to 92%


Incentive spirometry while awake


Chest x-ray revealed cardiomegaly/signs of mild heart failure


Albuterol/ipratropium aerosols every 6 hours with albuterol aerosols every 2 

hours as needed dyspnea





GI: 





History of hiatal hernia


History of esophageal dilatation


History of diverticulosis


History of internal and external hemorrhoids


Diverticulosis


Benign colonic polyp





2000 ADA diet


Pantoprazole for GI prophylaxis


Docusate sodium/senna 1 tablet twice daily for bowel regimen





:  





Romano catheter if indicated for accurate I's and O's in a critically ill patient





Endo:  





Diabetes mellitus type 2 with hyperglycemia


Hypothyroidism





Start insulin detemir 8 units subcu twice daily with NovoLog sliding scale 

insulin Accu-Chek's before meals/at bedtime/high regimen


She is on insulin glargine 20 units daily at home


Continue levothyroxine 125 mcg p.o. daily.  Check TSH was 0.61





Renal: 





Creatinine currently 0.8


Started on normal saline with 20 mEq KCl 84 cc an hour


Monitor urine output


Accurate I's and O





Heme:





History of pernicious anemia/microcytic





Monitor CBC daily.  Follow trends


No indication for transfusion of blood products at this time





ID:





C. difficile





Monitor for infection





Blood cultures 2 4/8 ordered by ED


Urine culture 4/9 pending


C. difficile positive.  Started vancomycin 125 mg p.o. every 6 hours 10 days 

total.  Infectious guidelines severe with elevated white cell count greater 

than 15,000





MSK/Rheum:





Osteoporosis


Vitamin B-12 deficiency


SLE


Rheumatoid arthritis


Sacral decubitus ulcer





Holding vitamin B12 1000 units subcu monthly and alendronate 70 mg by mouth 

weekly


Wound care evaluate and treat





FEN:





Hypokalemia -severe symptomatic resolved


Hypophosphatemia


Hypocalcemia





ICU electrolyte protocol initiated


Patient is on potassium chloride 20 mEq daily at home.  Resume


On 30 mmol sodium phosphorus with Neutra-Phos 1 tablet 3 times daily.  2 g mag 

sulfate IV 1 today.  Recheck in a.m.





Access:


-Utilize peripheral IV.  Central line if indicated





Prophylaxis


-GI -pantoprazole


-DVT -SCD/heparin





Level 2 follow-up





Stable from critical care medicine standpoint.  Assign care to hospitalist in 

a.m. 4/11.  Okay to transfer to [facility]











Timothy Jonas MD Apr 10, 2018 09:03

## 2018-04-10 NOTE — PD.CARD.PN
Subjective


Subjective Remarks


Denies further ICD shocks, CP, dyspnea, palpitations, syncope, dizziness.





Objective


Medications











Item Value  Date Time


 


Potassium Chloride 20 meq 4/10/18 0900





 (KCl) DAILY/PO 4/10/18 0922


 


Potassium Phos/ 250 mg 4/10/18 0900





Sodium Phos Q8HR/PO 





 (K-Phos Neutral)  


 


Amlodipine 10 mg 4/9/18 0900





Besylate DAILY/PO 4/10/18 0923





 (Norvasc)  


 


Aspirin 81 mg 4/9/18 0900





 (Aspirin Chew) DAILY/CHEW 4/10/18 0920


 


Valsartan 160 mg 4/9/18 0900





 (Diovan) DAILY/PO 4/10/18 0922


 


Metoprolol 100 mg 4/8/18 2100





Tartrate BID/PO 4/10/18 0922





 (Lopressor)  


 


Amiodarone HCl 250 ml @ 33.33 mls/hr 4/8/18 1300





450 mg/Sodium .Q7H31M  PRN/IV 4/9/18 0533





Chloride  











Current Medications








 Medications


  (Trade)  Dose


 Ordered  Sig/Adilene


 Route  Start Time


 Stop Time Status Last Admin


 


 Amiodarone HCl


 450 mg/Sodium


 Chloride  250 ml @ 


 33.33 mls/


 hr  Q7H31M PRN


 IV  4/8/18 13:00


    4/9/18 05:33


 


 


 Potassium Chloride  100 ml @ 


 50 mls/hr  Q2H  PRN


 IV  4/8/18 14:30


     


 


 


 Potassium Chloride  100 ml @ 


 50 mls/hr  Q2H  PRN


 IV  4/8/18 14:30


    4/9/18 17:03


 


 


  (K-Lyte Cl  Eff)  50 meq  UNSCH  PRN


 PO  4/8/18 14:30


     


 


 


 Potassium Chloride  100 ml @ 


 25 mls/hr  UNSCH  PRN


 IV  4/8/18 14:30


     


 


 


 Potassium Chloride  100 ml @ 


 50 mls/hr  Q2H  PRN


 IV  4/8/18 14:30


     


 


 


 Magnesium Sulfate


 4 gm/Sodium


 Chloride  100 ml @ 


 50 mls/hr  UNSCH  PRN


 IV  4/8/18 14:30


     


 


 


  (Mag-Ox)  800 mg  UNSCH  PRN


 PO  4/8/18 14:30


     


 


 


 Magnesium Sulfate


 2 gm/Sodium


 Chloride  100 ml @ 


 50 mls/hr  UNSCH  PRN


 IV  4/8/18 14:30


     


 


 


  (K-Phos)  2,000 mg  Q4H  PRN


 PO  4/8/18 14:30


     


 


 


 Sodium Phosphate


 30 mmol/Sodium


 Chloride  250 ml @ 


 42 mls/hr  UNSCH  PRN


 IV  4/8/18 14:30


     


 


 


  (K-Phos)  2,000 mg  UNSCH  PRN


 PO/TUBE  4/8/18 14:17


     


 


 


 Potassium


 Phosphate 30 mmol/


 Sodium Chloride  260 ml @ 


 42 mls/hr  UNSCH  PRN


 IV  4/8/18 14:17


     


 


 


  (Norvasc)  10 mg  DAILY


 PO  4/9/18 09:00


    4/10/18 09:23


 


 


  (Aspirin Chew)  81 mg  DAILY


 CHEW  4/9/18 09:00


    4/10/18 09:20


 


 


  (Oscal-D 250-125)  500 mg  TID


 PO  4/8/18 18:00


    4/10/18 09:21


 


 


  (Cymbalta Dr)  90 mg  DAILY


 PO  4/9/18 09:00


    4/10/18 09:20


 


 


  (Neurontin)  600 mg  TID


 PO  4/8/18 18:00


    4/10/18 09:20


 


 


  (Synthroid)  125 mcg  DAILY@0600


 PO  4/9/18 06:00


    4/10/18 06:29


 


 


  (Lopressor)  100 mg  BID


 PO  4/8/18 21:00


    4/10/18 09:22


 


 


  (Lyrica)  225 mg  BID


 PO  4/8/18 21:00


    4/10/18 09:21


 


 


  (Diovan)  160 mg  DAILY


 PO  4/9/18 09:00


    4/10/18 09:22


 


 


  (Nephrocaps)  1 cap  DAILY


 PO  4/9/18 09:00


    4/10/18 09:00


 


 


  (D50w (Vial) Inj)  50 ml  UNSCH  PRN


 IV PUSH  4/8/18 14:30


     


 


 


  (Glucagon Inj)  1 mg  UNSCH  PRN


 OTHER  4/8/18 14:30


     


 


 


  (NovoLOG


 SUPPLEMENTAL


 SCALE)  1  ACHS SLIDING  SCALE


 SQ  4/8/18 17:00


    4/10/18 12:00


 


 


  (NS Flush)  2 ml  UNSCH  PRN


 IV FLUSH  4/8/18 14:30


     


 


 


  (NS Flush)  2 ml  BID


 IV FLUSH  4/8/18 21:00


    4/10/18 09:23


 


 


  (Tylenol)  650 mg  Q6H  PRN


 PO  4/8/18 14:30


     


 


 


  (Zofran Inj)  4 mg  Q6H  PRN


 IV PUSH  4/8/18 14:30


     


 


 


  (Duoneb Neb)  1 ampule  Q6HR  NEB


 INH  4/8/18 16:00


    4/10/18 04:37


 


 


  (Albuterol Neb)  2.5 mg  Q2HR NEB  PRN


 INH  4/8/18 14:30


     


 


 


  (Heparin Inj)  5,000 units  Q12H


 SQ  4/8/18 16:00


    4/10/18 03:44


 


 


 Miscellaneous


 Information  1  Q361D


 XX  4/8/18 14:30


    4/8/18 19:00


 


 


  (Chlorhexidine


 2% Cloth)  3 pack


 Taper  DAILY@04


 TOP  4/9/18 04:00


 4/5/19 03:59  4/9/18 04:00


 


 


  (Chlorhexidine


 2% Cloth)  3 pack  UNSCH  PRN


 TOP  4/8/18 14:30


     


 


 


  (Tammi-Colace)  1 tab  BID


 PO  4/8/18 21:00


     


 


 


  (Milk Of


 Magnesia Liq)  30 ml  Q12H  PRN


 PO  4/8/18 14:30


     


 


 


  (Senokot)  17.2 mg  Q12H  PRN


 PO  4/8/18 14:30


     


 


 


  (Dulcolax Supp)  10 mg  DAILY  PRN


 RECTAL  4/8/18 14:30


     


 


 


  (Lactulose Liq)  30 ml  DAILY  PRN


 PO  4/8/18 14:30


     


 


 


  (Protonix)  40 mg  DAILY


 PO  4/9/18 09:00


    4/10/18 09:22


 


 


  (Norco  5-325 Mg)  1 tab  Q4H  PRN


 PO  4/8/18 19:00


    4/9/18 23:10


 


 


  (Morphine Inj)  2 mg  Q2H  PRN


 IV PUSH  4/8/18 19:00


    4/10/18 03:59


 


 


  (Catapres)  0.2 mg  BID


 PO  4/9/18 09:00


    4/10/18 09:22


 


 


  (KCl)  20 meq  DAILY


 PO  4/10/18 09:00


    4/10/18 09:22


 


 


  (Restoril)  15 mg  HS  PRN


 PO  4/10/18 03:45


    4/10/18 03:44


 


 


  (VANCOMYCIN for


 oral use only)  125 mg  QID


 PO  4/10/18 09:00


 4/20/18 08:59  4/10/18 10:00


 


 


  (K-Phos Neutral)  250 mg  Q8HR


 PO  4/10/18 09:00


 4/10/18 22:01   


 


 


  (Levemir Inj)  25 units  Q12HR


 SQ  4/10/18 21:00


     


 








Vital Signs / I&O





Vital Signs








  Date Time  Temp Pulse Resp B/P (MAP) Pulse Ox O2 Delivery O2 Flow Rate FiO2


 


4/10/18 08:00 98.6 77 15 126/89 (101) 96   


 


4/10/18 08:00  77      


 


4/10/18 06:00  81      


 


4/10/18 04:00  73      


 


4/10/18 04:00 98.8 73 26 133/64 (87) 98   


 


4/10/18 02:00  69      


 


4/10/18 00:00  64      


 


4/10/18 00:00 98.8 64 20 127/64 (85) 98   


 


4/9/18 22:04     99   21


 


4/9/18 22:00  70      


 


4/9/18 20:00  85      


 


4/9/18 20:00 98.0 85 21 148/69 (95) 100   


 


4/9/18 18:00  85      


 


4/9/18 16:00  84      


 


4/9/18 16:00 98.1 73 21 129/64 (85) 100   


 


4/9/18 14:00  73      














I/O      


 


 4/9/18 4/9/18 4/9/18 4/10/18 4/10/18 4/10/18





 07:00 15:00 23:00 07:00 15:00 23:00


 


Intake Total 1290 ml  450 ml   


 


Output Total    1400 ml  


 


Balance 1290 ml  450 ml -1400 ml  


 


      


 


Intake Oral   450 ml   


 


IV Total 1290 ml     


 


Output Urine Total    1000 ml  


 


Stool Total    400 ml  


 


# Voids 2  3   


 


# Bowel Movements 4     








Physical Exam


GENERAL: Well developed, well nourished. No acute distress.


HEENT: Jugular venous pressure is normal. 


CHEST: Lungs clear to auscultation anteriorly.


CARDIAC: Regular rate and rhythm without S3, S4, or murmur.


ABDOMEN: Soft, nontender, no hepatosplenomegaly. Bowel sounds present.


EXTREMITIES: No clubbing, cyanosis, or edema.


Laboratory





Laboratory Tests








Test


  4/9/18


15:00 4/9/18


23:15 4/10/18


04:25


 


Stool C. difficile Toxin (PCR) POSITIVE   


 


Stl C. difficile Toxin


Epiderm 027 PRESUMPTIVE


NEGATIVE 


  


 


 


Urine Color  YELLOW  


 


Urine Turbidity  CLOUDY  


 


Urine pH  5.5  


 


Urine Specific Gravity  1.025  


 


Urine Protein  100 mg/dL  


 


Urine Glucose (UA)  NEG mg/dL  


 


Urine Ketones  NEG mg/dL  


 


Urine Occult Blood  SMALL  


 


Urine Nitrite  POS  


 


Urine Bilirubin  NEG  


 


Urine Urobilinogen


  


  LESS THAN 2.0


MG/DL 


 


 


Urine Leukocyte Esterase  LARGE  


 


Urine WBC   /hpf  


 


Urine WBC Clumps  MANY  


 


Urine Squamous Epithelial


Cells 


  8 /hpf 


  


 


 


Urine Renal Epithelial Cells  4 /hpf  


 


Urine Amorphous Sediment  RARE  


 


Urine Mucus  FEW /lpf  


 


Microscopic Urinalysis Comment


  


  CATH-CULTURE


IND 


 


 


White Blood Count   9.5 TH/MM3 


 


Red Blood Count   4.00 MIL/MM3 


 


Hemoglobin   10.2 GM/DL 


 


Hematocrit   30.9 % 


 


Mean Corpuscular Volume   77.2 FL 


 


Mean Corpuscular Hemoglobin   25.6 PG 


 


Mean Corpuscular Hemoglobin


Concent 


  


  33.2 % 


 


 


Red Cell Distribution Width   16.5 % 


 


Platelet Count   276 TH/MM3 


 


Mean Platelet Volume   8.5 FL 


 


Blood Urea Nitrogen   7 MG/DL 


 


Creatinine   0.78 MG/DL 


 


Random Glucose   220 MG/DL 


 


Calcium Level   8.0 MG/DL 


 


Phosphorus Level   1.1 MG/DL 


 


Magnesium Level   2.0 MG/DL 


 


Sodium Level   136 MEQ/L 


 


Potassium Level   4.2 MEQ/L 


 


Chloride Level   105 MEQ/L 


 


Carbon Dioxide Level   21.1 MEQ/L 


 


Anion Gap   10 MEQ/L 


 


Estimat Glomerular Filtration


Rate 


  


  91 ML/MIN 


 











Assessment and Plan


Problem List:  


(1) AICD discharge


ICD Codes:  Z45.02 - Encounter for adjustment and management of automatic 

implantable cardiac defibrillator


Status:  Acute


Plan:  Stable overnight.  Interrogation of ICD shows 5 appropriate shocks for 

VT.  Recommend change to oral Amiodarone.





(2) Non-ischemic cardiomyopathy


ICD Codes:  I42.8 - Other cardiomyopathies


Status:  Chronic


Plan:  EF 40-45% on last available echo 6/2017.  Stable.  Compensated.  No 

acute CHF.  Continue beta blocker, ARB.  Awaiting echo.





(3) CAD (coronary artery disease)


ICD Codes:  I25.10 - Atherosclerotic heart disease of native coronary artery 

without angina pectoris


Status:  Chronic


Plan:  Stable.  Only mild CAD by cath 2015.  Continue daily aspirin





(4) Hypertension


ICD Codes:  I10 - Essential (primary) hypertension


Status:  Chronic


Plan:  BP's better today.  Continue to monitor.





Code Status


full code


Discussed Condition With


patient





Problem Qualifiers





(1) CAD (coronary artery disease):  


Qualified Codes:  I25.10 - Atherosclerotic heart disease of native coronary 

artery without angina pectoris


(2) Hypertension:  


Qualified Codes:  I10 - Essential (primary) hypertension








Narinder Treadwell MD Apr 10, 2018 13:43

## 2018-04-10 NOTE — ECHRPT
Indication:   CARDIOMYOPATHY

 

 CONCLUSIONS

 Left ventricular size is upper normal with mild concentric left ventricular hypertrophy.  The left v
entricular 

 systolic function is mildly reduced with an estimated ejection fraction in the range of 45-50%.   Gl
obal 

 hypokinesis.

 

 The left atrial size is mildly dilated. 

 

 Trace mitral valve regurgitation. 

 

 There is mild tricuspid regurgitation. 

 The estimated pulmonary arterial pressure is 36 mmHg. 

 

 Pacemaker wire is present within the right ventricular cavity. 

 

 

 

 

 BP:  133   / 64      HR: 73                       Rhythm:           Sinus

 

 MEASUREMENTS  (Male / Female) Normal Values       Technical Quality:Fair

 2D ECHO

 LV Diastolic Diameter PLAX        4.6 cm                4.2 - 5.9 / 3.9 - 5.3 cm

 LV Systolic Diameter PLAX         4.1 cm                

 IVS Diastolic Thickness           1.5 cm                0.6 - 1.0 / 0.6 - 0.9 cm

 LVPW Diastolic Thickness          1.5 cm                0.6 - 1.0 / 0.6 - 0.9 cm

 LV Relative Wall Thickness        0.7                   

 RV Internal Dim ED PLAX           3.3 cm                

 LVOT Diameter                     2.2 cm                

 Aortic Root Diameter              3.3 cm                

 LA Systolic Diameter LX           3.7 cm                3.0 - 4.0 / 2.7 - 3.8 cm

 

 M-MODE

 AV Cusp Separation MM             1.9 cm                

 

 DOPPLER

 AV Peak Velocity                  139.0 cm/s            

 AV Peak Gradient                  7.7 mmHg              

 AV Mean Gradient                  4.0 mmHg              

 AV Velocity Time Integral         26.0 cm               

 LVOT Peak Velocity                74.4 cm/s             

 LVOT Peak Gradient                2.2 mmHg              

 LVOT Velocity Time Integral       14.6 cm               

 AV Area Cont Eq vti               2.1 cm               

 AV Area Cont Eq pk                2.0 cm               

 Mitral E Point Velocity           75.0 cm/s             

 Mitral A Point Velocity           109.0 cm/s            

 Mitral E to A Ratio               0.7                   

 LV E' Lateral Velocity            4.8 cm/s              

 Mitral E to LV E' Lateral Ratio   15.7                  

 LV E' Septal Velocity             3.4 cm/s              

 Mitral E to LV E' Septal Ratio    22.0                  

 TR Peak Velocity                  259.0 cm/s            

 TR Peak Gradient                  26.8 mmHg             

 Right Atrial Pressure             10.0 mmHg             

 Pulmonary Artery Systolic Pressu  36.8 mmHg             

 Right Ventricular Systolic Press  36.8 mmHg             

 PV Peak Velocity                  60.9 cm/s             

 PV Peak Gradient                  1.5 mmHg              

 

 

 FINDINGS

 

 LEFT VENTRICLE

 Left ventricular size is upper normal with mild concentric left ventricular hypertrophy.  The left v
entricular 

 systolic function is mildly reduced with an estimated ejection fraction in the range of 45-50%.   Gl
obal 

 hypokinesis.

 

 RIGHT VENTRICLE

 Normal right ventricular size and systolic function. 

 A pacemaker wire is noted. 

 

 LEFT ATRIUM

 The left atrial size is mildly dilated. 

 

 RIGHT ATRIUM

 The right atrial size is normal. 

 

 

 ATRIAL SEPTUM

 No atrial level shunt is demonstrated by color flow Doppler interrogation. 

 

 AORTA

 The aortic root and proximal ascending aorta are not well visualized. 

 

 MITRAL VALVE

 Trace mitral valve regurgitation. 

 

 AORTIC VALVE

 Trileaflet aortic valve. No aortic valve stenosis or regurgitation. 

 

 

 TRICUSPID VALVE

 There is mild tricuspid regurgitation. 

 The estimated pulmonary arterial pressure is 36 mmHg. 

 Pacemaker wire is present within the right ventricular cavity. 

 

 PULMONARY VALVE

 Trivial pulmonary valve regurgitation. 

 

 VESSELS

 The inferior vena cava is normal in size. 

 

 PERICARDIUM

 No pericardial effusion. 

 

 

 

 

  Narinder Treadwell MD

  (Electronically Signed)

  Final Date:10 April 2018 16:28

## 2018-04-11 VITALS
HEART RATE: 69 BPM | SYSTOLIC BLOOD PRESSURE: 126 MMHG | OXYGEN SATURATION: 96 % | DIASTOLIC BLOOD PRESSURE: 58 MMHG | RESPIRATION RATE: 20 BRPM

## 2018-04-11 VITALS
HEART RATE: 75 BPM | DIASTOLIC BLOOD PRESSURE: 57 MMHG | OXYGEN SATURATION: 95 % | SYSTOLIC BLOOD PRESSURE: 123 MMHG | RESPIRATION RATE: 20 BRPM

## 2018-04-11 VITALS — HEART RATE: 83 BPM

## 2018-04-11 VITALS
OXYGEN SATURATION: 92 % | TEMPERATURE: 98.1 F | HEART RATE: 69 BPM | SYSTOLIC BLOOD PRESSURE: 129 MMHG | DIASTOLIC BLOOD PRESSURE: 59 MMHG | RESPIRATION RATE: 17 BRPM

## 2018-04-11 VITALS
SYSTOLIC BLOOD PRESSURE: 113 MMHG | RESPIRATION RATE: 18 BRPM | DIASTOLIC BLOOD PRESSURE: 57 MMHG | OXYGEN SATURATION: 96 % | HEART RATE: 72 BPM

## 2018-04-11 VITALS
HEART RATE: 77 BPM | RESPIRATION RATE: 19 BRPM | SYSTOLIC BLOOD PRESSURE: 123 MMHG | OXYGEN SATURATION: 94 % | DIASTOLIC BLOOD PRESSURE: 60 MMHG

## 2018-04-11 VITALS
OXYGEN SATURATION: 97 % | HEART RATE: 80 BPM | RESPIRATION RATE: 17 BRPM | TEMPERATURE: 99 F | DIASTOLIC BLOOD PRESSURE: 60 MMHG | SYSTOLIC BLOOD PRESSURE: 126 MMHG

## 2018-04-11 VITALS
HEART RATE: 78 BPM | OXYGEN SATURATION: 95 % | RESPIRATION RATE: 20 BRPM | SYSTOLIC BLOOD PRESSURE: 116 MMHG | DIASTOLIC BLOOD PRESSURE: 56 MMHG

## 2018-04-11 VITALS
OXYGEN SATURATION: 95 % | HEART RATE: 79 BPM | RESPIRATION RATE: 17 BRPM | SYSTOLIC BLOOD PRESSURE: 120 MMHG | DIASTOLIC BLOOD PRESSURE: 58 MMHG

## 2018-04-11 VITALS — HEART RATE: 74 BPM

## 2018-04-11 VITALS
SYSTOLIC BLOOD PRESSURE: 129 MMHG | HEART RATE: 69 BPM | OXYGEN SATURATION: 94 % | DIASTOLIC BLOOD PRESSURE: 59 MMHG | RESPIRATION RATE: 16 BRPM

## 2018-04-11 VITALS
RESPIRATION RATE: 21 BRPM | TEMPERATURE: 99.5 F | DIASTOLIC BLOOD PRESSURE: 73 MMHG | OXYGEN SATURATION: 98 % | HEART RATE: 110 BPM | SYSTOLIC BLOOD PRESSURE: 149 MMHG

## 2018-04-11 VITALS
TEMPERATURE: 98 F | DIASTOLIC BLOOD PRESSURE: 58 MMHG | RESPIRATION RATE: 23 BRPM | OXYGEN SATURATION: 95 % | HEART RATE: 78 BPM | SYSTOLIC BLOOD PRESSURE: 120 MMHG

## 2018-04-11 VITALS — HEART RATE: 70 BPM

## 2018-04-11 VITALS — HEART RATE: 89 BPM

## 2018-04-11 VITALS — HEART RATE: 75 BPM

## 2018-04-11 VITALS
RESPIRATION RATE: 17 BRPM | HEART RATE: 75 BPM | TEMPERATURE: 97.7 F | DIASTOLIC BLOOD PRESSURE: 63 MMHG | SYSTOLIC BLOOD PRESSURE: 135 MMHG | OXYGEN SATURATION: 98 %

## 2018-04-11 VITALS
DIASTOLIC BLOOD PRESSURE: 61 MMHG | TEMPERATURE: 99.2 F | SYSTOLIC BLOOD PRESSURE: 125 MMHG | RESPIRATION RATE: 19 BRPM | HEART RATE: 82 BPM | OXYGEN SATURATION: 96 %

## 2018-04-11 VITALS — OXYGEN SATURATION: 96 %

## 2018-04-11 VITALS — OXYGEN SATURATION: 99 %

## 2018-04-11 LAB
ALBUMIN SERPL-MCNC: 2 GM/DL (ref 3.4–5)
ALP SERPL-CCNC: 122 U/L (ref 45–117)
ALT SERPL-CCNC: 16 U/L (ref 10–53)
AST SERPL-CCNC: 12 U/L (ref 15–37)
BASOPHILS # BLD AUTO: 0 TH/MM3 (ref 0–0.2)
BASOPHILS NFR BLD: 0.1 % (ref 0–2)
BILIRUB SERPL-MCNC: 0.2 MG/DL (ref 0.2–1)
BUN SERPL-MCNC: 8 MG/DL (ref 7–18)
CALCIUM SERPL-MCNC: 8.1 MG/DL (ref 8.5–10.1)
CHLORIDE SERPL-SCNC: 102 MEQ/L (ref 98–107)
CREAT SERPL-MCNC: 0.76 MG/DL (ref 0.5–1)
EOSINOPHIL # BLD: 0 TH/MM3 (ref 0–0.4)
EOSINOPHIL NFR BLD: 0.1 % (ref 0–4)
ERYTHROCYTE [DISTWIDTH] IN BLOOD BY AUTOMATED COUNT: 16.9 % (ref 11.6–17.2)
GFR SERPLBLD BASED ON 1.73 SQ M-ARVRAT: 94 ML/MIN (ref 89–?)
GLUCOSE SERPL-MCNC: 135 MG/DL (ref 74–106)
HBA1C MFR BLD: 9.5 % (ref 4.3–6)
HCO3 BLD-SCNC: 26.3 MEQ/L (ref 21–32)
HCT VFR BLD CALC: 33.4 % (ref 35–46)
HGB BLD-MCNC: 10.9 GM/DL (ref 11.6–15.3)
LYMPHOCYTES # BLD AUTO: 1.4 TH/MM3 (ref 1–4.8)
LYMPHOCYTES NFR BLD AUTO: 8.5 % (ref 9–44)
MAGNESIUM SERPL-MCNC: 1.8 MG/DL (ref 1.5–2.5)
MCH RBC QN AUTO: 25 PG (ref 27–34)
MCHC RBC AUTO-ENTMCNC: 32.7 % (ref 32–36)
MCV RBC AUTO: 76.6 FL (ref 80–100)
MONOCYTE #: 1.5 TH/MM3 (ref 0–0.9)
MONOCYTES NFR BLD: 9.3 % (ref 0–8)
NEUTROPHILS # BLD AUTO: 13.6 TH/MM3 (ref 1.8–7.7)
NEUTROPHILS NFR BLD AUTO: 82 % (ref 16–70)
PHOSPHATE SERPL-MCNC: 3.2 MG/DL (ref 2.5–4.9)
PLATELET # BLD: 293 TH/MM3 (ref 150–450)
PMV BLD AUTO: 8.4 FL (ref 7–11)
PROT SERPL-MCNC: 6 GM/DL (ref 6.4–8.2)
RBC # BLD AUTO: 4.36 MIL/MM3 (ref 4–5.3)
SODIUM SERPL-SCNC: 137 MEQ/L (ref 136–145)
T4 FREE SERPL-MCNC: 1.18 NG/DL (ref 0.76–1.46)
WBC # BLD AUTO: 16.6 TH/MM3 (ref 4–11)

## 2018-04-11 RX ADMIN — MORPHINE SULFATE PRN MG: 2 INJECTION, SOLUTION INTRAMUSCULAR; INTRAVENOUS at 20:28

## 2018-04-11 RX ADMIN — IPRATROPIUM BROMIDE AND ALBUTEROL SULFATE SCH AMPULE: .5; 3 SOLUTION RESPIRATORY (INHALATION) at 04:21

## 2018-04-11 RX ADMIN — HEPARIN SODIUM SCH UNITS: 10000 INJECTION, SOLUTION INTRAVENOUS; SUBCUTANEOUS at 18:08

## 2018-04-11 RX ADMIN — VANCOMYCIN HYDROCHLORIDE SCH MG: 500 INJECTION, POWDER, LYOPHILIZED, FOR SOLUTION INTRAVENOUS at 12:29

## 2018-04-11 RX ADMIN — PANTOPRAZOLE SCH MG: 40 TABLET, DELAYED RELEASE ORAL at 08:09

## 2018-04-11 RX ADMIN — ONDANSETRON PRN MG: 2 INJECTION, SOLUTION INTRAMUSCULAR; INTRAVENOUS at 20:27

## 2018-04-11 RX ADMIN — MAGNESIUM SULFATE IN DEXTROSE SCH MLS/HR: 10 INJECTION, SOLUTION INTRAVENOUS at 20:32

## 2018-04-11 RX ADMIN — AMIODARONE HYDROCHLORIDE SCH MG: 200 TABLET ORAL at 21:47

## 2018-04-11 RX ADMIN — LEVOTHYROXINE SODIUM SCH MCG: 125 TABLET ORAL at 05:49

## 2018-04-11 RX ADMIN — MAGNESIUM SULFATE IN DEXTROSE SCH MLS/HR: 10 INJECTION, SOLUTION INTRAVENOUS at 18:06

## 2018-04-11 RX ADMIN — INSULIN ASPART SCH: 100 INJECTION, SOLUTION INTRAVENOUS; SUBCUTANEOUS at 20:57

## 2018-04-11 RX ADMIN — VANCOMYCIN HYDROCHLORIDE SCH MG: 500 INJECTION, POWDER, LYOPHILIZED, FOR SOLUTION INTRAVENOUS at 21:49

## 2018-04-11 RX ADMIN — INSULIN ASPART SCH: 100 INJECTION, SOLUTION INTRAVENOUS; SUBCUTANEOUS at 17:00

## 2018-04-11 RX ADMIN — CALCIUM CARBONATE-CHOLECALCIFEROL TAB 250 MG-125 UNIT SCH MG: 250-125 TAB at 12:29

## 2018-04-11 RX ADMIN — VANCOMYCIN HYDROCHLORIDE SCH MG: 500 INJECTION, POWDER, LYOPHILIZED, FOR SOLUTION INTRAVENOUS at 08:13

## 2018-04-11 RX ADMIN — CHLORHEXIDINE GLUCONATE SCH PACK: 500 CLOTH TOPICAL at 04:00

## 2018-04-11 RX ADMIN — INSULIN ASPART SCH: 100 INJECTION, SOLUTION INTRAVENOUS; SUBCUTANEOUS at 08:00

## 2018-04-11 RX ADMIN — MORPHINE SULFATE PRN MG: 2 INJECTION, SOLUTION INTRAMUSCULAR; INTRAVENOUS at 03:18

## 2018-04-11 RX ADMIN — IPRATROPIUM BROMIDE AND ALBUTEROL SULFATE SCH AMPULE: .5; 3 SOLUTION RESPIRATORY (INHALATION) at 09:19

## 2018-04-11 RX ADMIN — GABAPENTIN SCH MG: 300 CAPSULE ORAL at 18:06

## 2018-04-11 RX ADMIN — STANDARDIZED SENNA CONCENTRATE AND DOCUSATE SODIUM SCH TAB: 8.6; 5 TABLET, FILM COATED ORAL at 08:08

## 2018-04-11 RX ADMIN — GABAPENTIN SCH MG: 300 CAPSULE ORAL at 08:08

## 2018-04-11 RX ADMIN — AMIODARONE HYDROCHLORIDE SCH MG: 200 TABLET ORAL at 08:09

## 2018-04-11 RX ADMIN — ACYCLOVIR SCH UNITS: 800 TABLET ORAL at 20:57

## 2018-04-11 RX ADMIN — Medication SCH ML: at 20:33

## 2018-04-11 RX ADMIN — MORPHINE SULFATE PRN MG: 2 INJECTION, SOLUTION INTRAMUSCULAR; INTRAVENOUS at 11:14

## 2018-04-11 RX ADMIN — IPRATROPIUM BROMIDE AND ALBUTEROL SULFATE SCH AMPULE: .5; 3 SOLUTION RESPIRATORY (INHALATION) at 21:10

## 2018-04-11 RX ADMIN — Medication SCH ML: at 08:11

## 2018-04-11 RX ADMIN — CALCIUM CARBONATE-CHOLECALCIFEROL TAB 250 MG-125 UNIT SCH MG: 250-125 TAB at 18:06

## 2018-04-11 RX ADMIN — CALCIUM CARBONATE-CHOLECALCIFEROL TAB 250 MG-125 UNIT SCH MG: 250-125 TAB at 08:09

## 2018-04-11 RX ADMIN — PREGABALIN SCH MG: 75 CAPSULE ORAL at 08:10

## 2018-04-11 RX ADMIN — ACYCLOVIR SCH UNITS: 800 TABLET ORAL at 08:13

## 2018-04-11 RX ADMIN — STANDARDIZED SENNA CONCENTRATE AND DOCUSATE SODIUM SCH TAB: 8.6; 5 TABLET, FILM COATED ORAL at 21:47

## 2018-04-11 RX ADMIN — PREGABALIN SCH MG: 75 CAPSULE ORAL at 21:47

## 2018-04-11 RX ADMIN — VALSARTAN SCH MG: 160 TABLET ORAL at 08:09

## 2018-04-11 RX ADMIN — GABAPENTIN SCH MG: 300 CAPSULE ORAL at 12:29

## 2018-04-11 RX ADMIN — Medication SCH CAP: at 08:12

## 2018-04-11 RX ADMIN — VANCOMYCIN HYDROCHLORIDE SCH MG: 500 INJECTION, POWDER, LYOPHILIZED, FOR SOLUTION INTRAVENOUS at 18:06

## 2018-04-11 RX ADMIN — METOPROLOL TARTRATE SCH MG: 100 TABLET, FILM COATED ORAL at 21:47

## 2018-04-11 RX ADMIN — MORPHINE SULFATE PRN MG: 2 INJECTION, SOLUTION INTRAMUSCULAR; INTRAVENOUS at 23:01

## 2018-04-11 RX ADMIN — METOPROLOL TARTRATE SCH MG: 100 TABLET, FILM COATED ORAL at 08:08

## 2018-04-11 RX ADMIN — ASPIRIN 81 MG SCH MG: 81 TABLET ORAL at 08:11

## 2018-04-11 RX ADMIN — IPRATROPIUM BROMIDE AND ALBUTEROL SULFATE SCH AMPULE: .5; 3 SOLUTION RESPIRATORY (INHALATION) at 15:55

## 2018-04-11 RX ADMIN — MORPHINE SULFATE PRN MG: 2 INJECTION, SOLUTION INTRAMUSCULAR; INTRAVENOUS at 05:50

## 2018-04-11 RX ADMIN — HEPARIN SODIUM SCH UNITS: 10000 INJECTION, SOLUTION INTRAVENOUS; SUBCUTANEOUS at 05:49

## 2018-04-11 RX ADMIN — POTASSIUM CHLORIDE SCH MEQ: 20 TABLET, EXTENDED RELEASE ORAL at 08:11

## 2018-04-11 RX ADMIN — INSULIN ASPART SCH: 100 INJECTION, SOLUTION INTRAVENOUS; SUBCUTANEOUS at 12:00

## 2018-04-11 RX ADMIN — DULOXETINE HYDROCHLORIDE SCH MG: 30 CAPSULE, DELAYED RELEASE ORAL at 08:08

## 2018-04-11 NOTE — HHI.PR
Subjective


Remarks


This is a 62-year-old -American female.  Date of admission 4/8/2018.  

Complex past medical history including history of chronic systolic heart 

failure ejection fraction 20% status post AICD placement 2006.  Most recent 

echo revealed EF 65-70% with moderate TR/moderate to severe pulmonary 

hypertension, fibromyalgia, arthritis, rheumatoid arthritis, hypertension, 

diabetes mellitus with neuropathy, depression, hypothyroidism and COPD.  Today 

she presented to [facility] after having her AICD fire 5 times at home with 

no loss conscious.  Patient was incontinent.  2 weeks ago patient had wound VAC 

placed on 8 sacral decubitus ulcer by Dr. Navarro.





Workup included an EKG which revealed no acute ST elevation.  Potassium is 1.9.

  Magnesium is 1.4.  Troponin 0 0.14.  She received 150 mg amiodarone bolus 1.

  Currently is received 30 mEq IV potassium chloride and 90 mEq potassium 

chloride by mouth.  3 g mag sulfate were provided.  Will recheck at 7:00.  

Pacemaker will be interrogated.  Dr. Treadwell will be notified of admission.





Patient is neurologically intact denying chest pain currently.  Very pleasant.





4/9: ICD is not fired since admission.  Potassium magnesium aggressively 

replaced currently potassium 4.8 magnesium above 2.  Suboptimal blood pressure 

control.  Increase clonidine 0.2 mg twice daily for cardiology.  Advance diet 

as tolerated.





4/10: C. difficile positive.  Hemodynamically stable.  Currently receiving 

supplemental phosphorus.  Wound VAC is been replaced.  AICD has not fired





4-11 TRANSFERRED TO OUR SERVICE TODAY


HAS C. DIFFICILE COLITIS ON VANCO


DW RN AND PT 


WILL GET PT AND OT AND WOUND CARE TO EVAL AND TREAT


MOVE OUT OF ICU





Objective


Vitals





Vital Signs








  Date Time  Temp Pulse Resp B/P (MAP) Pulse Ox O2 Delivery O2 Flow Rate FiO2


 


4/11/18 13:00  77 19 123/60 (81) 94   


 


4/11/18 13:00  77      


 


4/11/18 12:00  78      


 


4/11/18 12:00 98.0 78 23 120/58 (78) 95   


 


4/11/18 11:00  79 17 120/58 (78) 95   


 


4/11/18 11:00  79      


 


4/11/18 10:00  78 20 116/56 (76) 95   


 


4/11/18 10:00  78      


 


4/11/18 09:20     99   


 


4/11/18 09:00  72      


 


4/11/18 09:00  72 18 113/57 (75) 96   


 


4/11/18 08:00 97.7 75 17 135/63 (87) 98   


 


4/11/18 08:00  75      


 


4/11/18 06:00  89      


 


4/11/18 04:00  82      


 


4/11/18 04:00 99.2 82 19 125/61 (82) 96   


 


4/11/18 02:00  83      


 


4/11/18 00:00 99.5 110 21 149/73 (98) 98   


 


4/11/18 00:00  110      


 


4/10/18 22:00  108      


 


4/10/18 21:15     97   21


 


4/10/18 20:00 98.9 87 21 167/77 (107) 97   


 


4/10/18 20:00  87      


 


4/10/18 18:00  87      














I/O      


 


 4/10/18 4/10/18 4/10/18 4/11/18 4/11/18 4/11/18





 07:00 15:00 23:00 07:00 15:00 23:00


 


Intake Total   600 ml 0 ml  


 


Output Total 1400 ml  1975 ml 3025 ml  


 


Balance -1400 ml  -1375 ml -3025 ml  


 


      


 


Intake Oral   600 ml   


 


IV Total    0 ml  


 


Output Urine Total 1000 ml  1425 ml 2425 ml  


 


Stool Total 400 ml  550 ml 600 ml  








Result Diagram:  


4/11/18 0558                                                                   

             4/11/18 0558





Other Results





Laboratory Tests








Test


  4/11/18


05:58


 


White Blood Count 16.6 TH/MM3 


 


Red Blood Count 4.36 MIL/MM3 


 


Hemoglobin 10.9 GM/DL 


 


Hematocrit 33.4 % 


 


Mean Corpuscular Volume 76.6 FL 


 


Mean Corpuscular Hemoglobin 25.0 PG 


 


Mean Corpuscular Hemoglobin


Concent 32.7 % 


 


 


Red Cell Distribution Width 16.9 % 


 


Platelet Count 293 TH/MM3 


 


Mean Platelet Volume 8.4 FL 


 


Neutrophils (%) (Auto) 82.0 % 


 


Lymphocytes (%) (Auto) 8.5 % 


 


Monocytes (%) (Auto) 9.3 % 


 


Eosinophils (%) (Auto) 0.1 % 


 


Basophils (%) (Auto) 0.1 % 


 


Neutrophils # (Auto) 13.6 TH/MM3 


 


Lymphocytes # (Auto) 1.4 TH/MM3 


 


Monocytes # (Auto) 1.5 TH/MM3 


 


Eosinophils # (Auto) 0.0 TH/MM3 


 


Basophils # (Auto) 0.0 TH/MM3 


 


CBC Comment DIFF FINAL 


 


Differential Comment  


 


Blood Urea Nitrogen 8 MG/DL 


 


Creatinine 0.76 MG/DL 


 


Random Glucose 135 MG/DL 


 


Total Protein 6.0 GM/DL 


 


Albumin 2.0 GM/DL 


 


Calcium Level 8.1 MG/DL 


 


Phosphorus Level 3.2 MG/DL 


 


Magnesium Level 1.8 MG/DL 


 


Alkaline Phosphatase 122 U/L 


 


Aspartate Amino Transf


(AST/SGOT) 12 U/L 


 


 


Alanine Aminotransferase


(ALT/SGPT) 16 U/L 


 


 


Total Bilirubin 0.2 MG/DL 


 


Sodium Level 137 MEQ/L 


 


Potassium Level 3.7 MEQ/L 


 


Chloride Level 102 MEQ/L 


 


Carbon Dioxide Level 26.3 MEQ/L 


 


Anion Gap 9 MEQ/L 


 


Estimat Glomerular Filtration


Rate 94 ML/MIN 


 


 


Free Thyroxine 1.18 NG/DL 


 


Thyroid Stimulating Hormone


3rd Gen 5.020 uIU/ML 


 








Imaging





Last Impressions








Chest X-Ray 4/8/18 0000 Signed





Impressions: 





 Service Date/Time:  Sunday, April 8, 2018 12:24 - CONCLUSION:  1. Cardiomegaly 





 with mild basilar atelectasis. Pacer lead overlies right ventricle.     Dileep Vu MD 








Objective Remarks


GENERAL: AWAKE ALERT AND OX3 TALKATIVE AND COOPERATIVE


SKIN: Warm and dry.SACRAL AREA IS DRESSED


HEAD: Atraumatic. Normocephalic. 


EYES: Pupils equal and round. No scleral icterus. No injection or drainage. EOMI


ENT: No nasal bleeding or discharge.  Mucous membranes pink and moist. TONGUE 

MIDLINE


NECK: Trachea midline. No JVD. SUPPLE


CARDIOVASCULAR: Regular rate and rhythm.  S1, S2 NO S3 OR S4 NO HEAVE OR THRILL


RESPIRATORY: No accessory muscle use. Clear to auscultation. Breath sounds 

equal bilaterally. 


GASTROINTESTINAL: Abdomen soft, non-tender, nondistended. Hepatic and splenic 

margins not palpable. 


MUSCULOSKELETAL: Extremities without clubbing, cyanosis, or edema. No obvious 

deformities. 


NEUROLOGICAL: Awake and alert. No obvious cranial nerve deficits.  Motor 

grossly within normal limits. 4 out of 5 muscle strength in the arms and legs.  

Normal speech.


PSYCHIATRIC: Appropriate mood and affect; insight and judgment normal.


Medications and IVs





Current Medications


Sodium Chloride (NS Flush) 2 ml UNSCH  PRN IVF FLUSH AFTER USING IV ACCESS;  

Start 4/8/18 at 12:30;  Stop 4/9/18 at 13:30;  Status DC


Amiodarone HCl 150 mg/Dextrose 103 ml @  600 mls/hr Q11M ONCE IV  Last 

administered on 4/8/18at 12:38;  Start 4/8/18 at 12:16;  Stop 4/8/18 at 12:26;  

Status DC


Amiodarone HCl 450 mg/Dextrose 250 ml @  33.33 mls/ hr Q7H31M PRN IV Per 

Protocol;  Start 4/8/18 at 12:26;  Status Cancel


Amiodarone HCl 450 mg/Sodium Chloride 250 ml @  33.33 mls/ hr Q7H31M PRN IV Per 

Protocol Last administered on 4/9/18at 05:33;  Start 4/8/18 at 13:00


Potassium Chloride (KCl) 40 meq ONCE  ONCE PO  Last administered on 4/8/18at 13:

20;  Start 4/8/18 at 13:30;  Stop 4/8/18 at 13:31;  Status DC


Potassium Chloride 100 ml @  100 mls/hr Q1H IV  Last administered on 4/8/18at 16

:48;  Start 4/8/18 at 13:30;  Stop 4/8/18 at 16:29;  Status DC


Calcium Gluconate 1 gm/Dextrose 110 ml @  110 mls/hr ONCE  ONCE IV  Last 

administered on 4/8/18at 14:21;  Start 4/8/18 at 13:45;  Stop 4/8/18 at 14:44;  

Status DC


Magnesium Sulfate/ Dextrose 100 ml @  100 mls/hr ONCE  ONCE IV  Last 

administered on 4/8/18at 14:21;  Start 4/8/18 at 14:00;  Stop 4/8/18 at 14:59;  

Status DC


Magnesium Sulfate/ Dextrose 100 ml @  100 mls/hr Q1H IV  Last administered on 4/ 8/18at 18:02;  Start 4/8/18 at 15:00;  Stop 4/8/18 at 16:59;  Status DC


Potassium Chloride (KCl) 60 meq ONCE  ONCE PO  Last administered on 4/8/18at 16:

46;  Start 4/8/18 at 16:00;  Stop 4/8/18 at 16:01;  Status DC


Potassium Chloride 100 ml @  50 mls/hr Q2H  PRN IV For Potassium 2.8 - 3.2 mEq/L

;  Start 4/8/18 at 14:30


Potassium Chloride 100 ml @  50 mls/hr Q2H  PRN IV For Potassium 2.8 - 3.2 mEq/

L Last administered on 4/9/18at 17:03;  Start 4/8/18 at 14:30


Potassium Bicarb/ Potassium Chloride (K-Lyte Cl  Eff) 50 meq UNSCH  PRN PO For 

Potassium 3.3 - 3.5 mEq/L;  Start 4/8/18 at 14:30


Potassium Chloride 100 ml @  25 mls/hr UNSCH  PRN IV For Potassium 3.3 - 3.5 mEq

/L;  Start 4/8/18 at 14:30


Potassium Chloride 100 ml @  50 mls/hr Q2H  PRN IV For Potassium 3.3 - 3.5 mEq/L

;  Start 4/8/18 at 14:30


Magnesium Sulfate 4 gm/Sodium Chloride 100 ml @  50 mls/hr UNSCH  PRN IV For 

Magnesium 0.9 - 1.1 mg/dL;  Start 4/8/18 at 14:30


Magnesium Oxide (Mag-Ox) 800 mg UNSCH  PRN PO For Magnesium 1.2 - 1.6 mg/dL;  

Start 4/8/18 at 14:30


Magnesium Sulfate 2 gm/Sodium Chloride 100 ml @  50 mls/hr UNSCH  PRN IV For 

Magnesium 1.2 - 1.6 mg/dL;  Start 4/8/18 at 14:30


Potassium Phosphate (K-Phos) 2,000 mg Q4H  PRN PO For Phosphorus < 2.5 mg/dL;  

Start 4/8/18 at 14:30


Sodium Phosphate 30 mmol/Sodium Chloride 250 ml @  42 mls/hr UNSCH  PRN IV For 

Phosphorus < 2.5 mg/dL;  Start 4/8/18 at 14:30


Potassium Phosphate (K-Phos) 2,000 mg UNSCH  PRN PO/TUBE SEE LABEL COMMENTS;  

Start 4/8/18 at 14:17


Potassium Phosphate 30 mmol/ Sodium Chloride 260 ml @  42 mls/hr UNSCH  PRN IV 

SEE LABEL COMMENTS;  Start 4/8/18 at 14:17


Amlodipine Besylate (Norvasc) 10 mg DAILY PO  Last administered on 4/11/18at 08:

08;  Start 4/9/18 at 09:00


Aspirin (Aspirin Chew) 81 mg DAILY CHEW  Last administered on 4/11/18at 08:11;  

Start 4/9/18 at 09:00


Calcium/Vitamin D (Oscal-D 250-125) 500 mg TID PO  Last administered on 4/11/ 18at 12:29;  Start 4/8/18 at 18:00


Clonidine (Catapres) 0.1 mg DAILY PO ;  Start 4/9/18 at 09:00;  Stop 4/9/18 at 

09:00;  Status DC


Duloxetine HCl (Cymbalta Dr) 90 mg DAILY PO  Last administered on 4/11/18at 08:

08;  Start 4/9/18 at 09:00


Gabapentin (Neurontin) 600 mg TID PO  Last administered on 4/11/18at 12:29;  

Start 4/8/18 at 18:00


Levothyroxine Sodium (Synthroid) 125 mcg DAILY@0600 PO  Last administered on 4/ 11/18at 05:49;  Start 4/9/18 at 06:00


Metoprolol Tartrate (Lopressor) 100 mg BID PO  Last administered on 4/11/18at 08

:08;  Start 4/8/18 at 21:00


Pregabalin (Lyrica) 225 mg BID PO  Last administered on 4/11/18at 08:10;  Start 

4/8/18 at 21:00


Valsartan (Diovan) 160 mg DAILY PO  Last administered on 4/11/18at 08:09;  

Start 4/9/18 at 09:00


Vitamin B Complex/ Vit C/Folic Acid (Nephrocaps) 1 cap DAILY PO  Last 

administered on 4/11/18at 08:12;  Start 4/9/18 at 09:00


Dextrose (D50w (Vial) Inj) 50 ml UNSCH  PRN IV PUSH HYPOGLYCEMIA-SEE COMMENTS;  

Start 4/8/18 at 14:30


Glucagon (Glucagon Inj) 1 mg UNSCH  PRN OTHER HYPOGLYCEMIA-SEE COMMENTS;  Start 

4/8/18 at 14:30


Insulin Aspart (NovoLOG SUPPLEMENTAL SCALE) 1 ACHS SLIDING  SCALE SQ  Last 

administered on 4/10/18at 21:32;  Start 4/8/18 at 17:00


Insulin Detemir (Levemir Inj) 8 units Q12HR SQ ;  Start 4/8/18 at 21:00;  Stop 4 /9/18 at 13:25;  Status DC


Sodium Chloride (NS Flush) 2 ml UNSCH  PRN IV FLUSH FLUSH AFTER USING IV ACCESS

;  Start 4/8/18 at 14:30


Sodium Chloride (NS Flush) 2 ml BID IV FLUSH  Last administered on 4/11/18at 08:

11;  Start 4/8/18 at 21:00


Acetaminophen (Tylenol) 650 mg Q6H  PRN PO fever;  Start 4/8/18 at 14:30


Acetaminophen/ Hydrocodone Bitart (Norco  5-325 Mg) 1 tab Q4H  PRN PO PAIN 

SCALE 1 TO 5;  Start 4/8/18 at 14:30;  Stop 4/8/18 at 15:22;  Status DC


Morphine Sulfate (Morphine Inj) 2 mg Q2H  PRN IV PUSH PAIN SCALE 6 TO 10;  

Start 4/8/18 at 14:30;  Stop 4/8/18 at 15:22;  Status DC


Famotidine (Pepcid Inj) 20 mg Q12HR IV PUSH ;  Start 4/8/18 at 21:00;  Stop 4/8/ 18 at 21:00;  Status DC


Ondansetron HCl (Zofran Inj) 4 mg Q6H  PRN IV PUSH NAUSEA OR VOMITING Last 

administered on 4/10/18at 21:33;  Start 4/8/18 at 14:30


Albuterol/ Ipratropium (Duoneb Neb) 1 ampule Q6HR  NEB INH  Last administered 

on 4/11/18at 09:19;  Start 4/8/18 at 16:00


Albuterol Sulfate (Albuterol Neb) 2.5 mg Q2HR NEB  PRN INH SOB/WHEEZING;  Start 

4/8/18 at 14:30


Heparin Sodium (Porcine) (Heparin Inj) 5,000 units Q12H SQ  Last administered 

on 4/11/18at 05:49;  Start 4/8/18 at 16:00


Miscellaneous Information 1 Q361D XX  Last administered on 4/8/18at 19:00;  

Start 4/8/18 at 14:30


Chlorhexidine Gluconate (Chlorhexidine 2% Cloth) 3 pack Taper DAILY@04 TOP  

Last administered on 4/9/18at 04:00;  Start 4/9/18 at 04:00;  Stop 4/5/19 at 03:

59


Chlorhexidine Gluconate (Chlorhexidine 2% Cloth) 3 pack UNSCH  PRN TOP HYGIENIC 

CARE;  Start 4/8/18 at 14:30


Senna/Docusate Sodium (Tammi-Colace) 1 tab BID PO  Last administered on 4/11/ 18at 08:08;  Start 4/8/18 at 21:00


Magnesium Hydroxide (Milk Of Magnesia Liq) 30 ml Q12H  PRN PO Mild constipation

;  Start 4/8/18 at 14:30


Sennosides (Senokot) 17.2 mg Q12H  PRN PO Moderate constipation Last 

administered on 4/10/18at 23:39;  Start 4/8/18 at 14:30


Bisacodyl (Dulcolax Supp) 10 mg DAILY  PRN RECTAL SEVERE CONSITIPATION;  Start 4 /8/18 at 14:30


Lactulose (Lactulose Liq) 30 ml DAILY  PRN PO SEVERE CONSITIPATION;  Start 4/8/ 18 at 14:30


Potassium Chloride/Sodium Chloride 1,000 ml @  84 mls/hr [id number] IV  Last 

administered on 4/8/18at 20:00;  Start 4/8/18 at 15:00;  Stop 4/9/18 at 13:25;  

Status DC


Calcium Gluconate 1 gm/Sodium Chloride 110 ml @  110 mls/hr ONCE  ONCE IV  Last 

administered on 4/8/18at 16:46;  Start 4/8/18 at 15:00;  Stop 4/8/18 at 15:59;  

Status DC


Pantoprazole Sodium (Protonix) 40 mg DAILY PO  Last administered on 4/11/18at 08

:09;  Start 4/9/18 at 09:00


Magnesium Sulfate/ Dextrose 100 ml @  100 mls/hr ONCE  ONCE IV ;  Start 4/8/18 

at 15:45;  Stop 4/8/18 at 16:44;  Status DC


Metoprolol Tartrate (Lopressor Inj) 5 mg ONCE  ONCE IV PUSH  Last administered 

on 4/8/18at 19:33;  Start 4/8/18 at 18:45;  Stop 4/8/18 at 19:04;  Status DC


Amiodarone HCl 450 mg/Sodium Chloride 250 ml @  33.33 mls/ hr Q7H31M PRN IV Per 

Protocol;  Start 4/8/18 at 18:53;  Stop 4/8/18 at 19:13;  Status DC


Acetaminophen/ Hydrocodone Bitart (Norco  5-325 Mg) 1 tab Q4H  PRN PO pain 1-5 

Last administered on 4/10/18at 22:24;  Start 4/8/18 at 19:00


Morphine Sulfate (Morphine Inj) 2 mg Q2H  PRN IV PUSH pain 6-10 Last 

administered on 4/11/18at 11:14;  Start 4/8/18 at 19:00


Potassium Phosphate 30 mmol/ Sodium Chloride 260 ml @  43.333 mls/ hr ONCE  

ONCE IV  Last administered on 4/8/18at 22:30;  Start 4/8/18 at 22:30;  Stop 4/9/ 18 at 04:29;  Status DC


Potassium Chloride (KCl) 40 meq ONCE  ONCE PO ;  Start 4/8/18 at 22:30;  Stop 4/ 8/18 at 22:30;  Status DC


Potassium Chloride (KCl) 60 meq ONCE  ONCE PO  Last administered on 4/8/18at 22:

30;  Start 4/8/18 at 22:30;  Stop 4/8/18 at 22:40;  Status DC


Clonidine (Catapres) 0.2 mg BID PO  Last administered on 4/10/18at 21:31;  

Start 4/9/18 at 09:00;  Stop 4/11/18 at 07:45;  Status DC


Insulin Detemir (Levemir Inj) 20 units Q12HR SQ  Last administered on 4/9/18at 

20:13;  Start 4/9/18 at 21:00;  Stop 4/10/18 at 09:05;  Status DC


Potassium Chloride (KCl) 20 meq DAILY PO  Last administered on 4/11/18at 08:11;

  Start 4/10/18 at 09:00


Temazepam (Restoril) 15 mg HS  PRN PO SLEEP Last administered on 4/10/18at 03:44

;  Start 4/10/18 at 03:45


Vancomycin HCl (VANCOMYCIN for oral use only) 125 mg QID PO  Last administered 

on 4/11/18at 12:29;  Start 4/10/18 at 09:00;  Stop 4/20/18 at 08:59


Potassium Phos/ Sodium Phos (K-Phos Neutral) 250 mg Q8HR PO ;  Start 4/10/18 at 

09:00;  Stop 4/10/18 at 22:01;  Status DC


Magnesium Sulfate/ Dextrose 100 ml @  100 mls/hr Q1H IV  Last administered on 4/

10/18at 10:01;  Start 4/10/18 at 09:00;  Stop 4/10/18 at 10:59;  Status DC


Insulin Detemir (Levemir Inj) 25 units Q12HR SQ  Last administered on 4/10/18at 

21:32;  Start 4/10/18 at 21:00


Amiodarone HCl (Cordarone) 400 mg Q12HR PO  Last administered on 4/11/18at 08:09

;  Start 4/10/18 at 21:00


Promethazine HCl (Phenergan Inj) 12.5 mg ONCE  ONCE IM  Last administered on 4/

10/18at 22:23;  Start 4/10/18 at 21:45;  Stop 4/10/18 at 21:56;  Status DC


Prochlorperazine Edisylate (Compazine Inj) 5 mg Q4H  PRN IM nausea vomiting;  

Start 4/10/18 at 21:45


Clonidine (Catapres) 0.2 mg TID PO  Last administered on 4/11/18at 12:31;  

Start 4/11/18 at 09:00





A/P


Problem List:  


(1) Hypoproteinemia


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


(2) Sacral decubitus ulcer


ICD Code:  L89.159 - Pressure ulcer of sacral region, unspecified stage


(3) Hyperglycemia


ICD Code:  R73.9 - Hyperglycemia, unspecified


(4) AICD discharge


ICD Code:  Z45.02 - Encounter for adjustment and management of automatic 

implantable cardiac defibrillator


Status:  Acute


(5) Hypokalemia


ICD Code:  E87.6 - Hypokalemia


Status:  Acute


(6) Hypomagnesemia


ICD Code:  E83.42 - Hypomagnesemia


Status:  Acute


(7) Hypocalcemia


ICD Code:  E83.51 - Hypocalcemia


Status:  Acute


(8) Leukocytosis


ICD Code:  D72.829 - Elevated white blood cell count, unspecified


Status:  Acute


(9) Elevated troponin I level


ICD Code:  R74.8 - Abnormal levels of other serum enzymes


Status:  Acute


(10) HTN (hypertension)


ICD Code:  I10 - Essential (primary) hypertension


Status:  Chronic


(11) Diabetes mellitus


ICD Code:  E11.9 - Type 2 diabetes mellitus without complications


Status:  Chronic


(12) CAD (coronary artery disease)


ICD Code:  I25.10 - Atherosclerotic heart disease of native coronary artery 

without angina pectoris


Status:  Chronic


(13) CHF (congestive heart failure)


ICD Code:  I50.9 - Heart failure, unspecified


Status:  Chronic


(14) Hypothyroidism


ICD Code:  E03.9 - Hypothyroidism, unspecified


Status:  Chronic


(15) GERD (gastroesophageal reflux disease)


ICD Code:  K21.9 - Gastro-esophageal reflux disease without esophagitis


Status:  Chronic


(16) Vitamin D deficiency


ICD Code:  E55.9 - Vitamin D deficiency, unspecified


Status:  Chronic


(17) Lupus


ICD Code:  L93.0 - Discoid lupus erythematosus


Status:  Chronic


(18) Depression


ICD Code:  F32.9 - Major depressive disorder, single episode, unspecified


(19) Vitamin B12 deficiency


ICD Code:  E53.8 - Deficiency of other specified B group vitamins


(20) Neuropathy


ICD Code:  G62.9 - Polyneuropathy, unspecified


(21) Chronic prescription opiate use


ICD Code:  Z79.891 - Long term (current) use of opiate analgesic


(22) Pernicious anemia


ICD Code:  D51.0 - Vitamin B12 deficiency anemia due to intrinsic factor 

deficiency


(23) Fibromyalgia


ICD Code:  M79.7 - Fibromyalgia


(24) Diverticulosis


ICD Code:  K57.90 - Diverticulosis of intestine, part unspecified, without 

perforation or abscess without bleeding


(25) COPD (chronic obstructive pulmonary disease)


ICD Code:  J44.9 - Chronic obstructive pulmonary disease, unspecified


(26) Dyslipidemia


ICD Code:  E78.5 - Hyperlipidemia, unspecified


(27) Pulmonary hypertension


ICD Code:  I27.20 - Pulmonary hypertension, unspecified


(28) Tricuspid regurgitation


ICD Code:  I07.1 - Rheumatic tricuspid insufficiency


(29) Osteoporosis


ICD Code:  M81.0 - Age-related osteoporosis without current pathological 

fracture


(30) Rheumatoid arthritis


ICD Code:  M06.9 - Rheumatoid arthritis, unspecified


(31) Peripheral vascular disease


ICD Code:  I73.9 - Peripheral vascular disease, unspecified


(32) Benign colonic polyp


ICD Code:  K63.5 - Polyp of colon


Assessment and Plan


Neuro/Psych:





Migraine headache


Chronic opioid use


Diabetic peripheral neuropathy


Depression/anxiety


History of fibromyalgia





Acetaminophen 650 mg p.o. every 6 hours as needed fever


Hydrocodone/acetaminophen 5/325 1 tab every 4 hours as needed pain 1 through 5


Morphine sulfate 2 mg IV every 2 hours as needed pain 6-10


Holding tizanidine 4 mg p.o. every 8 hours for muscle relaxant


Continue duloxetine 90 mg p.o. daily for depression


Patient is on Percocet 10/325 1 tablet every 6 hours as needed pain at home


Patient is on pregabalin 225 mg p.o. twice daily at home along with gabapentin 

600 mg p.o. 3 times daily.  This has been resumed


 


CV: 





Essential hypertension


History of chronic systolic heart failure currently EF 65-70%


Status post AICD placement


Coronary artery disease status post CABG


History of atrial fib relation status post ablation


Elevated troponin 





Resume metoprolol tartrate 100 mg p.o. twice daily, amlodipine 10 mg p.o. daily 

and valsartan 160 mg p.o. daily for hypertension


Resume clonidine 0.1 mg p.o. daily for hypertension


   Increase 0.2 mg twice daily per cardiology/Dr. Treadwell


Echocardiogram 10/2017 revealed EF 65-70%.  Severe portal hypertension.  

Moderate TR.


Interrogate pacemaker completed 4/9


Aggressively replete electrolytes


Dr. Treadwell consult


Serial troponin every 6 hours 2 peaked at 1.43.  Currently downward





Resp: 





History of COPD/prior tobaccoism quit 2001





Nasal cannula to maintain saturations greater than or equal to 92%


Incentive spirometry while awake


Chest x-ray revealed cardiomegaly/signs of mild heart failure


Albuterol/ipratropium aerosols every 6 hours with albuterol aerosols every 2 

hours as needed dyspnea





GI: 





History of hiatal hernia


History of esophageal dilatation


History of diverticulosis


History of internal and external hemorrhoids


Diverticulosis


Benign colonic polyp





2000 ADA diet


Pantoprazole for GI prophylaxis


Docusate sodium/senna 1 tablet twice daily for bowel regimen





:  





Romano catheter if indicated for accurate I's and O's in a critically ill patient





Endo:  





Diabetes mellitus type 2 with hyperglycemia


Hypothyroidism





Start insulin detemir 8 units subcu twice daily with NovoLog sliding scale 

insulin Accu-Chek's before meals/at bedtime/LOW DOSE regimen


She is on insulin glargine 20 units daily at home


Continue levothyroxine 125 mcg p.o. daily.  Check TSH was 0.61





Renal: 





Creatinine currently 0.8


Started on normal saline with 20 mEq KCl 84 cc an hour


Monitor urine output


Accurate I's and O





Heme:





History of pernicious anemia/microcytic





Monitor CBC daily.  Follow trends


No indication for transfusion of blood products at this time





ID:





C. difficile





Monitor for infection





Blood cultures 2 4/8 ordered by ED


Urine culture 4/9 pending


C. difficile positive.  Started vancomycin INCREASE  mg p.o. every 6 

hours 10 days total.  Infectious guidelines severe with elevated white cell 

count greater than 15,000





MSK/Rheum:





Osteoporosis


Vitamin B-12 deficiency


SLE


Rheumatoid arthritis


Sacral decubitus ulcer





Holding vitamin B12 1000 units subcu monthly and alendronate 70 mg by mouth 

weekly


Wound care evaluate and treat





FEN:





Hypokalemia -severe symptomatic resolved


Hypophosphatemia


Hypocalcemia


HYPOMAGNESIA





ICU electrolyte protocol initiated


Patient is on potassium chloride 20 mEq daily at home.  Resume


On 30 mmol sodium phosphorus with Neutra-Phos 1 tablet 3 times daily.  2 g mag 

sulfate IV 1 today.  Recheck in a.m.


 REPLACE BY PROTOCOLS





Access:


-Utilize peripheral IV.  Central line if indicated





Prophylaxis


-GI -pantoprazole


-DVT -SCD/heparin


Discharge Planning


MOVE OUT OF ICU


NO MORE AICD SHOCKS





Problem Qualifiers





(1) Sacral decubitus ulcer:  


Qualified Codes:  L89.159 - Pressure ulcer of sacral region, unspecified stage


(2) Leukocytosis:  


Qualified Codes:  D72.829 - Elevated white blood cell count, unspecified


(3) HTN (hypertension):  


Qualified Codes:  I10 - Essential (primary) hypertension


(4) Diabetes mellitus:  


Qualified Codes:  E11.65 - Type 2 diabetes mellitus with hyperglycemia; Z79.4 - 

Long term (current) use of insulin


(5) CAD (coronary artery disease):  


Qualified Codes:  I25.10 - Atherosclerotic heart disease of native coronary 

artery without angina pectoris


(6) CHF (congestive heart failure):  


Qualified Codes:  I50.32 - Chronic diastolic (congestive) heart failure


(7) Hypothyroidism:  


Qualified Codes:  E03.9 - Hypothyroidism, unspecified


(8) GERD (gastroesophageal reflux disease):  


Qualified Codes:  K21.9 - Gastro-esophageal reflux disease without esophagitis


(9) Lupus:  


Qualified Codes:  L93.0 - Discoid lupus erythematosus


(10) Depression:  





(11) Diverticulosis:  


Qualified Codes:  K57.90 - Diverticulosis of intestine, part unspecified, 

without perforation or abscess without bleeding


(12) COPD (chronic obstructive pulmonary disease):  


Qualified Codes:  J44.9 - Chronic obstructive pulmonary disease, unspecified


(13) Tricuspid regurgitation:  


Qualified Codes:  I07.1 - Rheumatic tricuspid insufficiency


(14) Osteoporosis:  


Qualified Codes:  M81.0 - Age-related osteoporosis without current pathological 

fracture


(15) Rheumatoid arthritis:  


Qualified Codes:  M06.9 - Rheumatoid arthritis, unspecified








Clyde Garcia DO Apr 11, 2018 16:15

## 2018-04-11 NOTE — PD.[email or web address].PN
Subjective


Subjective Remarks


Denies further ICD shocks, CP, dyspnea, palpitations, syncope, dizziness.  

Slept fairly well.  Episode of N/V last night.





Objective


Medications











Item Value  Date Time


 


Amiodarone HCl 400 mg 4/10/18 2100





 (Cordarone) Q12HR/PO 4/10/18 2131


 


Potassium Chloride 20 meq 4/10/18 0900





 (KCl) DAILY/PO 4/10/18 0922


 


Amlodipine 10 mg 4/9/18 0900





Besylate DAILY/PO 4/10/18 0923





 (Norvasc)  


 


Aspirin 81 mg 4/9/18 0900





 (Aspirin Chew) DAILY/CHEW 4/10/18 0920


 


Valsartan 160 mg 4/9/18 0900





 (Diovan) DAILY/PO 4/10/18 0922


 


Clonidine 0.2 mg 4/9/18 0900





 (Catapres) BID/PO 4/10/18 2131


 


Metoprolol 100 mg 4/8/18 2100





Tartrate BID/PO 4/10/18 2131





 (Lopressor)  











Current Medications








 Medications


  (Trade)  Dose


 Ordered  Sig/Adilene


 Route  Start Time


 Stop Time Status Last Admin


 


 Amiodarone HCl


 450 mg/Sodium


 Chloride  250 ml @ 


 33.33 mls/


 hr  Q7H31M PRN


 IV  4/8/18 13:00


    4/9/18 05:33


 


 


 Potassium Chloride  100 ml @ 


 50 mls/hr  Q2H  PRN


 IV  4/8/18 14:30


     


 


 


 Potassium Chloride  100 ml @ 


 50 mls/hr  Q2H  PRN


 IV  4/8/18 14:30


    4/9/18 17:03


 


 


  (K-Lyte Cl  Eff)  50 meq  UNSCH  PRN


 PO  4/8/18 14:30


     


 


 


 Potassium Chloride  100 ml @ 


 25 mls/hr  UNSCH  PRN


 IV  4/8/18 14:30


     


 


 


 Potassium Chloride  100 ml @ 


 50 mls/hr  Q2H  PRN


 IV  4/8/18 14:30


     


 


 


 Magnesium Sulfate


 4 gm/Sodium


 Chloride  100 ml @ 


 50 mls/hr  UNSCH  PRN


 IV  4/8/18 14:30


     


 


 


  (Mag-Ox)  800 mg  UNSCH  PRN


 PO  4/8/18 14:30


     


 


 


 Magnesium Sulfate


 2 gm/Sodium


 Chloride  100 ml @ 


 50 mls/hr  UNSCH  PRN


 IV  4/8/18 14:30


     


 


 


  (K-Phos)  2,000 mg  Q4H  PRN


 PO  4/8/18 14:30


     


 


 


 Sodium Phosphate


 30 mmol/Sodium


 Chloride  250 ml @ 


 42 mls/hr  UNSCH  PRN


 IV  4/8/18 14:30


     


 


 


  (K-Phos)  2,000 mg  UNSCH  PRN


 PO/TUBE  4/8/18 14:17


     


 


 


 Potassium


 Phosphate 30 mmol/


 Sodium Chloride  260 ml @ 


 42 mls/hr  UNSCH  PRN


 IV  4/8/18 14:17


     


 


 


  (Norvasc)  10 mg  DAILY


 PO  4/9/18 09:00


    4/10/18 09:23


 


 


  (Aspirin Chew)  81 mg  DAILY


 CHEW  4/9/18 09:00


    4/10/18 09:20


 


 


  (Oscal-D 250-125)  500 mg  TID


 PO  4/8/18 18:00


    4/10/18 16:39


 


 


  (Cymbalta Dr)  90 mg  DAILY


 PO  4/9/18 09:00


    4/10/18 09:20


 


 


  (Neurontin)  600 mg  TID


 PO  4/8/18 18:00


    4/10/18 16:38


 


 


  (Synthroid)  125 mcg  DAILY@0600


 PO  4/9/18 06:00


    4/11/18 05:49


 


 


  (Lopressor)  100 mg  BID


 PO  4/8/18 21:00


    4/10/18 21:31


 


 


  (Lyrica)  225 mg  BID


 PO  4/8/18 21:00


    4/10/18 21:30


 


 


  (Diovan)  160 mg  DAILY


 PO  4/9/18 09:00


    4/10/18 09:22


 


 


  (Nephrocaps)  1 cap  DAILY


 PO  4/9/18 09:00


    4/10/18 09:00


 


 


  (D50w (Vial) Inj)  50 ml  UNSCH  PRN


 IV PUSH  4/8/18 14:30


     


 


 


  (Glucagon Inj)  1 mg  UNSCH  PRN


 OTHER  4/8/18 14:30


     


 


 


  (NovoLOG


 SUPPLEMENTAL


 SCALE)  1  ACHS SLIDING  SCALE


 SQ  4/8/18 17:00


    4/10/18 21:32


 


 


  (NS Flush)  2 ml  UNSCH  PRN


 IV FLUSH  4/8/18 14:30


     


 


 


  (NS Flush)  2 ml  BID


 IV FLUSH  4/8/18 21:00


    4/10/18 21:31


 


 


  (Tylenol)  650 mg  Q6H  PRN


 PO  4/8/18 14:30


     


 


 


  (Zofran Inj)  4 mg  Q6H  PRN


 IV PUSH  4/8/18 14:30


    4/10/18 21:33


 


 


  (Duoneb Neb)  1 ampule  Q6HR  NEB


 INH  4/8/18 16:00


    4/10/18 04:37


 


 


  (Albuterol Neb)  2.5 mg  Q2HR NEB  PRN


 INH  4/8/18 14:30


     


 


 


  (Heparin Inj)  5,000 units  Q12H


 SQ  4/8/18 16:00


    4/11/18 05:49


 


 


 Miscellaneous


 Information  1  Q361D


 XX  4/8/18 14:30


    4/8/18 19:00


 


 


  (Chlorhexidine


 2% Cloth)  3 pack


 Taper  DAILY@04


 TOP  4/9/18 04:00


 4/5/19 03:59  4/9/18 04:00


 


 


  (Chlorhexidine


 2% Cloth)  3 pack  UNSCH  PRN


 TOP  4/8/18 14:30


     


 


 


  (Tammi-Colace)  1 tab  BID


 PO  4/8/18 21:00


    4/10/18 21:32


 


 


  (Milk Of


 Magnesia Liq)  30 ml  Q12H  PRN


 PO  4/8/18 14:30


     


 


 


  (Senokot)  17.2 mg  Q12H  PRN


 PO  4/8/18 14:30


    4/10/18 23:39


 


 


  (Dulcolax Supp)  10 mg  DAILY  PRN


 RECTAL  4/8/18 14:30


     


 


 


  (Lactulose Liq)  30 ml  DAILY  PRN


 PO  4/8/18 14:30


     


 


 


  (Protonix)  40 mg  DAILY


 PO  4/9/18 09:00


    4/10/18 09:22


 


 


  (Norco  5-325 Mg)  1 tab  Q4H  PRN


 PO  4/8/18 19:00


    4/10/18 22:24


 


 


  (Morphine Inj)  2 mg  Q2H  PRN


 IV PUSH  4/8/18 19:00


    4/11/18 05:50


 


 


  (Catapres)  0.2 mg  BID


 PO  4/9/18 09:00


    4/10/18 21:31


 


 


  (KCl)  20 meq  DAILY


 PO  4/10/18 09:00


    4/10/18 09:22


 


 


  (Restoril)  15 mg  HS  PRN


 PO  4/10/18 03:45


    4/10/18 03:44


 


 


  (VANCOMYCIN for


 oral use only)  125 mg  QID


 PO  4/10/18 09:00


 4/20/18 08:59  4/10/18 21:32


 


 


  (Levemir Inj)  25 units  Q12HR


 SQ  4/10/18 21:00


    4/10/18 21:32


 


 


  (Cordarone)  400 mg  Q12HR


 PO  4/10/18 21:00


    4/10/18 21:31


 


 


  (Compazine Inj)  5 mg  Q4H  PRN


 IM  4/10/18 21:45


     


 








Vital Signs / I&O





Vital Signs








  Date Time  Temp Pulse Resp B/P (MAP) Pulse Ox O2 Delivery O2 Flow Rate FiO2


 


4/11/18 06:00  89      


 


4/11/18 04:00  82      


 


4/11/18 04:00 99.2 82 19 125/61 (82) 96   


 


4/11/18 02:00  83      


 


4/11/18 00:00 99.5 110 21 149/73 (98) 98   


 


4/11/18 00:00  110      


 


4/10/18 22:00  108      


 


4/10/18 21:15     97   21


 


4/10/18 20:00 98.9 87 21 167/77 (107) 97   


 


4/10/18 20:00  87      


 


4/10/18 18:00  87      


 


4/10/18 16:00  73      


 


4/10/18 16:00 98.9 73 23 156/71 (99)    


 


4/10/18 14:00  74      


 


4/10/18 12:00  70      


 


4/10/18 12:00 98.4 70 23 130/63 (85)    


 


4/10/18 10:00  88      


 


4/10/18 08:00 98.6 77 15 126/89 (101) 96   


 


4/10/18 08:00  77      














I/O      


 


 4/10/18 4/10/18 4/10/18 4/11/18 4/11/18 4/11/18





 07:00 15:00 23:00 07:00 15:00 23:00


 


Intake Total   600 ml 0 ml  


 


Output Total 1400 ml  1975 ml 3025 ml  


 


Balance -1400 ml  -1375 ml -3025 ml  


 


      


 


Intake Oral   600 ml   


 


IV Total    0 ml  


 


Output Urine Total 1000 ml  1425 ml 2425 ml  


 


Stool Total 400 ml  550 ml 600 ml  








Physical Exam


GENERAL: Well developed, well nourished. No acute distress.


HEENT: Jugular venous pressure is normal. 


CHEST: Lungs clear to auscultation anteriorly.


CARDIAC: Regular rate and rhythm without S3, S4, or murmur.


ABDOMEN: Soft, nontender, no hepatosplenomegaly. Bowel sounds present.


EXTREMITIES: No clubbing, cyanosis, or edema.


Laboratory





Laboratory Tests








Test


  4/11/18


05:58


 


White Blood Count 16.6 TH/MM3 


 


Red Blood Count 4.36 MIL/MM3 


 


Hemoglobin 10.9 GM/DL 


 


Hematocrit 33.4 % 


 


Mean Corpuscular Volume 76.6 FL 


 


Mean Corpuscular Hemoglobin 25.0 PG 


 


Mean Corpuscular Hemoglobin


Concent 32.7 % 


 


 


Red Cell Distribution Width 16.9 % 


 


Platelet Count 293 TH/MM3 


 


Mean Platelet Volume 8.4 FL 


 


Neutrophils (%) (Auto) 82.0 % 


 


Lymphocytes (%) (Auto) 8.5 % 


 


Monocytes (%) (Auto) 9.3 % 


 


Eosinophils (%) (Auto) 0.1 % 


 


Basophils (%) (Auto) 0.1 % 


 


Neutrophils # (Auto) 13.6 TH/MM3 


 


Lymphocytes # (Auto) 1.4 TH/MM3 


 


Monocytes # (Auto) 1.5 TH/MM3 


 


Eosinophils # (Auto) 0.0 TH/MM3 


 


Basophils # (Auto) 0.0 TH/MM3 


 


CBC Comment DIFF FINAL 


 


Differential Comment  











Assessment and Plan


Problem List:  


(1) AICD discharge


ICD Codes:  Z45.02 - Encounter for adjustment and management of automatic 

implantable cardiac defibrillator


Status:  Acute


Plan:  Stable overnight.  Interrogation of ICD shows 5 appropriate shocks for 

VT.  Recommend continue oral Amiodarone.  Discharge dose will be 400 mg qd.  

Monitor her nausea.





(2) Non-ischemic cardiomyopathy


ICD Codes:  I42.8 - Other cardiomyopathies


Status:  Chronic


Plan:  EF appears slightly better, now 45-50%, compared to echo last year.   

Stable.  Compensated.  No acute CHF.  Continue beta blocker, ARB.  





(3) CAD (coronary artery disease)


ICD Codes:  I25.10 - Atherosclerotic heart disease of native coronary artery 

without angina pectoris


Status:  Chronic


Plan:  Stable.  Only mild CAD by cath 2015.  Continue daily aspirin





(4) Hypertension


ICD Codes:  I10 - Essential (primary) hypertension


Status:  Chronic


Plan:  Fluctuating BP's, mostly hypertensive.  Rec increase clonidine dosing 

further.  





Code Status


full code


Discussed Condition With


patient





Problem Qualifiers





(1) CAD (coronary artery disease):  


Qualified Codes:  I25.10 - Atherosclerotic heart disease of native coronary 

artery without angina pectoris


(2) Hypertension:  


Qualified Codes:  I10 - Essential (primary) hypertension








Narinder Treadwell MD Apr 11, 2018 07:44

## 2018-04-11 NOTE — [email or web address].NOT
Wound Consult


Description:


Right side of Sacrum


Recommendation:


Apply wound VAC to sacral wounds using a bridge method with settings @125mmHg 

low continuous suction change M-W-[id number].





Neg Pressure Wound Therapy


Wound Location


Wound Location:


Right side of Sacrum





Wound Description


Wound bed appearance:


100% moist red muscle tissue


Periwound appearance:  Other (maceration with epibole from 12 to 7 o'clock)





Settings


Suction:  125 mmHg, Continuous


Intensity:  Low


Other Information:  Bridged, Windowpaned, Mushroomed


Foam type:  Black


Number of pieces:  1





Wound Location


Wound Location:


Left side of Sacrum





Wound Description


Wound bed appearance:


100% moist red muscle tissue


Periwound appearance:  Other (maceration)





Settings


Suction:  125 mmHg, Continuous


Intensity:  Low


Other Information:  Bridged, Windowpaned


Foam type:  Black


Number of pieces:  1





Additonal Information


Patient seen on 5th floor C for wound VAC dressing change with bridge to R 

hip.Dressing change completed with Mariangel CANO and writer. Removed dressing in 

place to wounds to R side of sacrum and L side of sacrum, including one piece 

from granufoam from both wounds. Wounds were cleansed with wound cleanser and 

patted dry. Applied Skin barrier film spray  to periwound and up to R hip. 

Applied stoma paste to gluteal cleft to seal, before window paning wounds with 

VAC drape. VAC drape was bridged up to R hip. Applied black granufoam packed to 

L side fo sacrum wound Applied mushroom cap of black granufoam to R side of 

sacrum wound. Bridged granufoam from wounds to R hip. Applied Sensi trac pad to 

R hip. All exposed granufoam was covered with VAC drape to seal. Wound VAC is 

suctioning without leaks 125 mm/hg low continuous. Patient tolerated procedure 

well.











Reina Sales [facility] Apr 11, 2018 17:02

## 2018-04-12 VITALS
DIASTOLIC BLOOD PRESSURE: 55 MMHG | OXYGEN SATURATION: 98 % | RESPIRATION RATE: 21 BRPM | HEART RATE: 71 BPM | SYSTOLIC BLOOD PRESSURE: 110 MMHG

## 2018-04-12 VITALS
SYSTOLIC BLOOD PRESSURE: 117 MMHG | HEART RATE: 62 BPM | RESPIRATION RATE: 18 BRPM | TEMPERATURE: 98.5 F | OXYGEN SATURATION: 97 % | DIASTOLIC BLOOD PRESSURE: 54 MMHG

## 2018-04-12 VITALS
OXYGEN SATURATION: 97 % | SYSTOLIC BLOOD PRESSURE: 96 MMHG | HEART RATE: 74 BPM | TEMPERATURE: 98.5 F | RESPIRATION RATE: 18 BRPM | DIASTOLIC BLOOD PRESSURE: 52 MMHG

## 2018-04-12 VITALS
SYSTOLIC BLOOD PRESSURE: 91 MMHG | OXYGEN SATURATION: 95 % | HEART RATE: 66 BPM | DIASTOLIC BLOOD PRESSURE: 53 MMHG | RESPIRATION RATE: 17 BRPM

## 2018-04-12 VITALS
DIASTOLIC BLOOD PRESSURE: 60 MMHG | TEMPERATURE: 98.6 F | OXYGEN SATURATION: 96 % | HEART RATE: 91 BPM | RESPIRATION RATE: 16 BRPM | SYSTOLIC BLOOD PRESSURE: 121 MMHG

## 2018-04-12 VITALS
SYSTOLIC BLOOD PRESSURE: 111 MMHG | HEART RATE: 89 BPM | OXYGEN SATURATION: 93 % | DIASTOLIC BLOOD PRESSURE: 56 MMHG | RESPIRATION RATE: 19 BRPM | TEMPERATURE: 98.6 F

## 2018-04-12 VITALS
OXYGEN SATURATION: 97 % | TEMPERATURE: 98.9 F | SYSTOLIC BLOOD PRESSURE: 125 MMHG | RESPIRATION RATE: 16 BRPM | HEART RATE: 90 BPM | DIASTOLIC BLOOD PRESSURE: 58 MMHG

## 2018-04-12 VITALS
DIASTOLIC BLOOD PRESSURE: 51 MMHG | HEART RATE: 62 BPM | OXYGEN SATURATION: 98 % | SYSTOLIC BLOOD PRESSURE: 102 MMHG | RESPIRATION RATE: 17 BRPM

## 2018-04-12 VITALS
OXYGEN SATURATION: 95 % | SYSTOLIC BLOOD PRESSURE: 94 MMHG | RESPIRATION RATE: 17 BRPM | DIASTOLIC BLOOD PRESSURE: 50 MMHG | HEART RATE: 67 BPM

## 2018-04-12 VITALS
HEART RATE: 68 BPM | RESPIRATION RATE: 17 BRPM | OXYGEN SATURATION: 94 % | DIASTOLIC BLOOD PRESSURE: 55 MMHG | SYSTOLIC BLOOD PRESSURE: 104 MMHG

## 2018-04-12 VITALS
DIASTOLIC BLOOD PRESSURE: 60 MMHG | RESPIRATION RATE: 25 BRPM | SYSTOLIC BLOOD PRESSURE: 117 MMHG | OXYGEN SATURATION: 96 % | HEART RATE: 85 BPM

## 2018-04-12 VITALS
RESPIRATION RATE: 17 BRPM | HEART RATE: 67 BPM | DIASTOLIC BLOOD PRESSURE: 53 MMHG | OXYGEN SATURATION: 95 % | SYSTOLIC BLOOD PRESSURE: 101 MMHG | TEMPERATURE: 97.9 F

## 2018-04-12 VITALS
DIASTOLIC BLOOD PRESSURE: 53 MMHG | SYSTOLIC BLOOD PRESSURE: 101 MMHG | HEART RATE: 94 BPM | RESPIRATION RATE: 19 BRPM | OXYGEN SATURATION: 95 %

## 2018-04-12 VITALS
HEART RATE: 82 BPM | OXYGEN SATURATION: 96 % | DIASTOLIC BLOOD PRESSURE: 53 MMHG | RESPIRATION RATE: 27 BRPM | SYSTOLIC BLOOD PRESSURE: 83 MMHG

## 2018-04-12 VITALS
HEART RATE: 60 BPM | TEMPERATURE: 98 F | SYSTOLIC BLOOD PRESSURE: 108 MMHG | DIASTOLIC BLOOD PRESSURE: 51 MMHG | OXYGEN SATURATION: 94 % | RESPIRATION RATE: 18 BRPM

## 2018-04-12 VITALS
OXYGEN SATURATION: 95 % | DIASTOLIC BLOOD PRESSURE: 50 MMHG | RESPIRATION RATE: 16 BRPM | SYSTOLIC BLOOD PRESSURE: 101 MMHG | HEART RATE: 65 BPM

## 2018-04-12 VITALS — OXYGEN SATURATION: 98 %

## 2018-04-12 LAB
ALBUMIN SERPL-MCNC: 1.9 GM/DL (ref 3.4–5)
ALP SERPL-CCNC: 120 U/L (ref 45–117)
ALT SERPL-CCNC: 13 U/L (ref 10–53)
AST SERPL-CCNC: 15 U/L (ref 15–37)
BASOPHILS # BLD AUTO: 0 TH/MM3 (ref 0–0.2)
BASOPHILS NFR BLD: 0.1 % (ref 0–2)
BILIRUB SERPL-MCNC: 0.4 MG/DL (ref 0.2–1)
BUN SERPL-MCNC: 8 MG/DL (ref 7–18)
CALCIUM SERPL-MCNC: 8 MG/DL (ref 8.5–10.1)
CHLORIDE SERPL-SCNC: 97 MEQ/L (ref 98–107)
CREAT SERPL-MCNC: 0.76 MG/DL (ref 0.5–1)
EOSINOPHIL # BLD: 0 TH/MM3 (ref 0–0.4)
EOSINOPHIL NFR BLD: 0 % (ref 0–4)
ERYTHROCYTE [DISTWIDTH] IN BLOOD BY AUTOMATED COUNT: 16.3 % (ref 11.6–17.2)
GFR SERPLBLD BASED ON 1.73 SQ M-ARVRAT: 94 ML/MIN (ref 89–?)
GLUCOSE SERPL-MCNC: 292 MG/DL (ref 74–106)
HCO3 BLD-SCNC: 25.8 MEQ/L (ref 21–32)
HCT VFR BLD CALC: 34.9 % (ref 35–46)
HGB BLD-MCNC: 11.3 GM/DL (ref 11.6–15.3)
LYMPHOCYTES # BLD AUTO: 0.5 TH/MM3 (ref 1–4.8)
LYMPHOCYTES NFR BLD AUTO: 2.3 % (ref 9–44)
MAGNESIUM SERPL-MCNC: 2.3 MG/DL (ref 1.5–2.5)
MCH RBC QN AUTO: 25.2 PG (ref 27–34)
MCHC RBC AUTO-ENTMCNC: 32.4 % (ref 32–36)
MCV RBC AUTO: 77.8 FL (ref 80–100)
MONOCYTE #: 1.5 TH/MM3 (ref 0–0.9)
MONOCYTES NFR BLD: 6.5 % (ref 0–8)
NEUTROPHILS # BLD AUTO: 21.1 TH/MM3 (ref 1.8–7.7)
NEUTROPHILS NFR BLD AUTO: 91.1 % (ref 16–70)
PHOSPHATE SERPL-MCNC: 3.4 MG/DL (ref 2.5–4.9)
PLATELET # BLD: 308 TH/MM3 (ref 150–450)
PMV BLD AUTO: 8.8 FL (ref 7–11)
PROT SERPL-MCNC: 6.1 GM/DL (ref 6.4–8.2)
RBC # BLD AUTO: 4.49 MIL/MM3 (ref 4–5.3)
SODIUM SERPL-SCNC: 133 MEQ/L (ref 136–145)
WBC # BLD AUTO: 23.2 TH/MM3 (ref 4–11)

## 2018-04-12 RX ADMIN — INSULIN ASPART SCH: 100 INJECTION, SOLUTION INTRAVENOUS; SUBCUTANEOUS at 20:28

## 2018-04-12 RX ADMIN — ASPIRIN 81 MG SCH MG: 81 TABLET ORAL at 08:30

## 2018-04-12 RX ADMIN — MORPHINE SULFATE PRN MG: 2 INJECTION, SOLUTION INTRAMUSCULAR; INTRAVENOUS at 03:28

## 2018-04-12 RX ADMIN — INSULIN ASPART SCH: 100 INJECTION, SOLUTION INTRAVENOUS; SUBCUTANEOUS at 12:00

## 2018-04-12 RX ADMIN — Medication SCH CAP: at 08:34

## 2018-04-12 RX ADMIN — CALCIUM CARBONATE-CHOLECALCIFEROL TAB 250 MG-125 UNIT SCH MG: 250-125 TAB at 13:30

## 2018-04-12 RX ADMIN — LACTOBACILLUS ACIDOPHILUS / LACTOBACILLUS BULGARICUS SCH GM: 100 MILLION CFU STRENGTH GRANULES at 18:09

## 2018-04-12 RX ADMIN — CALCIUM CARBONATE-CHOLECALCIFEROL TAB 250 MG-125 UNIT SCH MG: 250-125 TAB at 18:09

## 2018-04-12 RX ADMIN — VANCOMYCIN HYDROCHLORIDE SCH MG: 500 INJECTION, POWDER, LYOPHILIZED, FOR SOLUTION INTRAVENOUS at 18:10

## 2018-04-12 RX ADMIN — GABAPENTIN SCH MG: 300 CAPSULE ORAL at 18:09

## 2018-04-12 RX ADMIN — IPRATROPIUM BROMIDE AND ALBUTEROL SULFATE SCH AMPULE: .5; 3 SOLUTION RESPIRATORY (INHALATION) at 04:00

## 2018-04-12 RX ADMIN — GABAPENTIN SCH MG: 300 CAPSULE ORAL at 13:30

## 2018-04-12 RX ADMIN — INSULIN ASPART SCH: 100 INJECTION, SOLUTION INTRAVENOUS; SUBCUTANEOUS at 08:00

## 2018-04-12 RX ADMIN — CALCIUM CARBONATE-CHOLECALCIFEROL TAB 250 MG-125 UNIT SCH MG: 250-125 TAB at 08:30

## 2018-04-12 RX ADMIN — AMIODARONE HYDROCHLORIDE SCH MG: 200 TABLET ORAL at 08:30

## 2018-04-12 RX ADMIN — VANCOMYCIN HYDROCHLORIDE SCH MG: 500 INJECTION, POWDER, LYOPHILIZED, FOR SOLUTION INTRAVENOUS at 13:30

## 2018-04-12 RX ADMIN — VALSARTAN SCH MG: 160 TABLET ORAL at 08:31

## 2018-04-12 RX ADMIN — Medication SCH ML: at 08:31

## 2018-04-12 RX ADMIN — ACYCLOVIR SCH UNITS: 800 TABLET ORAL at 09:00

## 2018-04-12 RX ADMIN — PREGABALIN SCH MG: 75 CAPSULE ORAL at 08:29

## 2018-04-12 RX ADMIN — METOPROLOL TARTRATE SCH MG: 100 TABLET, FILM COATED ORAL at 08:30

## 2018-04-12 RX ADMIN — Medication SCH ML: at 20:27

## 2018-04-12 RX ADMIN — DULOXETINE HYDROCHLORIDE SCH MG: 30 CAPSULE, DELAYED RELEASE ORAL at 08:31

## 2018-04-12 RX ADMIN — LACTOBACILLUS ACIDOPHILUS / LACTOBACILLUS BULGARICUS SCH GM: 100 MILLION CFU STRENGTH GRANULES at 13:31

## 2018-04-12 RX ADMIN — HEPARIN SODIUM SCH UNITS: 10000 INJECTION, SOLUTION INTRAVENOUS; SUBCUTANEOUS at 03:28

## 2018-04-12 RX ADMIN — VANCOMYCIN HYDROCHLORIDE SCH MG: 500 INJECTION, POWDER, LYOPHILIZED, FOR SOLUTION INTRAVENOUS at 08:31

## 2018-04-12 RX ADMIN — LEVOTHYROXINE SODIUM SCH MCG: 125 TABLET ORAL at 05:29

## 2018-04-12 RX ADMIN — POTASSIUM CHLORIDE SCH MEQ: 20 TABLET, EXTENDED RELEASE ORAL at 08:31

## 2018-04-12 RX ADMIN — STANDARDIZED SENNA CONCENTRATE AND DOCUSATE SODIUM SCH TAB: 8.6; 5 TABLET, FILM COATED ORAL at 08:31

## 2018-04-12 RX ADMIN — HEPARIN SODIUM SCH UNITS: 10000 INJECTION, SOLUTION INTRAVENOUS; SUBCUTANEOUS at 18:09

## 2018-04-12 RX ADMIN — AMIODARONE HYDROCHLORIDE SCH MG: 200 TABLET ORAL at 20:01

## 2018-04-12 RX ADMIN — INSULIN ASPART SCH: 100 INJECTION, SOLUTION INTRAVENOUS; SUBCUTANEOUS at 17:00

## 2018-04-12 RX ADMIN — IPRATROPIUM BROMIDE AND ALBUTEROL SULFATE SCH AMPULE: .5; 3 SOLUTION RESPIRATORY (INHALATION) at 09:18

## 2018-04-12 RX ADMIN — VANCOMYCIN HYDROCHLORIDE SCH MG: 500 INJECTION, POWDER, LYOPHILIZED, FOR SOLUTION INTRAVENOUS at 20:01

## 2018-04-12 RX ADMIN — PREGABALIN SCH MG: 75 CAPSULE ORAL at 20:00

## 2018-04-12 RX ADMIN — PANTOPRAZOLE SCH MG: 40 TABLET, DELAYED RELEASE ORAL at 08:30

## 2018-04-12 RX ADMIN — CHLORHEXIDINE GLUCONATE SCH PACK: 500 CLOTH TOPICAL at 03:27

## 2018-04-12 RX ADMIN — ACYCLOVIR SCH UNITS: 800 TABLET ORAL at 20:06

## 2018-04-12 RX ADMIN — METOPROLOL TARTRATE SCH MG: 100 TABLET, FILM COATED ORAL at 20:00

## 2018-04-12 RX ADMIN — GABAPENTIN SCH MG: 300 CAPSULE ORAL at 08:30

## 2018-04-12 NOTE — [email or web address].PN
Subjective


Subjective Remarks


Denies CP, dyspnea, palpitations, syncope, dizziness.  Slept poorly.  Episode 

of N/V again last night.





Objective


Medications











Item Value  Date Time


 


Clonidine 0.2 mg 4/11/18 0900





 (Catapres) TID/PO 4/12/18 0830


 


Amiodarone HCl 400 mg 4/10/18 2100





 (Cordarone) Q12HR/PO 4/12/18 0830


 


Potassium Chloride 20 meq 4/10/18 0900





 (KCl) DAILY/PO 4/12/18 0831


 


Amlodipine 10 mg 4/9/18 0900





Besylate DAILY/PO 4/12/18 0830





 (Norvasc)  


 


Aspirin 81 mg 4/9/18 0900





 (Aspirin Chew) DAILY/CHEW 4/12/18 0830


 


Valsartan 160 mg 4/9/18 0900





 (Diovan) DAILY/PO 4/12/18 0831


 


Metoprolol 100 mg 4/8/18 2100





Tartrate BID/PO 4/12/18 0830





 (Lopressor)  











Current Medications








 Medications


  (Trade)  Dose


 Ordered  Sig/Adilene


 Route  Start Time


 Stop Time Status Last Admin


 


 Amiodarone HCl


 450 mg/Sodium


 Chloride  250 ml @ 


 33.33 mls/


 hr  Q7H31M PRN


 IV  4/8/18 13:00


    4/9/18 05:33


 


 


 Potassium Chloride  100 ml @ 


 50 mls/hr  Q2H  PRN


 IV  4/8/18 14:30


     


 


 


 Potassium Chloride  100 ml @ 


 50 mls/hr  Q2H  PRN


 IV  4/8/18 14:30


    4/9/18 17:03


 


 


  (K-Lyte Cl  Eff)  50 meq  UNSCH  PRN


 PO  4/8/18 14:30


     


 


 


 Potassium Chloride  100 ml @ 


 25 mls/hr  UNSCH  PRN


 IV  4/8/18 14:30


     


 


 


 Potassium Chloride  100 ml @ 


 50 mls/hr  Q2H  PRN


 IV  4/8/18 14:30


     


 


 


 Magnesium Sulfate


 4 gm/Sodium


 Chloride  100 ml @ 


 50 mls/hr  UNSCH  PRN


 IV  4/8/18 14:30


     


 


 


  (Mag-Ox)  800 mg  UNSCH  PRN


 PO  4/8/18 14:30


     


 


 


 Magnesium Sulfate


 2 gm/Sodium


 Chloride  100 ml @ 


 50 mls/hr  UNSCH  PRN


 IV  4/8/18 14:30


     


 


 


  (K-Phos)  2,000 mg  Q4H  PRN


 PO  4/8/18 14:30


     


 


 


 Sodium Phosphate


 30 mmol/Sodium


 Chloride  250 ml @ 


 42 mls/hr  UNSCH  PRN


 IV  4/8/18 14:30


     


 


 


  (K-Phos)  2,000 mg  UNSCH  PRN


 PO/TUBE  4/8/18 14:17


     


 


 


 Potassium


 Phosphate 30 mmol/


 Sodium Chloride  260 ml @ 


 42 mls/hr  UNSCH  PRN


 IV  4/8/18 14:17


     


 


 


  (Norvasc)  10 mg  DAILY


 PO  4/9/18 09:00


    4/12/18 08:30


 


 


  (Aspirin Chew)  81 mg  DAILY


 CHEW  4/9/18 09:00


    4/12/18 08:30


 


 


  (Oscal-D 250-125)  500 mg  TID


 PO  4/8/18 18:00


    4/12/18 08:30


 


 


  (Cymbalta Dr)  90 mg  DAILY


 PO  4/9/18 09:00


    4/12/18 08:31


 


 


  (Neurontin)  600 mg  TID


 PO  4/8/18 18:00


    4/12/18 08:30


 


 


  (Synthroid)  125 mcg  DAILY@0600


 PO  4/9/18 06:00


    4/11/18 05:49


 


 


  (Lopressor)  100 mg  BID


 PO  4/8/18 21:00


    4/12/18 08:30


 


 


  (Lyrica)  225 mg  BID


 PO  4/8/18 21:00


    4/12/18 08:29


 


 


  (Diovan)  160 mg  DAILY


 PO  4/9/18 09:00


    4/12/18 08:31


 


 


  (Nephrocaps)  1 cap  DAILY


 PO  4/9/18 09:00


    4/12/18 08:34


 


 


  (NS Flush)  2 ml  UNSCH  PRN


 IV FLUSH  4/8/18 14:30


     


 


 


  (NS Flush)  2 ml  BID


 IV FLUSH  4/8/18 21:00


    4/12/18 08:31


 


 


  (Tylenol)  650 mg  Q6H  PRN


 PO  4/8/18 14:30


     


 


 


  (Zofran Inj)  4 mg  Q6H  PRN


 IV PUSH  4/8/18 14:30


    4/11/18 20:27


 


 


  (Duoneb Neb)  1 ampule  Q6HR  NEB


 INH  4/8/18 16:00


    4/11/18 21:10


 


 


  (Albuterol Neb)  2.5 mg  Q2HR NEB  PRN


 INH  4/8/18 14:30


     


 


 


  (Heparin Inj)  5,000 units  Q12H


 SQ  4/8/18 16:00


    4/12/18 03:28


 


 


 Miscellaneous


 Information  1  Q361D


 XX  4/8/18 14:30


    4/8/18 19:00


 


 


  (Chlorhexidine


 2% Cloth)  3 pack


 Taper  DAILY@04


 TOP  4/9/18 04:00


 4/5/19 03:59  4/12/18 03:27


 


 


  (Chlorhexidine


 2% Cloth)  3 pack  UNSCH  PRN


 TOP  4/8/18 14:30


     


 


 


  (Tammi-Colace)  1 tab  BID


 PO  4/8/18 21:00


    4/11/18 21:47


 


 


  (Milk Of


 Magnesia Liq)  30 ml  Q12H  PRN


 PO  4/8/18 14:30


     


 


 


  (Senokot)  17.2 mg  Q12H  PRN


 PO  4/8/18 14:30


    4/10/18 23:39


 


 


  (Dulcolax Supp)  10 mg  DAILY  PRN


 RECTAL  4/8/18 14:30


     


 


 


  (Lactulose Liq)  30 ml  DAILY  PRN


 PO  4/8/18 14:30


     


 


 


  (Protonix)  40 mg  DAILY


 PO  4/9/18 09:00


    4/12/18 08:30


 


 


  (Norco  5-325 Mg)  1 tab  Q4H  PRN


 PO  4/8/18 19:00


    4/10/18 22:24


 


 


  (Morphine Inj)  2 mg  Q2H  PRN


 IV PUSH  4/8/18 19:00


    4/12/18 03:28


 


 


  (KCl)  20 meq  DAILY


 PO  4/10/18 09:00


    4/12/18 08:31


 


 


  (Restoril)  15 mg  HS  PRN


 PO  4/10/18 03:45


    4/10/18 03:44


 


 


  (Levemir Inj)  25 units  Q12HR


 SQ  4/10/18 21:00


    4/10/18 21:32


 


 


  (Cordarone)  400 mg  Q12HR


 PO  4/10/18 21:00


    4/12/18 08:30


 


 


  (Compazine Inj)  5 mg  Q4H  PRN


 IM  4/10/18 21:45


     


 


 


  (Catapres)  0.2 mg  TID


 PO  4/11/18 09:00


    4/12/18 08:30


 


 


  (D50w (Vial) Inj)  50 ml  UNSCH  PRN


 IV PUSH  4/11/18 16:15


     


 


 


  (Glucagon Inj)  1 mg  UNSCH  PRN


 OTHER  4/11/18 16:15


     


 


 


  (NovoLOG


 SUPPLEMENTAL


 SCALE)  1  ACHS SLIDING  SCALE


 SQ  4/11/18 17:00


    4/11/18 20:57


 


 


  (VANCOMYCIN for


 oral use only)  250 mg  QID


 PO  4/11/18 18:00


    4/12/18 08:31


 








Vital Signs / I&O





Vital Signs








  Date Time  Temp Pulse Resp B/P (MAP) Pulse Ox O2 Delivery O2 Flow Rate FiO2


 


4/12/18 04:00 98.6 91 16 121/60 (80) 96   


 


4/12/18 04:00  91      


 


4/12/18 03:33   17     


 


4/12/18 00:00  90      


 


4/12/18 00:00 98.9 90 16 125/58 (80) 97   


 


4/11/18 22:54   17     


 


4/11/18 21:13     96   21


 


4/11/18 20:00 99.0 80 17 126/60 (82) 97   


 


4/11/18 20:00  80      


 


4/11/18 19:00  75      


 


4/11/18 18:00  74      


 


4/11/18 17:32  70      


 


4/11/18 17:00  69      


 


4/11/18 17:00  69 16 129/59 (82) 94   


 


4/11/18 16:00  69      


 


4/11/18 16:00 98.1 69 17 129/59 (82) 92   


 


4/11/18 15:00  69      


 


4/11/18 15:00  69 20 126/58 (80) 96   


 


4/11/18 14:00  75      


 


4/11/18 14:00  75 20 123/57 (79) 95   


 


4/11/18 13:00  77 19 123/60 (81) 94   


 


4/11/18 13:00  77      


 


4/11/18 12:00  78      


 


4/11/18 12:00 98.0 78 23 120/58 (78) 95   


 


4/11/18 11:00  79 17 120/58 (78) 95   


 


4/11/18 11:00  79      


 


4/11/18 10:00  78 20 116/56 (76) 95   


 


4/11/18 10:00  78      


 


4/11/18 09:20     99   


 


4/11/18 09:00  72      


 


4/11/18 09:00  72 18 113/57 (75) 96   














I/O      


 


 4/11/18 4/11/18 4/11/18 4/12/18 4/12/18 4/12/18





 07:00 15:00 23:00 07:00 15:00 23:00


 


Intake Total 0 ml  680 ml 480 ml  


 


Output Total 3025 ml  1200 ml 780 ml  


 


Balance -3025 ml  -520 ml -300 ml  


 


      


 


Intake Oral   480 ml 480 ml  


 


IV Total 0 ml  200 ml   


 


Output Urine Total 2425 ml  850 ml 750 ml  


 


Stool Total 600 ml  350 ml 30 ml  


 


# Bowel Movements    2  








Physical Exam


GENERAL: Well developed, well nourished. No acute distress.


HEENT: Jugular venous pressure is normal. 


CHEST: Lungs clear to auscultation anteriorly.


CARDIAC: Regular rate and rhythm without S3, S4, or murmur.


ABDOMEN: Soft, nontender, no hepatosplenomegaly. Bowel sounds present.


EXTREMITIES: No clubbing, cyanosis, or edema.


Laboratory





Laboratory Tests








Test


  4/12/18


04:56


 


White Blood Count 23.2 TH/MM3 


 


Red Blood Count 4.49 MIL/MM3 


 


Hemoglobin 11.3 GM/DL 


 


Hematocrit 34.9 % 


 


Mean Corpuscular Volume 77.8 FL 


 


Mean Corpuscular Hemoglobin 25.2 PG 


 


Mean Corpuscular Hemoglobin


Concent 32.4 % 


 


 


Red Cell Distribution Width 16.3 % 


 


Platelet Count 308 TH/MM3 


 


Mean Platelet Volume 8.8 FL 


 


Neutrophils (%) (Auto) 91.1 % 


 


Lymphocytes (%) (Auto) 2.3 % 


 


Monocytes (%) (Auto) 6.5 % 


 


Eosinophils (%) (Auto) 0.0 % 


 


Basophils (%) (Auto) 0.1 % 


 


Neutrophils # (Auto) 21.1 TH/MM3 


 


Lymphocytes # (Auto) 0.5 TH/MM3 


 


Monocytes # (Auto) 1.5 TH/MM3 


 


Eosinophils # (Auto) 0.0 TH/MM3 


 


Basophils # (Auto) 0.0 TH/MM3 


 


CBC Comment DIFF FINAL 


 


Differential Comment  


 


Blood Urea Nitrogen 8 MG/DL 


 


Creatinine 0.76 MG/DL 


 


Random Glucose 292 MG/DL 


 


Total Protein 6.1 GM/DL 


 


Albumin 1.9 GM/DL 


 


Calcium Level 8.0 MG/DL 


 


Phosphorus Level 3.4 MG/DL 


 


Magnesium Level 2.3 MG/DL 


 


Alkaline Phosphatase 120 U/L 


 


Aspartate Amino Transf


(AST/SGOT) 15 U/L 


 


 


Alanine Aminotransferase


(ALT/SGPT) 13 U/L 


 


 


Total Bilirubin 0.4 MG/DL 


 


Sodium Level 133 MEQ/L 


 


Potassium Level 3.8 MEQ/L 


 


Chloride Level 97 MEQ/L 


 


Carbon Dioxide Level 25.8 MEQ/L 


 


Anion Gap 10 MEQ/L 


 


Estimat Glomerular Filtration


Rate 94 ML/MIN 


 











Assessment and Plan


Problem List:  


(1) AICD discharge


ICD Codes:  Z45.02 - Encounter for adjustment and management of automatic 

implantable cardiac defibrillator


Status:  Acute


Plan:  Stable overnight.  Interrogation of ICD shows 5 appropriate shocks for 

VT.  Recommend continue oral Amiodarone.  N/V again last night, possibly from 

Amiodarone, will decrease dose.  Discharge dose will be 400 mg qd.  Will follow 

up periodically.





(2) Non-ischemic cardiomyopathy


ICD Codes:  I42.8 - Other cardiomyopathies


Status:  Chronic


Plan:  EF appears slightly better, now 45-50%, compared to echo last year.   

Stable.  Compensated.  No acute CHF.  Continue beta blocker, ARB.  





(3) CAD (coronary artery disease)


ICD Codes:  I25.10 - Atherosclerotic heart disease of native coronary artery 

without angina pectoris


Status:  Chronic


Plan:  Stable.  Only mild CAD by cath 2015.  Continue daily aspirin





(4) Hypertension


ICD Codes:  I10 - Essential (primary) hypertension


Status:  Chronic


Plan:  Stable.  Normotensive today.





Code Status


full code


Discussed Condition With


patient





Problem Qualifiers





(1) CAD (coronary artery disease):  


Qualified Codes:  I25.10 - Atherosclerotic heart disease of native coronary 

artery without angina pectoris


(2) Hypertension:  


Qualified Codes:  I10 - Essential (primary) hypertension








Narinder Treadwell MD Apr 12, 2018 08:44

## 2018-04-12 NOTE — HHI.PR
Subjective


Remarks


This is a 62-year-old -American female.  Date of admission 4/8/2018.  

Complex past medical history including history of chronic systolic heart 

failure ejection fraction 20% status post AICD placement 2006.  Most recent 

echo revealed EF 65-70% with moderate TR/moderate to severe pulmonary 

hypertension, fibromyalgia, arthritis, rheumatoid arthritis, hypertension, 

diabetes mellitus with neuropathy, depression, hypothyroidism and COPD.  Today 

she presented to [facility] after having her AICD fire 5 times at home with 

no loss conscious.  Patient was incontinent.  2 weeks ago patient had wound VAC 

placed on 8 sacral decubitus ulcer by Dr. Navarro.





Workup included an EKG which revealed no acute ST elevation.  Potassium is 1.9.

  Magnesium is 1.4.  Troponin 0 0.14.  She received 150 mg amiodarone bolus 1.

  Currently is received 30 mEq IV potassium chloride and 90 mEq potassium 

chloride by mouth.  3 g mag sulfate were provided.  Will recheck at 7:00.  

Pacemaker will be interrogated.  Dr. Treadwell will be notified of admission.





Patient is neurologically intact denying chest pain currently.  Very pleasant.





4/9: ICD is not fired since admission.  Potassium magnesium aggressively 

replaced currently potassium 4.8 magnesium above 2.  Suboptimal blood pressure 

control.  Increase clonidine 0.2 mg twice daily for cardiology.  Advance diet 

as tolerated.





4/10: C. difficile positive.  Hemodynamically stable.  Currently receiving 

supplemental phosphorus.  Wound VAC is been replaced.  AICD has not fired





4-11 TRANSFERRED TO OUR SERVICE TODAY


HAS C. DIFFICILE COLITIS ON VANCO


MICHAEL RN AND PT 


WILL GET PT AND OT AND WOUND CARE TO EVAL AND TREAT


MOVE OUT OF ICU





4-12 electrolytes have been replaced 


can be transferred out of ICU


Continues to have loose stools LESS watery diarrhea


ONLY TAKING INSULIN AS SHE WISHES


MICHAEL RN AND PT





Objective


Vitals





Vital Signs








  Date Time  Temp Pulse Resp B/P (MAP) Pulse Ox O2 Delivery O2 Flow Rate FiO2


 


4/12/18 09:18     98   21


 


4/12/18 04:00 98.6 91 16 121/60 (80) 96   


 


4/12/18 04:00  91      


 


4/12/18 03:33   17     


 


4/12/18 00:00  90      


 


4/12/18 00:00 98.9 90 16 125/58 (80) 97   


 


4/11/18 22:54   17     


 


4/11/18 21:13     96   21


 


4/11/18 20:00 99.0 80 17 126/60 (82) 97   


 


4/11/18 20:00  80      


 


4/11/18 19:00  75      


 


4/11/18 18:00  74      


 


4/11/18 17:32  70      


 


4/11/18 17:00  69      


 


4/11/18 17:00  69 16 129/59 (82) 94   


 


4/11/18 16:00  69      


 


4/11/18 16:00 98.1 69 17 129/59 (82) 92   


 


4/11/18 15:00  69      


 


4/11/18 15:00  69 20 126/58 (80) 96   


 


4/11/18 14:00  75      


 


4/11/18 14:00  75 20 123/57 (79) 95   


 


4/11/18 13:00  77 19 123/60 (81) 94   


 


4/11/18 13:00  77      


 


4/11/18 12:00  78      


 


4/11/18 12:00 98.0 78 23 120/58 (78) 95   














I/O      


 


 4/11/18 4/11/18 4/11/18 4/12/18 4/12/18 4/12/18





 07:00 15:00 23:00 07:00 15:00 23:00


 


Intake Total 0 ml  680 ml 480 ml  


 


Output Total 3025 ml  1200 ml 780 ml  


 


Balance -3025 ml  -520 ml -300 ml  


 


      


 


Intake Oral   480 ml 480 ml  


 


IV Total 0 ml  200 ml   


 


Output Urine Total 2425 ml  850 ml 750 ml  


 


Stool Total 600 ml  350 ml 30 ml  


 


# Bowel Movements    2  








Result Diagram:  


4/12/18 0456                                                                   

             4/12/18 0456





Other Results





 Laboratory Tests








Test


  4/9/18


15:00 4/9/18


23:15 4/10/18


04:25 4/11/18


05:58


 


Stool C. difficile Toxin (PCR) POSITIVE    


 


Stl C. difficile Toxin


Epiderm 027 PRESUMPTIVE


NEGATIVE 


  


  


 


 


Urine Color  YELLOW   


 


Urine Turbidity  CLOUDY   


 


Urine pH  5.5   


 


Urine Specific Gravity  1.025   


 


Urine Protein  100 mg/dL   


 


Urine Glucose (UA)  NEG mg/dL   


 


Urine Ketones  NEG mg/dL   


 


Urine Occult Blood  SMALL   


 


Urine Nitrite  POS   


 


Urine Bilirubin  NEG   


 


Urine Urobilinogen


  


  LESS THAN 2.0


MG/DL 


  


 


 


Urine Leukocyte Esterase  LARGE   


 


Urine WBC   /hpf   


 


Urine WBC Clumps  MANY   


 


Urine Squamous Epithelial


Cells 


  8 /hpf 


  


  


 


 


Urine Renal Epithelial Cells  4 /hpf   


 


Urine Amorphous Sediment  RARE   


 


Urine Mucus  FEW /lpf   


 


Microscopic Urinalysis Comment


  


  CATH-CULTURE


IND 


  


 


 


White Blood Count   9.5 TH/MM3  16.6 TH/MM3 


 


Red Blood Count   4.00 MIL/MM3  4.36 MIL/MM3 


 


Hemoglobin   10.2 GM/DL  10.9 GM/DL 


 


Hematocrit   30.9 %  33.4 % 


 


Mean Corpuscular Volume   77.2 FL  76.6 FL 


 


Mean Corpuscular Hemoglobin   25.6 PG  25.0 PG 


 


Mean Corpuscular Hemoglobin


Concent 


  


  33.2 % 


  32.7 % 


 


 


Red Cell Distribution Width   16.5 %  16.9 % 


 


Platelet Count   276 TH/MM3  293 TH/MM3 


 


Mean Platelet Volume   8.5 FL  8.4 FL 


 


Blood Urea Nitrogen   7 MG/DL  8 MG/DL 


 


Creatinine   0.78 MG/DL  0.76 MG/DL 


 


Random Glucose   220 MG/DL  135 MG/DL 


 


Calcium Level   8.0 MG/DL  8.1 MG/DL 


 


Phosphorus Level   1.1 MG/DL  3.2 MG/DL 


 


Magnesium Level   2.0 MG/DL  1.8 MG/DL 


 


Sodium Level   136 MEQ/L  137 MEQ/L 


 


Potassium Level   4.2 MEQ/L  3.7 MEQ/L 


 


Chloride Level   105 MEQ/L  102 MEQ/L 


 


Carbon Dioxide Level   21.1 MEQ/L  26.3 MEQ/L 


 


Anion Gap   10 MEQ/L  9 MEQ/L 


 


Estimat Glomerular Filtration


Rate 


  


  91 ML/MIN 


  94 ML/MIN 


 


 


Neutrophils (%) (Auto)    82.0 % 


 


Lymphocytes (%) (Auto)    8.5 % 


 


Monocytes (%) (Auto)    9.3 % 


 


Eosinophils (%) (Auto)    0.1 % 


 


Basophils (%) (Auto)    0.1 % 


 


Neutrophils # (Auto)    13.6 TH/MM3 


 


Lymphocytes # (Auto)    1.4 TH/MM3 


 


Monocytes # (Auto)    1.5 TH/MM3 


 


Eosinophils # (Auto)    0.0 TH/MM3 


 


Basophils # (Auto)    0.0 TH/MM3 


 


CBC Comment    DIFF FINAL 


 


Differential Comment     


 


Total Protein    6.0 GM/DL 


 


Albumin    2.0 GM/DL 


 


Alkaline Phosphatase    122 U/L 


 


Aspartate Amino Transf


(AST/SGOT) 


  


  


  12 U/L 


 


 


Alanine Aminotransferase


(ALT/SGPT) 


  


  


  16 U/L 


 


 


Total Bilirubin    0.2 MG/DL 


 


Hemoglobin A1c    9.5 % 


 


Free Thyroxine    1.18 NG/DL 


 


Thyroid Stimulating Hormone


3rd Gen 


  


  


  5.020 uIU/ML 


 


 


Test


  4/12/18


04:56 


  


  


 


 


White Blood Count 23.2 TH/MM3    


 


Red Blood Count 4.49 MIL/MM3    


 


Hemoglobin 11.3 GM/DL    


 


Hematocrit 34.9 %    


 


Mean Corpuscular Volume 77.8 FL    


 


Mean Corpuscular Hemoglobin 25.2 PG    


 


Mean Corpuscular Hemoglobin


Concent 32.4 % 


  


  


  


 


 


Red Cell Distribution Width 16.3 %    


 


Platelet Count 308 TH/MM3    


 


Mean Platelet Volume 8.8 FL    


 


Neutrophils (%) (Auto) 91.1 %    


 


Lymphocytes (%) (Auto) 2.3 %    


 


Monocytes (%) (Auto) 6.5 %    


 


Eosinophils (%) (Auto) 0.0 %    


 


Basophils (%) (Auto) 0.1 %    


 


Neutrophils # (Auto) 21.1 TH/MM3    


 


Lymphocytes # (Auto) 0.5 TH/MM3    


 


Monocytes # (Auto) 1.5 TH/MM3    


 


Eosinophils # (Auto) 0.0 TH/MM3    


 


Basophils # (Auto) 0.0 TH/MM3    


 


CBC Comment DIFF FINAL    


 


Differential Comment     


 


Blood Urea Nitrogen 8 MG/DL    


 


Creatinine 0.76 MG/DL    


 


Random Glucose 292 MG/DL    


 


Total Protein 6.1 GM/DL    


 


Albumin 1.9 GM/DL    


 


Calcium Level 8.0 MG/DL    


 


Phosphorus Level 3.4 MG/DL    


 


Magnesium Level 2.3 MG/DL    


 


Alkaline Phosphatase 120 U/L    


 


Aspartate Amino Transf


(AST/SGOT) 15 U/L 


  


  


  


 


 


Alanine Aminotransferase


(ALT/SGPT) 13 U/L 


  


  


  


 


 


Total Bilirubin 0.4 MG/DL    


 


Sodium Level 133 MEQ/L    


 


Potassium Level 3.8 MEQ/L    


 


Chloride Level 97 MEQ/L    


 


Carbon Dioxide Level 25.8 MEQ/L    


 


Anion Gap 10 MEQ/L    


 


Estimat Glomerular Filtration


Rate 94 ML/MIN 


  


  


  


 








Imaging





Last Impressions








Chest X-Ray 4/8/18 0000 Signed





Impressions: 





 Service Date/Time:  Sunday, April 8, 2018 12:24 - CONCLUSION:  1. Cardiomegaly 





 with mild basilar atelectasis. Pacer lead overlies right ventricle.     Dileep Vu MD 








Objective Remarks


GENERAL: AWAKE ALERT AND OX3 TALKATIVE AND COOPERATIVE


SKIN: Warm and dry.SACRAL AREA IS DRESSED


HEAD: Atraumatic. Normocephalic. 


EYES: Pupils equal and round. No scleral icterus. No injection or drainage. EOMI


ENT: No nasal bleeding or discharge.  Mucous membranes pink and moist. TONGUE 

MIDLINE


NECK: Trachea midline. No JVD. SUPPLE


CARDIOVASCULAR: Regular rate and rhythm.  S1, S2 NO S3 OR S4 NO HEAVE OR THRILL


RESPIRATORY: No accessory muscle use. Clear to auscultation. Breath sounds 

equal bilaterally. 


GASTROINTESTINAL: Abdomen soft, non-tender, nondistended. Hepatic and splenic 

margins not palpable. 


MUSCULOSKELETAL: Extremities without clubbing, cyanosis, or edema. No obvious 

deformities. 


NEUROLOGICAL: Awake and alert. No obvious cranial nerve deficits.  Motor 

grossly within normal limits. 4 out of 5 muscle strength in the arms and legs.  

Normal speech.


PSYCHIATRIC: Appropriate mood and affect; insight and judgment normal.


Procedures


NONE


Medications and IVs





Current Medications


Sodium Chloride (NS Flush) 2 ml UNSCH  PRN IVF FLUSH AFTER USING IV ACCESS;  

Start 4/8/18 at 12:30;  Stop 4/9/18 at 13:30;  Status DC


Amiodarone HCl 150 mg/Dextrose 103 ml @  600 mls/hr Q11M ONCE IV  Last 

administered on 4/8/18at 12:38;  Start 4/8/18 at 12:16;  Stop 4/8/18 at 12:26;  

Status DC


Amiodarone HCl 450 mg/Dextrose 250 ml @  33.33 mls/ hr Q7H31M PRN IV Per 

Protocol;  Start 4/8/18 at 12:26;  Status Cancel


Amiodarone HCl 450 mg/Sodium Chloride 250 ml @  33.33 mls/ hr Q7H31M PRN IV Per 

Protocol Last administered on 4/9/18at 05:33;  Start 4/8/18 at 13:00


Potassium Chloride (KCl) 40 meq ONCE  ONCE PO  Last administered on 4/8/18at 13:

20;  Start 4/8/18 at 13:30;  Stop 4/8/18 at 13:31;  Status DC


Potassium Chloride 100 ml @  100 mls/hr Q1H IV  Last administered on 4/8/18at 16

:48;  Start 4/8/18 at 13:30;  Stop 4/8/18 at 16:29;  Status DC


Calcium Gluconate 1 gm/Dextrose 110 ml @  110 mls/hr ONCE  ONCE IV  Last 

administered on 4/8/18at 14:21;  Start 4/8/18 at 13:45;  Stop 4/8/18 at 14:44;  

Status DC


Magnesium Sulfate/ Dextrose 100 ml @  100 mls/hr ONCE  ONCE IV  Last 

administered on 4/8/18at 14:21;  Start 4/8/18 at 14:00;  Stop 4/8/18 at 14:59;  

Status DC


Magnesium Sulfate/ Dextrose 100 ml @  100 mls/hr Q1H IV  Last administered on 4/ 8/18at 18:02;  Start 4/8/18 at 15:00;  Stop 4/8/18 at 16:59;  Status DC


Potassium Chloride (KCl) 60 meq ONCE  ONCE PO  Last administered on 4/8/18at 16:

46;  Start 4/8/18 at 16:00;  Stop 4/8/18 at 16:01;  Status DC


Potassium Chloride 100 ml @  50 mls/hr Q2H  PRN IV For Potassium 2.8 - 3.2 mEq/L

;  Start 4/8/18 at 14:30


Potassium Chloride 100 ml @  50 mls/hr Q2H  PRN IV For Potassium 2.8 - 3.2 mEq/

L Last administered on 4/9/18at 17:03;  Start 4/8/18 at 14:30


Potassium Bicarb/ Potassium Chloride (K-Lyte Cl  Eff) 50 meq UNSCH  PRN PO For 

Potassium 3.3 - 3.5 mEq/L;  Start 4/8/18 at 14:30


Potassium Chloride 100 ml @  25 mls/hr UNSCH  PRN IV For Potassium 3.3 - 3.5 mEq

/L;  Start 4/8/18 at 14:30


Potassium Chloride 100 ml @  50 mls/hr Q2H  PRN IV For Potassium 3.3 - 3.5 mEq/L

;  Start 4/8/18 at 14:30


Magnesium Sulfate 4 gm/Sodium Chloride 100 ml @  50 mls/hr UNSCH  PRN IV For 

Magnesium 0.9 - 1.1 mg/dL;  Start 4/8/18 at 14:30


Magnesium Oxide (Mag-Ox) 800 mg UNSCH  PRN PO For Magnesium 1.2 - 1.6 mg/dL;  

Start 4/8/18 at 14:30


Magnesium Sulfate 2 gm/Sodium Chloride 100 ml @  50 mls/hr UNSCH  PRN IV For 

Magnesium 1.2 - 1.6 mg/dL;  Start 4/8/18 at 14:30


Potassium Phosphate (K-Phos) 2,000 mg Q4H  PRN PO For Phosphorus < 2.5 mg/dL;  

Start 4/8/18 at 14:30


Sodium Phosphate 30 mmol/Sodium Chloride 250 ml @  42 mls/hr UNSCH  PRN IV For 

Phosphorus < 2.5 mg/dL;  Start 4/8/18 at 14:30


Potassium Phosphate (K-Phos) 2,000 mg UNSCH  PRN PO/TUBE SEE LABEL COMMENTS;  

Start 4/8/18 at 14:17


Potassium Phosphate 30 mmol/ Sodium Chloride 260 ml @  42 mls/hr UNSCH  PRN IV 

SEE LABEL COMMENTS;  Start 4/8/18 at 14:17


Amlodipine Besylate (Norvasc) 10 mg DAILY PO  Last administered on 4/12/18at 08:

30;  Start 4/9/18 at 09:00


Aspirin (Aspirin Chew) 81 mg DAILY CHEW  Last administered on 4/12/18at 08:30;  

Start 4/9/18 at 09:00


Calcium/Vitamin D (Oscal-D 250-125) 500 mg TID PO  Last administered on 4/12/ 18at 08:30;  Start 4/8/18 at 18:00


Clonidine (Catapres) 0.1 mg DAILY PO ;  Start 4/9/18 at 09:00;  Stop 4/9/18 at 

09:00;  Status DC


Duloxetine HCl (Cymbalta Dr) 90 mg DAILY PO  Last administered on 4/12/18at 08:

31;  Start 4/9/18 at 09:00


Gabapentin (Neurontin) 600 mg TID PO  Last administered on 4/12/18at 08:30;  

Start 4/8/18 at 18:00


Levothyroxine Sodium (Synthroid) 125 mcg DAILY@0600 PO  Last administered on 4/ 11/18at 05:49;  Start 4/9/18 at 06:00


Metoprolol Tartrate (Lopressor) 100 mg BID PO  Last administered on 4/12/18at 08

:30;  Start 4/8/18 at 21:00


Pregabalin (Lyrica) 225 mg BID PO  Last administered on 4/12/18at 08:29;  Start 

4/8/18 at 21:00


Valsartan (Diovan) 160 mg DAILY PO  Last administered on 4/12/18at 08:31;  

Start 4/9/18 at 09:00


Vitamin B Complex/ Vit C/Folic Acid (Nephrocaps) 1 cap DAILY PO  Last 

administered on 4/12/18at 08:34;  Start 4/9/18 at 09:00


Dextrose (D50w (Vial) Inj) 50 ml UNSCH  PRN IV PUSH HYPOGLYCEMIA-SEE COMMENTS;  

Start 4/8/18 at 14:30;  Stop 4/11/18 at 16:15;  Status DC


Glucagon (Glucagon Inj) 1 mg UNSCH  PRN OTHER HYPOGLYCEMIA-SEE COMMENTS;  Start 

4/8/18 at 14:30;  Stop 4/11/18 at 16:15;  Status DC


Insulin Aspart (NovoLOG SUPPLEMENTAL SCALE) 1 ACHS SLIDING  SCALE SQ  Last 

administered on 4/10/18at 21:32;  Start 4/8/18 at 17:00;  Stop 4/11/18 at 16:11

;  Status DC


Insulin Detemir (Levemir Inj) 8 units Q12HR SQ ;  Start 4/8/18 at 21:00;  Stop 4 /9/18 at 13:25;  Status DC


Sodium Chloride (NS Flush) 2 ml UNSCH  PRN IV FLUSH FLUSH AFTER USING IV ACCESS

;  Start 4/8/18 at 14:30


Sodium Chloride (NS Flush) 2 ml BID IV FLUSH  Last administered on 4/12/18at 08:

31;  Start 4/8/18 at 21:00


Acetaminophen (Tylenol) 650 mg Q6H  PRN PO fever;  Start 4/8/18 at 14:30


Acetaminophen/ Hydrocodone Bitart (Norco  5-325 Mg) 1 tab Q4H  PRN PO PAIN 

SCALE 1 TO 5;  Start 4/8/18 at 14:30;  Stop 4/8/18 at 15:22;  Status DC


Morphine Sulfate (Morphine Inj) 2 mg Q2H  PRN IV PUSH PAIN SCALE 6 TO 10;  

Start 4/8/18 at 14:30;  Stop 4/8/18 at 15:22;  Status DC


Famotidine (Pepcid Inj) 20 mg Q12HR IV PUSH ;  Start 4/8/18 at 21:00;  Stop 4/8/ 18 at 21:00;  Status DC


Ondansetron HCl (Zofran Inj) 4 mg Q6H  PRN IV PUSH NAUSEA OR VOMITING Last 

administered on 4/11/18at 20:27;  Start 4/8/18 at 14:30


Albuterol/ Ipratropium (Duoneb Neb) 1 ampule Q6HR  NEB INH  Last administered 

on 4/12/18at 09:18;  Start 4/8/18 at 16:00


Albuterol Sulfate (Albuterol Neb) 2.5 mg Q2HR NEB  PRN INH SOB/WHEEZING;  Start 

4/8/18 at 14:30


Heparin Sodium (Porcine) (Heparin Inj) 5,000 units Q12H SQ  Last administered 

on 4/12/18at 03:28;  Start 4/8/18 at 16:00


Miscellaneous Information 1 Q361D XX  Last administered on 4/8/18at 19:00;  

Start 4/8/18 at 14:30


Chlorhexidine Gluconate (Chlorhexidine 2% Cloth) 3 pack Taper DAILY@04 TOP  

Last administered on 4/12/18at 03:27;  Start 4/9/18 at 04:00;  Stop 4/5/19 at 03

:59


Chlorhexidine Gluconate (Chlorhexidine 2% Cloth) 3 pack UNSCH  PRN TOP HYGIENIC 

CARE;  Start 4/8/18 at 14:30


Senna/Docusate Sodium (Tammi-Colace) 1 tab BID PO  Last administered on 4/11/ 18at 21:47;  Start 4/8/18 at 21:00


Magnesium Hydroxide (Milk Of Magnesia Liq) 30 ml Q12H  PRN PO Mild constipation

;  Start 4/8/18 at 14:30


Sennosides (Senokot) 17.2 mg Q12H  PRN PO Moderate constipation Last 

administered on 4/10/18at 23:39;  Start 4/8/18 at 14:30


Bisacodyl (Dulcolax Supp) 10 mg DAILY  PRN RECTAL SEVERE CONSITIPATION;  Start 4 /8/18 at 14:30


Lactulose (Lactulose Liq) 30 ml DAILY  PRN PO SEVERE CONSITIPATION;  Start 4/8/ 18 at 14:30


Potassium Chloride/Sodium Chloride 1,000 ml @  84 mls/hr [id number] IV  Last 

administered on 4/8/18at 20:00;  Start 4/8/18 at 15:00;  Stop 4/9/18 at 13:25;  

Status DC


Calcium Gluconate 1 gm/Sodium Chloride 110 ml @  110 mls/hr ONCE  ONCE IV  Last 

administered on 4/8/18at 16:46;  Start 4/8/18 at 15:00;  Stop 4/8/18 at 15:59;  

Status DC


Pantoprazole Sodium (Protonix) 40 mg DAILY PO  Last administered on 4/12/18at 08

:30;  Start 4/9/18 at 09:00


Magnesium Sulfate/ Dextrose 100 ml @  100 mls/hr ONCE  ONCE IV ;  Start 4/8/18 

at 15:45;  Stop 4/8/18 at 16:44;  Status DC


Metoprolol Tartrate (Lopressor Inj) 5 mg ONCE  ONCE IV PUSH  Last administered 

on 4/8/18at 19:33;  Start 4/8/18 at 18:45;  Stop 4/8/18 at 19:04;  Status DC


Amiodarone HCl 450 mg/Sodium Chloride 250 ml @  33.33 mls/ hr Q7H31M PRN IV Per 

Protocol;  Start 4/8/18 at 18:53;  Stop 4/8/18 at 19:13;  Status DC


Acetaminophen/ Hydrocodone Bitart (Norco  5-325 Mg) 1 tab Q4H  PRN PO pain 1-5 

Last administered on 4/10/18at 22:24;  Start 4/8/18 at 19:00


Morphine Sulfate (Morphine Inj) 2 mg Q2H  PRN IV PUSH pain 6-10 Last 

administered on 4/12/18at 03:28;  Start 4/8/18 at 19:00


Potassium Phosphate 30 mmol/ Sodium Chloride 260 ml @  43.333 mls/ hr ONCE  

ONCE IV  Last administered on 4/8/18at 22:30;  Start 4/8/18 at 22:30;  Stop 4/9/ 18 at 04:29;  Status DC


Potassium Chloride (KCl) 40 meq ONCE  ONCE PO ;  Start 4/8/18 at 22:30;  Stop 4/ 8/18 at 22:30;  Status DC


Potassium Chloride (KCl) 60 meq ONCE  ONCE PO  Last administered on 4/8/18at 22:

30;  Start 4/8/18 at 22:30;  Stop 4/8/18 at 22:40;  Status DC


Clonidine (Catapres) 0.2 mg BID PO  Last administered on 4/10/18at 21:31;  

Start 4/9/18 at 09:00;  Stop 4/11/18 at 07:45;  Status DC


Insulin Detemir (Levemir Inj) 20 units Q12HR SQ  Last administered on 4/9/18at 

20:13;  Start 4/9/18 at 21:00;  Stop 4/10/18 at 09:05;  Status DC


Potassium Chloride (KCl) 20 meq DAILY PO  Last administered on 4/12/18at 08:31;

  Start 4/10/18 at 09:00


Temazepam (Restoril) 15 mg HS  PRN PO SLEEP Last administered on 4/10/18at 03:44

;  Start 4/10/18 at 03:45


Vancomycin HCl (VANCOMYCIN for oral use only) 125 mg QID PO  Last administered 

on 4/11/18at 12:29;  Start 4/10/18 at 09:00;  Stop 4/11/18 at 16:15;  Status DC


Potassium Phos/ Sodium Phos (K-Phos Neutral) 250 mg Q8HR PO ;  Start 4/10/18 at 

09:00;  Stop 4/10/18 at 22:01;  Status DC


Magnesium Sulfate/ Dextrose 100 ml @  100 mls/hr Q1H IV  Last administered on 4/

10/18at 10:01;  Start 4/10/18 at 09:00;  Stop 4/10/18 at 10:59;  Status DC


Insulin Detemir (Levemir Inj) 25 units Q12HR SQ  Last administered on 4/12/18at 

09:00;  Start 4/10/18 at 21:00


Amiodarone HCl (Cordarone) 400 mg Q12HR PO  Last administered on 4/12/18at 08:30

;  Start 4/10/18 at 21:00


Promethazine HCl (Phenergan Inj) 12.5 mg ONCE  ONCE IM  Last administered on 4/

10/18at 22:23;  Start 4/10/18 at 21:45;  Stop 4/10/18 at 21:56;  Status DC


Prochlorperazine Edisylate (Compazine Inj) 5 mg Q4H  PRN IM nausea vomiting;  

Start 4/10/18 at 21:45


Clonidine (Catapres) 0.2 mg TID PO  Last administered on 4/12/18at 08:30;  

Start 4/11/18 at 09:00


Dextrose (D50w (Vial) Inj) 50 ml UNSCH  PRN IV PUSH HYPOGLYCEMIA-SEE COMMENTS;  

Start 4/11/18 at 16:15


Glucagon (Glucagon Inj) 1 mg UNSCH  PRN OTHER HYPOGLYCEMIA-SEE COMMENTS;  Start 

4/11/18 at 16:15


Insulin Aspart (NovoLOG SUPPLEMENTAL SCALE) 1 ACHS SLIDING  SCALE SQ  Last 

administered on 4/12/18at 08:00;  Start 4/11/18 at 17:00


Vancomycin HCl (VANCOMYCIN for oral use only) 250 mg QID PO  Last administered 

on 4/12/18at 08:31;  Start 4/11/18 at 18:00


Magnesium Sulfate/ Dextrose 100 ml @  100 mls/hr Q1H IV  Last administered on 4/ 11/18at 20:32;  Start 4/11/18 at 18:00;  Stop 4/11/18 at 19:59;  Status DC


Simethicone (Mylicon Chew) 80 mg ONCE  ONCE CHEW  Last administered on 4/12/ 18at 01:21;  Start 4/12/18 at 01:00;  Stop 4/12/18 at 01:08;  Status DC





A/P


Problem List:  


(1) Hypoproteinemia


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


(2) Sacral decubitus ulcer


ICD Code:  L89.159 - Pressure ulcer of sacral region, unspecified stage


(3) Hyperglycemia


ICD Code:  R73.9 - Hyperglycemia, unspecified


(4) AICD discharge


ICD Code:  Z45.02 - Encounter for adjustment and management of automatic 

implantable cardiac defibrillator


Status:  Acute


(5) Hypokalemia


ICD Code:  E87.6 - Hypokalemia


Status:  Acute


(6) Hypomagnesemia


ICD Code:  E83.42 - Hypomagnesemia


Status:  Acute


(7) Hypocalcemia


ICD Code:  E83.51 - Hypocalcemia


Status:  Acute


(8) Leukocytosis


ICD Code:  D72.829 - Elevated white blood cell count, unspecified


Status:  Acute


(9) Elevated troponin I level


ICD Code:  R74.8 - Abnormal levels of other serum enzymes


Status:  Acute


(10) HTN (hypertension)


ICD Code:  I10 - Essential (primary) hypertension


Status:  Chronic


(11) Diabetes mellitus


ICD Code:  E11.9 - Type 2 diabetes mellitus without complications


Status:  Chronic


(12) CAD (coronary artery disease)


ICD Code:  I25.10 - Atherosclerotic heart disease of native coronary artery 

without angina pectoris


Status:  Chronic


(13) CHF (congestive heart failure)


ICD Code:  I50.9 - Heart failure, unspecified


Status:  Chronic


(14) Hypothyroidism


ICD Code:  E03.9 - Hypothyroidism, unspecified


Status:  Chronic


(15) GERD (gastroesophageal reflux disease)


ICD Code:  K21.9 - Gastro-esophageal reflux disease without esophagitis


Status:  Chronic


(16) Vitamin D deficiency


ICD Code:  E55.9 - Vitamin D deficiency, unspecified


Status:  Chronic


(17) Lupus


ICD Code:  L93.0 - Discoid lupus erythematosus


Status:  Chronic


(18) Depression


ICD Code:  F32.9 - Major depressive disorder, single episode, unspecified


(19) Vitamin B12 deficiency


ICD Code:  E53.8 - Deficiency of other specified B group vitamins


(20) Neuropathy


ICD Code:  G62.9 - Polyneuropathy, unspecified


(21) Chronic prescription opiate use


ICD Code:  Z79.891 - Long term (current) use of opiate analgesic


(22) Pernicious anemia


ICD Code:  D51.0 - Vitamin B12 deficiency anemia due to intrinsic factor 

deficiency


(23) Fibromyalgia


ICD Code:  M79.7 - Fibromyalgia


(24) Diverticulosis


ICD Code:  K57.90 - Diverticulosis of intestine, part unspecified, without 

perforation or abscess without bleeding


(25) COPD (chronic obstructive pulmonary disease)


ICD Code:  J44.9 - Chronic obstructive pulmonary disease, unspecified


(26) Dyslipidemia


ICD Code:  E78.5 - Hyperlipidemia, unspecified


(27) Pulmonary hypertension


ICD Code:  I27.20 - Pulmonary hypertension, unspecified


(28) Tricuspid regurgitation


ICD Code:  I07.1 - Rheumatic tricuspid insufficiency


(29) Osteoporosis


ICD Code:  M81.0 - Age-related osteoporosis without current pathological 

fracture


(30) Rheumatoid arthritis


ICD Code:  M06.9 - Rheumatoid arthritis, unspecified


(31) Peripheral vascular disease


ICD Code:  I73.9 - Peripheral vascular disease, unspecified


(32) Benign colonic polyp


ICD Code:  K63.5 - Polyp of colon


Assessment and Plan


Neuro/Psych:





Migraine headache


Chronic opioid use


Diabetic peripheral neuropathy


Depression/anxiety


History of fibromyalgia





Acetaminophen 650 mg p.o. every 6 hours as needed fever


Hydrocodone/acetaminophen 5/325 1 tab every 4 hours as needed pain 1 through 5


Morphine sulfate 2 mg IV every 2 hours as needed pain 6-10


Holding tizanidine 4 mg p.o. every 8 hours for muscle relaxant


Continue duloxetine 90 mg p.o. daily for depression


Patient is on Percocet 10/325 1 tablet every 6 hours as needed pain at home


Patient is on pregabalin 225 mg p.o. twice daily at home along with gabapentin 

600 mg p.o. 3 times daily.  This has been resumed


 


CV: 





Essential hypertension


History of chronic systolic heart failure currently EF 65-70%


Status post AICD placement


Coronary artery disease status post CABG


History of atrial fib relation status post ablation


Elevated troponin 





Resume metoprolol tartrate 100 mg p.o. twice daily, amlodipine 10 mg p.o. daily 

and valsartan 160 mg p.o. daily for hypertension


Resume clonidine 0.1 mg p.o. daily for hypertension


   Increase 0.2 mg twice daily per cardiology/Dr. Treadwell


Echocardiogram 10/2017 revealed EF 65-70%.  Severe portal hypertension.  

Moderate TR.


Interrogate pacemaker completed 4/9


Aggressively replete electrolytes


Dr. Treadwell consult


Serial troponin every 6 hours 2 peaked at 1.43.  Currently downward





Resp: 





History of COPD/prior tobaccoism quit 2001





Nasal cannula to maintain saturations greater than or equal to 92%


Incentive spirometry while awake


Chest x-ray revealed cardiomegaly/signs of mild heart failure


Albuterol/ipratropium aerosols every 6 hours with albuterol aerosols every 2 

hours as needed dyspnea





GI: 





History of hiatal hernia


History of esophageal dilatation


History of diverticulosis


History of internal and external hemorrhoids


Diverticulosis


Benign colonic polyp





2000 ADA diet


Pantoprazole for GI prophylaxis


Docusate sodium/senna 1 tablet twice daily for bowel regimen





:  E.COLI CONTINUE ON ROCEPHIN





Romano catheter if indicated for accurate I's and O's in a critically ill patient





Endo:  





Diabetes mellitus type 2 with hyperglycemia


Hypothyroidism





Start insulin detemir 8 units subcu twice daily with NovoLog sliding scale 

insulin Accu-Chek's before meals/at bedtime/LOW DOSE regimen


She is on insulin glargine 20 units daily at home


Continue levothyroxine 125 mcg p.o. daily.  Check TSH was 0.61





Renal: 





Creatinine currently 0.8


Started on normal saline with 20 mEq KCl 84 cc an hour


Monitor urine output


Accurate I's and O





Heme:





History of pernicious anemia/microcytic





Monitor CBC daily.  Follow trends


No indication for transfusion of blood products at this time





ID:





C. difficile





Monitor for infection





Blood cultures 2 4/8 ordered by ED


Urine culture 4/9 pending


C. difficile positive.  Started vancomycin INCREASE  mg p.o. every 6 

hours 10 days total.  Infectious guidelines severe with elevated white cell 

count greater than 15,000





MSK/Rheum:





Osteoporosis


Vitamin B-12 deficiency


SLE


Rheumatoid arthritis


Sacral decubitus ulcer





Holding vitamin B12 1000 units subcu monthly and alendronate 70 mg by mouth 

weekly


Wound care evaluate and treat





FEN:





Hypokalemia -severe symptomatic resolved


Hypophosphatemia


Hypocalcemia


HYPOMAGNESIA





ICU electrolyte protocol initiated


Patient is on potassium chloride 20 mEq daily at home.  Resume


On 30 mmol sodium phosphorus with Neutra-Phos 1 tablet 3 times daily.  2 g mag 

sulfate IV 1 today.  Recheck in a.m.


 REPLACE BY PROTOCOLS





Access:


-Utilize peripheral IV.  Central line if indicated





Prophylaxis


-GI -pantoprazole


-DVT -SCD/heparin





UTI ROCEPHIN AND LACTINEX


Discharge Planning


MOVE OUT OF ICU


NO MORE AICD SHOCKS





Problem Qualifiers





(1) Sacral decubitus ulcer:  


Qualified Codes:  L89.159 - Pressure ulcer of sacral region, unspecified stage


(2) Leukocytosis:  


Qualified Codes:  D72.829 - Elevated white blood cell count, unspecified


(3) HTN (hypertension):  


Qualified Codes:  I10 - Essential (primary) hypertension


(4) Diabetes mellitus:  


Qualified Codes:  E11.65 - Type 2 diabetes mellitus with hyperglycemia; Z79.4 - 

Long term (current) use of insulin


(5) CAD (coronary artery disease):  


Qualified Codes:  I25.10 - Atherosclerotic heart disease of native coronary 

artery without angina pectoris


(6) CHF (congestive heart failure):  


Qualified Codes:  I50.32 - Chronic diastolic (congestive) heart failure


(7) Hypothyroidism:  


Qualified Codes:  E03.9 - Hypothyroidism, unspecified


(8) GERD (gastroesophageal reflux disease):  


Qualified Codes:  K21.9 - Gastro-esophageal reflux disease without esophagitis


(9) Lupus:  


Qualified Codes:  L93.0 - Discoid lupus erythematosus


(10) Depression:  





(11) Diverticulosis:  


Qualified Codes:  K57.90 - Diverticulosis of intestine, part unspecified, 

without perforation or abscess without bleeding


(12) COPD (chronic obstructive pulmonary disease):  


Qualified Codes:  J44.9 - Chronic obstructive pulmonary disease, unspecified


(13) Tricuspid regurgitation:  


Qualified Codes:  I07.1 - Rheumatic tricuspid insufficiency


(14) Osteoporosis:  


Qualified Codes:  M81.0 - Age-related osteoporosis without current pathological 

fracture


(15) Rheumatoid arthritis:  


Qualified Codes:  M06.9 - Rheumatoid arthritis, unspecified








Clyde Garcia DO Apr 12, 2018 11:13

## 2018-04-13 VITALS
RESPIRATION RATE: 18 BRPM | OXYGEN SATURATION: 95 % | DIASTOLIC BLOOD PRESSURE: 58 MMHG | HEART RATE: 68 BPM | SYSTOLIC BLOOD PRESSURE: 100 MMHG | TEMPERATURE: 97.8 F

## 2018-04-13 VITALS
RESPIRATION RATE: 18 BRPM | TEMPERATURE: 98.2 F | HEART RATE: 63 BPM | OXYGEN SATURATION: 96 % | DIASTOLIC BLOOD PRESSURE: 54 MMHG | SYSTOLIC BLOOD PRESSURE: 103 MMHG

## 2018-04-13 VITALS
OXYGEN SATURATION: 97 % | HEART RATE: 61 BPM | DIASTOLIC BLOOD PRESSURE: 54 MMHG | SYSTOLIC BLOOD PRESSURE: 94 MMHG | RESPIRATION RATE: 18 BRPM | TEMPERATURE: 98.3 F

## 2018-04-13 VITALS
HEART RATE: 66 BPM | RESPIRATION RATE: 18 BRPM | SYSTOLIC BLOOD PRESSURE: 132 MMHG | DIASTOLIC BLOOD PRESSURE: 63 MMHG | TEMPERATURE: 98.9 F | OXYGEN SATURATION: 95 %

## 2018-04-13 VITALS
SYSTOLIC BLOOD PRESSURE: 109 MMHG | TEMPERATURE: 98.3 F | HEART RATE: 68 BPM | DIASTOLIC BLOOD PRESSURE: 59 MMHG | OXYGEN SATURATION: 97 % | RESPIRATION RATE: 18 BRPM

## 2018-04-13 VITALS
SYSTOLIC BLOOD PRESSURE: 116 MMHG | HEART RATE: 73 BPM | DIASTOLIC BLOOD PRESSURE: 58 MMHG | RESPIRATION RATE: 16 BRPM | TEMPERATURE: 97.4 F | OXYGEN SATURATION: 99 %

## 2018-04-13 VITALS — HEART RATE: 62 BPM

## 2018-04-13 LAB
ALBUMIN SERPL-MCNC: 1.8 GM/DL (ref 3.4–5)
ALP SERPL-CCNC: 110 U/L (ref 45–117)
ALT SERPL-CCNC: 11 U/L (ref 10–53)
AST SERPL-CCNC: 8 U/L (ref 15–37)
BASOPHILS # BLD AUTO: 0 TH/MM3 (ref 0–0.2)
BASOPHILS NFR BLD: 0.1 % (ref 0–2)
BILIRUB SERPL-MCNC: 0.2 MG/DL (ref 0.2–1)
BUN SERPL-MCNC: 8 MG/DL (ref 7–18)
CALCIUM SERPL-MCNC: 8.9 MG/DL (ref 8.5–10.1)
CHLORIDE SERPL-SCNC: 101 MEQ/L (ref 98–107)
CREAT SERPL-MCNC: 0.7 MG/DL (ref 0.5–1)
EOSINOPHIL # BLD: 0 TH/MM3 (ref 0–0.4)
EOSINOPHIL NFR BLD: 0.1 % (ref 0–4)
ERYTHROCYTE [DISTWIDTH] IN BLOOD BY AUTOMATED COUNT: 16.6 % (ref 11.6–17.2)
GFR SERPLBLD BASED ON 1.73 SQ M-ARVRAT: 103 ML/MIN (ref 89–?)
GLUCOSE SERPL-MCNC: 68 MG/DL (ref 74–106)
HCO3 BLD-SCNC: 31.5 MEQ/L (ref 21–32)
HCT VFR BLD CALC: 32.8 % (ref 35–46)
HGB BLD-MCNC: 10.7 GM/DL (ref 11.6–15.3)
LYMPHOCYTES # BLD AUTO: 1.9 TH/MM3 (ref 1–4.8)
LYMPHOCYTES NFR BLD AUTO: 11.3 % (ref 9–44)
MAGNESIUM SERPL-MCNC: 2.2 MG/DL (ref 1.5–2.5)
MCH RBC QN AUTO: 25 PG (ref 27–34)
MCHC RBC AUTO-ENTMCNC: 32.5 % (ref 32–36)
MCV RBC AUTO: 77 FL (ref 80–100)
MONOCYTE #: 1.5 TH/MM3 (ref 0–0.9)
MONOCYTES NFR BLD: 9.2 % (ref 0–8)
NEUTROPHILS # BLD AUTO: 13.1 TH/MM3 (ref 1.8–7.7)
NEUTROPHILS NFR BLD AUTO: 79.3 % (ref 16–70)
PHOSPHATE SERPL-MCNC: 3.1 MG/DL (ref 2.5–4.9)
PLATELET # BLD: 357 TH/MM3 (ref 150–450)
PMV BLD AUTO: 8.1 FL (ref 7–11)
PROT SERPL-MCNC: 5.9 GM/DL (ref 6.4–8.2)
RBC # BLD AUTO: 4.26 MIL/MM3 (ref 4–5.3)
SODIUM SERPL-SCNC: 138 MEQ/L (ref 136–145)
WBC # BLD AUTO: 16.5 TH/MM3 (ref 4–11)

## 2018-04-13 RX ADMIN — VALSARTAN SCH MG: 160 TABLET ORAL at 09:00

## 2018-04-13 RX ADMIN — CALCIUM CARBONATE-CHOLECALCIFEROL TAB 250 MG-125 UNIT SCH MG: 250-125 TAB at 09:10

## 2018-04-13 RX ADMIN — LACTOBACILLUS ACIDOPHILUS / LACTOBACILLUS BULGARICUS SCH GM: 100 MILLION CFU STRENGTH GRANULES at 12:30

## 2018-04-13 RX ADMIN — GABAPENTIN SCH MG: 300 CAPSULE ORAL at 11:54

## 2018-04-13 RX ADMIN — MORPHINE SULFATE PRN MG: 2 INJECTION, SOLUTION INTRAMUSCULAR; INTRAVENOUS at 09:39

## 2018-04-13 RX ADMIN — CALCIUM CARBONATE-CHOLECALCIFEROL TAB 250 MG-125 UNIT SCH MG: 250-125 TAB at 17:13

## 2018-04-13 RX ADMIN — VANCOMYCIN HYDROCHLORIDE SCH MG: 500 INJECTION, POWDER, LYOPHILIZED, FOR SOLUTION INTRAVENOUS at 20:12

## 2018-04-13 RX ADMIN — GABAPENTIN SCH MG: 300 CAPSULE ORAL at 17:13

## 2018-04-13 RX ADMIN — ASPIRIN 81 MG SCH MG: 81 TABLET ORAL at 09:12

## 2018-04-13 RX ADMIN — ACYCLOVIR SCH UNITS: 800 TABLET ORAL at 09:38

## 2018-04-13 RX ADMIN — METOPROLOL TARTRATE SCH MG: 100 TABLET, FILM COATED ORAL at 09:00

## 2018-04-13 RX ADMIN — HEPARIN SODIUM SCH UNITS: 10000 INJECTION, SOLUTION INTRAVENOUS; SUBCUTANEOUS at 16:05

## 2018-04-13 RX ADMIN — Medication SCH ML: at 09:13

## 2018-04-13 RX ADMIN — HEPARIN SODIUM SCH UNITS: 10000 INJECTION, SOLUTION INTRAVENOUS; SUBCUTANEOUS at 06:03

## 2018-04-13 RX ADMIN — CHLORHEXIDINE GLUCONATE SCH PACK: 500 CLOTH TOPICAL at 04:00

## 2018-04-13 RX ADMIN — DULOXETINE HYDROCHLORIDE SCH MG: 30 CAPSULE, DELAYED RELEASE ORAL at 09:10

## 2018-04-13 RX ADMIN — MORPHINE SULFATE PRN MG: 2 INJECTION, SOLUTION INTRAMUSCULAR; INTRAVENOUS at 20:13

## 2018-04-13 RX ADMIN — LACTOBACILLUS ACIDOPHILUS / LACTOBACILLUS BULGARICUS SCH GM: 100 MILLION CFU STRENGTH GRANULES at 17:13

## 2018-04-13 RX ADMIN — INSULIN ASPART SCH: 100 INJECTION, SOLUTION INTRAVENOUS; SUBCUTANEOUS at 20:12

## 2018-04-13 RX ADMIN — MORPHINE SULFATE PRN MG: 2 INJECTION, SOLUTION INTRAMUSCULAR; INTRAVENOUS at 06:45

## 2018-04-13 RX ADMIN — LEVOTHYROXINE SODIUM SCH MCG: 125 TABLET ORAL at 06:02

## 2018-04-13 RX ADMIN — METOPROLOL TARTRATE SCH MG: 100 TABLET, FILM COATED ORAL at 20:11

## 2018-04-13 RX ADMIN — CIPROFLOXACIN HYDROCHLORIDE SCH MG: 250 TABLET, FILM COATED ORAL at 20:11

## 2018-04-13 RX ADMIN — LACTOBACILLUS ACIDOPHILUS / LACTOBACILLUS BULGARICUS SCH GM: 100 MILLION CFU STRENGTH GRANULES at 09:00

## 2018-04-13 RX ADMIN — Medication SCH CAP: at 09:00

## 2018-04-13 RX ADMIN — INSULIN ASPART SCH: 100 INJECTION, SOLUTION INTRAVENOUS; SUBCUTANEOUS at 08:00

## 2018-04-13 RX ADMIN — POTASSIUM CHLORIDE SCH MEQ: 20 TABLET, EXTENDED RELEASE ORAL at 09:12

## 2018-04-13 RX ADMIN — CIPROFLOXACIN HYDROCHLORIDE SCH MG: 250 TABLET, FILM COATED ORAL at 11:54

## 2018-04-13 RX ADMIN — Medication SCH ML: at 20:12

## 2018-04-13 RX ADMIN — INSULIN ASPART SCH: 100 INJECTION, SOLUTION INTRAVENOUS; SUBCUTANEOUS at 17:11

## 2018-04-13 RX ADMIN — VANCOMYCIN HYDROCHLORIDE SCH MG: 500 INJECTION, POWDER, LYOPHILIZED, FOR SOLUTION INTRAVENOUS at 17:13

## 2018-04-13 RX ADMIN — PANTOPRAZOLE SCH MG: 40 TABLET, DELAYED RELEASE ORAL at 09:12

## 2018-04-13 RX ADMIN — VANCOMYCIN HYDROCHLORIDE SCH MG: 500 INJECTION, POWDER, LYOPHILIZED, FOR SOLUTION INTRAVENOUS at 09:00

## 2018-04-13 RX ADMIN — ACYCLOVIR SCH UNITS: 800 TABLET ORAL at 20:12

## 2018-04-13 RX ADMIN — CALCIUM CARBONATE-CHOLECALCIFEROL TAB 250 MG-125 UNIT SCH MG: 250-125 TAB at 11:54

## 2018-04-13 RX ADMIN — INSULIN ASPART SCH: 100 INJECTION, SOLUTION INTRAVENOUS; SUBCUTANEOUS at 12:00

## 2018-04-13 RX ADMIN — VANCOMYCIN HYDROCHLORIDE SCH MG: 500 INJECTION, POWDER, LYOPHILIZED, FOR SOLUTION INTRAVENOUS at 12:31

## 2018-04-13 RX ADMIN — GABAPENTIN SCH MG: 300 CAPSULE ORAL at 09:11

## 2018-04-13 RX ADMIN — PREGABALIN SCH MG: 75 CAPSULE ORAL at 09:09

## 2018-04-13 RX ADMIN — AMIODARONE HYDROCHLORIDE SCH MG: 200 TABLET ORAL at 09:00

## 2018-04-13 RX ADMIN — MORPHINE SULFATE PRN MG: 2 INJECTION, SOLUTION INTRAMUSCULAR; INTRAVENOUS at 16:12

## 2018-04-13 RX ADMIN — PREGABALIN SCH MG: 75 CAPSULE ORAL at 20:11

## 2018-04-13 NOTE — HHI.DCPOC
Discharge Care Plan


Diagnosis:  


(1) Non-ischemic cardiomyopathy


(2) Sacral decubitus ulcer








Your Health Problems Are: Difficulty with ADL





 Exercise Tolerance








Goals to Promote Your Health


* To prevent worsening of your condition and complications


* To maintain your health at the optimal level


Directions to Meet Your Goals


*** Take your medications as prescribed


*** Follow your dietary instruction


*** Follow activity as directed








*** Keep your appointments as scheduled


*** Take your immunizations and boosters as scheduled


*** If your symptoms worsen call your PCP, if no PCP go to Urgent Care Center 

or Emergency Room***


*** Smoking is Dangerous to Your Health. Avoid second hand smoke***


***Call the 24-hour hour crisis hotline for domestic abuse at 1-941.639.4842***











Candido Garrison MD Apr 13, 2018 09:17

## 2018-04-13 NOTE — PD.WCN.NOT
Neg Pressure Wound Therapy


Wound Location


Wound Location:


Right side of Sacrum





Wound Description


Length:


5cm


Width:


1.5cm


Depth:


0.7cm


Undermining:


from 1 to 4 o'clock deepest at 4 o'clock measuring 1.6cm


Wound bed appearance:


100% moist red muscle tissue


Periwound appearance:  Other (scar tissue. Wound margins with eipibole from 7 

to 10 o'clockl )





Settings


Suction:  125 mmHg, Continuous


Intensity:  Low


Other Information:  Bridged, Windowpaned, Mushroomed


Foam type:  Black


Number of pieces:  1





Wound Location


Wound Location:


Left side of Sacrum





Wound Description


Length:


3.3cm


Width:


3.9cm


Depth:


2cm


Undermining:


Undermining noted from 7 to 1 o'clock deepest at 11 o'clock measuring 2.8cm


Wound bed appearance:


100% moist red muscle tissue


Periwound appearance:  Other (scar tissue)





Settings


Suction:  125 mmHg, Continuous


Intensity:  Low


Other Information:  Bridged, Windowpaned


Foam type:  Black


Number of pieces:  1





Additonal Information


Patient seen on 7 north for VAC dressing change with bridging to R hip around 

1815, full note to follow.











[facility] Apr 13, 2018 19:16

## 2018-04-13 NOTE — HHI.FF
Face to Face Verification


Diagnosis:  


(1) Sacral decubitus ulcer


(2) Non-ischemic cardiomyopathy


Physical Therapy


Order:  Evaluate and Treat, Improve ambulation, Strength and gait training





Home Health Nursing








Order: Medical education





 Signs/symptoms of disease process





 Diabetic education





 CHF education





 Medication education-adverse effect





 Wound care and dressing changes





 Nursing assessment with vital signs





 Romano catheter maintenance

















I have seen patient Maite Damon on 4/13/18. My clinical findings 

support the need for the requested home health care services because:








 Deconditioned w/ increased weakness














I certify that my clinical findings support that this patient is homebound 

because:








 Unsafe to leave home unassisted

















Candido Garrison MD Apr 13, 2018 09:18

## 2018-04-13 NOTE — PD.CARD.PN
Subjective


Subjective Remarks


Denies CP, dyspnea, palpitations, syncope.  Mild dizziness upon standing.  

Slight occasional nausea, no vomiting.





Objective


Medications











Item Value  Date Time


 


Amiodarone HCl 400 mg 4/13/18 0900





 (Cordarone) DAILY/PO 


 


Clonidine 0.2 mg 4/11/18 0900





 (Catapres) TID/PO 4/13/18 1230


 


Potassium Chloride 20 meq 4/10/18 0900





 (KCl) DAILY/PO 4/13/18 0912


 


Amlodipine 10 mg 4/9/18 0900





Besylate DAILY/PO 





 (Norvasc)  


 


Aspirin 81 mg 4/9/18 0900





 (Aspirin Chew) DAILY/CHEW 4/13/18 0912


 


Valsartan 160 mg 4/9/18 0900





 (Diovan) DAILY/PO 


 


Metoprolol 100 mg 4/8/18 2100





Tartrate BID/PO 





 (Lopressor)  


 


Heparin Sodium 5,000 units 4/8/18 1600





 (Porcine) Q12H/SQ 4/13/18 1605





 (Heparin Inj)  











Current Medications








 Medications


  (Trade)  Dose


 Ordered  Sig/Adilene


 Route  Start Time


 Stop Time Status Last Admin


 


  (Norvasc)  10 mg  DAILY


 PO  4/9/18 09:00


    4/12/18 08:30


 


 


  (Aspirin Chew)  81 mg  DAILY


 CHEW  4/9/18 09:00


    4/13/18 09:12


 


 


  (Oscal-D 250-125)  500 mg  TID


 PO  4/8/18 18:00


    4/13/18 11:54


 


 


  (Cymbalta Dr)  90 mg  DAILY


 PO  4/9/18 09:00


    4/13/18 09:10


 


 


  (Neurontin)  600 mg  TID


 PO  4/8/18 18:00


    4/13/18 11:54


 


 


  (Synthroid)  125 mcg  DAILY@0600


 PO  4/9/18 06:00


    4/13/18 06:02


 


 


  (Lopressor)  100 mg  BID


 PO  4/8/18 21:00


    4/12/18 20:00


 


 


  (Lyrica)  225 mg  BID


 PO  4/8/18 21:00


    4/13/18 09:09


 


 


  (Diovan)  160 mg  DAILY


 PO  4/9/18 09:00


    4/12/18 08:31


 


 


  (Nephrocaps)  1 cap  DAILY


 PO  4/9/18 09:00


    4/13/18 09:00


 


 


  (NS Flush)  2 ml  UNSCH  PRN


 IV FLUSH  4/8/18 14:30


    4/13/18 09:41


 


 


  (NS Flush)  2 ml  BID


 IV FLUSH  4/8/18 21:00


    4/13/18 09:13


 


 


  (Tylenol)  650 mg  Q6H  PRN


 PO  4/8/18 14:30


     


 


 


  (Zofran Inj)  4 mg  Q6H  PRN


 IV PUSH  4/8/18 14:30


    4/11/18 20:27


 


 


  (Albuterol Neb)  2.5 mg  Q2HR NEB  PRN


 INH  4/8/18 14:30


     


 


 


  (Heparin Inj)  5,000 units  Q12H


 SQ  4/8/18 16:00


    4/13/18 16:05


 


 


 Miscellaneous


 Information  1  Q361D


 XX  4/8/18 14:30


    4/8/18 19:00


 


 


  (Chlorhexidine


 2% Cloth)  3 pack


 Taper  DAILY@04


 TOP  4/9/18 04:00


 4/5/19 03:59  4/12/18 03:27


 


 


  (Chlorhexidine


 2% Cloth)  3 pack  UNSCH  PRN


 TOP  4/8/18 14:30


     


 


 


  (Tammi-Colace)  1 tab  BID


 PO  4/8/18 21:00


   Future Hold 4/11/18 21:47


 


 


  (Milk Of


 Magnesia Liq)  30 ml  Q12H  PRN


 PO  4/8/18 14:30


     


 


 


  (Senokot)  17.2 mg  Q12H  PRN


 PO  4/8/18 14:30


    4/10/18 23:39


 


 


  (Dulcolax Supp)  10 mg  DAILY  PRN


 RECTAL  4/8/18 14:30


     


 


 


  (Lactulose Liq)  30 ml  DAILY  PRN


 PO  4/8/18 14:30


     


 


 


  (Protonix)  40 mg  DAILY


 PO  4/9/18 09:00


    4/13/18 09:12


 


 


  (Norco  5-325 Mg)  1 tab  Q4H  PRN


 PO  4/8/18 19:00


    4/10/18 22:24


 


 


  (Morphine Inj)  2 mg  Q2H  PRN


 IV PUSH  4/8/18 19:00


    4/13/18 09:39


 


 


  (KCl)  20 meq  DAILY


 PO  4/10/18 09:00


    4/13/18 09:12


 


 


  (Restoril)  15 mg  HS  PRN


 PO  4/10/18 03:45


    4/10/18 03:44


 


 


  (Compazine Inj)  5 mg  Q4H  PRN


 IM  4/10/18 21:45


     


 


 


  (Catapres)  0.2 mg  TID


 PO  4/11/18 09:00


    4/13/18 12:30


 


 


  (D50w (Vial) Inj)  50 ml  UNSCH  PRN


 IV PUSH  4/11/18 16:15


     


 


 


  (Glucagon Inj)  1 mg  UNSCH  PRN


 OTHER  4/11/18 16:15


     


 


 


  (NovoLOG


 SUPPLEMENTAL


 SCALE)  1  ACHS SLIDING  SCALE


 SQ  4/11/18 17:00


    4/12/18 08:00


 


 


  (VANCOMYCIN for


 oral use only)  250 mg  QID


 PO  4/11/18 18:00


 4/21/18 17:59  4/13/18 12:31


 


 


  (Lactinex Pkt)  1 gm  TID


 PO  4/12/18 13:00


    4/13/18 12:30


 


 


  (Cordarone)  400 mg  DAILY


 PO  4/13/18 09:00


     


 


 


  (Cipro)  250 mg  Q12HR


 PO  4/13/18 11:00


 4/15/18 10:59  4/13/18 11:54


 


 


  (Levemir Inj)  8 units  Q12HR


 SQ  4/13/18 21:00


     


 








Vital Signs / I&O





Vital Signs








  Date Time  Temp Pulse Resp B/P (MAP) Pulse Ox O2 Delivery O2 Flow Rate FiO2


 


4/13/18 12:00 98.9 66 18 132/63 (86) 95   


 


4/13/18 09:44   18     


 


4/13/18 08:00 98.2 63 18 103/54 (70) 96   


 


4/13/18 06:50   18     


 


4/13/18 04:00 97.8 68 18 100/58 (72) 95   


 


4/13/18 00:00 98.3 61 18 94/54 (67) 97   


 


4/12/18 21:00 98.0 60 18 108/51 (70) 94   


 


4/12/18 20:00 98.5 62 18 117/54 (75) 97   


 


4/12/18 19:00  62 17 102/51 (68) 98   


 


4/12/18 19:00  62      


 


4/12/18 18:00  65      


 


4/12/18 18:00  65 16 101/50 (67) 95   


 


4/12/18 17:00  66 17 91/53 (66) 95   


 


4/12/18 17:00  66      














I/O      


 


 4/12/18 4/12/18 4/12/18 4/13/18 4/13/18 4/13/18





 07:00 15:00 23:00 07:00 15:00 23:00


 


Intake Total 480 ml 100 ml 240 ml 240 ml  


 


Output Total 780 ml  550 ml 400 ml  


 


Balance -300 ml 100 ml -310 ml -160 ml  


 


      


 


Intake Oral 480 ml  240 ml 240 ml  


 


IV Total  100 ml    


 


Output Urine Total 750 ml  550 ml 400 ml  


 


Stool Total 30 ml     


 


# Bowel Movements 2  5 1  








Physical Exam


GENERAL: Well developed, well nourished. No acute distress.


HEENT: Jugular venous pressure is normal. 


CHEST: Lungs clear to auscultation anteriorly.


CARDIAC: Regular rate and rhythm without S3, S4, or murmur.


ABDOMEN: Soft, nontender, no hepatosplenomegaly. Bowel sounds present.


EXTREMITIES: No clubbing, cyanosis, or edema.


Laboratory





Laboratory Tests








Test


  4/13/18


07:05


 


White Blood Count 16.5 TH/MM3 


 


Red Blood Count 4.26 MIL/MM3 


 


Hemoglobin 10.7 GM/DL 


 


Hematocrit 32.8 % 


 


Mean Corpuscular Volume 77.0 FL 


 


Mean Corpuscular Hemoglobin 25.0 PG 


 


Mean Corpuscular Hemoglobin


Concent 32.5 % 


 


 


Red Cell Distribution Width 16.6 % 


 


Platelet Count 357 TH/MM3 


 


Mean Platelet Volume 8.1 FL 


 


Neutrophils (%) (Auto) 79.3 % 


 


Lymphocytes (%) (Auto) 11.3 % 


 


Monocytes (%) (Auto) 9.2 % 


 


Eosinophils (%) (Auto) 0.1 % 


 


Basophils (%) (Auto) 0.1 % 


 


Neutrophils # (Auto) 13.1 TH/MM3 


 


Lymphocytes # (Auto) 1.9 TH/MM3 


 


Monocytes # (Auto) 1.5 TH/MM3 


 


Eosinophils # (Auto) 0.0 TH/MM3 


 


Basophils # (Auto) 0.0 TH/MM3 


 


CBC Comment DIFF FINAL 


 


Differential Comment  


 


Blood Urea Nitrogen 8 MG/DL 


 


Creatinine 0.70 MG/DL 


 


Random Glucose 68 MG/DL 


 


Total Protein 5.9 GM/DL 


 


Albumin 1.8 GM/DL 


 


Calcium Level 8.9 MG/DL 


 


Phosphorus Level 3.1 MG/DL 


 


Magnesium Level 2.2 MG/DL 


 


Alkaline Phosphatase 110 U/L 


 


Aspartate Amino Transf


(AST/SGOT) 8 U/L 


 


 


Alanine Aminotransferase


(ALT/SGPT) 11 U/L 


 


 


Total Bilirubin 0.2 MG/DL 


 


Sodium Level 138 MEQ/L 


 


Potassium Level 3.6 MEQ/L 


 


Chloride Level 101 MEQ/L 


 


Carbon Dioxide Level 31.5 MEQ/L 


 


Anion Gap 6 MEQ/L 


 


Estimat Glomerular Filtration


Rate 103 ML/MIN 


 











Assessment and Plan


Problem List:  


(1) AICD discharge


ICD Codes:  Z45.02 - Encounter for adjustment and management of automatic 

implantable cardiac defibrillator


Status:  Acute


Plan:  Stable overnight.  Interrogation of ICD shows 5 appropriate shocks for 

VT.  The VT may have been precipitated by severe hypokalemia on admission.  

Recommend continue oral Amiodarone.  OK for discharge from cardiac standpoint.





(2) Non-ischemic cardiomyopathy


ICD Codes:  I42.8 - Other cardiomyopathies


Status:  Chronic


Plan:  EF appears slightly better, now 45-50%, compared to echo last year.   

Stable.  Compensated.  No acute CHF.  Continue beta blocker, ARB.  





(3) CAD (coronary artery disease)


ICD Codes:  I25.10 - Atherosclerotic heart disease of native coronary artery 

without angina pectoris


Status:  Chronic


Plan:  Stable.  Only mild CAD by cath 2015.  Continue daily aspirin





(4) Hypertension


ICD Codes:  I10 - Essential (primary) hypertension


Status:  Chronic


Plan:  Stable.  Normotensive on increased clonidine dosing.





Code Status


full code


Discussed Condition With


patient





Problem Qualifiers





(1) CAD (coronary artery disease):  


Qualified Codes:  I25.10 - Atherosclerotic heart disease of native coronary 

artery without angina pectoris


(2) Hypertension:  


Qualified Codes:  I10 - Essential (primary) hypertension








Narinder Treadwell MD Apr 13, 2018 16:18

## 2018-04-13 NOTE — HHI.PR
Subjective


Remarks


Follow-up C diff. She complains of weakness continues to have loose stools.  

Discussed with nursing





Objective


Vitals





Vital Signs








  Date Time  Temp Pulse Resp B/P (MAP) Pulse Ox O2 Delivery O2 Flow Rate FiO2


 


4/13/18 12:00 98.9 66 18 132/63 (86) 95   


 


4/13/18 09:44   18     


 


4/13/18 08:00 98.2 63 18 103/54 (70) 96   


 


4/13/18 06:50   18     


 


4/13/18 04:00 97.8 68 18 100/58 (72) 95   


 


4/13/18 00:00 98.3 61 18 94/54 (67) 97   


 


4/12/18 21:00 98.0 60 18 108/51 (70) 94   


 


4/12/18 20:00 98.5 62 18 117/54 (75) 97   


 


4/12/18 19:00  62 17 102/51 (68) 98   


 


4/12/18 19:00  62      


 


4/12/18 18:00  65      


 


4/12/18 18:00  65 16 101/50 (67) 95   


 


4/12/18 17:00  66 17 91/53 (66) 95   


 


4/12/18 17:00  66      


 


4/12/18 16:00 97.9 67 17 101/53 (69) 95   


 


4/12/18 16:00  67      


 


4/12/18 15:00  67 17 94/50 (65) 95   


 


4/12/18 15:00  67      


 


4/12/18 14:00  68 17 104/55 (71) 94   


 


4/12/18 14:00  68      














I/O      


 


 4/12/18 4/12/18 4/12/18 4/13/18 4/13/18 4/13/18





 07:00 15:00 23:00 07:00 15:00 23:00


 


Intake Total 480 ml 100 ml 240 ml 240 ml  


 


Output Total 780 ml  550 ml 400 ml  


 


Balance -300 ml 100 ml -310 ml -160 ml  


 


      


 


Intake Oral 480 ml  240 ml 240 ml  


 


IV Total  100 ml    


 


Output Urine Total 750 ml  550 ml 400 ml  


 


Stool Total 30 ml     


 


# Bowel Movements 2  5 1  








Result Diagram:  


4/13/18 0705                                                                   

             4/13/18 0705





Imaging





Last Impressions








Chest X-Ray 4/8/18 0000 Signed





Impressions: 





 Service Date/Time:  Sunday, April 8, 2018 12:24 - CONCLUSION:  1. Cardiomegaly 





 with mild basilar atelectasis. Pacer lead overlies right ventricle.     Dileep Vu MD 








Objective Remarks


GENERAL: 62-year-old AA female currently on room air in no acute distress


SKIN: Warm and dry.  Wound VAC over sacral/coccyx region


CARDIOVASCULAR: Regular rate and rhythm.  S1, S2 predose for without murmur


RESPIRATORY:. Clear to auscultation. Breath sounds equal bilaterally. 


GASTROINTESTINAL: Abdomen soft, non-tender, nondistended.  No prior scars noted


MUSCULOSKELETAL: Extremities without significant edema. No obvious deformities. 


NEUROLOGICAL: Awake and alert. No obvious cranial nerve deficits.  Motor 

grossly within normal limits. Five out of 5 muscle strength in the arms and 

legs.  Normal speech.


PSYCHIATRIC: Appropriate mood and affect; insight and judgment normal.


Procedures


NONE





A/P


Problem List:  


(1) Hypoproteinemia


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


(2) Sacral decubitus ulcer


ICD Code:  L89.159 - Pressure ulcer of sacral region, unspecified stage


(3) Hyperglycemia


ICD Code:  R73.9 - Hyperglycemia, unspecified


(4) AICD discharge


ICD Code:  Z45.02 - Encounter for adjustment and management of automatic 

implantable cardiac defibrillator


Status:  Acute


(5) Hypokalemia


ICD Code:  E87.6 - Hypokalemia


Status:  Acute


(6) Hypomagnesemia


ICD Code:  E83.42 - Hypomagnesemia


Status:  Acute


(7) Hypocalcemia


ICD Code:  E83.51 - Hypocalcemia


Status:  Acute


(8) Leukocytosis


ICD Code:  D72.829 - Elevated white blood cell count, unspecified


Status:  Acute


(9) Elevated troponin I level


ICD Code:  R74.8 - Abnormal levels of other serum enzymes


Status:  Acute


(10) HTN (hypertension)


ICD Code:  I10 - Essential (primary) hypertension


Status:  Chronic


(11) Diabetes mellitus


ICD Code:  E11.9 - Type 2 diabetes mellitus without complications


Status:  Chronic


(12) CAD (coronary artery disease)


ICD Code:  I25.10 - Atherosclerotic heart disease of native coronary artery 

without angina pectoris


Status:  Chronic


(13) CHF (congestive heart failure)


ICD Code:  I50.9 - Heart failure, unspecified


Status:  Chronic


(14) Hypothyroidism


ICD Code:  E03.9 - Hypothyroidism, unspecified


Status:  Chronic


(15) GERD (gastroesophageal reflux disease)


ICD Code:  K21.9 - Gastro-esophageal reflux disease without esophagitis


Status:  Chronic


(16) Vitamin D deficiency


ICD Code:  E55.9 - Vitamin D deficiency, unspecified


Status:  Chronic


(17) Lupus


ICD Code:  L93.0 - Discoid lupus erythematosus


Status:  Chronic


(18) Depression


ICD Code:  F32.9 - Major depressive disorder, single episode, unspecified


(19) Vitamin B12 deficiency


ICD Code:  E53.8 - Deficiency of other specified B group vitamins


(20) Neuropathy


ICD Code:  G62.9 - Polyneuropathy, unspecified


(21) Chronic prescription opiate use


ICD Code:  Z79.891 - Long term (current) use of opiate analgesic


(22) Pernicious anemia


ICD Code:  D51.0 - Vitamin B12 deficiency anemia due to intrinsic factor 

deficiency


(23) Fibromyalgia


ICD Code:  M79.7 - Fibromyalgia


(24) Diverticulosis


ICD Code:  K57.90 - Diverticulosis of intestine, part unspecified, without 

perforation or abscess without bleeding


(25) COPD (chronic obstructive pulmonary disease)


ICD Code:  J44.9 - Chronic obstructive pulmonary disease, unspecified


(26) Dyslipidemia


ICD Code:  E78.5 - Hyperlipidemia, unspecified


(27) Pulmonary hypertension


ICD Code:  I27.20 - Pulmonary hypertension, unspecified


(28) Tricuspid regurgitation


ICD Code:  I07.1 - Rheumatic tricuspid insufficiency


(29) Osteoporosis


ICD Code:  M81.0 - Age-related osteoporosis without current pathological 

fracture


(30) Rheumatoid arthritis


ICD Code:  M06.9 - Rheumatoid arthritis, unspecified


(31) Peripheral vascular disease


ICD Code:  I73.9 - Peripheral vascular disease, unspecified


(32) Benign colonic polyp


ICD Code:  K63.5 - Polyp of colon


Assessment and Plan


Neuro/Psych:





Migraine headache


Chronic opioid use


Diabetic peripheral neuropathy


Depression/anxiety


History of fibromyalgia





Acetaminophen 650 mg p.o. every 6 hours as needed fever


Hydrocodone/acetaminophen 5/325 1 tab every 4 hours as needed pain 1 through 5


Morphine sulfate 2 mg IV every 2 hours as needed pain 6-10


Holding tizanidine 4 mg p.o. every 8 hours for muscle relaxant


Continue duloxetine 90 mg p.o. daily for depression


Patient is on Percocet 10/325 1 tablet every 6 hours as needed pain at home


Patient is on pregabalin 225 mg p.o. twice daily at home along with gabapentin 

600 mg p.o. 3 times daily.  This has been resumed


 


CV: 





Essential hypertension


History of chronic systolic heart failure currently EF 65-70%


Status post AICD placement


Coronary artery disease status post CABG


History of atrial fib relation status post ablation


Elevated troponin 





Resume metoprolol tartrate 100 mg p.o. twice daily, amlodipine 10 mg p.o. daily 

and valsartan 160 mg p.o. daily for hypertension


Resume clonidine 0.1 mg p.o. daily for hypertension


   Increase 0.2 mg twice daily per cardiology/Dr. Treadwell


Echocardiogram 10/2017 revealed EF 65-70%.  Severe portal hypertension.  

Moderate TR.


Interrogate pacemaker completed 4/9


Aggressively replete electrolytes


Dr. Treadwell recommended amiodarone 400 mg daily and continue beta-blocker and ARB


Serial troponin every 6 hours 2 peaked at 1.43.  Currently downward





Resp: 





History of COPD/prior tobaccoism quit 2001





Nasal cannula to maintain saturations greater than or equal to 92%


Incentive spirometry while awake


Chest x-ray revealed cardiomegaly/signs of mild heart failure


Albuterol/ipratropium aerosols every 6 hours with albuterol aerosols every 2 

hours as needed dyspnea





GI: 





History of hiatal hernia


History of esophageal dilatation


History of diverticulosis


History of internal and external hemorrhoids


Diverticulosis


Benign colonic polyp





2000 ADA diet


Pantoprazole for GI prophylaxis


Docusate sodium/senna 1 tablet twice daily for bowel regimen





:  





Discontinue Romano catheter





Endo:  





Diabetes mellitus type 2 with hyperglycemia


Hypothyroidism





Refusing higher dose of Levemir just wants 8 units twice daily.  Continue to 

monitor fingersticks with sliding scale coverage


Continue levothyroxine 125 mcg p.o. daily.  Check TSH was 0.61





Renal: 





Creatinine currently 0.8


Started on normal saline with 20 mEq KCl 84 cc an hour


Monitor urine output


Accurate I's and O





Heme:





History of pernicious anemia/microcytic





Monitor CBC daily.  Follow trends


No indication for transfusion of blood products at this time





ID:





C. difficile





Monitor for infection





Blood cultures 2 4/8 ordered by ED


Urine culture 4/9 pending


C. difficile positive.  Started vancomycin 125 mg p.o. every 6 hours 10 days 

total.  Infectious guidelines severe with elevated white cell count greater 

than 15,000.  Improving leukocytosis





MSK/Rheum:





Osteoporosis


Vitamin B-12 deficiency


SLE


Rheumatoid arthritis


Sacral decubitus ulcer





Holding vitamin B12 1000 units subcu monthly and alendronate 70 mg by mouth 

weekly


Wound care evaluate and treat





FEN:





Hypokalemia -severe symptomatic resolved


Hypophosphatemia


Hypocalcemia





Patient is on potassium chloride 20 mEq daily at home.  Resume


Status post replacement of electrolytes





Access:


-Utilize peripheral IV.  Central line if indicated





Prophylaxis


-GI -pantoprazole


-DVT -SCD/heparin


Discharge Planning


Not ready for discharge because of ongoing diarrhea high likelihood of 

dehydration.  Will need home care PT and visiting nurse





Problem Qualifiers





(1) Sacral decubitus ulcer:  


Qualified Codes:  L89.159 - Pressure ulcer of sacral region, unspecified stage


(2) Leukocytosis:  


Qualified Codes:  D72.829 - Elevated white blood cell count, unspecified


(3) HTN (hypertension):  


Qualified Codes:  I10 - Essential (primary) hypertension


(4) Diabetes mellitus:  


Qualified Codes:  E11.65 - Type 2 diabetes mellitus with hyperglycemia; Z79.4 - 

Long term (current) use of insulin


(5) CAD (coronary artery disease):  


Qualified Codes:  I25.10 - Atherosclerotic heart disease of native coronary 

artery without angina pectoris


(6) CHF (congestive heart failure):  


Qualified Codes:  I50.32 - Chronic diastolic (congestive) heart failure


(7) Hypothyroidism:  


Qualified Codes:  E03.9 - Hypothyroidism, unspecified


(8) GERD (gastroesophageal reflux disease):  


Qualified Codes:  K21.9 - Gastro-esophageal reflux disease without esophagitis


(9) Lupus:  


Qualified Codes:  L93.0 - Discoid lupus erythematosus


(10) Depression:  





(11) Diverticulosis:  


Qualified Codes:  K57.90 - Diverticulosis of intestine, part unspecified, 

without perforation or abscess without bleeding


(12) COPD (chronic obstructive pulmonary disease):  


Qualified Codes:  J44.9 - Chronic obstructive pulmonary disease, unspecified


(13) Tricuspid regurgitation:  


Qualified Codes:  I07.1 - Rheumatic tricuspid insufficiency


(14) Osteoporosis:  


Qualified Codes:  M81.0 - Age-related osteoporosis without current pathological 

fracture


(15) Rheumatoid arthritis:  


Qualified Codes:  M06.9 - Rheumatoid arthritis, unspecified








Candido Garrison MD Apr 13, 2018 13:04

## 2018-04-14 VITALS
HEART RATE: 59 BPM | OXYGEN SATURATION: 96 % | TEMPERATURE: 98.2 F | RESPIRATION RATE: 18 BRPM | SYSTOLIC BLOOD PRESSURE: 128 MMHG | DIASTOLIC BLOOD PRESSURE: 60 MMHG

## 2018-04-14 VITALS
SYSTOLIC BLOOD PRESSURE: 136 MMHG | HEART RATE: 61 BPM | DIASTOLIC BLOOD PRESSURE: 63 MMHG | TEMPERATURE: 98.3 F | OXYGEN SATURATION: 99 % | RESPIRATION RATE: 17 BRPM

## 2018-04-14 VITALS
TEMPERATURE: 98 F | OXYGEN SATURATION: 97 % | HEART RATE: 60 BPM | SYSTOLIC BLOOD PRESSURE: 111 MMHG | DIASTOLIC BLOOD PRESSURE: 51 MMHG | RESPIRATION RATE: 18 BRPM

## 2018-04-14 VITALS
DIASTOLIC BLOOD PRESSURE: 56 MMHG | SYSTOLIC BLOOD PRESSURE: 112 MMHG | HEART RATE: 64 BPM | OXYGEN SATURATION: 97 % | TEMPERATURE: 98 F | RESPIRATION RATE: 18 BRPM

## 2018-04-14 VITALS
HEART RATE: 76 BPM | SYSTOLIC BLOOD PRESSURE: 146 MMHG | DIASTOLIC BLOOD PRESSURE: 75 MMHG | TEMPERATURE: 98 F | OXYGEN SATURATION: 95 % | RESPIRATION RATE: 17 BRPM

## 2018-04-14 VITALS
DIASTOLIC BLOOD PRESSURE: 57 MMHG | OXYGEN SATURATION: 97 % | SYSTOLIC BLOOD PRESSURE: 119 MMHG | HEART RATE: 64 BPM | RESPIRATION RATE: 16 BRPM | TEMPERATURE: 97.8 F

## 2018-04-14 VITALS — OXYGEN SATURATION: 97 %

## 2018-04-14 LAB
BASOPHILS # BLD AUTO: 0 TH/MM3 (ref 0–0.2)
BASOPHILS NFR BLD: 0.3 % (ref 0–2)
BUN SERPL-MCNC: 8 MG/DL (ref 7–18)
CALCIUM SERPL-MCNC: 8.5 MG/DL (ref 8.5–10.1)
CHLORIDE SERPL-SCNC: 99 MEQ/L (ref 98–107)
CREAT SERPL-MCNC: 0.69 MG/DL (ref 0.5–1)
EOSINOPHIL # BLD: 0 TH/MM3 (ref 0–0.4)
EOSINOPHIL NFR BLD: 0.2 % (ref 0–4)
ERYTHROCYTE [DISTWIDTH] IN BLOOD BY AUTOMATED COUNT: 16.2 % (ref 11.6–17.2)
GFR SERPLBLD BASED ON 1.73 SQ M-ARVRAT: 105 ML/MIN (ref 89–?)
GLUCOSE SERPL-MCNC: 84 MG/DL (ref 74–106)
HCO3 BLD-SCNC: 29.5 MEQ/L (ref 21–32)
HCT VFR BLD CALC: 32.4 % (ref 35–46)
HGB BLD-MCNC: 10.7 GM/DL (ref 11.6–15.3)
INR PPP: 1 RATIO
LYMPHOCYTES # BLD AUTO: 2.1 TH/MM3 (ref 1–4.8)
LYMPHOCYTES NFR BLD AUTO: 16.9 % (ref 9–44)
LYMPHOCYTES: 18 % (ref 9–44)
MAGNESIUM SERPL-MCNC: 1.9 MG/DL (ref 1.5–2.5)
MCH RBC QN AUTO: 25.4 PG (ref 27–34)
MCHC RBC AUTO-ENTMCNC: 33.1 % (ref 32–36)
MCV RBC AUTO: 76.8 FL (ref 80–100)
MONOCYTE #: 1.4 TH/MM3 (ref 0–0.9)
MONOCYTES NFR BLD: 11.1 % (ref 0–8)
MONOCYTES: 10 % (ref 0–8)
MYELOCYTES NFR BLD: 1 % (ref 0–0)
NEUTROPHILS # BLD AUTO: 8.9 TH/MM3 (ref 1.8–7.7)
NEUTROPHILS NFR BLD AUTO: 71.5 % (ref 16–70)
NEUTS BAND # BLD MANUAL: 8.9 TH/MM3 (ref 1.8–7.7)
NEUTS BAND NFR BLD: 10 % (ref 0–6)
NEUTS SEG NFR BLD MANUAL: 60 % (ref 16–70)
PLATELET # BLD: 380 TH/MM3 (ref 150–450)
PMV BLD AUTO: 8.2 FL (ref 7–11)
PROTHROMBIN TIME: 10.1 SEC (ref 9.8–11.6)
RBC # BLD AUTO: 4.22 MIL/MM3 (ref 4–5.3)
SODIUM SERPL-SCNC: 136 MEQ/L (ref 136–145)
WBC # BLD AUTO: 12.5 TH/MM3 (ref 4–11)

## 2018-04-14 RX ADMIN — MORPHINE SULFATE PRN MG: 2 INJECTION, SOLUTION INTRAMUSCULAR; INTRAVENOUS at 04:32

## 2018-04-14 RX ADMIN — GABAPENTIN SCH MG: 300 CAPSULE ORAL at 17:18

## 2018-04-14 RX ADMIN — PREGABALIN SCH MG: 75 CAPSULE ORAL at 09:36

## 2018-04-14 RX ADMIN — ASPIRIN 81 MG SCH MG: 81 TABLET ORAL at 09:39

## 2018-04-14 RX ADMIN — CHLORHEXIDINE GLUCONATE SCH PACK: 500 CLOTH TOPICAL at 04:00

## 2018-04-14 RX ADMIN — VANCOMYCIN HYDROCHLORIDE SCH MG: 500 INJECTION, POWDER, LYOPHILIZED, FOR SOLUTION INTRAVENOUS at 17:23

## 2018-04-14 RX ADMIN — LACTOBACILLUS ACIDOPHILUS / LACTOBACILLUS BULGARICUS SCH GM: 100 MILLION CFU STRENGTH GRANULES at 12:31

## 2018-04-14 RX ADMIN — LACTOBACILLUS ACIDOPHILUS / LACTOBACILLUS BULGARICUS SCH GM: 100 MILLION CFU STRENGTH GRANULES at 09:37

## 2018-04-14 RX ADMIN — DULOXETINE HYDROCHLORIDE SCH MG: 30 CAPSULE, DELAYED RELEASE ORAL at 09:39

## 2018-04-14 RX ADMIN — CALCIUM CARBONATE-CHOLECALCIFEROL TAB 250 MG-125 UNIT SCH MG: 250-125 TAB at 17:20

## 2018-04-14 RX ADMIN — PREGABALIN SCH MG: 75 CAPSULE ORAL at 20:50

## 2018-04-14 RX ADMIN — HEPARIN SODIUM SCH UNITS: 10000 INJECTION, SOLUTION INTRAVENOUS; SUBCUTANEOUS at 04:31

## 2018-04-14 RX ADMIN — CIPROFLOXACIN HYDROCHLORIDE SCH MG: 250 TABLET, FILM COATED ORAL at 20:50

## 2018-04-14 RX ADMIN — GABAPENTIN SCH MG: 300 CAPSULE ORAL at 12:30

## 2018-04-14 RX ADMIN — LEVOTHYROXINE SODIUM SCH MCG: 125 TABLET ORAL at 04:31

## 2018-04-14 RX ADMIN — ACYCLOVIR SCH UNITS: 800 TABLET ORAL at 09:41

## 2018-04-14 RX ADMIN — CALCIUM CARBONATE-CHOLECALCIFEROL TAB 250 MG-125 UNIT SCH MG: 250-125 TAB at 12:30

## 2018-04-14 RX ADMIN — HYDROCODONE BITARTRATE AND ACETAMINOPHEN PRN TAB: 5; 325 TABLET ORAL at 12:30

## 2018-04-14 RX ADMIN — INSULIN ASPART SCH: 100 INJECTION, SOLUTION INTRAVENOUS; SUBCUTANEOUS at 17:00

## 2018-04-14 RX ADMIN — CALCIUM CARBONATE-CHOLECALCIFEROL TAB 250 MG-125 UNIT SCH MG: 250-125 TAB at 09:37

## 2018-04-14 RX ADMIN — HYDROCODONE BITARTRATE AND ACETAMINOPHEN PRN TAB: 5; 325 TABLET ORAL at 22:31

## 2018-04-14 RX ADMIN — INSULIN ASPART SCH: 100 INJECTION, SOLUTION INTRAVENOUS; SUBCUTANEOUS at 07:53

## 2018-04-14 RX ADMIN — POTASSIUM CHLORIDE SCH MEQ: 20 TABLET, EXTENDED RELEASE ORAL at 09:36

## 2018-04-14 RX ADMIN — Medication SCH ML: at 09:40

## 2018-04-14 RX ADMIN — VANCOMYCIN HYDROCHLORIDE SCH MG: 500 INJECTION, POWDER, LYOPHILIZED, FOR SOLUTION INTRAVENOUS at 12:30

## 2018-04-14 RX ADMIN — VALSARTAN SCH MG: 160 TABLET ORAL at 09:41

## 2018-04-14 RX ADMIN — INSULIN ASPART SCH: 100 INJECTION, SOLUTION INTRAVENOUS; SUBCUTANEOUS at 20:50

## 2018-04-14 RX ADMIN — LACTOBACILLUS ACIDOPHILUS / LACTOBACILLUS BULGARICUS SCH GM: 100 MILLION CFU STRENGTH GRANULES at 17:18

## 2018-04-14 RX ADMIN — AMIODARONE HYDROCHLORIDE SCH MG: 200 TABLET ORAL at 09:38

## 2018-04-14 RX ADMIN — METOPROLOL TARTRATE SCH MG: 100 TABLET, FILM COATED ORAL at 09:36

## 2018-04-14 RX ADMIN — INSULIN ASPART SCH: 100 INJECTION, SOLUTION INTRAVENOUS; SUBCUTANEOUS at 12:00

## 2018-04-14 RX ADMIN — VANCOMYCIN HYDROCHLORIDE SCH MG: 500 INJECTION, POWDER, LYOPHILIZED, FOR SOLUTION INTRAVENOUS at 20:50

## 2018-04-14 RX ADMIN — PANTOPRAZOLE SCH MG: 40 TABLET, DELAYED RELEASE ORAL at 09:39

## 2018-04-14 RX ADMIN — ACYCLOVIR SCH UNITS: 800 TABLET ORAL at 20:50

## 2018-04-14 RX ADMIN — MORPHINE SULFATE PRN MG: 2 INJECTION, SOLUTION INTRAMUSCULAR; INTRAVENOUS at 00:03

## 2018-04-14 RX ADMIN — Medication SCH ML: at 20:51

## 2018-04-14 RX ADMIN — CIPROFLOXACIN HYDROCHLORIDE SCH MG: 250 TABLET, FILM COATED ORAL at 09:39

## 2018-04-14 RX ADMIN — Medication SCH CAP: at 09:41

## 2018-04-14 RX ADMIN — HEPARIN SODIUM SCH UNITS: 10000 INJECTION, SOLUTION INTRAVENOUS; SUBCUTANEOUS at 17:17

## 2018-04-14 RX ADMIN — METOPROLOL TARTRATE SCH MG: 100 TABLET, FILM COATED ORAL at 20:50

## 2018-04-14 RX ADMIN — VANCOMYCIN HYDROCHLORIDE SCH MG: 500 INJECTION, POWDER, LYOPHILIZED, FOR SOLUTION INTRAVENOUS at 09:35

## 2018-04-14 RX ADMIN — GABAPENTIN SCH MG: 300 CAPSULE ORAL at 09:40

## 2018-04-14 NOTE — HHI.PR
Subjective


Remarks


Follow-up C. difficile infection.  Still feels weak already had two BMs today 

described as semi-formed.  Discussed with nursing





Objective


Vitals





Vital Signs








  Date Time  Temp Pulse Resp B/P (MAP) Pulse Ox O2 Delivery O2 Flow Rate FiO2


 


4/14/18 08:00 98.0 76 17 146/75 (98) 95   


 


4/14/18 08:00  74      


 


4/14/18 04:00 98.2 59 18 128/60 (82) 96   


 


4/14/18 00:00 98.0 60 18 111/51 (71) 97   


 


4/13/18 20:04  62      


 


4/13/18 20:00 97.4 73 16 116/58 (77) 99   


 


4/13/18 16:17   18     


 


4/13/18 16:00 98.3 68 18 109/59 (76) 97   














I/O      


 


 4/13/18 4/13/18 4/13/18 4/14/18 4/14/18 4/14/18





 06:59 14:59 22:59 06:59 14:59 22:59


 


Intake Total 240 ml  720 ml 480 ml  


 


Output Total 400 ml  1000 ml 1190 ml  


 


Balance -160 ml  -280 ml -710 ml  


 


      


 


Intake Oral 240 ml  720 ml 480 ml  


 


Output Urine Total 400 ml  1000 ml 1150 ml  


 


Drainage Total   0 ml 40 ml  


 


Bladder Scan Volume Amount   234 ml   


 


# Voids   0   


 


# Bowel Movements 1  3   








Result Diagram:  


4/14/18 0403                                                                   

             4/14/18 0403





Imaging





Last Impressions








Chest X-Ray 4/8/18 0000 Signed





Impressions: 





 Service Date/Time:  Sunday, April 8, 2018 12:24 - CONCLUSION:  1. Cardiomegaly 





 with mild basilar atelectasis. Pacer lead overlies right ventricle.     Dileep Vu MD 








Objective Remarks


GENERAL: 62-year-old AA female currently on room air in no acute distress


SKIN: Warm and dry.  Wound VAC over sacral/coccyx region


CARDIOVASCULAR: Regular rate and rhythm.  S1, S2 predose for without murmur


RESPIRATORY:. Clear to auscultation. Breath sounds equal bilaterally. 


GASTROINTESTINAL: Abdomen soft, slightly tender lower quadrants, nondistended.  

No prior scars noted


MUSCULOSKELETAL: Extremities without significant edema. No obvious deformities. 


NEUROLOGICAL: Awake and alert. No obvious cranial nerve deficits.  Motor 

grossly within normal limits. Five out of 5 muscle strength in the arms and 

legs.  Normal speech.


PSYCHIATRIC: Appropriate mood and affect; insight and judgment normal.


Procedures


NONE





A/P


Problem List:  


(1) Hypoproteinemia


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


(2) Sacral decubitus ulcer


ICD Code:  L89.159 - Pressure ulcer of sacral region, unspecified stage


(3) Hyperglycemia


ICD Code:  R73.9 - Hyperglycemia, unspecified


(4) AICD discharge


ICD Code:  Z45.02 - Encounter for adjustment and management of automatic 

implantable cardiac defibrillator


Status:  Acute


(5) Hypokalemia


ICD Code:  E87.6 - Hypokalemia


Status:  Acute


(6) Hypomagnesemia


ICD Code:  E83.42 - Hypomagnesemia


Status:  Acute


(7) Hypocalcemia


ICD Code:  E83.51 - Hypocalcemia


Status:  Acute


(8) Leukocytosis


ICD Code:  D72.829 - Elevated white blood cell count, unspecified


Status:  Acute


(9) Elevated troponin I level


ICD Code:  R74.8 - Abnormal levels of other serum enzymes


Status:  Acute


(10) HTN (hypertension)


ICD Code:  I10 - Essential (primary) hypertension


Status:  Chronic


(11) Diabetes mellitus


ICD Code:  E11.9 - Type 2 diabetes mellitus without complications


Status:  Chronic


(12) CAD (coronary artery disease)


ICD Code:  I25.10 - Atherosclerotic heart disease of native coronary artery 

without angina pectoris


Status:  Chronic


(13) CHF (congestive heart failure)


ICD Code:  I50.9 - Heart failure, unspecified


Status:  Chronic


(14) Hypothyroidism


ICD Code:  E03.9 - Hypothyroidism, unspecified


Status:  Chronic


(15) GERD (gastroesophageal reflux disease)


ICD Code:  K21.9 - Gastro-esophageal reflux disease without esophagitis


Status:  Chronic


(16) Vitamin D deficiency


ICD Code:  E55.9 - Vitamin D deficiency, unspecified


Status:  Chronic


(17) Lupus


ICD Code:  L93.0 - Discoid lupus erythematosus


Status:  Chronic


(18) Depression


ICD Code:  F32.9 - Major depressive disorder, single episode, unspecified


(19) Vitamin B12 deficiency


ICD Code:  E53.8 - Deficiency of other specified B group vitamins


(20) Neuropathy


ICD Code:  G62.9 - Polyneuropathy, unspecified


(21) Chronic prescription opiate use


ICD Code:  Z79.891 - Long term (current) use of opiate analgesic


(22) Pernicious anemia


ICD Code:  D51.0 - Vitamin B12 deficiency anemia due to intrinsic factor 

deficiency


(23) Fibromyalgia


ICD Code:  M79.7 - Fibromyalgia


(24) Diverticulosis


ICD Code:  K57.90 - Diverticulosis of intestine, part unspecified, without 

perforation or abscess without bleeding


(25) COPD (chronic obstructive pulmonary disease)


ICD Code:  J44.9 - Chronic obstructive pulmonary disease, unspecified


(26) Dyslipidemia


ICD Code:  E78.5 - Hyperlipidemia, unspecified


(27) Pulmonary hypertension


ICD Code:  I27.20 - Pulmonary hypertension, unspecified


(28) Tricuspid regurgitation


ICD Code:  I07.1 - Rheumatic tricuspid insufficiency


(29) Osteoporosis


ICD Code:  M81.0 - Age-related osteoporosis without current pathological 

fracture


(30) Rheumatoid arthritis


ICD Code:  M06.9 - Rheumatoid arthritis, unspecified


(31) Peripheral vascular disease


ICD Code:  I73.9 - Peripheral vascular disease, unspecified


(32) Benign colonic polyp


ICD Code:  K63.5 - Polyp of colon


Assessment and Plan


Neuro/Psych:





Migraine headache


Chronic opioid use


Diabetic peripheral neuropathy


Depression/anxiety


History of fibromyalgia





Acetaminophen 650 mg p.o. every 6 hours as needed fever


Hydrocodone/acetaminophen 5/325 1 tab every 4 hours as needed pain 1 through 5


Morphine sulfate 2 mg IV every 2 hours as needed pain 6-10


Holding tizanidine 4 mg p.o. every 8 hours for muscle relaxant


Continue duloxetine 90 mg p.o. daily for depression


Patient is on Percocet 10/325 1 tablet every 6 hours as needed pain at home


Patient is on pregabalin 225 mg p.o. twice daily at home along with gabapentin 

600 mg p.o. 3 times daily.  This has been resumed


 


CV: 





Essential hypertension


History of chronic systolic heart failure currently EF 65-70%


Status post AICD placement


Coronary artery disease status post CABG


History of atrial fib relation status post ablation


Elevated troponin 





Resume metoprolol tartrate 100 mg p.o. twice daily, amlodipine 10 mg p.o. daily 

and valsartan 160 mg p.o. daily for hypertension


Resume clonidine 0.1 mg p.o. daily for hypertension


   Increase 0.2 mg twice daily per cardiology/Dr. Treadwell


Echocardiogram 10/2017 revealed EF 65-70%.  Severe portal hypertension.  

Moderate TR.


Interrogate pacemaker completed 4/9


Aggressively replete electrolytes


Dr. Treadwell recommended amiodarone 400 mg daily and continue beta-blocker and ARB


Serial troponin every 6 hours 2 peaked at 1.43.  Currently downward





Resp: 





History of COPD/prior tobaccoism quit 2001





Nasal cannula to maintain saturations greater than or equal to 92%


Incentive spirometry while awake


Chest x-ray revealed cardiomegaly/signs of mild heart failure


Albuterol/ipratropium aerosols every 6 hours with albuterol aerosols every 2 

hours as needed dyspnea





GI: 





History of hiatal hernia


History of esophageal dilatation


History of diverticulosis


History of internal and external hemorrhoids


Diverticulosis


Benign colonic polyp





2000 ADA diet


Pantoprazole for GI prophylaxis


Docusate sodium/senna 1 tablet twice daily for bowel regimen





:  





Discontinued Romano catheter





Endo:  





Diabetes mellitus type 2 with hyperglycemia


Hypothyroidism





Refusing higher dose of Levemir just wants 8 units twice daily.  Continue to 

monitor fingersticks with sliding scale coverage


Continue levothyroxine 125 mcg p.o. daily. TSH was 0.61





Renal: 





Creatinine currently 0.8


Started on normal saline with 20 mEq KCl 84 cc an hour


Monitor urine output


Accurate I's and O





Heme:





History of pernicious anemia/microcytic





Monitor CBC daily.  Follow trends


No indication for transfusion of blood products at this time





ID:





C. difficile





Monitor for infection





Blood cultures 2 4/8 ordered by ED


Urine culture 4/9 pending


C. difficile positive.  Started vancomycin 125 mg p.o. every 6 hours 10 days 

total.  Infectious guidelines severe with elevated white cell count greater 

than 15,000.  Improving leukocytosis but Im concerned with abd tenderness will 

monitor





MSK/Rheum:





Osteoporosis


Vitamin B-12 deficiency


SLE


Rheumatoid arthritis


Sacral decubitus ulcer





Holding vitamin B12 1000 units subcu monthly and alendronate 70 mg by mouth 

weekly


Wound care evaluate and treat





FEN:





Hypokalemia -severe symptomatic resolved


Hypophosphatemia


Hypocalcemia





Patient is on potassium chloride 20 mEq daily at home.  Resume


Status post replacement of electrolytes





Access:


-Utilize peripheral IV.  Central line if indicated





Prophylaxis


-GI -pantoprazole


-DVT -SCD/heparin


Discharge Planning


Not ready for discharge.  Will need home care PT and visiting nurse





Problem Qualifiers





(1) Sacral decubitus ulcer:  


Qualified Codes:  L89.159 - Pressure ulcer of sacral region, unspecified stage


(2) Leukocytosis:  


Qualified Codes:  D72.829 - Elevated white blood cell count, unspecified


(3) HTN (hypertension):  


Qualified Codes:  I10 - Essential (primary) hypertension


(4) Diabetes mellitus:  


Qualified Codes:  E11.65 - Type 2 diabetes mellitus with hyperglycemia; Z79.4 - 

Long term (current) use of insulin


(5) CAD (coronary artery disease):  


Qualified Codes:  I25.10 - Atherosclerotic heart disease of native coronary 

artery without angina pectoris


(6) CHF (congestive heart failure):  


Qualified Codes:  I50.32 - Chronic diastolic (congestive) heart failure


(7) Hypothyroidism:  


Qualified Codes:  E03.9 - Hypothyroidism, unspecified


(8) GERD (gastroesophageal reflux disease):  


Qualified Codes:  K21.9 - Gastro-esophageal reflux disease without esophagitis


(9) Lupus:  


Qualified Codes:  L93.0 - Discoid lupus erythematosus


(10) Depression:  





(11) Diverticulosis:  


Qualified Codes:  K57.90 - Diverticulosis of intestine, part unspecified, 

without perforation or abscess without bleeding


(12) COPD (chronic obstructive pulmonary disease):  


Qualified Codes:  J44.9 - Chronic obstructive pulmonary disease, unspecified


(13) Tricuspid regurgitation:  


Qualified Codes:  I07.1 - Rheumatic tricuspid insufficiency


(14) Osteoporosis:  


Qualified Codes:  M81.0 - Age-related osteoporosis without current pathological 

fracture


(15) Rheumatoid arthritis:  


Qualified Codes:  M06.9 - Rheumatoid arthritis, unspecified








Candido Garrison MD Apr 14, 2018 12:09

## 2018-04-15 VITALS
SYSTOLIC BLOOD PRESSURE: 119 MMHG | TEMPERATURE: 97.7 F | HEART RATE: 62 BPM | RESPIRATION RATE: 17 BRPM | DIASTOLIC BLOOD PRESSURE: 60 MMHG | OXYGEN SATURATION: 100 %

## 2018-04-15 VITALS
SYSTOLIC BLOOD PRESSURE: 111 MMHG | RESPIRATION RATE: 17 BRPM | DIASTOLIC BLOOD PRESSURE: 63 MMHG | OXYGEN SATURATION: 99 % | TEMPERATURE: 97.5 F | HEART RATE: 63 BPM

## 2018-04-15 VITALS
TEMPERATURE: 98.4 F | SYSTOLIC BLOOD PRESSURE: 121 MMHG | DIASTOLIC BLOOD PRESSURE: 59 MMHG | HEART RATE: 64 BPM | RESPIRATION RATE: 16 BRPM | OXYGEN SATURATION: 96 %

## 2018-04-15 VITALS
DIASTOLIC BLOOD PRESSURE: 65 MMHG | RESPIRATION RATE: 18 BRPM | OXYGEN SATURATION: 95 % | SYSTOLIC BLOOD PRESSURE: 144 MMHG | HEART RATE: 62 BPM | TEMPERATURE: 97.9 F

## 2018-04-15 VITALS
HEART RATE: 69 BPM | TEMPERATURE: 98.1 F | DIASTOLIC BLOOD PRESSURE: 59 MMHG | OXYGEN SATURATION: 95 % | RESPIRATION RATE: 18 BRPM | SYSTOLIC BLOOD PRESSURE: 117 MMHG

## 2018-04-15 VITALS
DIASTOLIC BLOOD PRESSURE: 67 MMHG | TEMPERATURE: 97.9 F | HEART RATE: 69 BPM | OXYGEN SATURATION: 100 % | SYSTOLIC BLOOD PRESSURE: 115 MMHG | RESPIRATION RATE: 17 BRPM

## 2018-04-15 VITALS — HEART RATE: 59 BPM

## 2018-04-15 VITALS — HEART RATE: 62 BPM

## 2018-04-15 VITALS — HEART RATE: 68 BPM

## 2018-04-15 LAB
BASOPHILS # BLD AUTO: 0 TH/MM3 (ref 0–0.2)
BASOPHILS NFR BLD: 0.3 % (ref 0–2)
BUN SERPL-MCNC: 6 MG/DL (ref 7–18)
CALCIUM SERPL-MCNC: 7.8 MG/DL (ref 8.5–10.1)
CHLORIDE SERPL-SCNC: 98 MEQ/L (ref 98–107)
CREAT SERPL-MCNC: 0.81 MG/DL (ref 0.5–1)
EOSINOPHIL # BLD: 0 TH/MM3 (ref 0–0.4)
EOSINOPHIL NFR BLD: 0 % (ref 0–4)
ERYTHROCYTE [DISTWIDTH] IN BLOOD BY AUTOMATED COUNT: 15.7 % (ref 11.6–17.2)
GFR SERPLBLD BASED ON 1.73 SQ M-ARVRAT: 87 ML/MIN (ref 89–?)
GLUCOSE SERPL-MCNC: 314 MG/DL (ref 74–106)
HCO3 BLD-SCNC: 28.1 MEQ/L (ref 21–32)
HCT VFR BLD CALC: 32.4 % (ref 35–46)
HGB BLD-MCNC: 10.4 GM/DL (ref 11.6–15.3)
LYMPHOCYTES # BLD AUTO: 1.4 TH/MM3 (ref 1–4.8)
LYMPHOCYTES NFR BLD AUTO: 12.2 % (ref 9–44)
MAGNESIUM SERPL-MCNC: 1.8 MG/DL (ref 1.5–2.5)
MCH RBC QN AUTO: 24.9 PG (ref 27–34)
MCHC RBC AUTO-ENTMCNC: 32.1 % (ref 32–36)
MCV RBC AUTO: 77.6 FL (ref 80–100)
MONOCYTE #: 1.1 TH/MM3 (ref 0–0.9)
MONOCYTES NFR BLD: 9.6 % (ref 0–8)
NEUTROPHILS # BLD AUTO: 9 TH/MM3 (ref 1.8–7.7)
NEUTROPHILS NFR BLD AUTO: 77.9 % (ref 16–70)
PLATELET # BLD: 364 TH/MM3 (ref 150–450)
PMV BLD AUTO: 8 FL (ref 7–11)
RBC # BLD AUTO: 4.17 MIL/MM3 (ref 4–5.3)
SODIUM SERPL-SCNC: 134 MEQ/L (ref 136–145)
WBC # BLD AUTO: 11.6 TH/MM3 (ref 4–11)

## 2018-04-15 RX ADMIN — CHLORHEXIDINE GLUCONATE SCH PACK: 500 CLOTH TOPICAL at 04:00

## 2018-04-15 RX ADMIN — METOPROLOL TARTRATE SCH MG: 100 TABLET, FILM COATED ORAL at 22:13

## 2018-04-15 RX ADMIN — CALCIUM CARBONATE-CHOLECALCIFEROL TAB 250 MG-125 UNIT SCH MG: 250-125 TAB at 16:59

## 2018-04-15 RX ADMIN — MORPHINE SULFATE PRN MG: 2 INJECTION, SOLUTION INTRAMUSCULAR; INTRAVENOUS at 12:57

## 2018-04-15 RX ADMIN — HYDROCODONE BITARTRATE AND ACETAMINOPHEN PRN TAB: 5; 325 TABLET ORAL at 04:54

## 2018-04-15 RX ADMIN — GABAPENTIN SCH MG: 300 CAPSULE ORAL at 14:28

## 2018-04-15 RX ADMIN — HEPARIN SODIUM SCH UNITS: 10000 INJECTION, SOLUTION INTRAVENOUS; SUBCUTANEOUS at 04:53

## 2018-04-15 RX ADMIN — VANCOMYCIN HYDROCHLORIDE SCH MG: 500 INJECTION, POWDER, LYOPHILIZED, FOR SOLUTION INTRAVENOUS at 14:28

## 2018-04-15 RX ADMIN — ASPIRIN 81 MG SCH MG: 81 TABLET ORAL at 09:08

## 2018-04-15 RX ADMIN — VANCOMYCIN HYDROCHLORIDE SCH MG: 500 INJECTION, POWDER, LYOPHILIZED, FOR SOLUTION INTRAVENOUS at 16:59

## 2018-04-15 RX ADMIN — DULOXETINE HYDROCHLORIDE SCH MG: 30 CAPSULE, DELAYED RELEASE ORAL at 09:04

## 2018-04-15 RX ADMIN — MORPHINE SULFATE PRN MG: 2 INJECTION, SOLUTION INTRAMUSCULAR; INTRAVENOUS at 02:35

## 2018-04-15 RX ADMIN — PREGABALIN SCH MG: 75 CAPSULE ORAL at 22:13

## 2018-04-15 RX ADMIN — ACYCLOVIR SCH UNITS: 800 TABLET ORAL at 09:07

## 2018-04-15 RX ADMIN — VALSARTAN SCH MG: 160 TABLET ORAL at 09:00

## 2018-04-15 RX ADMIN — LACTOBACILLUS ACIDOPHILUS / LACTOBACILLUS BULGARICUS SCH GM: 100 MILLION CFU STRENGTH GRANULES at 17:26

## 2018-04-15 RX ADMIN — PREGABALIN SCH MG: 75 CAPSULE ORAL at 09:03

## 2018-04-15 RX ADMIN — GABAPENTIN SCH MG: 300 CAPSULE ORAL at 16:59

## 2018-04-15 RX ADMIN — POTASSIUM CHLORIDE SCH MEQ: 20 TABLET, EXTENDED RELEASE ORAL at 09:06

## 2018-04-15 RX ADMIN — LACTOBACILLUS ACIDOPHILUS / LACTOBACILLUS BULGARICUS SCH GM: 100 MILLION CFU STRENGTH GRANULES at 14:27

## 2018-04-15 RX ADMIN — AMIODARONE HYDROCHLORIDE SCH MG: 200 TABLET ORAL at 09:09

## 2018-04-15 RX ADMIN — ACYCLOVIR SCH UNITS: 800 TABLET ORAL at 21:00

## 2018-04-15 RX ADMIN — INSULIN ASPART SCH: 100 INJECTION, SOLUTION INTRAVENOUS; SUBCUTANEOUS at 08:00

## 2018-04-15 RX ADMIN — VANCOMYCIN HYDROCHLORIDE SCH MG: 500 INJECTION, POWDER, LYOPHILIZED, FOR SOLUTION INTRAVENOUS at 09:04

## 2018-04-15 RX ADMIN — METOPROLOL TARTRATE SCH MG: 100 TABLET, FILM COATED ORAL at 09:03

## 2018-04-15 RX ADMIN — CALCIUM CARBONATE-CHOLECALCIFEROL TAB 250 MG-125 UNIT SCH MG: 250-125 TAB at 14:28

## 2018-04-15 RX ADMIN — Medication SCH ML: at 09:09

## 2018-04-15 RX ADMIN — LACTOBACILLUS ACIDOPHILUS / LACTOBACILLUS BULGARICUS SCH GM: 100 MILLION CFU STRENGTH GRANULES at 09:06

## 2018-04-15 RX ADMIN — INSULIN ASPART SCH: 100 INJECTION, SOLUTION INTRAVENOUS; SUBCUTANEOUS at 22:15

## 2018-04-15 RX ADMIN — PANTOPRAZOLE SCH MG: 40 TABLET, DELAYED RELEASE ORAL at 09:07

## 2018-04-15 RX ADMIN — Medication SCH ML: at 22:14

## 2018-04-15 RX ADMIN — INSULIN ASPART SCH: 100 INJECTION, SOLUTION INTRAVENOUS; SUBCUTANEOUS at 12:55

## 2018-04-15 RX ADMIN — CIPROFLOXACIN HYDROCHLORIDE SCH MG: 250 TABLET, FILM COATED ORAL at 09:08

## 2018-04-15 RX ADMIN — INSULIN ASPART SCH: 100 INJECTION, SOLUTION INTRAVENOUS; SUBCUTANEOUS at 16:59

## 2018-04-15 RX ADMIN — MORPHINE SULFATE PRN MG: 2 INJECTION, SOLUTION INTRAMUSCULAR; INTRAVENOUS at 17:00

## 2018-04-15 RX ADMIN — Medication SCH CAP: at 09:07

## 2018-04-15 RX ADMIN — VANCOMYCIN HYDROCHLORIDE SCH MG: 500 INJECTION, POWDER, LYOPHILIZED, FOR SOLUTION INTRAVENOUS at 22:16

## 2018-04-15 RX ADMIN — HYDROCODONE BITARTRATE AND ACETAMINOPHEN PRN TAB: 5; 325 TABLET ORAL at 22:13

## 2018-04-15 RX ADMIN — LEVOTHYROXINE SODIUM SCH MCG: 125 TABLET ORAL at 04:54

## 2018-04-15 RX ADMIN — GABAPENTIN SCH MG: 300 CAPSULE ORAL at 09:04

## 2018-04-15 RX ADMIN — CALCIUM CARBONATE-CHOLECALCIFEROL TAB 250 MG-125 UNIT SCH MG: 250-125 TAB at 09:04

## 2018-04-15 RX ADMIN — HYDROCODONE BITARTRATE AND ACETAMINOPHEN PRN TAB: 5; 325 TABLET ORAL at 14:29

## 2018-04-15 NOTE — HHI.PR
Subjective


Remarks


Follow-up diverticulitis.  Still feels weak and continues to have abdominal 

pain 8 out of 10.  Stools are soft.  Discussed with nursing





Objective


Vitals





Vital Signs








  Date Time  Temp Pulse Resp B/P (MAP) Pulse Ox O2 Delivery O2 Flow Rate FiO2


 


4/15/18 12:00 97.7 62 17 119/60 (79) 100   


 


4/15/18 08:00 98.4 64 16 121/59 (79) 96   


 


4/15/18 04:00  66      


 


4/15/18 04:00 98.1 69 18 117/59 (78) 95   


 


4/15/18 04:00      Room Air  


 


4/15/18 00:00  64      


 


4/15/18 00:00 97.9 62 18 144/65 (91) 95   


 


4/15/18 00:00      Room Air  


 


4/14/18 21:09     97   21


 


4/14/18 20:00 98.0 63 18 112/56 (74) 97   


 


4/14/18 20:00      Room Air  


 


4/14/18 20:00  64      


 


4/14/18 16:00 97.8 64 16 119/57 (77) 97   














I/O      


 


 4/14/18 4/14/18 4/14/18 4/15/18 4/15/18 4/15/18





 07:00 15:00 23:00 07:00 15:00 23:00


 


Intake Total 480 ml  1080 ml 480 ml  


 


Output Total 1190 ml  1300 ml 1100 ml  


 


Balance -710 ml  -220 ml -620 ml  


 


      


 


Intake Oral 480 ml  1080 ml 480 ml  


 


Output Urine Total 1150 ml  1300 ml 1100 ml  


 


Drainage Total 40 ml     


 


# Bowel Movements   3 0  








Result Diagram:  


4/15/18 0815                                                                   

             4/15/18 0815





Imaging





Last Impressions








Chest X-Ray 4/8/18 0000 Signed





Impressions: 





 Service Date/Time:  Sunday, April 8, 2018 12:24 - CONCLUSION:  1. Cardiomegaly 





 with mild basilar atelectasis. Pacer lead overlies right ventricle.     Dileep Vu MD 








Objective Remarks


GENERAL: 62-year-old AA female currently on room air in no acute distress


SKIN: Warm and dry.  Wound VAC over sacral/coccyx region


CARDIOVASCULAR: Regular rate and rhythm.  S1, S2 without murmur


RESPIRATORY:. Clear to auscultation. Breath sounds equal bilaterally. 


GASTROINTESTINAL: Abdomen soft, slightly tender lower quadrants, nondistended.  

No prior scars noted


MUSCULOSKELETAL: Extremities without significant edema. No obvious deformities. 


NEUROLOGICAL: Awake and alert. No obvious cranial nerve deficits.  Motor 

grossly within normal limits. Five out of 5 muscle strength in the arms and 

legs.  Normal speech.


PSYCHIATRIC: Appropriate mood and affect; insight and judgment normal.


Procedures


NONE





A/P


Problem List:  


(1) Hypoproteinemia


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


(2) Sacral decubitus ulcer


ICD Code:  L89.159 - Pressure ulcer of sacral region, unspecified stage


(3) Hyperglycemia


ICD Code:  R73.9 - Hyperglycemia, unspecified


(4) AICD discharge


ICD Code:  Z45.02 - Encounter for adjustment and management of automatic 

implantable cardiac defibrillator


Status:  Acute


(5) Hypokalemia


ICD Code:  E87.6 - Hypokalemia


Status:  Acute


(6) Hypomagnesemia


ICD Code:  E83.42 - Hypomagnesemia


Status:  Acute


(7) Hypocalcemia


ICD Code:  E83.51 - Hypocalcemia


Status:  Acute


(8) Leukocytosis


ICD Code:  D72.829 - Elevated white blood cell count, unspecified


Status:  Acute


(9) Elevated troponin I level


ICD Code:  R74.8 - Abnormal levels of other serum enzymes


Status:  Acute


(10) HTN (hypertension)


ICD Code:  I10 - Essential (primary) hypertension


Status:  Chronic


(11) Diabetes mellitus


ICD Code:  E11.9 - Type 2 diabetes mellitus without complications


Status:  Chronic


(12) CAD (coronary artery disease)


ICD Code:  I25.10 - Atherosclerotic heart disease of native coronary artery 

without angina pectoris


Status:  Chronic


(13) CHF (congestive heart failure)


ICD Code:  I50.9 - Heart failure, unspecified


Status:  Chronic


(14) Hypothyroidism


ICD Code:  E03.9 - Hypothyroidism, unspecified


Status:  Chronic


(15) GERD (gastroesophageal reflux disease)


ICD Code:  K21.9 - Gastro-esophageal reflux disease without esophagitis


Status:  Chronic


(16) Vitamin D deficiency


ICD Code:  E55.9 - Vitamin D deficiency, unspecified


Status:  Chronic


(17) Lupus


ICD Code:  L93.0 - Discoid lupus erythematosus


Status:  Chronic


(18) Depression


ICD Code:  F32.9 - Major depressive disorder, single episode, unspecified


(19) Vitamin B12 deficiency


ICD Code:  E53.8 - Deficiency of other specified B group vitamins


(20) Neuropathy


ICD Code:  G62.9 - Polyneuropathy, unspecified


(21) Chronic prescription opiate use


ICD Code:  Z79.891 - Long term (current) use of opiate analgesic


(22) Pernicious anemia


ICD Code:  D51.0 - Vitamin B12 deficiency anemia due to intrinsic factor 

deficiency


(23) Fibromyalgia


ICD Code:  M79.7 - Fibromyalgia


(24) Diverticulosis


ICD Code:  K57.90 - Diverticulosis of intestine, part unspecified, without 

perforation or abscess without bleeding


(25) COPD (chronic obstructive pulmonary disease)


ICD Code:  J44.9 - Chronic obstructive pulmonary disease, unspecified


(26) Dyslipidemia


ICD Code:  E78.5 - Hyperlipidemia, unspecified


(27) Pulmonary hypertension


ICD Code:  I27.20 - Pulmonary hypertension, unspecified


(28) Tricuspid regurgitation


ICD Code:  I07.1 - Rheumatic tricuspid insufficiency


(29) Osteoporosis


ICD Code:  M81.0 - Age-related osteoporosis without current pathological 

fracture


(30) Rheumatoid arthritis


ICD Code:  M06.9 - Rheumatoid arthritis, unspecified


(31) Peripheral vascular disease


ICD Code:  I73.9 - Peripheral vascular disease, unspecified


(32) Benign colonic polyp


ICD Code:  K63.5 - Polyp of colon


Assessment and Plan


Neuro/Psych:





Migraine headache


Chronic opioid use


Diabetic peripheral neuropathy


Depression/anxiety


History of fibromyalgia





Acetaminophen 650 mg p.o. every 6 hours as needed fever


Hydrocodone/acetaminophen 5/325 1 tab every 4 hours as needed pain 1 through 5


Morphine sulfate 2 mg IV every 2 hours as needed pain 6-10


Holding tizanidine 4 mg p.o. every 8 hours for muscle relaxant


Continue duloxetine 90 mg p.o. daily for depression


Patient is on Percocet 10/325 1 tablet every 6 hours as needed pain at home


Patient is on pregabalin 225 mg p.o. twice daily at home along with gabapentin 

600 mg p.o. 3 times daily.  This has been resumed


 


CV: 





Essential hypertension


History of chronic systolic heart failure currently EF 65-70%


Status post AICD placement


Coronary artery disease status post CABG


History of atrial fib relation status post ablation


Elevated troponin 





Resume metoprolol tartrate 100 mg p.o. twice daily, amlodipine 10 mg p.o. daily 

and valsartan 160 mg p.o. daily for hypertension


Resume clonidine 0.1 mg p.o. daily for hypertension


   Increase 0.2 mg twice daily per cardiology/Dr. Treadwell


Echocardiogram 10/2017 revealed EF 65-70%.  Severe portal hypertension.  

Moderate TR.


Interrogate pacemaker completed 4/9


Aggressively replete electrolytes


Dr. Treadwell recommended amiodarone 400 mg daily and continue beta-blocker and ARB


Serial troponin every 6 hours 2 peaked at 1.43.  Currently downward





Resp: 





History of COPD/prior tobaccoism quit 2001





Nasal cannula to maintain saturations greater than or equal to 92%


Incentive spirometry while awake


Chest x-ray revealed cardiomegaly/signs of mild heart failure


Albuterol/ipratropium aerosols every 6 hours with albuterol aerosols every 2 

hours as needed dyspnea





GI: 





History of hiatal hernia


History of esophageal dilatation


History of diverticulosis


History of internal and external hemorrhoids


Diverticulosis


Benign colonic polyp





2000 ADA diet


Pantoprazole for GI prophylaxis


Docusate sodium/senna 1 tablet twice daily for bowel regimen





:  





Discontinued Romano catheter





Endo:  





Diabetes mellitus type 2 with hyperglycemia


Hypothyroidism





Refusing higher dose of Levemir just wants 8 units twice daily.  Continue to 

monitor fingersticks with sliding scale coverage hyperglycemia improved 

hypoglycemia protocol as


Continue levothyroxine 125 mcg p.o. daily. TSH was 0.61





Renal: 





Creatinine currently 0.8


Started on normal saline with 20 mEq KCl 84 cc an hour


Monitor urine output


Accurate I's and O





Heme:





History of pernicious anemia/microcytic





Monitor CBC daily.  Follow trends


No indication for transfusion of blood products at this time





ID:





C. difficile





Monitor for infection





Blood cultures 2 4/8 ordered by ED


Urine culture 4/9 pending


C. difficile positive.  Continues to have abdominal pain and tenderness will 

obtain CT of the abdomen and pelvis continue vancomycin 250 mg p.o. every 6 

hours 10 days total.  Infectious guidelines severe with elevated white cell 

count greater than 15,000.  Improving leukocytosis but Im concerned with abd 

tenderness will monitor





MSK/Rheum:





Osteoporosis


Vitamin B-12 deficiency


SLE


Rheumatoid arthritis


Sacral decubitus ulcer





Holding vitamin B12 1000 units subcu monthly and alendronate 70 mg by mouth 

weekly


Wound care evaluate and treat





FEN:





Hypokalemia -severe symptomatic resolved


Hypophosphatemia


Hypocalcemia





Patient is on potassium chloride 20 mEq daily at home.  Resume


Status post replacement of electrolytes





Access:


-Utilize peripheral IV.  Central line if indicated





Prophylaxis


-GI -pantoprazole


-DVT -SCD/heparin (will hold pending CT of the pelvis)


Discharge Planning


Not ready for discharge.  Will need home care PT and visiting nurse





Problem Qualifiers





(1) Sacral decubitus ulcer:  


Qualified Codes:  L89.159 - Pressure ulcer of sacral region, unspecified stage


(2) Leukocytosis:  


Qualified Codes:  D72.829 - Elevated white blood cell count, unspecified


(3) HTN (hypertension):  


Qualified Codes:  I10 - Essential (primary) hypertension


(4) Diabetes mellitus:  


Qualified Codes:  E11.65 - Type 2 diabetes mellitus with hyperglycemia; Z79.4 - 

Long term (current) use of insulin


(5) CAD (coronary artery disease):  


Qualified Codes:  I25.10 - Atherosclerotic heart disease of native coronary 

artery without angina pectoris


(6) CHF (congestive heart failure):  


Qualified Codes:  I50.32 - Chronic diastolic (congestive) heart failure


(7) Hypothyroidism:  


Qualified Codes:  E03.9 - Hypothyroidism, unspecified


(8) GERD (gastroesophageal reflux disease):  


Qualified Codes:  K21.9 - Gastro-esophageal reflux disease without esophagitis


(9) Lupus:  


Qualified Codes:  L93.0 - Discoid lupus erythematosus


(10) Depression:  





(11) Diverticulosis:  


Qualified Codes:  K57.90 - Diverticulosis of intestine, part unspecified, 

without perforation or abscess without bleeding


(12) COPD (chronic obstructive pulmonary disease):  


Qualified Codes:  J44.9 - Chronic obstructive pulmonary disease, unspecified


(13) Tricuspid regurgitation:  


Qualified Codes:  I07.1 - Rheumatic tricuspid insufficiency


(14) Osteoporosis:  


Qualified Codes:  M81.0 - Age-related osteoporosis without current pathological 

fracture


(15) Rheumatoid arthritis:  


Qualified Codes:  M06.9 - Rheumatoid arthritis, unspecified








Candido Garrison MD Apr 15, 2018 12:35

## 2018-04-15 NOTE — RADRPT
EXAM DATE/TIME:  04/15/2018 20:35 

 

[facility] COMPARISON:     

CTA ABDOMEN & PELVIS W 3D RECON, October 05, 2017, 19:02.

 

 

INDICATIONS :     

Abdomen pain.

                      

 

IV CONTRAST:     

95 cc Omnipaque 350 (iohexol) IV 

 

 

ORAL CONTRAST:      

Prescribed oral contrast ingested.

                      

 

RADIATION DOSE:     

10.16 CTDIvol (mGy) 

 

 

MEDICAL HISTORY :     

Cardiovascular disease. Hypertension. Renal calculi.C-Diff  Diabetes

 

SURGICAL HISTORY :      

Hysterectomy. CABG

 

ENCOUNTER:      

Initial

 

ACUITY:      

1 day

 

PAIN SCALE:      

6/10

 

LOCATION:       

Bilateral  abdomen

 

TECHNIQUE:     

Volumetric scanning of the abdomen and pelvis was performed.  Using automated exposure control and ad
justment of the mA and/or kV according to patient size, radiation dose was kept as low as reasonably 
achievable to obtain optimal diagnostic quality images.  DICOM format image data is available electro
nically for review and comparison.  

 

FINDINGS:     

 

LOWER LUNGS:     

There is linear scarring or atelectasis seen at the left lung base. There is a pacing lead seen in th
e right heart.

 

LIVER:     

There is mild decreased attenuation to the liver.

 

SPLEEN:     

The spleen is normal in size. There is a 2.4 cm hyperenhancing area seen at the anterior superior asp
ect of the spleen. There are peripheral calcification seen at the lateral aspect of the spleen and a 
minimal amount of subcapsular fluid likely from prior injury.

 

PANCREAS:     

The pancreas appears small likely from atrophy. A pancreatic mass is not seen.

 

KIDNEYS:     

Normal in size and shape.  There is 0.6 cm nonobstructing right renal stone and a 0.4 nonobstructing 
left renal stone.. No hydronephrosis is seen.

 

ADRENAL GLANDS:     

There is a 2.4 cm nonspecific right adrenal gland mass. This was present on the prior CTA from 10/5/2
017. The left adrenal gland is normal.

 

VASCULAR:     

There is no aortic aneurysm. There are atherosclerotic calcifications present.

 

BOWEL/MESENTERY:     

The stomach, small bowel, and colon demonstrate no acute abnormality.  There is no free intraperitone
al air or fluid.

 

ABDOMINAL WALL:     

Within normal limits.

 

RETROPERITONEUM:     

There is no lymphadenopathy. 

 

BLADDER:     

No wall thickening or mass. 

 

REPRODUCTIVE:     

Within normal limits. The patient is status post hysterectomy.

 

INGUINAL:     

There is no lymphadenopathy or hernia. 

 

MUSCULOSKELETAL:     

Surgical hardware seen at the left proximal femur. There is degenerative change in the lumbar spine. 
There some mild chronic appearing concavity to the superior endplate of L1. There is increased densit
y seen in the posterior soft tissues over the lower sacral and coccygeal regions likely related to de
cubitus ulcers.

 

CONCLUSION:     

1. Increased density seen in the subcutaneous tissues at the lower sacrococcygeal region is likely re
lated to sacral decubiti.

2. Mild hepatic steatosis.

3. Calcification and minimal subcapsular fluid likely from prior injury of the spleen. There is a 2.4
 cm hyperenhancing lesion seen at the superior aspect of the spleen. This is nonspecific. The most co
mmon hyperenhancing splenic mass would be hemangioma. These findings were present on a prior CTA from
 10/5/2017.

4. Nonobstructing renal stones.

5. Persistent 2.4 cm nonspecific right adrenal gland mass.

 

 

 

 Abdon Salcedo MD on April 15, 2018 at 20:54           

Board Certified Radiologist.

 This report was verified electronically.

## 2018-04-16 VITALS
RESPIRATION RATE: 18 BRPM | HEART RATE: 62 BPM | SYSTOLIC BLOOD PRESSURE: 124 MMHG | OXYGEN SATURATION: 97 % | DIASTOLIC BLOOD PRESSURE: 58 MMHG | TEMPERATURE: 98 F

## 2018-04-16 VITALS
SYSTOLIC BLOOD PRESSURE: 147 MMHG | RESPIRATION RATE: 17 BRPM | DIASTOLIC BLOOD PRESSURE: 68 MMHG | HEART RATE: 69 BPM | OXYGEN SATURATION: 97 % | TEMPERATURE: 97.7 F

## 2018-04-16 VITALS
SYSTOLIC BLOOD PRESSURE: 120 MMHG | HEART RATE: 64 BPM | TEMPERATURE: 98.1 F | RESPIRATION RATE: 17 BRPM | DIASTOLIC BLOOD PRESSURE: 65 MMHG | OXYGEN SATURATION: 100 %

## 2018-04-16 VITALS
OXYGEN SATURATION: 97 % | SYSTOLIC BLOOD PRESSURE: 113 MMHG | DIASTOLIC BLOOD PRESSURE: 57 MMHG | RESPIRATION RATE: 17 BRPM | TEMPERATURE: 98 F | HEART RATE: 65 BPM

## 2018-04-16 LAB
BASOPHILS # BLD AUTO: 0 TH/MM3 (ref 0–0.2)
BASOPHILS NFR BLD: 0.2 % (ref 0–2)
BUN SERPL-MCNC: 9 MG/DL (ref 7–18)
CALCIUM SERPL-MCNC: 8.6 MG/DL (ref 8.5–10.1)
CHLORIDE SERPL-SCNC: 99 MEQ/L (ref 98–107)
CREAT SERPL-MCNC: 0.93 MG/DL (ref 0.5–1)
EOSINOPHIL # BLD: 0 TH/MM3 (ref 0–0.4)
EOSINOPHIL NFR BLD: 0.1 % (ref 0–4)
ERYTHROCYTE [DISTWIDTH] IN BLOOD BY AUTOMATED COUNT: 16.3 % (ref 11.6–17.2)
GFR SERPLBLD BASED ON 1.73 SQ M-ARVRAT: 74 ML/MIN (ref 89–?)
GLUCOSE SERPL-MCNC: 219 MG/DL (ref 74–106)
HCO3 BLD-SCNC: 27.9 MEQ/L (ref 21–32)
HCT VFR BLD CALC: 32.3 % (ref 35–46)
HGB BLD-MCNC: 11 GM/DL (ref 11.6–15.3)
LYMPHOCYTES # BLD AUTO: 1.7 TH/MM3 (ref 1–4.8)
LYMPHOCYTES NFR BLD AUTO: 11.8 % (ref 9–44)
LYMPHOCYTES: 10 % (ref 9–44)
MAGNESIUM SERPL-MCNC: 1.9 MG/DL (ref 1.5–2.5)
MCH RBC QN AUTO: 26.3 PG (ref 27–34)
MCHC RBC AUTO-ENTMCNC: 34 % (ref 32–36)
MCV RBC AUTO: 77.5 FL (ref 80–100)
MONOCYTE #: 1.1 TH/MM3 (ref 0–0.9)
MONOCYTES NFR BLD: 7.8 % (ref 0–8)
MONOCYTES: 3 % (ref 0–8)
MYELOCYTES NFR BLD: 2 % (ref 0–0)
NEUTROPHILS # BLD AUTO: 11.5 TH/MM3 (ref 1.8–7.7)
NEUTROPHILS NFR BLD AUTO: 80.1 % (ref 16–70)
NEUTS BAND # BLD MANUAL: 12.5 TH/MM3 (ref 1.8–7.7)
NEUTS SEG NFR BLD MANUAL: 85 % (ref 16–70)
PLATELET # BLD: 342 TH/MM3 (ref 150–450)
PMV BLD AUTO: 7.8 FL (ref 7–11)
RBC # BLD AUTO: 4.16 MIL/MM3 (ref 4–5.3)
SODIUM SERPL-SCNC: 135 MEQ/L (ref 136–145)
WBC # BLD AUTO: 14.4 TH/MM3 (ref 4–11)

## 2018-04-16 RX ADMIN — METOPROLOL TARTRATE SCH MG: 100 TABLET, FILM COATED ORAL at 08:49

## 2018-04-16 RX ADMIN — VANCOMYCIN HYDROCHLORIDE SCH MG: 500 INJECTION, POWDER, LYOPHILIZED, FOR SOLUTION INTRAVENOUS at 13:41

## 2018-04-16 RX ADMIN — MORPHINE SULFATE PRN MG: 2 INJECTION, SOLUTION INTRAMUSCULAR; INTRAVENOUS at 08:51

## 2018-04-16 RX ADMIN — PANTOPRAZOLE SCH MG: 40 TABLET, DELAYED RELEASE ORAL at 08:51

## 2018-04-16 RX ADMIN — CALCIUM CARBONATE-CHOLECALCIFEROL TAB 250 MG-125 UNIT SCH MG: 250-125 TAB at 13:41

## 2018-04-16 RX ADMIN — GABAPENTIN SCH MG: 300 CAPSULE ORAL at 13:41

## 2018-04-16 RX ADMIN — ASPIRIN 81 MG SCH MG: 81 TABLET ORAL at 08:50

## 2018-04-16 RX ADMIN — LACTOBACILLUS ACIDOPHILUS / LACTOBACILLUS BULGARICUS SCH GM: 100 MILLION CFU STRENGTH GRANULES at 08:50

## 2018-04-16 RX ADMIN — Medication SCH ML: at 08:51

## 2018-04-16 RX ADMIN — INSULIN ASPART SCH: 100 INJECTION, SOLUTION INTRAVENOUS; SUBCUTANEOUS at 08:00

## 2018-04-16 RX ADMIN — GABAPENTIN SCH MG: 300 CAPSULE ORAL at 08:50

## 2018-04-16 RX ADMIN — Medication SCH CAP: at 08:51

## 2018-04-16 RX ADMIN — PREGABALIN SCH MG: 75 CAPSULE ORAL at 08:49

## 2018-04-16 RX ADMIN — AMIODARONE HYDROCHLORIDE SCH MG: 200 TABLET ORAL at 08:50

## 2018-04-16 RX ADMIN — CHLORHEXIDINE GLUCONATE SCH PACK: 500 CLOTH TOPICAL at 04:04

## 2018-04-16 RX ADMIN — VALSARTAN SCH MG: 160 TABLET ORAL at 08:50

## 2018-04-16 RX ADMIN — HYDROCODONE BITARTRATE AND ACETAMINOPHEN PRN TAB: 5; 325 TABLET ORAL at 10:49

## 2018-04-16 RX ADMIN — LEVOTHYROXINE SODIUM SCH MCG: 125 TABLET ORAL at 04:58

## 2018-04-16 RX ADMIN — HYDROCODONE BITARTRATE AND ACETAMINOPHEN PRN TAB: 5; 325 TABLET ORAL at 04:58

## 2018-04-16 RX ADMIN — POTASSIUM CHLORIDE SCH MEQ: 20 TABLET, EXTENDED RELEASE ORAL at 08:50

## 2018-04-16 RX ADMIN — CALCIUM CARBONATE-CHOLECALCIFEROL TAB 250 MG-125 UNIT SCH MG: 250-125 TAB at 08:51

## 2018-04-16 RX ADMIN — DULOXETINE HYDROCHLORIDE SCH MG: 30 CAPSULE, DELAYED RELEASE ORAL at 08:50

## 2018-04-16 RX ADMIN — VANCOMYCIN HYDROCHLORIDE SCH MG: 500 INJECTION, POWDER, LYOPHILIZED, FOR SOLUTION INTRAVENOUS at 08:51

## 2018-04-16 RX ADMIN — INSULIN ASPART SCH: 100 INJECTION, SOLUTION INTRAVENOUS; SUBCUTANEOUS at 13:41

## 2018-04-16 RX ADMIN — MORPHINE SULFATE PRN MG: 2 INJECTION, SOLUTION INTRAMUSCULAR; INTRAVENOUS at 01:16

## 2018-04-16 RX ADMIN — ACYCLOVIR SCH UNITS: 800 TABLET ORAL at 08:52

## 2018-04-16 RX ADMIN — LACTOBACILLUS ACIDOPHILUS / LACTOBACILLUS BULGARICUS SCH GM: 100 MILLION CFU STRENGTH GRANULES at 13:41

## 2018-04-16 RX ADMIN — HYDROCODONE BITARTRATE AND ACETAMINOPHEN PRN TAB: 5; 325 TABLET ORAL at 15:08

## 2018-04-16 NOTE — HHI.DS
__________________________________________________





Discharge Summary


Admission Date


Apr 8, 2018 at 13:50


Discharge Date:  Apr 16, 2018


Admitting Diagnosis





Severe hypokalemia, hypocalcemia, AICD firing





(1) Hypoproteinemia


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


Diagnosis:  Principal





(2) Sacral decubitus ulcer


ICD Code:  L89.159 - Pressure ulcer of sacral region, unspecified stage


Diagnosis:  Principal





(3) Hyperglycemia


ICD Code:  R73.9 - Hyperglycemia, unspecified


Diagnosis:  Principal





(4) AICD discharge


ICD Code:  Z45.02 - Encounter for adjustment and management of automatic 

implantable cardiac defibrillator


Diagnosis:  Principal


Status:  Acute


(5) Hypokalemia


ICD Code:  E87.6 - Hypokalemia


Diagnosis:  Principal


Status:  Acute


(6) Hypomagnesemia


ICD Code:  E83.42 - Hypomagnesemia


Diagnosis:  Principal


Status:  Acute


(7) Hypocalcemia


ICD Code:  E83.51 - Hypocalcemia


Diagnosis:  Principal


Status:  Acute


(8) Leukocytosis


ICD Code:  D72.829 - Elevated white blood cell count, unspecified


Diagnosis:  Secondary


Status:  Acute


(9) Elevated troponin I level


ICD Code:  R74.8 - Abnormal levels of other serum enzymes


Diagnosis:  Principal


Status:  Acute


(10) HTN (hypertension)


ICD Code:  I10 - Essential (primary) hypertension


Diagnosis:  Principal


Status:  Chronic


(11) Diabetes mellitus


ICD Code:  E11.9 - Type 2 diabetes mellitus without complications


Diagnosis:  Principal


Status:  Chronic


(12) CAD (coronary artery disease)


ICD Code:  I25.10 - Atherosclerotic heart disease of native coronary artery 

without angina pectoris


Diagnosis:  Principal


Status:  Chronic


(13) CHF (congestive heart failure)


ICD Code:  I50.9 - Heart failure, unspecified


Diagnosis:  Principal


Status:  Chronic


(14) Hypothyroidism


ICD Code:  E03.9 - Hypothyroidism, unspecified


Diagnosis:  Principal


Status:  Chronic


(15) GERD (gastroesophageal reflux disease)


ICD Code:  K21.9 - Gastro-esophageal reflux disease without esophagitis


Diagnosis:  Principal


Status:  Chronic


(16) Vitamin D deficiency


ICD Code:  E55.9 - Vitamin D deficiency, unspecified


Diagnosis:  Secondary


Status:  Chronic


(17) Lupus


ICD Code:  L93.0 - Discoid lupus erythematosus


Diagnosis:  Secondary


Status:  Chronic


(18) Depression


ICD Code:  F32.9 - Major depressive disorder, single episode, unspecified


Diagnosis:  Principal





(19) Vitamin B12 deficiency


ICD Code:  E53.8 - Deficiency of other specified B group vitamins


Diagnosis:  Secondary





(20) Neuropathy


ICD Code:  G62.9 - Polyneuropathy, unspecified


Diagnosis:  Secondary





(21) Chronic prescription opiate use


ICD Code:  Z79.891 - Long term (current) use of opiate analgesic


Diagnosis:  Secondary





(22) Pernicious anemia


ICD Code:  D51.0 - Vitamin B12 deficiency anemia due to intrinsic factor 

deficiency


Diagnosis:  Secondary





(23) Fibromyalgia


ICD Code:  M79.7 - Fibromyalgia


Diagnosis:  Secondary





(24) Diverticulosis


ICD Code:  K57.90 - Diverticulosis of intestine, part unspecified, without 

perforation or abscess without bleeding


Diagnosis:  Secondary





(25) COPD (chronic obstructive pulmonary disease)


ICD Code:  J44.9 - Chronic obstructive pulmonary disease, unspecified


Diagnosis:  Secondary





(26) Dyslipidemia


ICD Code:  E78.5 - Hyperlipidemia, unspecified


Diagnosis:  Secondary





(27) Pulmonary hypertension


ICD Code:  I27.20 - Pulmonary hypertension, unspecified


Diagnosis:  Secondary





(28) Tricuspid regurgitation


ICD Code:  I07.1 - Rheumatic tricuspid insufficiency


Diagnosis:  Secondary





(29) Osteoporosis


ICD Code:  M81.0 - Age-related osteoporosis without current pathological 

fracture


Diagnosis:  Secondary





(30) Rheumatoid arthritis


ICD Code:  M06.9 - Rheumatoid arthritis, unspecified


Diagnosis:  Secondary





(31) Peripheral vascular disease


ICD Code:  I73.9 - Peripheral vascular disease, unspecified


Diagnosis:  Secondary





(32) Benign colonic polyp


ICD Code:  K63.5 - Polyp of colon


Diagnosis:  Secondary





Procedures


NONE


Brief History - From Admission


This is a 62-year-old -American female.  Date of admission 4/8/2018.  

Complex past medical history including history of chronic systolic heart 

failure ejection fraction 20% status post AICD placement 2006.  Most recent 

echo revealed EF 65-70% with moderate TR/moderate to severe pulmonary 

hypertension, fibromyalgia, arthritis, rheumatoid arthritis, hypertension, 

diabetes mellitus with neuropathy, depression, hypothyroidism and COPD.  Today 

she presented to [facility] after having her AICD fire 5 times at home with 

no loss conscious.  Patient was incontinent.  2 weeks ago patient had wound VAC 

placed on 8 sacral decubitus ulcer by Dr. Navarro.





Workup included an EKG which revealed no acute ST elevation.  Potassium is 1.9.

  Magnesium is 1.4.  Troponin 0 0.14.  She received 150 mg amiodarone bolus 1.

  Currently is received 30 mEq IV potassium chloride and 90 mEq potassium 

chloride by mouth.  3 g mag sulfate were provided.  Will recheck at 7:00.  

Pacemaker will be interrogated.  Dr. Vargas will be notified of admission.





Patient is neurologically intact denying chest pain currently.  Very pleasant.


CBC/BMP:  


4/16/18 0621                                                                   

             4/16/18 0621





Significant Findings





Laboratory Tests








Test


  4/14/18


04:03 4/15/18


08:15 4/16/18


06:21


 


White Blood Count


  12.5 TH/MM3


(4.0-11.0) 11.6 TH/MM3


(4.0-11.0) 14.4 TH/MM3


(4.0-11.0)


 


Hemoglobin


  10.7 GM/DL


(11.6-15.3) 10.4 GM/DL


(11.6-15.3) 11.0 GM/DL


(11.6-15.3)


 


Hematocrit


  32.4 %


(35.0-46.0) 32.4 %


(35.0-46.0) 32.3 %


(35.0-46.0)


 


Mean Corpuscular Volume


  76.8 FL


(80.0-100.0) 77.6 FL


(80.0-100.0) 77.5 FL


(80.0-100.0)


 


Mean Corpuscular Hemoglobin


  25.4 PG


(27.0-34.0) 24.9 PG


(27.0-34.0) 26.3 PG


(27.0-34.0)


 


Neutrophils (%) (Auto)


  71.5 %


(16.0-70.0) 77.9 %


(16.0-70.0) 80.1 %


(16.0-70.0)


 


Monocytes (%) (Auto)


  11.1 %


(0.0-8.0) 9.6 %


(0.0-8.0) 


 


 


Neutrophils # (Auto)


  8.9 TH/MM3


(1.8-7.7) 9.0 TH/MM3


(1.8-7.7) 11.5 TH/MM3


(1.8-7.7)


 


Monocytes # (Auto)


  1.4 TH/MM3


(0-0.9) 1.1 TH/MM3


(0-0.9) 1.1 TH/MM3


(0-0.9)


 


Band Neutrophils % 10 % (0-6)   


 


Monocytes % 10 % (0-8)   


 


Neutrophils # (Manual)


  8.9 TH/MM3


(1.8-7.7) 


  12.5 TH/MM3


(1.8-7.7)


 


Myelocytes 1 % (0-0)   2 % (0-0) 


 


Blood Urea Nitrogen  6 MG/DL (7-18)  


 


Random Glucose


  


  314 MG/DL


() 219 MG/DL


()


 


Calcium Level


  


  7.8 MG/DL


(8.5-10.1) 


 


 


Sodium Level


  


  134 MEQ/L


(136-145) 135 MEQ/L


(136-145)


 


Estimat Glomerular Filtration


Rate 


  87 ML/MIN


(>89) 74 ML/MIN


(>89)


 


Neutrophils % (Manual)   85 % (16-70) 








Imaging





Last Impressions








Abdomen/Pelvis CT 4/15/18 0000 Signed





Impressions: 





 Service Date/Time:  [place], April 15, 2018 20:35 - CONCLUSION:  1. Increased 





 density seen in the subcutaneous tissues at the lower sacrococcygeal region is 





 likely related to sacral decubiti. 2. Mild hepatic steatosis. 3. Calcification 





 and minimal subcapsular fluid likely from prior injury of the spleen. There is 

a 





 2.4 cm hyperenhancing lesion seen at the superior aspect of the spleen. This 

is 





 nonspecific. The most common hyperenhancing splenic mass would be hemangioma. 





 These findings were present on a prior CTA from 10/5/2017. 4. Nonobstructing 





 renal stones. 5. Persistent 2.4 cm nonspecific right adrenal gland mass.     





 Abdon Salcedo MD 


 


Chest X-Ray 4/8/18 0000 Signed





Impressions: 





 Service Date/Time:  Sunday, April 8, 2018 12:24 - CONCLUSION:  1. Cardiomegaly 





 with mild basilar atelectasis. Pacer lead overlies right ventricle.     Dileep Vu MD 








PE at Discharge


GENERAL: 62-year-old AA female currently on room air in no acute distress


SKIN: Warm and dry.  Wound VAC over sacral/coccyx region


CARDIOVASCULAR: Regular rate and rhythm.  S1, S2 without murmur


RESPIRATORY:. Clear to auscultation. Breath sounds equal bilaterally. 


GASTROINTESTINAL: Abdomen soft, slightly tender lower quadrants which is 

improving, nondistended.  No prior scars noted


MUSCULOSKELETAL: Extremities without significant edema. No obvious deformities. 


NEUROLOGICAL: Awake and alert. No obvious cranial nerve deficits.  Motor 

grossly within normal limits. Five out of 5 muscle strength in the arms and 

legs.  Normal speech.


PSYCHIATRIC: Appropriate mood and affect; insight and judgment normal.


Hospital Course


Neuro/Psych:





Migraine headache


Chronic opioid use


Diabetic peripheral neuropathy


Depression/anxiety


History of fibromyalgia





Acetaminophen 650 mg p.o. every 6 hours as needed fever


Hydrocodone/acetaminophen 5/325 1 tab every 4 hours as needed pain 1 through 5


Morphine sulfate 2 mg IV every 2 hours as needed pain 6-10


Holding tizanidine 4 mg p.o. every 8 hours for muscle relaxant


Continue duloxetine 90 mg p.o. daily for depression


Patient is on Percocet 10/325 1 tablet every 6 hours as needed pain at home


Patient is on pregabalin 225 mg p.o. twice daily at home along with gabapentin 

600 mg p.o. 3 times daily.  This has been resumed


 


CV: 





Essential hypertension


History of chronic systolic heart failure currently EF 65-70%


Status post AICD placement


Coronary artery disease status post CABG


History of atrial fib relation status post ablation


Elevated troponin 





Resume metoprolol tartrate 100 mg p.o. twice daily, amlodipine 10 mg p.o. daily 

and valsartan 160 mg p.o. daily for hypertension


Resume clonidine 0.1 mg p.o. daily for hypertension


   Increase 0.2 mg twice daily per cardiology/Dr. Vargas


Echocardiogram 10/2017 revealed EF 65-70%.  Severe portal hypertension.  

Moderate TR.


Interrogate pacemaker completed 4/9


Aggressively replete electrolytes


Dr. Vargas recommended amiodarone 400 mg daily and continue beta-blocker and ARB


Serial troponin every 6 hours 2 peaked at 1.43.  Currently downward





Resp: 





History of COPD/prior tobaccoism quit 2001





Nasal cannula to maintain saturations greater than or equal to 92%


Incentive spirometry while awake


Chest x-ray revealed cardiomegaly/signs of mild heart failure


Albuterol/ipratropium aerosols every 6 hours with albuterol aerosols every 2 

hours as needed dyspnea





GI: 





History of hiatal hernia


History of esophageal dilatation


History of diverticulosis


History of internal and external hemorrhoids


Diverticulosis


Benign colonic polyp


Hepatic steatosis





2000 ADA diet


Pantoprazole for GI prophylaxis


Docusate sodium/senna 1 tablet twice daily for bowel regimen





:  





Discontinued Romano catheter





Endo:  





Diabetes mellitus type 2 with hyperglycemia


Hypothyroidism


Right adrenal gland mass.  Patient has known about this finding and will follow 

up with PCP





Refusing higher dose of Levemir just wants 8 units twice daily.  Continue to 

monitor fingersticks with sliding scale coverage hyperglycemia improved 

hypoglycemia protoco


Continue levothyroxine 125 mcg p.o. daily. TSH was 0.61





Renal: 





Creatinine currently 0.8


IV hydration discontinued


monitor urine output


Accurate I's and O





Heme:





History of pernicious anemia/microcytic





Monitor CBC daily.  Follow trends


No indication for transfusion of blood products at this time





ID:





C. difficile





Monitor for infection





Blood cultures 2 4/8 ordered by ED


Urine culture 4/9 pending


C. difficile positive.  No acute findings on CT of the abdomen and pelvis.  

Stable continue vancomycin 250 mg p.o. every 6 hours 10 days total.  

Infectious guidelines severe with elevated white cell count greater than 15,

000.  





MSK/Rheum:





Osteoporosis


Vitamin B-12 deficiency


SLE


Rheumatoid arthritis


Sacral decubitus ulcer





Holding vitamin B12 1000 units subcu monthly and alendronate 70 mg by mouth 

weekly


Wound care evaluate and treat





FEN:





Hypokalemia -severe symptomatic resolved


Hypophosphatemia


Hypocalcemia





Patient is on potassium chloride 20 mEq daily at home.  Resume


Status post replacement of electrolytes





Access:


-Utilize peripheral IV.  Central line if indicated





Prophylaxis


-GI -pantoprazole


-DVT -SCD/heparin


Pt Condition on Discharge:  Stable


Discharge Disposition:  Disch w/ Home Health Serv


Discharge Time:  > 30 minutes


Discharge Instructions


DIET: Follow Instructions for:  Heart Healthy Diet, Diabetic Diet


Activities you can perform:  Regular-No Restrictions


Activities to Avoid:  Driving


Follow up Referrals:  


Cardiology - 1 Week with Narinder Vargas MD WITH DR. VARGAS OFFICE SAID 

THEY WILL CALL THE PATIENT TO SCHEDULE A FOLLOW UP APPOINTMENT


PCP Follow-up - 1 Week with DR. BÁRBARA CASTANEDA





New Medications:  


Amiodarone (Amiodarone) 200 Mg Tab


400 MG PO DAILY for Regulate Heart Beat, #60 TAB





Clonidine (Catapres) 0.2 Mg Tab


0.2 MG PO TID for Blood Pressure Management, #90 TAB





Lactobacillus Acidophilus (Floranex) 1 Gm Pkt


1 GM PO TID for Bowel Management, #45 GM





Vancomycin Inj (Vancomycin Inj) 500 Mg Inj


250 MG PO QID for Infection, #30 INJECTION


take 1 pill 4 times a day for 6 days til April 21 then 1 pill 3 times


 a day for 1 day then 1 pill twice a day for 1 day then 1 pill once a


 day for 1 day then discontinue


 


Continued Medications:  


Alendronate (Alendronate) 70 Mg Tab


Unknown Dose PO Q7D for Osteporosis Treatment, #4 TAB 0 Refills





Amlodipine (Norvasc) 10 Mg Tab


10 MG PO DAILY for htn for 30 Days, #30 TAB





Aspirin (Aspirin Children's) 81 Mg Chew


81 MG CHEW DAILY, TAB 0 Refills





Calcium/Vitamin D (Oyster Shell 250 mg + Vit D Tb) 250 Mg Calcium (625 Mg)-125 

Unit Tablet


500 MG PO TID, #90





Cyanocobalamin Inj (Cyanocobalamin Inj) 1,000 Mcg/Ml Inj


1000 MCG SQ 1 month, #1 VIAL 0 Refills





Duloxetine DR (Duloxetine DR) 30 Mg Capdr


90 MG PO DAILY, #30 CAP 0 Refills





Gabapentin (Gabapentin) 300 Mg Cap


600 MG PO TID, #60 CAP 0 Refills





Insulin Glargine Inj (Lantus Inj) 1,000 Unit/10 Ml Vial


20 UNITS SQ DAILY for Blood Sugar Management, VIAL 0 Refills





Levothyroxine (Levothyroxine) 125 Mcg Tab


125 MCG PO DAILY for Thyroid, #30 TAB 0 Refills





Metoprolol Tartrate (Metoprolol Tartrate) 100 Mg Tab


100 MG PO BID, #60 TAB 0 Refills





Multi-Vit/Iron-Vit C-B12-Folic Acid (Integra Plus) 191-210-0.01-1 mg Cap


1 CAP PO DAILY





Nitroglycerin SL (Nitroglycerin SL) 0.4 Mg Subl


0.4 MG SL AS DIRECTED PRN for CHEST PAIN, #100 TAB.SL 0 Refills


ONE TABLET UNDER THE TONGUE AS NEEDED FOR CHEST PAIN, MAY REPEAT EVERY


 FIVE MINUTES FOR A TOTAL OF 3 DOSES OR CALL 911 IF NO RELIEF


Oxycodone HCl/Acetaminophen (Oxycodone-Acetaminophen ) 10 Mg-325 Mg Tablet


1 TAB PO Q6HR PRN for PAIN SCALE 5 TO 10, #90





Potassium Chloride ER (Potassium Chloride ER) 20 Meq Tab


20 MEQ PO DAILY for Electrolyte Replacement, #30 TAB 0 Refills





Pregabalin (Lyrica) 225 Mg Cap


225 MG PO BID, #60 CAP 0 Refills





Valsartan (Valsartan) 160 Mg Tab


160 MG PO DAILY, #30 TAB 0 Refills

















Candido Garrison MD Apr 16, 2018 14:47

## 2018-04-16 NOTE — HHI.PR
Subjective


Remarks


Follow-up C. difficile.  States she is doing better having frequent but semi-

formed stools.  Discussed with nursing stable for discharge





Objective


Vitals





Vital Signs








  Date Time  Temp Pulse Resp B/P (MAP) Pulse Ox O2 Delivery O2 Flow Rate FiO2


 


4/16/18 08:00 97.7 69 17 147/68 (94) 97   


 


4/16/18 04:00 98.0 73 17 113/57 (75) 97   


 


4/16/18 04:00  65      


 


4/16/18 00:00 98.1 64 17 120/65 (83) 100   


 


4/15/18 23:46  59      


 


4/15/18 20:00 97.9 69 17 115/67 (83) 100   


 


4/15/18 20:00      Room Air  


 


4/15/18 19:49  62      


 


4/15/18 16:00 97.5 63 17 111/63 (79) 99   














I/O      


 


 4/15/18 4/15/18 4/15/18 4/16/18 4/16/18 4/16/18





 07:00 15:00 23:00 07:00 15:00 23:00


 


Intake Total 480 ml  2278 ml 240 ml  


 


Output Total 1100 ml  1100 ml 3100 ml  


 


Balance -620 ml  1178 ml -2860 ml  


 


      


 


Intake Oral 480 ml  2278 ml 240 ml  


 


Output Urine Total 1100 ml  1100 ml 3000 ml  


 


Drainage Total    100 ml  


 


# Bowel Movements 0  3 1  








Result Diagram:  


4/16/18 0621                                                                   

             4/16/18 0621





Imaging





Last Impressions








Abdomen/Pelvis CT 4/15/18 0000 Signed





Impressions: 





 Service Date/Time:  [place], April 15, 2018 20:35 - CONCLUSION:  1. Increased 





 density seen in the subcutaneous tissues at the lower sacrococcygeal region is 





 likely related to sacral decubiti. 2. Mild hepatic steatosis. 3. Calcification 





 and minimal subcapsular fluid likely from prior injury of the spleen. There is 

a 





 2.4 cm hyperenhancing lesion seen at the superior aspect of the spleen. This 

is 





 nonspecific. The most common hyperenhancing splenic mass would be hemangioma. 





 These findings were present on a prior CTA from 10/5/2017. 4. Nonobstructing 





 renal stones. 5. Persistent 2.4 cm nonspecific right adrenal gland mass.     





 Abdon Salcedo MD 


 


Chest X-Ray 4/8/18 0000 Signed





Impressions: 





 Service Date/Time:  Sunday, April 8, 2018 12:24 - CONCLUSION:  1. Cardiomegaly 





 with mild basilar atelectasis. Pacer lead overlies right ventricle.     Dileep Vu MD 








Objective Remarks


GENERAL: 62-year-old AA female currently on room air in no acute distress


SKIN: Warm and dry.  Wound VAC over sacral/coccyx region


CARDIOVASCULAR: Regular rate and rhythm.  S1, S2 without murmur


RESPIRATORY:. Clear to auscultation. Breath sounds equal bilaterally. 


GASTROINTESTINAL: Abdomen soft, slightly tender lower quadrants which is 

improving, nondistended.  No prior scars noted


MUSCULOSKELETAL: Extremities without significant edema. No obvious deformities. 


NEUROLOGICAL: Awake and alert. No obvious cranial nerve deficits.  Motor 

grossly within normal limits. Five out of 5 muscle strength in the arms and 

legs.  Normal speech.


PSYCHIATRIC: Appropriate mood and affect; insight and judgment normal.


Procedures


NONE





A/P


Problem List:  


(1) Hypoproteinemia


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


(2) Sacral decubitus ulcer


ICD Code:  L89.159 - Pressure ulcer of sacral region, unspecified stage


(3) Hyperglycemia


ICD Code:  R73.9 - Hyperglycemia, unspecified


(4) AICD discharge


ICD Code:  Z45.02 - Encounter for adjustment and management of automatic 

implantable cardiac defibrillator


Status:  Acute


(5) Hypokalemia


ICD Code:  E87.6 - Hypokalemia


Status:  Acute


(6) Hypomagnesemia


ICD Code:  E83.42 - Hypomagnesemia


Status:  Acute


(7) Hypocalcemia


ICD Code:  E83.51 - Hypocalcemia


Status:  Acute


(8) Leukocytosis


ICD Code:  D72.829 - Elevated white blood cell count, unspecified


Status:  Acute


(9) Elevated troponin I level


ICD Code:  R74.8 - Abnormal levels of other serum enzymes


Status:  Acute


(10) HTN (hypertension)


ICD Code:  I10 - Essential (primary) hypertension


Status:  Chronic


(11) Diabetes mellitus


ICD Code:  E11.9 - Type 2 diabetes mellitus without complications


Status:  Chronic


(12) CAD (coronary artery disease)


ICD Code:  I25.10 - Atherosclerotic heart disease of native coronary artery 

without angina pectoris


Status:  Chronic


(13) CHF (congestive heart failure)


ICD Code:  I50.9 - Heart failure, unspecified


Status:  Chronic


(14) Hypothyroidism


ICD Code:  E03.9 - Hypothyroidism, unspecified


Status:  Chronic


(15) GERD (gastroesophageal reflux disease)


ICD Code:  K21.9 - Gastro-esophageal reflux disease without esophagitis


Status:  Chronic


(16) Vitamin D deficiency


ICD Code:  E55.9 - Vitamin D deficiency, unspecified


Status:  Chronic


(17) Lupus


ICD Code:  L93.0 - Discoid lupus erythematosus


Status:  Chronic


(18) Depression


ICD Code:  F32.9 - Major depressive disorder, single episode, unspecified


(19) Vitamin B12 deficiency


ICD Code:  E53.8 - Deficiency of other specified B group vitamins


(20) Neuropathy


ICD Code:  G62.9 - Polyneuropathy, unspecified


(21) Chronic prescription opiate use


ICD Code:  Z79.891 - Long term (current) use of opiate analgesic


(22) Pernicious anemia


ICD Code:  D51.0 - Vitamin B12 deficiency anemia due to intrinsic factor 

deficiency


(23) Fibromyalgia


ICD Code:  M79.7 - Fibromyalgia


(24) Diverticulosis


ICD Code:  K57.90 - Diverticulosis of intestine, part unspecified, without 

perforation or abscess without bleeding


(25) COPD (chronic obstructive pulmonary disease)


ICD Code:  J44.9 - Chronic obstructive pulmonary disease, unspecified


(26) Dyslipidemia


ICD Code:  E78.5 - Hyperlipidemia, unspecified


(27) Pulmonary hypertension


ICD Code:  I27.20 - Pulmonary hypertension, unspecified


(28) Tricuspid regurgitation


ICD Code:  I07.1 - Rheumatic tricuspid insufficiency


(29) Osteoporosis


ICD Code:  M81.0 - Age-related osteoporosis without current pathological 

fracture


(30) Rheumatoid arthritis


ICD Code:  M06.9 - Rheumatoid arthritis, unspecified


(31) Peripheral vascular disease


ICD Code:  I73.9 - Peripheral vascular disease, unspecified


(32) Benign colonic polyp


ICD Code:  K63.5 - Polyp of colon


Assessment and Plan


Neuro/Psych:





Migraine headache


Chronic opioid use


Diabetic peripheral neuropathy


Depression/anxiety


History of fibromyalgia





Acetaminophen 650 mg p.o. every 6 hours as needed fever


Hydrocodone/acetaminophen 5/325 1 tab every 4 hours as needed pain 1 through 5


Morphine sulfate 2 mg IV every 2 hours as needed pain 6-10


Holding tizanidine 4 mg p.o. every 8 hours for muscle relaxant


Continue duloxetine 90 mg p.o. daily for depression


Patient is on Percocet 10/325 1 tablet every 6 hours as needed pain at home


Patient is on pregabalin 225 mg p.o. twice daily at home along with gabapentin 

600 mg p.o. 3 times daily.  This has been resumed


 


CV: 





Essential hypertension


History of chronic systolic heart failure currently EF 65-70%


Status post AICD placement


Coronary artery disease status post CABG


History of atrial fib relation status post ablation


Elevated troponin 





Resume metoprolol tartrate 100 mg p.o. twice daily, amlodipine 10 mg p.o. daily 

and valsartan 160 mg p.o. daily for hypertension


Resume clonidine 0.1 mg p.o. daily for hypertension


   Increase 0.2 mg twice daily per cardiology/Dr. Treadwell


Echocardiogram 10/2017 revealed EF 65-70%.  Severe portal hypertension.  

Moderate TR.


Interrogate pacemaker completed 4/9


Aggressively replete electrolytes


Dr. Treadwell recommended amiodarone 400 mg daily and continue beta-blocker and ARB


Serial troponin every 6 hours 2 peaked at 1.43.  Currently downward





Resp: 





History of COPD/prior tobaccoism quit 2001





Nasal cannula to maintain saturations greater than or equal to 92%


Incentive spirometry while awake


Chest x-ray revealed cardiomegaly/signs of mild heart failure


Albuterol/ipratropium aerosols every 6 hours with albuterol aerosols every 2 

hours as needed dyspnea





GI: 





History of hiatal hernia


History of esophageal dilatation


History of diverticulosis


History of internal and external hemorrhoids


Diverticulosis


Benign colonic polyp


Hepatic steatosis





2000 ADA diet


Pantoprazole for GI prophylaxis


Docusate sodium/senna 1 tablet twice daily for bowel regimen





:  





Discontinued Romano catheter





Endo:  





Diabetes mellitus type 2 with hyperglycemia


Hypothyroidism


Right adrenal gland mass.  Patient has known about this finding and will follow 

up with PCP





Refusing higher dose of Levemir just wants 8 units twice daily.  Continue to 

monitor fingersticks with sliding scale coverage hyperglycemia improved 

hypoglycemia protoco


Continue levothyroxine 125 mcg p.o. daily. TSH was 0.61





Renal: 





Creatinine currently 0.8


IV hydration discontinued


monitor urine output


Accurate I's and O





Heme:





History of pernicious anemia/microcytic





Monitor CBC daily.  Follow trends


No indication for transfusion of blood products at this time





ID:





C. difficile





Monitor for infection





Blood cultures 2 4/8 ordered by ED


Urine culture 4/9 pending


C. difficile positive.  No acute findings on CT of the abdomen and pelvis.  

Stable continue vancomycin 250 mg p.o. every 6 hours 10 days total.  

Infectious guidelines severe with elevated white cell count greater than 15,

000.  





MSK/Rheum:





Osteoporosis


Vitamin B-12 deficiency


SLE


Rheumatoid arthritis


Sacral decubitus ulcer





Holding vitamin B12 1000 units subcu monthly and alendronate 70 mg by mouth 

weekly


Wound care evaluate and treat





FEN:





Hypokalemia -severe symptomatic resolved


Hypophosphatemia


Hypocalcemia





Patient is on potassium chloride 20 mEq daily at home.  Resume


Status post replacement of electrolytes





Access:


-Utilize peripheral IV.  Central line if indicated





Prophylaxis


-GI -pantoprazole


-DVT -SCD/heparin


Discharge Planning


Stable for discharge she





Problem Qualifiers





(1) Sacral decubitus ulcer:  


Qualified Codes:  L89.159 - Pressure ulcer of sacral region, unspecified stage


(2) Leukocytosis:  


Qualified Codes:  D72.829 - Elevated white blood cell count, unspecified


(3) HTN (hypertension):  


Qualified Codes:  I10 - Essential (primary) hypertension


(4) Diabetes mellitus:  


Qualified Codes:  E11.65 - Type 2 diabetes mellitus with hyperglycemia; Z79.4 - 

Long term (current) use of insulin


(5) CAD (coronary artery disease):  


Qualified Codes:  I25.10 - Atherosclerotic heart disease of native coronary 

artery without angina pectoris


(6) CHF (congestive heart failure):  


Qualified Codes:  I50.32 - Chronic diastolic (congestive) heart failure


(7) Hypothyroidism:  


Qualified Codes:  E03.9 - Hypothyroidism, unspecified


(8) GERD (gastroesophageal reflux disease):  


Qualified Codes:  K21.9 - Gastro-esophageal reflux disease without esophagitis


(9) Lupus:  


Qualified Codes:  L93.0 - Discoid lupus erythematosus


(10) Depression:  





(11) Diverticulosis:  


Qualified Codes:  K57.90 - Diverticulosis of intestine, part unspecified, 

without perforation or abscess without bleeding


(12) COPD (chronic obstructive pulmonary disease):  


Qualified Codes:  J44.9 - Chronic obstructive pulmonary disease, unspecified


(13) Tricuspid regurgitation:  


Qualified Codes:  I07.1 - Rheumatic tricuspid insufficiency


(14) Osteoporosis:  


Qualified Codes:  M81.0 - Age-related osteoporosis without current pathological 

fracture


(15) Rheumatoid arthritis:  


Qualified Codes:  M06.9 - Rheumatoid arthritis, unspecified








Candido Garrison MD Apr 16, 2018 12:24

## 2018-06-07 ENCOUNTER — HOSPITAL ENCOUNTER (EMERGENCY)
Dept: HOSPITAL 17 - NEPC | Age: 63
Discharge: HOME | End: 2018-06-07
Payer: MEDICARE

## 2018-06-07 VITALS
RESPIRATION RATE: 16 BRPM | OXYGEN SATURATION: 98 % | DIASTOLIC BLOOD PRESSURE: 98 MMHG | SYSTOLIC BLOOD PRESSURE: 194 MMHG | HEART RATE: 90 BPM

## 2018-06-07 VITALS
RESPIRATION RATE: 16 BRPM | OXYGEN SATURATION: 99 % | HEART RATE: 86 BPM | DIASTOLIC BLOOD PRESSURE: 98 MMHG | SYSTOLIC BLOOD PRESSURE: 201 MMHG

## 2018-06-07 VITALS
OXYGEN SATURATION: 98 % | HEART RATE: 80 BPM | SYSTOLIC BLOOD PRESSURE: 194 MMHG | RESPIRATION RATE: 16 BRPM | DIASTOLIC BLOOD PRESSURE: 97 MMHG

## 2018-06-07 VITALS
SYSTOLIC BLOOD PRESSURE: 185 MMHG | RESPIRATION RATE: 16 BRPM | HEART RATE: 105 BPM | DIASTOLIC BLOOD PRESSURE: 76 MMHG | OXYGEN SATURATION: 99 % | TEMPERATURE: 98.8 F

## 2018-06-07 VITALS — DIASTOLIC BLOOD PRESSURE: 81 MMHG | SYSTOLIC BLOOD PRESSURE: 160 MMHG

## 2018-06-07 DIAGNOSIS — R60.0: Primary | ICD-10-CM

## 2018-06-07 DIAGNOSIS — J44.9: ICD-10-CM

## 2018-06-07 DIAGNOSIS — Z87.19: ICD-10-CM

## 2018-06-07 DIAGNOSIS — I50.9: ICD-10-CM

## 2018-06-07 DIAGNOSIS — K21.9: ICD-10-CM

## 2018-06-07 DIAGNOSIS — I10: ICD-10-CM

## 2018-06-07 DIAGNOSIS — I25.10: ICD-10-CM

## 2018-06-07 DIAGNOSIS — I11.0: ICD-10-CM

## 2018-06-07 DIAGNOSIS — E11.9: ICD-10-CM

## 2018-06-07 PROCEDURE — 72170 X-RAY EXAM OF PELVIS: CPT

## 2018-06-07 PROCEDURE — 93971 EXTREMITY STUDY: CPT

## 2018-06-07 PROCEDURE — 99284 EMERGENCY DEPT VISIT MOD MDM: CPT

## 2018-06-07 NOTE — PD
HPI


Chief Complaint:  Numbness/Tingling


Time Seen by Provider:  21:04


Travel History


International Travel<30 days:  No


Contact w/Intl Traveler<30days:  No


Traveled to known affect area:  No





History of Present Illness


HPI


62-year-old female presents to the emergency department at the recommendation 

of her primary care provider after office visit today was noted swelling to the 

left lower extremity.  Patient was sent to the emergency room for ultrasound to 

evaluate for DVT.  Patient denies any specific injury or pain.  Patient has 

noted some swelling and some discoloration.  Patient did have hip surgery 

2017.  Patient denies any new fall or injury.  Patient states that she 

is still followed by Dr. Navarro for recovery of surgery performed on decubitus 

ulcer and still has wound VAC has had no issues with her ulcer.  No fever no 

chills no nausea no vomiting no abdominal pain no chest pain no shortness of 

breath no pleuritic pain no hemoptysis.  Patient does not recall having mild 

swelling of the left lower extremity in the past.  Therefore was sent to the 

emergency room for possible DVT evaluation.  Patient also has history of 

hypertension does take as needed clonidine and does take antihypertensive 

medications when she has taken to days.  Patient denies any headache visual 

disturbance altered mentation difficulty with her speech any focal numbness 

tingling or weakness also denies any new balance disturbance.  Patient uses 

cane to assist with ambulation chronically.





PFSH


Past Medical History


*** Narrative Medical


Low-dose aspirin therapy, cephalgia, esophageal stricture, aspiration pneumonia

, esophagitis, closed hip fracture, atrial tachycardia, anemia, acute kidney 

injury, hypoglycemia, diabetes, CHF, hypothyroidism, GERD, hypertension, sacral 

decubitus ulcer status post surgical debridement and wound VAC placement, 

pulmonary hypertension, CAD, nonischemic cardiomyopathy; no tobacco use; 

nursing notes reviewed


Hx Anticoagulant Therapy:  Yes (low dose ASA daily. )


Anemia:  Yes (PERNICIOUS)


Arthritis:  Yes


Asthma:  Yes


Autoimmune Disease:  No


Blood Disorders:  No


Anxiety:  No


Depression:  No


Heart Rhythm Problems:  Yes (Afib )


Cancer:  No


Cardiac Catheterization:  Yes (in  for ablation)


Cardiomyopathy:  Yes


Cardiovascular Problems:  Yes


High Cholesterol:  No


Chemotherapy:  No


Chest Pain:  No


Congestive Heart Failure:  Yes


COPD:  Yes


Cerebrovascular Accident:  No


Coronary Artery Disease:  Yes


Diabetes:  Yes


Patient Takes Glucophage:  No


Diminished Hearing:  No


Endocrine:  Yes


Fibromyalgia:  Yes


Gastrointestinal Disorders:  Yes (Gastroparesis)


GERD:  Yes


Glaucoma:  No


Genitourinary:  Yes


Headaches:  Yes


Hepatitis:  No


Hiatal Hernia:  Yes


Heparin Induced Thrombocytopen:  No


Herniated Disk:  Yes


Hypertension:  Yes


Immune Disorder:  No


Implanted Vascular Access Dvce:  Yes


Kidney Stones:  Yes


Medical other:  Yes (LUPUS, MIGRAINES, ANEMIA, FIBROMYALGIA, RA )


Musculoskeletal:  Yes


Neurologic:  Yes


Psychiatric:  No


Reproductive:  No


Respiratory:  Yes


Immunizations Current:  Yes


Migraines:  No


Myocardial Infarction:  Yes ()


Radiation Therapy:  No


Renal Failure:  No


Seizures:  No


Sickle Cell Disease:  No


Sleep Apnea:  No


Thyroid Disease:  Yes (Hypothyroidism)


Ulcer:  Yes


Tetanus Vaccination:  > 5 Years


Influenza Vaccination:  No


Menopausal:  Yes


:  2


Para:  1


Miscarriage:  1


Dilation and Curettage (D&C):  Yes





Past Surgical History


Abdominal Surgery:  Yes


AICD:  Yes (Defibrilator)


Arteriovenous Shunt:  No


Cardiac Surgery:  Yes (Defibrilator placed )


 Section:  Yes (x1)


Coronary Artery Bypass Graft:  Yes


Ear Surgery:  No


Endocrine Surgery:  No


Eye Surgery:  No


Genitourinary Surgery:  No


Gynecologic Surgery:  Yes (Hysterectomy)


Hysterectomy:  Yes


Insulin Pump:  No


Joint Replacement:  Yes (Left hip fx)


Neurologic Surgery:  No


Oral Surgery:  Yes (Dentures )


Pacemaker:  Yes


Thoracic Surgery:  No


Other Surgery:  Yes





Social History


Alcohol Use:  No


Tobacco Use:  No


Substance Use:  No





Allergies-Medications


(Allergen,Severity, Reaction):  


Coded Allergies:  


     No Known Allergies (Verified  Allergy, Unknown, 18)


Reported Meds & Prescriptions





Reported Meds & Active Scripts


Active


Amiodarone (Amiodarone HCl) 200 Mg Tab 400 Mg PO DAILY


Oxycodone-Acetaminophen  (Oxycodone HCl/Acetaminophen) 10 Mg-325 Mg 

Tablet 1 Tab PO Q6HR PRN


Norvasc (Amlodipine Besylate) 10 Mg Tab 10 Mg PO DAILY 30 Days


Metoprolol Tartrate 100 Mg Tab 100 Mg PO BID


Levothyroxine (Levothyroxine Sodium) 125 Mcg Tab 125 Mcg PO DAILY


Reported


Lantus Inj (Insulin Glargine) 1,000 Unit/10 Ml Vial 20 Units SQ DAILY


Valsartan 160 Mg Tab 160 Mg PO DAILY


Clonidine (Clonidine HCl) 0.1 Mg Tab 0.1 Mg PO DAILY


Potassium Chloride ER (Potassium Chloride) 20 Meq Tab 20 Meq PO DAILY


Integra Plus (Multi-Vit/Iron-Vit C-B12-Folic Acid) 191-210-0.01-1 mg Cap 1 Cap 

PO DAILY


Duloxetine DR (Duloxetine HCl) 30 Mg Capdr 90 Mg PO DAILY


Lyrica (Pregabalin) 225 Mg Cap 225 Mg PO BID


Alendronate (Alendronate Sodium) 70 Mg Tab Unknown Dose PO Q7D


Nitroglycerin SL (Nitroglycerin) 0.4 Mg Subl 0.4 Mg SL AS DIRECTED PRN


     ONE TABLET UNDER THE TONGUE AS NEEDED FOR CHEST PAIN, MAY REPEAT EVERY


     FIVE MINUTES FOR A TOTAL OF 3 DOSES OR CALL 911 IF NO RELIEF


Cyanocobalamin Inj (Cyanocobalamin) 1,000 Mcg/Ml Inj 1,000 Mcg SQ 1 MONTH








Review of Systems


Except as stated in HPI:  all other systems reviewed are Neg


General / Constitutional:  No: Fever, Chills


Eyes:  No: Visual changes


HENT:  No: Headaches, Vertigo, Lightheadedness


Cardiovascular:  Positive: Edema (Left lower extremity mild), No: Chest Pain or 

Discomfort, Palpitations, Diaphoresis, Syncope, Claudication ( swelling)


Respiratory:  No: Cough, Shortness of Breath, Wheezing, Hemoptysis, Pleuritic 

Pain


Gastrointestinal:  No: Nausea, Vomiting, Diarrhea, Abdominal Pain


Genitourinary:  No: Dysuria, Flank Pain


Musculoskeletal:  Positive: Edema (Left lower leg), No: Myalgias, Arthralgias


Skin:  No Rash, No Itching, No Dryness, No Lumps


Neurologic:  No: Weakness, Dizziness, Syncope, Focal Abnormalities, 

Coordination Problem


Psychiatric:  No: Anxiety


Hematologic/Lymphatic:  No: Easy Bruising





Physical Exam


Narrative


GENERAL: Well-developed well-nourished female in no acute distress no 

respiratory distress GCS 15


SKIN: Warm and dry.


HEAD: Normocephalic.


EYES: No scleral icterus. No injection or drainage. 


NECK: Supple, trachea midline. No JVD or lymphadenopathy.


CARDIOVASCULAR: Regular rate and rhythm without murmurs, gallops, or rubs. 


RESPIRATORY: Breath sounds equal bilaterally. No accessory muscle use.


GASTROINTESTINAL: Abdomen soft, non-tender, nondistended. 


MUSCULOSKELETAL: No cyanosis, mild left lower extremity edema with mild 

erythema no pallor no coolness no increased warmth no tenderness to palpation 

no posterior calf cording no Homans sign dorsalis pedis pulses 2+ to palpation 

capillary refill is brisk and less than 2 seconds.  Patient demonstrates intact 

range of motion of the hip and knee and ankle.  No deformity. 


BACK: Nontender without obvious deformity. No CVA tenderness. Sacral wound vac 

in place








Data


Data


Last Documented VS





Vital Signs








  Date Time  Temp Pulse Resp B/P (MAP) Pulse Ox O2 Delivery O2 Flow Rate FiO2


 


18 23:14    160/81 (107)    


 


18 22:50  80 16  98 Room Air  


 


18 19:58 98.8       








Orders





 Orders


Us Leg Venous Doppler (18 )


Pelvis, Ap Only (Routine) (18 )


Clonidine (Catapres) (18 22:15)








MDM


Medical Decision Making


Medical Screen Exam Complete:  Yes


Emergency Medical Condition:  Yes


Medical Record Reviewed:  Yes


Interpretation(s)





Last Impressions








Pelvis X-Ray 18 0000 Signed





Impressions: 





 CONCLUSION:





 No acute pelvis abnormality is identified. There is old fracture of the left pr





 oximal humerus post-ORIF.





  





 


 


Lower Extremity Ultrasound 18 0000 Signed





Impressions: 





 CONCLUSION: 





 There is no venous thrombosis within the left lower extremity.





  





 








Vital Signs








  Date Time  Temp Pulse Resp B/P (MAP) Pulse Ox O2 Delivery O2 Flow Rate FiO2


 


18 23:14    160/81 (107)    


 


18 22:50  80 16 194/97 (129) 98 Room Air  


 


18 22:26  90 16 194/98 (130) 98 Room Air  


 


18 20:48  86 16 201/98 (132) 99 Room Air  


 


18 19:58 98.8 105 16 185/76 (112) 99   








Differential Diagnosis


DVT, cellulitis, limb length discrepancy, lymphedema, dependent edema, hip 

fracture subluxation dislocation, hypertension


Narrative Course


Ultrasound of the left lower extremity ordered along with pelvic x-ray; patient 

hypertensive


Ultrasound is negative for DVT pelvic x-ray reveals no acute bony abnormality; 

patient remains hypertensive given clonidine 0.1 mg 1 dose patient is taking 

this medication as a daily medication reports she has not skipped any dosages 

of this medication


Patient asymptomatic otherwise doing well voices no concerns or complaints is 

stable at this time for outpatient management and follow-up with her primary 

care provider


Patient with good blood pressure response after one-time dose of clonidine 0.1 

mg by mouth patient is stable for outpatient management at this time.  Patient'

s questions have been answered to her satisfaction.





Diagnosis





 Primary Impression:  


 Leg edema, left


 Additional Impression:  


 Hypertension


Referrals:  


Primary Care Physician


Patient Instructions:  General Instructions





***Additional Instructions:  


Elevate left lower extremity


Follow-up with your primary care provider and wound care provider


Take your blood pressure medications as prescribed


Return to the emergency department for any concerns or change in condition


***Med/Other Pt SpecificInfo:  No Change to Meds


Disposition:  01 DISCHARGE HOME


Condition:  Stable











Margie Luna MD 2018 21:51

## 2018-06-07 NOTE — RADRPT
EXAM DATE:  6/7/2018 9:23 PM EDT

AGE/SEX:        62 years / Female



INDICATIONS:  Anterior pelvic pain, no known injury.



CLINICAL DATA:  This is the patient's initial encounter. Patient reports that signs and symptoms have
 been present for 3 days and indicates a pain score of 8/10. 

                                                                          

MEDICAL/SURGICAL HISTORY:       . Bilateral wound vac posterior pelvis.  . Left hip troch nail.



COMPARISON:      [facility] – [place] ABDOMEN & PELVIS W CONTRAST, 4/15/2018.  . 





FINDINGS:  

Single view of the pelvis demonstrates no acute fracture or dislocation. The bones appear underminera
lized. Sacroiliac joints demonstrate no abnormality. There is antegrade intramedullary duke within the
 proximal femur with a proximal compression screw. There is an old fracture in the left intertrochant
jacque region. No soft tissue abnormality is identified. Some type of tubing overlies the right pelvis.




CONCLUSION:

No acute pelvis abnormality is identified. There is old fracture of the left proximal humerus post-OR
IF.



Electronically signed by: Abdon Dalton MD  6/7/2018 9:31 PM EDT

## 2018-06-07 NOTE — RADRPT
EXAM DATE:  6/7/2018 10:02 PM EDT

AGE/SEX:        62 years / Female



INDICATIONS:  Left lower extremity pain and swelling.



CLINICAL DATA:  This is the patient's initial encounter. Patient reports that signs and symptoms have
 been present for 3 days and indicates a pain score of 3/10. 

                                                                          

MEDICAL/SURGICAL HISTORY:       Cardiovascular disease. Congestive heart failure. Rheumatoid arthriti
s. Lupus. Fibromyalgia. Diabetes. Hypertension.  Hysterectomy.  CABG.  Fusion, cervical.  Myomectomy.
 Pacemaker.



COMPARISON:      No prior exams available for comparison. 





TECHNIQUE:  Venous ultrasound of both lower extremities was performed from the inguinal ligament to t
he proximal calf.  Real-time, color Doppler and spectral tracing, compression and augmentation techni
ques were used.  



FINDINGS:  

There is normal compressibility of the deep venous system from the inguinal region to the proximal ca
lf.  No echogenic clot is seen in the lumen of the common femoral, femoral, popliteal, and posterior 
tibial veins.  There is a normal response of the venous system to proximal and distal augmentation an
d respiration.   



CONCLUSION: 

There is no venous thrombosis within the left lower extremity.



Electronically signed by: Abdon Dalton MD  6/7/2018 10:06 PM EDT